# Patient Record
Sex: FEMALE | Race: WHITE | NOT HISPANIC OR LATINO | Employment: OTHER | URBAN - NONMETROPOLITAN AREA
[De-identification: names, ages, dates, MRNs, and addresses within clinical notes are randomized per-mention and may not be internally consistent; named-entity substitution may affect disease eponyms.]

---

## 2017-01-11 ENCOUNTER — OFFICE VISIT (OUTPATIENT)
Dept: FAMILY MEDICINE CLINIC | Facility: CLINIC | Age: 67
End: 2017-01-11

## 2017-01-11 VITALS
BODY MASS INDEX: 26.87 KG/M2 | WEIGHT: 133.3 LBS | DIASTOLIC BLOOD PRESSURE: 80 MMHG | HEART RATE: 94 BPM | SYSTOLIC BLOOD PRESSURE: 132 MMHG | HEIGHT: 59 IN | OXYGEN SATURATION: 98 %

## 2017-01-11 DIAGNOSIS — M25.551 RIGHT HIP PAIN: Primary | ICD-10-CM

## 2017-01-11 PROCEDURE — 99213 OFFICE O/P EST LOW 20 MIN: CPT | Performed by: FAMILY MEDICINE

## 2017-01-11 RX ORDER — ONDANSETRON 4 MG/1
4 TABLET, ORALLY DISINTEGRATING ORAL EVERY 8 HOURS PRN
Qty: 90 TABLET | Refills: 3 | Status: SHIPPED | OUTPATIENT
Start: 2017-01-11 | End: 2017-05-27

## 2017-01-11 RX ORDER — ACETAMINOPHEN 325 MG/1
650 TABLET ORAL EVERY 6 HOURS PRN
Qty: 240 TABLET | Refills: 5 | Status: SHIPPED | OUTPATIENT
Start: 2017-01-11 | End: 2017-02-07 | Stop reason: HOSPADM

## 2017-01-11 RX ORDER — CLINDAMYCIN HYDROCHLORIDE 150 MG/1
CAPSULE ORAL
Qty: 28 CAPSULE | Refills: 0 | Status: ON HOLD | OUTPATIENT
Start: 2017-01-11 | End: 2017-02-06 | Stop reason: SDUPTHER

## 2017-01-17 DIAGNOSIS — R11.2 NAUSEA AND VOMITING, INTRACTABILITY OF VOMITING NOT SPECIFIED, UNSPECIFIED VOMITING TYPE: Primary | ICD-10-CM

## 2017-01-17 RX ORDER — PROMETHAZINE HYDROCHLORIDE 25 MG/1
25 TABLET ORAL EVERY 8 HOURS PRN
Qty: 30 TABLET | Refills: 2 | Status: SHIPPED | OUTPATIENT
Start: 2017-01-17 | End: 2017-03-31 | Stop reason: HOSPADM

## 2017-01-20 ENCOUNTER — OFFICE VISIT (OUTPATIENT)
Dept: PAIN MEDICINE | Facility: CLINIC | Age: 67
End: 2017-01-20

## 2017-01-20 VITALS
SYSTOLIC BLOOD PRESSURE: 130 MMHG | WEIGHT: 132.5 LBS | DIASTOLIC BLOOD PRESSURE: 78 MMHG | HEIGHT: 61 IN | BODY MASS INDEX: 25.02 KG/M2

## 2017-01-20 DIAGNOSIS — M47.817 LUMBOSACRAL SPONDYLOSIS WITHOUT MYELOPATHY: ICD-10-CM

## 2017-01-20 DIAGNOSIS — M48.061 LUMBAR STENOSIS: Primary | ICD-10-CM

## 2017-01-20 DIAGNOSIS — M46.1 SACROILIITIS (HCC): ICD-10-CM

## 2017-01-20 DIAGNOSIS — Z79.899 HIGH RISK MEDICATIONS (NOT ANTICOAGULANTS) LONG-TERM USE: ICD-10-CM

## 2017-01-20 PROCEDURE — 99204 OFFICE O/P NEW MOD 45 MIN: CPT | Performed by: NURSE PRACTITIONER

## 2017-01-20 RX ORDER — HYDROCODONE BITARTRATE AND ACETAMINOPHEN 7.5; 325 MG/1; MG/1
1 TABLET ORAL 3 TIMES DAILY
Qty: 90 TABLET | Refills: 0 | Status: SHIPPED | OUTPATIENT
Start: 2017-01-20 | End: 2017-02-04 | Stop reason: HOSPADM

## 2017-01-20 NOTE — PROGRESS NOTES
"Nikki Felder is a 66 y.o. female.   1950    HPI:   Location: right hip  Quality: shooting, aching and sharp  Severity: 5/10  Timing: constant  Alleviating: pain medication and lying down  Aggravating: ambulating  and increased activity    Has b hip pain but right is significantly worse.  Has had this about 3 years and has been progressing.  Never had hip surgery.  States she has been told she will likely need a hip replacement in the future.  This was from a CT scan after a fall 2 weeks ago.  She is taking gabapentin 1200mg tid.  She has been on this dose for two years.  Was taking norco from Dr. Manley until she had an MI and admitted to the rehab floor.  Utilizes a walker.  She is on coumadin for hx of \"blood clots\".  Has received opioid from ER for hip pain in interim.  States that sitting improves her symptoms.. Describes occasional right leg radiation but uncommon.    CT from 1/17:  IMPRESSION-       1. Osteoporosis.  2. NO CT evidence of acute fracture.   3. Multilevel degenerative disease in the degenerative arthropathy of  the lumbar spine with severe acquired canal stenosis at L3-4.         The following portions of the patient's history were reviewed by me and updated as appropriate: allergies, current medications, past family history, past medical history, past social history, past surgical history and problem list.    Past Medical History   Diagnosis Date   • Acute bronchitis    • Allergic rhinitis    • Anxiety state    • Anxiety state      Anxiety state, unspecified      • Bacterial pneumonia    • Chronic pain    • Chronic pain      Other chronic pain      • Contact dermatitis    • Coronary arteriosclerosis    • Depressive disorder    • Depressive disorder    • Diabetes mellitus due to underlying condition with diabetic autonomic neuropathy      Diabetes mellitus due to underlying condition with diabetic autonomic (poly)neuropathy      • Drug therapy      Long-term drug therapy      • " Dysuria    • Encounter for immunization    • Encounter for screening for malignant neoplasm of colon    • Essential hypertension    • Folliculitis    • Headache    • Herpes zoster    • Herpes zoster with nervous system complication    • History of screening mammography    • Hyperlipidemia    • Insomnia    • Kidney stone    • Lumbar radiculopathy    • Migraine    • Migraine      Migraine, unspecified, without mention of intractable migraine, without mention of status migrainosus      • Nausea and vomiting    • Nausea with vomiting    • Neck pain    • Need for immunization against influenza    • Need for prophylactic vaccination and inoculation against diphtheria alone    • Need for prophylactic vaccination and inoculation against unspecified single disease      Need for prophylactic vaccination and inoculation against Streptococcus pneumoniae [pneumococcus] and influenza      • Non-alcoholic fatty liver disease    • Senile osteoporosis    • Shoulder pain    • Solitary pulmonary nodule present on computed tomography of lung    • Spasm of back muscles    • Tobacco dependence syndrome    • Type 2 diabetes mellitus    • Urinary tract infectious disease    • Viral gastroenteritis    • Vitamin D deficiency      Unspecified vitamin D deficiency      • Vitamin D deficiency        Social History     Social History   • Marital status:      Spouse name: N/A   • Number of children: N/A   • Years of education: N/A     Occupational History   • Not on file.     Social History Main Topics   • Smoking status: Former Smoker     Packs/day: 1.00     Years: 35.00   • Smokeless tobacco: Not on file      Comment: maybe 2 or 3 a day   • Alcohol use No   • Drug use: No   • Sexual activity: Defer     Other Topics Concern   • Not on file     Social History Narrative       Family History   Problem Relation Age of Onset   • Alzheimer's disease Mother    • Diabetes Brother    • Lung cancer Brother    • Alzheimer's disease Other    •  Diabetes Other    • Lung cancer Other    • Pulmonary embolism Other          Current Outpatient Prescriptions:   •  acetaminophen (TYLENOL) 325 MG tablet, Take 2 tablets by mouth Every 6 (Six) Hours As Needed for mild pain (1-3)., Disp: 240 tablet, Rfl: 5  •  albuterol (PROVENTIL HFA;VENTOLIN HFA) 108 (90 BASE) MCG/ACT inhaler, Inhale 2 puffs every 4 (four) hours as needed for wheezing., Disp: , Rfl:   •  atorvastatin (LIPITOR) 20 MG tablet, Take 20 mg by mouth every night., Disp: , Rfl:   •  carvedilol (COREG) 3.125 MG tablet, Take 3.125 mg by mouth 2 (two) times a day., Disp: , Rfl:   •  escitalopram (LEXAPRO) 10 MG tablet, Take 1 tablet by mouth Daily., Disp: 30 tablet, Rfl: 0  •  gabapentin (NEURONTIN) 400 MG capsule, Take 400 mg by mouth 3 (three) times a day., Disp: , Rfl:   •  gabapentin (NEURONTIN) 800 MG tablet, Take 800 mg by mouth 3 (three) times a day., Disp: , Rfl:   •  HYDROcodone-acetaminophen (NORCO) 7.5-325 MG per tablet, Take 1 tablet by mouth 3 (three) times a day as needed for moderate pain (4-6)., Disp: , Rfl:   •  ipratropium-albuterol (COMBIVENT RESPIMAT)  MCG/ACT inhaler, Inhale 2 puffs every 4 (four) hours as needed for wheezing., Disp: , Rfl:   •  nicotine (NICODERM CQ) 21 MG/24HR patch, Place 1 patch on the skin every day., Disp: , Rfl:   •  nitroglycerin (NITROSTAT) 0.4 MG SL tablet, Place 0.4-1.2 mg under the tongue as needed for chest pain. Take no more than 3 doses in 15 minutes., Disp: , Rfl:   •  ondansetron ODT (ZOFRAN ODT) 4 MG disintegrating tablet, Take 1 tablet by mouth Every 8 (Eight) Hours As Needed for nausea or vomiting., Disp: 90 tablet, Rfl: 3  •  warfarin (COUMADIN) 4 MG tablet, One tablet daily, Disp: 30 tablet, Rfl: 0  •  clindamycin (CLEOCIN) 150 MG capsule, 1 tab PO 4 x daily, Disp: 28 capsule, Rfl: 0  •  clonazePAM (KlonoPIN) 1 MG tablet, 1 mg po daily x 1 week then 1/2 po daily x 1 week then 1/2 po every other day, Disp: 14 tablet, Rfl: 0  •   HYDROcodone-acetaminophen (NORCO) 7.5-325 MG per tablet, Take 1 tablet by mouth 3 (Three) Times a Day for 30 days., Disp: 90 tablet, Rfl: 0  •  HYDROcodone-acetaminophen (NORCO) 7.5-325 MG per tablet, Take 1 tablet by mouth 3 (Three) Times a Day for 30 days., Disp: 90 tablet, Rfl: 0  •  promethazine (PHENERGAN) 25 MG tablet, Take 1 tablet by mouth Every 8 (Eight) Hours As Needed for nausea or vomiting., Disp: 30 tablet, Rfl: 2    Allergies   Allergen Reactions   • Corticosteroids          Review of Systems   Musculoskeletal:        Right hip pain      10 system review of systems was reviewed and negative except for above.    Physical Exam   Constitutional: She is oriented to person, place, and time. She appears well-developed and well-nourished. She does not appear ill. No distress.   HENT:   Head: Normocephalic and atraumatic.   Right Ear: Hearing normal.   Left Ear: Hearing normal.   Eyes: Conjunctivae and EOM are normal. Pupils are equal, round, and reactive to light.   Neck: Normal range of motion and full passive range of motion without pain. Neck supple. No muscular tenderness present. Normal range of motion present.   Cardiovascular: Normal rate, regular rhythm and normal heart sounds.    Pulmonary/Chest: Effort normal and breath sounds normal.   Abdominal: Soft. Bowel sounds are normal. There is no tenderness.   Musculoskeletal:        Lumbar back: She exhibits decreased range of motion (ext limited to 10 deg with right facet loading.), tenderness and pain.   Neurological: She is alert and oriented to person, place, and time. She has normal reflexes. She displays normal reflexes. No cranial nerve deficit or sensory deficit. She exhibits normal muscle tone. Coordination and gait (walker) normal.   Skin: Skin is warm and dry. No rash noted. No erythema.   Psychiatric: She has a normal mood and affect. Her behavior is normal. Judgment normal.   Vitals reviewed.      Nikki was seen today for hip  pain.    Diagnoses and all orders for this visit:    Lumbar stenosis    Sacroiliitis    Lumbosacral spondylosis without myelopathy    High risk medications (not anticoagulants) long-term use    Other orders  -     HYDROcodone-acetaminophen (NORCO) 7.5-325 MG per tablet; Take 1 tablet by mouth 3 (Three) Times a Day for 30 days.  -     HYDROcodone-acetaminophen (NORCO) 7.5-325 MG per tablet; Take 1 tablet by mouth 3 (Three) Times a Day for 30 days.        Medication: Patient reports no negative side effects, Patient reports appropriate usage and storage habits, Patient's opioid provides enough reflief to be more active and perform activities of daily living with less discomfort., Refill opioid medication as above and Opioid contract was read and discussed today and the patient chooses to sign.  Sounds like there was some issue with medications she was taking after hospital d/c.  I will request old records from [facility].    Interventional: none at this time.  Could be candidate for intervention.  However, continues in cardiac workup and is on coumadin.    Rehab: none at this time    Behavioral. [facility] Report # [id number] was reviewed and is consistent with stated history    Urine drug screen None at this time    ORT: 2    PHQ-9: 4          This document has been electronically signed by Yan Melendez MD on January 20, 2017 11:40 AM

## 2017-01-25 ENCOUNTER — TRANSCRIBE ORDERS (OUTPATIENT)
Dept: CARDIOLOGY | Facility: HOSPITAL | Age: 67
End: 2017-01-25

## 2017-01-25 DIAGNOSIS — I50.9 CHRONIC HEART FAILURE, UNSPECIFIED HEART FAILURE TYPE (HCC): Primary | ICD-10-CM

## 2017-02-02 ENCOUNTER — HOSPITAL ENCOUNTER (OUTPATIENT)
Facility: HOSPITAL | Age: 67
Setting detail: OBSERVATION
Discharge: HOME | End: 2017-02-04
Attending: EMERGENCY MEDICINE | Admitting: FAMILY MEDICINE

## 2017-02-02 ENCOUNTER — APPOINTMENT (OUTPATIENT)
Dept: GENERAL RADIOLOGY | Facility: HOSPITAL | Age: 67
End: 2017-02-02

## 2017-02-02 DIAGNOSIS — R07.9 CHEST PAIN AT REST: Primary | ICD-10-CM

## 2017-02-02 DIAGNOSIS — I10 ESSENTIAL HYPERTENSION, BENIGN: ICD-10-CM

## 2017-02-02 PROBLEM — R79.1 SUBTHERAPEUTIC INTERNATIONAL NORMALIZED RATIO (INR): Chronic | Status: ACTIVE | Noted: 2017-02-02

## 2017-02-02 PROBLEM — E11.9 DIABETES MELLITUS TYPE 2 IN NONOBESE (HCC): Chronic | Status: ACTIVE | Noted: 2017-02-02

## 2017-02-02 PROBLEM — F17.200 TOBACCO DEPENDENCE SYNDROME: Chronic | Status: ACTIVE | Noted: 2017-02-02

## 2017-02-02 LAB
ALBUMIN SERPL-MCNC: 4.4 G/DL (ref 3.4–4.8)
ALBUMIN/GLOB SERPL: 1.6 G/DL (ref 1.1–1.8)
ALP SERPL-CCNC: 99 U/L (ref 38–126)
ALT SERPL W P-5'-P-CCNC: 28 U/L (ref 9–52)
ANION GAP SERPL CALCULATED.3IONS-SCNC: 13 MMOL/L (ref 5–15)
AST SERPL-CCNC: 19 U/L (ref 14–36)
BASOPHILS # BLD AUTO: 0.03 10*3/MM3 (ref 0–0.2)
BASOPHILS NFR BLD AUTO: 0.6 % (ref 0–2)
BILIRUB SERPL-MCNC: 0.6 MG/DL (ref 0.2–1.3)
BUN BLD-MCNC: 5 MG/DL (ref 7–21)
BUN/CREAT SERPL: 8.5 (ref 7–25)
CALCIUM SPEC-SCNC: 9.4 MG/DL (ref 8.4–10.2)
CHLORIDE SERPL-SCNC: 107 MMOL/L (ref 95–110)
CK MB SERPL-CCNC: 1.63 NG/ML (ref 0–5)
CK MB SERPL-CCNC: 1.81 NG/ML (ref 0–5)
CK SERPL-CCNC: 36 U/L (ref 30–135)
CK SERPL-CCNC: 39 U/L (ref 30–135)
CO2 SERPL-SCNC: 21 MMOL/L (ref 22–31)
CREAT BLD-MCNC: 0.59 MG/DL (ref 0.5–1)
DEPRECATED RDW RBC AUTO: 43.8 FL (ref 36.4–46.3)
EOSINOPHIL # BLD AUTO: 0.07 10*3/MM3 (ref 0–0.7)
EOSINOPHIL NFR BLD AUTO: 1.3 % (ref 0–7)
ERYTHROCYTE [DISTWIDTH] IN BLOOD BY AUTOMATED COUNT: 13.3 % (ref 11.5–14.5)
GFR SERPL CREATININE-BSD FRML MDRD: 101 ML/MIN/1.73 (ref 45–104)
GLOBULIN UR ELPH-MCNC: 2.7 GM/DL (ref 2.3–3.5)
GLUCOSE BLD-MCNC: 146 MG/DL (ref 60–100)
HCT VFR BLD AUTO: 41.2 % (ref 35–45)
HGB BLD-MCNC: 14.4 G/DL (ref 12–15.5)
HOLD SPECIMEN: NORMAL
HOLD SPECIMEN: NORMAL
IMM GRANULOCYTES # BLD: 0.01 10*3/MM3 (ref 0–0.02)
IMM GRANULOCYTES NFR BLD: 0.2 % (ref 0–0.5)
INR PPP: 1.43 (ref 0.8–1.2)
LYMPHOCYTES # BLD AUTO: 2.12 10*3/MM3 (ref 0.6–4.2)
LYMPHOCYTES NFR BLD AUTO: 40.7 % (ref 10–50)
MCH RBC QN AUTO: 31.6 PG (ref 26.5–34)
MCHC RBC AUTO-ENTMCNC: 35 G/DL (ref 31.4–36)
MCV RBC AUTO: 90.5 FL (ref 80–98)
MONOCYTES # BLD AUTO: 0.26 10*3/MM3 (ref 0–0.9)
MONOCYTES NFR BLD AUTO: 5 % (ref 0–12)
NEUTROPHILS # BLD AUTO: 2.72 10*3/MM3 (ref 2–8.6)
NEUTROPHILS NFR BLD AUTO: 52.2 % (ref 37–80)
NT-PROBNP SERPL-MCNC: 577 PG/ML (ref 0–900)
PLATELET # BLD AUTO: 276 10*3/MM3 (ref 150–450)
PMV BLD AUTO: 9 FL (ref 8–12)
POTASSIUM BLD-SCNC: 3.6 MMOL/L (ref 3.5–5.1)
PROT SERPL-MCNC: 7.1 G/DL (ref 6.3–8.6)
PROTHROMBIN TIME: 17.3 SECONDS (ref 11.1–15.3)
RBC # BLD AUTO: 4.55 10*6/MM3 (ref 3.77–5.16)
SODIUM BLD-SCNC: 141 MMOL/L (ref 137–145)
TROPONIN I SERPL-MCNC: <0.012 NG/ML
WBC NRBC COR # BLD: 5.21 10*3/MM3 (ref 3.2–9.8)
WHOLE BLOOD HOLD SPECIMEN: NORMAL
WHOLE BLOOD HOLD SPECIMEN: NORMAL

## 2017-02-02 PROCEDURE — 63710000001 PROMETHAZINE PER 25 MG: Performed by: FAMILY MEDICINE

## 2017-02-02 PROCEDURE — 93005 ELECTROCARDIOGRAM TRACING: CPT | Performed by: EMERGENCY MEDICINE

## 2017-02-02 PROCEDURE — 96374 THER/PROPH/DIAG INJ IV PUSH: CPT

## 2017-02-02 PROCEDURE — 83880 ASSAY OF NATRIURETIC PEPTIDE: CPT | Performed by: EMERGENCY MEDICINE

## 2017-02-02 PROCEDURE — G0378 HOSPITAL OBSERVATION PER HR: HCPCS

## 2017-02-02 PROCEDURE — 71010 HC CHEST PA OR AP: CPT

## 2017-02-02 PROCEDURE — 25010000002 ONDANSETRON PER 1 MG: Performed by: EMERGENCY MEDICINE

## 2017-02-02 PROCEDURE — 99284 EMERGENCY DEPT VISIT MOD MDM: CPT

## 2017-02-02 PROCEDURE — 85610 PROTHROMBIN TIME: CPT | Performed by: EMERGENCY MEDICINE

## 2017-02-02 PROCEDURE — 82553 CREATINE MB FRACTION: CPT | Performed by: FAMILY MEDICINE

## 2017-02-02 PROCEDURE — 85025 COMPLETE CBC W/AUTO DIFF WBC: CPT | Performed by: EMERGENCY MEDICINE

## 2017-02-02 PROCEDURE — 25010000002 MORPHINE SULFATE (PF) 2 MG/ML SOLUTION: Performed by: EMERGENCY MEDICINE

## 2017-02-02 PROCEDURE — 84484 ASSAY OF TROPONIN QUANT: CPT | Performed by: EMERGENCY MEDICINE

## 2017-02-02 PROCEDURE — 84484 ASSAY OF TROPONIN QUANT: CPT | Performed by: FAMILY MEDICINE

## 2017-02-02 PROCEDURE — 82550 ASSAY OF CK (CPK): CPT | Performed by: FAMILY MEDICINE

## 2017-02-02 PROCEDURE — 96375 TX/PRO/DX INJ NEW DRUG ADDON: CPT

## 2017-02-02 PROCEDURE — 99220 PR INITIAL OBSERVATION CARE/DAY 70 MINUTES: CPT | Performed by: FAMILY MEDICINE

## 2017-02-02 PROCEDURE — 80053 COMPREHEN METABOLIC PANEL: CPT | Performed by: EMERGENCY MEDICINE

## 2017-02-02 PROCEDURE — 93010 ELECTROCARDIOGRAM REPORT: CPT | Performed by: INTERNAL MEDICINE

## 2017-02-02 RX ORDER — ACETAMINOPHEN, ASPIRIN AND CAFFEINE 250; 250; 65 MG/1; MG/1; MG/1
2 TABLET, FILM COATED ORAL EVERY 6 HOURS PRN
Status: DISCONTINUED | OUTPATIENT
Start: 2017-02-02 | End: 2017-02-03

## 2017-02-02 RX ORDER — GABAPENTIN 800 MG/1
800 TABLET ORAL 3 TIMES DAILY
Status: DISCONTINUED | OUTPATIENT
Start: 2017-02-02 | End: 2017-02-04 | Stop reason: HOSPADM

## 2017-02-02 RX ORDER — SODIUM CHLORIDE 0.9 % (FLUSH) 0.9 %
10 SYRINGE (ML) INJECTION AS NEEDED
Status: DISCONTINUED | OUTPATIENT
Start: 2017-02-02 | End: 2017-02-04 | Stop reason: HOSPADM

## 2017-02-02 RX ORDER — CLOPIDOGREL BISULFATE 75 MG/1
TABLET ORAL
Refills: 2 | Status: ON HOLD | COMMUNITY
Start: 2017-01-12 | End: 2017-03-31

## 2017-02-02 RX ORDER — PROMETHAZINE HYDROCHLORIDE 25 MG/1
25 TABLET ORAL EVERY 8 HOURS PRN
Status: DISCONTINUED | OUTPATIENT
Start: 2017-02-02 | End: 2017-02-04 | Stop reason: HOSPADM

## 2017-02-02 RX ORDER — ESCITALOPRAM OXALATE 10 MG/1
10 TABLET ORAL DAILY
Status: DISCONTINUED | OUTPATIENT
Start: 2017-02-02 | End: 2017-02-04 | Stop reason: HOSPADM

## 2017-02-02 RX ORDER — NITROGLYCERIN 0.4 MG/1
0.4 TABLET SUBLINGUAL
Status: DISCONTINUED | OUTPATIENT
Start: 2017-02-02 | End: 2017-02-02

## 2017-02-02 RX ORDER — WARFARIN SODIUM 5 MG/1
5 TABLET ORAL
Refills: 12 | COMMUNITY
Start: 2017-01-12 | End: 2017-02-04 | Stop reason: HOSPADM

## 2017-02-02 RX ORDER — HYDROCODONE BITARTRATE AND ACETAMINOPHEN 7.5; 325 MG/1; MG/1
1 TABLET ORAL 3 TIMES DAILY PRN
Status: DISCONTINUED | OUTPATIENT
Start: 2017-02-02 | End: 2017-02-04 | Stop reason: HOSPADM

## 2017-02-02 RX ORDER — ASPIRIN 325 MG
325 TABLET ORAL ONCE
Status: COMPLETED | OUTPATIENT
Start: 2017-02-02 | End: 2017-02-02

## 2017-02-02 RX ORDER — ONDANSETRON 2 MG/ML
4 INJECTION INTRAMUSCULAR; INTRAVENOUS ONCE
Status: COMPLETED | OUTPATIENT
Start: 2017-02-02 | End: 2017-02-02

## 2017-02-02 RX ORDER — ATORVASTATIN CALCIUM 20 MG/1
20 TABLET, FILM COATED ORAL NIGHTLY
Status: DISCONTINUED | OUTPATIENT
Start: 2017-02-02 | End: 2017-02-04 | Stop reason: HOSPADM

## 2017-02-02 RX ORDER — NICOTINE 21 MG/24HR
1 PATCH, TRANSDERMAL 24 HOURS TRANSDERMAL EVERY 24 HOURS
Status: DISCONTINUED | OUTPATIENT
Start: 2017-02-02 | End: 2017-02-04 | Stop reason: HOSPADM

## 2017-02-02 RX ORDER — CARVEDILOL 3.12 MG/1
3.12 TABLET ORAL 2 TIMES DAILY
Status: DISCONTINUED | OUTPATIENT
Start: 2017-02-02 | End: 2017-02-04 | Stop reason: HOSPADM

## 2017-02-02 RX ORDER — HYDROCODONE BITARTRATE AND ACETAMINOPHEN 5; 325 MG/1; MG/1
5 TABLET ORAL 3 TIMES DAILY PRN
Refills: 0 | COMMUNITY
Start: 2017-01-02 | End: 2017-02-04 | Stop reason: HOSPADM

## 2017-02-02 RX ORDER — CLOPIDOGREL BISULFATE 75 MG/1
75 TABLET ORAL DAILY
Status: DISCONTINUED | OUTPATIENT
Start: 2017-02-02 | End: 2017-02-04 | Stop reason: HOSPADM

## 2017-02-02 RX ORDER — ACETAMINOPHEN 325 MG/1
650 TABLET ORAL EVERY 6 HOURS PRN
Status: DISCONTINUED | OUTPATIENT
Start: 2017-02-02 | End: 2017-02-04 | Stop reason: HOSPADM

## 2017-02-02 RX ORDER — GABAPENTIN 400 MG/1
400 CAPSULE ORAL 3 TIMES DAILY
Status: DISCONTINUED | OUTPATIENT
Start: 2017-02-02 | End: 2017-02-04 | Stop reason: HOSPADM

## 2017-02-02 RX ORDER — MORPHINE SULFATE 2 MG/ML
2 INJECTION, SOLUTION INTRAMUSCULAR; INTRAVENOUS ONCE
Status: COMPLETED | OUTPATIENT
Start: 2017-02-02 | End: 2017-02-02

## 2017-02-02 RX ORDER — WARFARIN SODIUM 5 MG/1
5 TABLET ORAL NIGHTLY
Status: DISCONTINUED | OUTPATIENT
Start: 2017-02-02 | End: 2017-02-04 | Stop reason: HOSPADM

## 2017-02-02 RX ADMIN — CLOPIDOGREL BISULFATE 75 MG: 75 TABLET ORAL at 15:27

## 2017-02-02 RX ADMIN — PROMETHAZINE HYDROCHLORIDE 25 MG: 25 TABLET ORAL at 23:10

## 2017-02-02 RX ADMIN — MORPHINE SULFATE 2 MG: 2 INJECTION, SOLUTION INTRAMUSCULAR; INTRAVENOUS at 12:25

## 2017-02-02 RX ADMIN — WARFARIN SODIUM 5 MG: 5 TABLET ORAL at 20:59

## 2017-02-02 RX ADMIN — CARVEDILOL 3.12 MG: 3.12 TABLET, FILM COATED ORAL at 17:15

## 2017-02-02 RX ADMIN — ACETAMINOPHEN 650 MG: 325 TABLET ORAL at 18:34

## 2017-02-02 RX ADMIN — GABAPENTIN 800 MG: 800 TABLET, FILM COATED ORAL at 15:27

## 2017-02-02 RX ADMIN — HYDROCODONE BITARTRATE AND ACETAMINOPHEN 1 TABLET: 7.5; 325 TABLET ORAL at 23:10

## 2017-02-02 RX ADMIN — NITROGLYCERIN 1 INCH: 20 OINTMENT TOPICAL at 12:31

## 2017-02-02 RX ADMIN — ACETAMINOPHEN, ASPIRIN, CAFFEINE 2 TABLET: 250; 250; 65 TABLET ORAL at 21:35

## 2017-02-02 RX ADMIN — ONDANSETRON 4 MG: 2 INJECTION, SOLUTION INTRAMUSCULAR; INTRAVENOUS at 12:30

## 2017-02-02 RX ADMIN — HYDROCODONE BITARTRATE AND ACETAMINOPHEN 1 TABLET: 7.5; 325 TABLET ORAL at 15:27

## 2017-02-02 RX ADMIN — GABAPENTIN 800 MG: 800 TABLET, FILM COATED ORAL at 20:59

## 2017-02-02 RX ADMIN — GABAPENTIN 400 MG: 400 CAPSULE ORAL at 20:59

## 2017-02-02 RX ADMIN — NITROGLYCERIN 1 INCH: 20 OINTMENT TOPICAL at 17:14

## 2017-02-02 RX ADMIN — GABAPENTIN 400 MG: 400 CAPSULE ORAL at 15:27

## 2017-02-02 RX ADMIN — ATORVASTATIN CALCIUM 20 MG: 20 TABLET, FILM COATED ORAL at 20:59

## 2017-02-02 RX ADMIN — NICOTINE 1 PATCH: 21 PATCH TRANSDERMAL at 15:28

## 2017-02-02 RX ADMIN — ASPIRIN 325 MG: 325 TABLET, COATED ORAL at 12:00

## 2017-02-02 NOTE — ED PROVIDER NOTES
Subjective   Patient is a 66 y.o. female presenting with chest pain.   History provided by:  Patient  Chest Pain   Pain location:  Substernal area  Pain quality: dull and pressure    Pain radiates to:  R shoulder  Pain severity:  Moderate  Onset quality:  Gradual  Duration:  5 hours  Timing:  Constant  Progression:  Unchanged  Chronicity:  Recurrent  Context: at rest    Relieved by:  Nothing  Worsened by:  Nothing  Ineffective treatments:  Nitroglycerin  Associated symptoms: nausea and shortness of breath    Associated symptoms: no abdominal pain, no back pain, no cough, no diaphoresis, no dizziness, no fever, no headache, no numbness, no palpitations, no PND, no vomiting and no weakness    Risk factors: coronary artery disease, diabetes mellitus, high cholesterol, hypertension and smoking        Review of Systems   Constitutional: Negative for activity change, appetite change, chills, diaphoresis and fever.   HENT: Negative for congestion, ear pain and sore throat.    Eyes: Negative.  Negative for discharge and redness.   Respiratory: Positive for shortness of breath. Negative for cough and chest tightness.    Cardiovascular: Positive for chest pain. Negative for palpitations, leg swelling and PND.   Gastrointestinal: Positive for nausea. Negative for abdominal pain, diarrhea and vomiting.   Genitourinary: Negative for difficulty urinating, dysuria, flank pain and urgency.   Musculoskeletal: Negative.  Negative for arthralgias, back pain, joint swelling, myalgias and neck pain.   Skin: Negative.  Negative for color change and rash.   Neurological: Negative.  Negative for dizziness, seizures, speech difficulty, weakness, numbness and headaches.   Psychiatric/Behavioral: Negative for behavioral problems.   All other systems reviewed and are negative.      Past Medical History   Diagnosis Date   • Acute bronchitis    • Allergic    • Allergic rhinitis    • Anxiety state    • Anxiety state      Anxiety state,  unspecified      • Arthritis    • Bacterial pneumonia    • Chronic pain    • Chronic pain      Other chronic pain      • Contact dermatitis    • COPD (chronic obstructive pulmonary disease)    • Coronary arteriosclerosis    • Depressive disorder    • Depressive disorder    • Diabetes mellitus due to underlying condition with diabetic autonomic neuropathy      Diabetes mellitus due to underlying condition with diabetic autonomic (poly)neuropathy      • Drug therapy      Long-term drug therapy      • Dysuria    • Encounter for immunization    • Encounter for screening for malignant neoplasm of colon    • Essential hypertension    • Folliculitis    • Headache    • Heart murmur    • Herpes zoster    • Herpes zoster with nervous system complication    • History of screening mammography    • Hyperlipidemia    • Insomnia    • Insomnia    • Kidney stone    • Lumbar radiculopathy    • Lung nodule    • Migraine    • Migraine      Migraine, unspecified, without mention of intractable migraine, without mention of status migrainosus      • Migraine    • Myocardial infarction    • Nausea and vomiting    • Nausea with vomiting    • Neck pain    • Need for immunization against influenza    • Need for prophylactic vaccination and inoculation against diphtheria alone    • Need for prophylactic vaccination and inoculation against unspecified single disease      Need for prophylactic vaccination and inoculation against Streptococcus pneumoniae [pneumococcus] and influenza      • Non-alcoholic fatty liver disease    • Rectus sheath hematoma    • Senile osteoporosis    • Shoulder pain    • Solitary pulmonary nodule present on computed tomography of lung    • Spasm of back muscles    • Spinal stenosis of lumbar region    • Systolic congestive heart failure    • TIA (transient ischemic attack)    • Tobacco dependence syndrome    • Type 2 diabetes mellitus    • Urinary tract infectious disease    • Viral gastroenteritis    • Vitamin D  deficiency      Unspecified vitamin D deficiency      • Vitamin D deficiency        Allergies   Allergen Reactions   • Corticosteroids        Past Surgical History   Procedure Laterality Date   • Cardiac catheterization       plaque noted ef 55% 2010    • Cholecystectomy     • Dilatation and curettage  1975   • Injection of medication  2015      Phenergan (3)      • Injection of medication       Kenalog (1)      2011    • Injection of medication  2014     Toradol (3)   • Tubal abdominal ligation        [facility] tubal ligation 1981    • Injection of medication  2011      Vistaril (1)    • Injection of medication  2012     Zofran (1)   •  section        Low cervical  1981       • Dilatation and curettage  1975       Family History   Problem Relation Age of Onset   • Alzheimer's disease Mother    • Diabetes Brother    • Alzheimer's disease Other    • Diabetes Other    • Lung cancer Other    • Pulmonary embolism Other    • Hypertension Father    • Depression Daughter    • Hypertension Daughter    • Anxiety disorder Daughter    • Lung cancer Brother        Social History     Social History   • Marital status:      Spouse name: N/A   • Number of children: N/A   • Years of education: N/A     Social History Main Topics   • Smoking status: Former Smoker     Packs/day: 1.00     Years: 45.00   • Smokeless tobacco: Never Used      Comment: maybe 2 or 3 a day   • Alcohol use No   • Drug use: No   • Sexual activity: Defer     Other Topics Concern   • None     Social History Narrative    Previously worked in [employer] at [facility] [facility] [place].  Lives with daughter.            Objective   Physical Exam   Constitutional: She is oriented to person, place, and time. She appears well-developed and well-nourished.   HENT:   Head: Normocephalic and atraumatic.   Right Ear: External ear normal.   Left Ear: External ear normal.   Nose: Nose normal.   Mouth/Throat:  Oropharynx is clear and moist.   Eyes: Conjunctivae and EOM are normal. Pupils are equal, round, and reactive to light.   Neck: Normal range of motion. Neck supple.   Cardiovascular: Normal rate, regular rhythm, normal heart sounds and intact distal pulses.    Pulmonary/Chest: Effort normal and breath sounds normal.   Abdominal: Soft. Bowel sounds are normal.   Musculoskeletal: Normal range of motion.   Neurological: She is alert and oriented to person, place, and time. She has normal reflexes.   Skin: Skin is warm and dry.   Psychiatric: She has a normal mood and affect. Her behavior is normal.   Nursing note and vitals reviewed.      ECG 12 Lead    Date/Time: 2/2/2017 11:15 AM  Performed by: JUAN A BOTELLO  Authorized by: JUAN A BOTELLO   Interpreted by physician  Comparison: compared with previous ECG   Similar to previous ECG  Rhythm: sinus rhythm  Rate: normal  QRS axis: normal  Conduction: conduction normal  T depression: V2, V3, V4, V5 and V6  Q waves: V1 and V2  Clinical impression: abnormal ECG                 ED Course  ED Course   Comment By Time    Patient with chest pain and HEART score of 7. Patient was given nitro, asa and pain meds. She is admitted to Dr. Stewart. She is improved.  Juan A Botello MD 02/03 0606      Labs Reviewed   COMPREHENSIVE METABOLIC PANEL - Abnormal; Notable for the following:        Result Value    Glucose 146 (*)     BUN 5 (*)     CO2 21.0 (*)     All other components within normal limits   PROTIME-INR - Abnormal; Notable for the following:     Protime 17.3 (*)     INR 1.43 (*)     All other components within normal limits    Narrative:     Therapeutic range for most indications is 2.0-3.0 INR,  or 2.5-3.5 for mechanical heart valves.   TROPONIN (IN-HOUSE) - Normal   BNP (IN-HOUSE) - Normal   CBC WITH AUTO DIFFERENTIAL - Normal   TROPONIN (IN-HOUSE) - Normal   CK TOTAL AND CKMB - Normal   CK TOTAL AND CKMB - Normal   TROPONIN (IN-HOUSE) - Normal   RAINBOW DRAW     Narrative:     The following orders were created for panel order [facility] Draw.  Procedure                               Abnormality         Status                     ---------                               -----------         ------                     Light Blue Top[[id number]]                                    Final result               Green Top (Gel)[[id number]]                                   Final result               Lavender Top[[id number]]                                      Final result               Gold Top - SST[[id number]]                                    Final result                 Please view results for these tests on the individual orders.   CBC (NO DIFF)   COMPREHENSIVE METABOLIC PANEL   PROTIME-INR   CBC AND DIFFERENTIAL    Narrative:     The following orders were created for panel order CBC & Differential.  Procedure                               Abnormality         Status                     ---------                               -----------         ------                     CBC Auto Differential[[id number]]         Normal              Final result                 Please view results for these tests on the individual orders.   LIGHT BLUE TOP   GREEN TOP   LAVENDER TOP   GOLD TOP - SST     Xr Humerus Right    Result Date: 1/8/2017  Narrative: Patient Name-  GINO TAVARES Patient ID-  [id number] Ordering-  EROS CHÁVEZ MD Attending-  DR VANN  ------------------------------------------------  DATE OF EXAM- 1/8/2017 1-45 PM CST  PROCEDURE- HUMERUS MIN 2 VIEWS RIGHT  INDICATION FOR PROCEDURE- 66 years old patient presents for evaluation of right-sided arm injury after fall..  TECHNIQUE-  Two views of the right arm are submitted for interpretation.  COMPARISON-  None  FINDINGS-  There is soft tissue contusion of the mid arm is soft tissue swelling. The humerus has a normal appearance without obvious fracture. There may be an acromial spur at the shoulder. There are mild degenerative changes  at the elbow.  IMPRESSION-  Soft tissue contusion without evidence for acute fracture currently. If there is still clinical concern for acute fracture, suggest follow-up radiographs in 7-10 days to detect a healing radiographically occult fracture.  Electronically Signed By- Gael Whitlock MD On: 2017-01-08 16:14:13    Ct Pelvis Without Contrast    Result Date: 1/8/2017  Narrative: Patient Name-  GINO TAVARES Patient ID-  [id number] Ordering-  EROS CHÁVEZ MD Attending-  DR VANN ------------------------------------------------  DATE OF EXAM- 1/8/2017 2-20 PM CST  PROCEDURE- CT-PELVIS NO CONTRAST  INDICATION FOR PROCEDURE- 66 years old patient presents for evaluation of pelvic pain with possible fracture after falling..  TECHNIQUE-  Contiguous axial images were obtained of the bony pelvis. Multiplanar reformations are submitted for interpretation. Dose length product is 282. Images were acquired in accordance with the principles of ALARA (as low as reasonably allowable).  COMPARISON-  Radiographs of the pelvis dated January 8, 2017.  FINDINGS-  There is grade 1 spondylolisthesis at L3-4 and L4-5. There is multilevel degenerative arthropathy of facet joints of the lumbar spine. There is a high-grade central canal stenosis at L3-4.  The visualized sacrum does not have fracture. Superior and inferior pubic rami are within normal limits. The iliac wings have a grossly normal appearance.  Visualized proximal femurs have a normal appearance. There is mild degenerative arthropathy of both hips with degenerative changes of the pubic symphysis.  There are scattered colonic diverticula. There is no obvious organomegaly, mass or dilated. Skeletal muscles is seen of the pelvis and proximal thighs through the normal limits.  IMPRESSION-   1.  Osteoporosis. 2.  NO CT evidence of acute fracture. 3.  Multilevel degenerative disease in the degenerative arthropathy of the lumbar spine with severe acquired canal stenosis at  L3-4.  Electronically Signed By- Gale Whitlock MD On: 2017-01-08 17:09:50    Xr Chest 1 View    Result Date: 2/2/2017  Narrative: PROCEDURE: Single chest view AP REASON FOR EXAM:Chest Pain triage protocol FINDINGS: Comparison study dated January 2, 2017. Cardiac and pulmonary vasculature are normal. Left upper lung field small calcified lung parenchymal granuloma consistent with old granulomatous disease. Lungs are otherwise clear. Pleural spaces are normal. No acute osseous abnormality.     Impression: 1.  Evidence of old granulomatous disease. 2.  Otherwise unremarkable chest. Electronically signed by:  Mason Franco  2/2/2017 12:03 PM CST     Xr Pelvis 1 Or 2 View    Result Date: 1/8/2017  Narrative: Patient Name-  GINO TAVARES Patient ID-  [id number] Ordering-  EROS CHÁVEZ MD Attending-  DR VANN ------------------------------------------------  DATE OF EXAM- 1/8/2017 1-45 PM CST  PROCEDURE- PELVIS ONE OR TWO VIEWS  INDICATION FOR PROCEDURE- 66 years old patient presents for evaluation of pelvic pain after fall..  TECHNIQUE-  AP radiograph of the pelvis is submitted for interpretation.  COMPARISON-  Radiograph of pelvis dated January 2, 2017.  FINDINGS-  The bones appear osteopenic. Sacrum is obscured by bowel gas. Iliac wings have a grossly normal appearance. There are bilateral acetabular spurs. There is mild narrowing of both hip joints. The proximal right femur has a subtle lucency at the intertrochanteric area and undisplaced fracture cannot be excluded.  A transverse lucency in the region of the proximal left femur is thought to be related to overlying soft tissues.  The proximal left femur has a grossly normal appearance.  IMPRESSION-   1.  Osteopenia. 2.  Questionable undisplaced fracture of the right greater trochanter.  Electronically Signed By- Gale Whitlock MD On: 2017-01-08 16:17:07          HEART Score  History: Highly suspicious (+2)  ECG: Non specific repolarization disturbance (+1)  Age:  Greater than or equal to 65 (+2)  Risk Factors: 3 or more risk factors OR history of atherosclerotic disease (+2)  Troponin: Normal limit or lower (+0)  Total: 7         MDM    Final diagnoses:   Chest pain at rest   Essential hypertension, benign            Juan A Muhammad MD  02/03/17 [id number]

## 2017-02-03 DIAGNOSIS — F32.A DEPRESSION, UNSPECIFIED DEPRESSION TYPE: ICD-10-CM

## 2017-02-03 PROBLEM — R51.9 ACUTE INTRACTABLE HEADACHE: Status: ACTIVE | Noted: 2017-02-03

## 2017-02-03 LAB
ALBUMIN SERPL-MCNC: 3.2 G/DL (ref 3.4–4.8)
ALBUMIN/GLOB SERPL: 1.3 G/DL (ref 1.1–1.8)
ALP SERPL-CCNC: 74 U/L (ref 38–126)
ALT SERPL W P-5'-P-CCNC: 33 U/L (ref 9–52)
ANION GAP SERPL CALCULATED.3IONS-SCNC: 5 MMOL/L (ref 5–15)
AST SERPL-CCNC: 20 U/L (ref 14–36)
BILIRUB SERPL-MCNC: 0.3 MG/DL (ref 0.2–1.3)
BUN BLD-MCNC: 14 MG/DL (ref 7–21)
BUN/CREAT SERPL: 19.2 (ref 7–25)
CALCIUM SPEC-SCNC: 8.4 MG/DL (ref 8.4–10.2)
CHLORIDE SERPL-SCNC: 109 MMOL/L (ref 95–110)
CO2 SERPL-SCNC: 25 MMOL/L (ref 22–31)
CREAT BLD-MCNC: 0.73 MG/DL (ref 0.5–1)
DEPRECATED RDW RBC AUTO: 46.3 FL (ref 36.4–46.3)
ERYTHROCYTE [DISTWIDTH] IN BLOOD BY AUTOMATED COUNT: 13.8 % (ref 11.5–14.5)
GFR SERPL CREATININE-BSD FRML MDRD: 79 ML/MIN/1.73 (ref 60–104)
GLOBULIN UR ELPH-MCNC: 2.4 GM/DL (ref 2.3–3.5)
GLUCOSE BLD-MCNC: 133 MG/DL (ref 60–100)
HCT VFR BLD AUTO: 37.9 % (ref 35–45)
HGB BLD-MCNC: 12.7 G/DL (ref 12–15.5)
INR PPP: 1.42 (ref 0.8–1.2)
MCH RBC QN AUTO: 31.1 PG (ref 26.5–34)
MCHC RBC AUTO-ENTMCNC: 33.5 G/DL (ref 31.4–36)
MCV RBC AUTO: 92.9 FL (ref 80–98)
PLATELET # BLD AUTO: 229 10*3/MM3 (ref 150–450)
PMV BLD AUTO: 8.5 FL (ref 8–12)
POTASSIUM BLD-SCNC: 4.3 MMOL/L (ref 3.5–5.1)
PROT SERPL-MCNC: 5.6 G/DL (ref 6.3–8.6)
PROTHROMBIN TIME: 17.2 SECONDS (ref 11.1–15.3)
RBC # BLD AUTO: 4.08 10*6/MM3 (ref 3.77–5.16)
SODIUM BLD-SCNC: 139 MMOL/L (ref 137–145)
WBC NRBC COR # BLD: 4.22 10*3/MM3 (ref 3.2–9.8)

## 2017-02-03 PROCEDURE — 99225 PR SBSQ OBSERVATION CARE/DAY 25 MINUTES: CPT | Performed by: FAMILY MEDICINE

## 2017-02-03 PROCEDURE — 85610 PROTHROMBIN TIME: CPT | Performed by: FAMILY MEDICINE

## 2017-02-03 PROCEDURE — 25010000002 KETOROLAC TROMETHAMINE PER 15 MG: Performed by: FAMILY MEDICINE

## 2017-02-03 PROCEDURE — G0378 HOSPITAL OBSERVATION PER HR: HCPCS

## 2017-02-03 PROCEDURE — 85027 COMPLETE CBC AUTOMATED: CPT | Performed by: FAMILY MEDICINE

## 2017-02-03 PROCEDURE — 96375 TX/PRO/DX INJ NEW DRUG ADDON: CPT

## 2017-02-03 PROCEDURE — 63710000001 PROMETHAZINE PER 25 MG: Performed by: FAMILY MEDICINE

## 2017-02-03 PROCEDURE — 80053 COMPREHEN METABOLIC PANEL: CPT | Performed by: FAMILY MEDICINE

## 2017-02-03 RX ORDER — ESCITALOPRAM OXALATE 10 MG/1
10 TABLET ORAL DAILY
Qty: 30 TABLET | Refills: 5 | Status: SHIPPED | OUTPATIENT
Start: 2017-02-03 | End: 2017-03-31 | Stop reason: HOSPADM

## 2017-02-03 RX ORDER — BUTALBITAL, ACETAMINOPHEN AND CAFFEINE 50; 325; 40 MG/1; MG/1; MG/1
1 TABLET ORAL EVERY 4 HOURS PRN
Status: DISCONTINUED | OUTPATIENT
Start: 2017-02-03 | End: 2017-02-04 | Stop reason: HOSPADM

## 2017-02-03 RX ORDER — KETOROLAC TROMETHAMINE 15 MG/ML
15 INJECTION, SOLUTION INTRAMUSCULAR; INTRAVENOUS ONCE
Status: COMPLETED | OUTPATIENT
Start: 2017-02-03 | End: 2017-02-03

## 2017-02-03 RX ADMIN — GABAPENTIN 400 MG: 400 CAPSULE ORAL at 08:31

## 2017-02-03 RX ADMIN — GABAPENTIN 400 MG: 400 CAPSULE ORAL at 20:05

## 2017-02-03 RX ADMIN — CARVEDILOL 3.12 MG: 3.12 TABLET, FILM COATED ORAL at 08:31

## 2017-02-03 RX ADMIN — HYDROCODONE BITARTRATE AND ACETAMINOPHEN 1 TABLET: 7.5; 325 TABLET ORAL at 15:50

## 2017-02-03 RX ADMIN — ESCITALOPRAM OXALATE 10 MG: 10 TABLET ORAL at 17:41

## 2017-02-03 RX ADMIN — CLOPIDOGREL BISULFATE 75 MG: 75 TABLET ORAL at 08:31

## 2017-02-03 RX ADMIN — PROMETHAZINE HYDROCHLORIDE 25 MG: 25 TABLET ORAL at 15:50

## 2017-02-03 RX ADMIN — Medication 3 ML: at 12:13

## 2017-02-03 RX ADMIN — GABAPENTIN 400 MG: 400 CAPSULE ORAL at 15:50

## 2017-02-03 RX ADMIN — ATORVASTATIN CALCIUM 20 MG: 20 TABLET, FILM COATED ORAL at 20:05

## 2017-02-03 RX ADMIN — GABAPENTIN 800 MG: 800 TABLET, FILM COATED ORAL at 08:31

## 2017-02-03 RX ADMIN — NICOTINE 1 PATCH: 21 PATCH TRANSDERMAL at 15:50

## 2017-02-03 RX ADMIN — WARFARIN SODIUM 5 MG: 5 TABLET ORAL at 20:05

## 2017-02-03 RX ADMIN — HYDROCODONE BITARTRATE AND ACETAMINOPHEN 1 TABLET: 7.5; 325 TABLET ORAL at 23:44

## 2017-02-03 RX ADMIN — KETOROLAC TROMETHAMINE 15 MG: 15 INJECTION, SOLUTION INTRAMUSCULAR; INTRAVENOUS at 12:13

## 2017-02-03 RX ADMIN — GABAPENTIN 800 MG: 800 TABLET, FILM COATED ORAL at 20:05

## 2017-02-03 RX ADMIN — CARVEDILOL 3.12 MG: 3.12 TABLET, FILM COATED ORAL at 17:41

## 2017-02-03 RX ADMIN — PROMETHAZINE HYDROCHLORIDE 25 MG: 25 TABLET ORAL at 07:58

## 2017-02-03 RX ADMIN — HYDROCODONE BITARTRATE AND ACETAMINOPHEN 1 TABLET: 7.5; 325 TABLET ORAL at 07:58

## 2017-02-03 RX ADMIN — BUTALBITAL, ACETAMINOPHEN, AND CAFFEINE 1 TABLET: 50; 325; 40 TABLET ORAL at 10:21

## 2017-02-03 RX ADMIN — GABAPENTIN 800 MG: 800 TABLET, FILM COATED ORAL at 15:50

## 2017-02-03 NOTE — PLAN OF CARE
Problem: Patient Care Overview (Adult)  Goal: Plan of Care Review  Outcome: Ongoing (interventions implemented as appropriate)    02/02/17 1822   Coping/Psychosocial Response Interventions   Plan Of Care Reviewed With patient   Patient Care Overview   Progress no change   Outcome Evaluation   Outcome Summary/Follow up Plan ce(-) but still c/o chest pain 5, waiting for kodi       Goal: Adult Individualization and Mutuality  Outcome: Ongoing (interventions implemented as appropriate)  Goal: Discharge Needs Assessment  Outcome: Ongoing (interventions implemented as appropriate)    Problem: Acute Coronary Syndrome (ACS) (Adult)  Goal: Signs and Symptoms of Listed Potential Problems Will be Absent or Manageable (Acute Coronary Syndrome)  Outcome: Ongoing (interventions implemented as appropriate)

## 2017-02-03 NOTE — PLAN OF CARE
Problem: Patient Care Overview (Adult)  Goal: Plan of Care Review  Outcome: Ongoing (interventions implemented as appropriate)    02/03/17 0608   Coping/Psychosocial Response Interventions   Plan Of Care Reviewed With patient   Patient Care Overview   Progress improving       Goal: Adult Individualization and Mutuality  Outcome: Ongoing (interventions implemented as appropriate)  Goal: Discharge Needs Assessment  Outcome: Ongoing (interventions implemented as appropriate)    Problem: Acute Coronary Syndrome (ACS) (Adult)  Goal: Signs and Symptoms of Listed Potential Problems Will be Absent or Manageable (Acute Coronary Syndrome)  Outcome: Ongoing (interventions implemented as appropriate)

## 2017-02-03 NOTE — CONSULTS
Discharge Planning Assessment  [facility]     Patient Name: Nikki Felder  MRN: [id number]  Today's Date: 2/3/2017    Admit Date: 2/2/2017          Discharge Needs Assessment       02/03/17 1439    Discharge Needs Assessment    Discharge Planning Comments Pt. states that she has mc part D which covers her meds after deductibles and she is able to get her meds.       02/03/17 1434    Living Environment    Lives With child(xavi), adult    Living Arrangements mobile home    Quality Of Family Relationships supportive    Able to Return to Prior Living Arrangements yes    Living Arrangement Comments Pt. was indep. prior., still drives.She uses a SW and shower chair for assist.     Discharge Needs Assessment    Concerns To Be Addressed other (see comments)    Concerns Comments pt. would like referral to anticoag. cliniic at     Outpatient/Agency/Support Group Needs clinic(s) (specify)    Community Agency Name(S) anticoag. clinic at [facility].     Equipment Needed After Discharge walker, standard;shower chair    Transportation Available family or friend will provide            Discharge Plan       02/03/17 1439    Case Management/Social Work Plan    Plan home with family    Patient/Family In Agreement With Plan yes      02/03/17 1430    Case Management/Social Work Plan    Plan home with daughter    Patient/Family In Agreement With Plan yes    Additional Comments Pt. and daughter would like to make sure that she has arrangements made for the coumadin clinic at d/c. Spoke with Dennise LION, and she will follow up.        Discharge Placement     No information found                Demographic Summary       02/03/17 1432    Referral Information    Admission Type observation    Referral Source physician    Reason For Consult discharge planning    Record Reviewed medical record    Referral Information Comments Pt. and daughter voice no needs, except needing MD to make referral to anticoag. clinic at d/c.              Functional Status     None            Psychosocial     None            Abuse/Neglect     None            Legal     None            Substance Abuse     None            Patient Forms     None          Kajal Manrique RN

## 2017-02-03 NOTE — PROGRESS NOTES
FAMILY MEDICINE PROGRESS NOTE  NAME: Nikki Felder  : 1950  MRN: [id number]     LOS: 0 days   Full Code  PROVIDER OF SERVICE:     Chief Complaint:  Atherosclerosis of native coronary artery of native heart with angina pectoris    Subjective     Interval History:  History taken from: patient  Subjective: Patient is a 67 yo  female who has known coronary artery disease and was admitted for angina.  She is still complaining of chest pain off/on.  She is still having a headache, the tylenol and excedrin did not help.    Review of Systems:   Review of Systems   Constitutional: Positive for chills and fatigue. Negative for activity change, appetite change and fever.   HENT: Positive for congestion and hearing loss.    Respiratory: Positive for cough and shortness of breath.    Cardiovascular: Positive for chest pain. Negative for palpitations and leg swelling.   Gastrointestinal: Positive for nausea. Negative for abdominal pain, constipation and vomiting.   Musculoskeletal: Positive for arthralgias and back pain.   Neurological: Positive for headaches.   Psychiatric/Behavioral: Positive for dysphoric mood. The patient is nervous/anxious.        Objective     Vital Signs  Temp:  [96.5 °F (35.8 °C)-98.7 °F (37.1 °C)] 96.7 °F (35.9 °C)  Heart Rate:  [59-81] 71  Resp:  [14-22] 20  BP: (126-182)/(67-89) 134/82    Physical Exam  Physical Exam   Constitutional: She is oriented to person, place, and time. She appears well-developed and well-nourished.   HENT:   Head: Normocephalic and atraumatic.   Cardiovascular: Normal rate, regular rhythm and intact distal pulses.  Exam reveals no gallop and no friction rub.    Murmur heard.  Pulmonary/Chest: Effort normal and breath sounds normal. No respiratory distress. She has no wheezes. She has no rales.   Abdominal: Soft. Bowel sounds are normal. She exhibits no distension. There is no tenderness. There is no rebound and no guarding.   Neurological: She is alert and  oriented to person, place, and time.   Psychiatric: She has a normal mood and affect. Her behavior is normal. Judgment and thought content normal.   Nursing note and vitals reviewed.      Medication Review    Current Facility-Administered Medications:   •  acetaminophen (TYLENOL) tablet 650 mg, 650 mg, Oral, Q6H PRN, Yasmin Stewart MD, 650 mg at 02/02/17 1834  •  atorvastatin (LIPITOR) tablet 20 mg, 20 mg, Oral, Nightly, Yasmin Stewart MD, 20 mg at 02/02/17 2059  •  butalbital-acetaminophen-caffeine (FIORICET, ESGIC) -40 MG per tablet 1 tablet, 1 tablet, Oral, Q4H PRN, Yasmin Stewart MD, 1 tablet at 02/03/17 1021  •  carvedilol (COREG) tablet 3.125 mg, 3.125 mg, Oral, BID, Yasmin Stewart MD, 3.125 mg at 02/03/17 0831  •  clopidogrel (PLAVIX) tablet 75 mg, 75 mg, Oral, Daily, Yasmin Stewart MD, 75 mg at 02/03/17 0831  •  escitalopram (LEXAPRO) tablet 10 mg, 10 mg, Oral, Daily, Yasmin Stewart MD, 10 mg at 02/02/17 1532  •  gabapentin (NEURONTIN) capsule 400 mg, 400 mg, Oral, TID, Yasmin Stewart MD, 400 mg at 02/03/17 0831  •  gabapentin (NEURONTIN) tablet 800 mg, 800 mg, Oral, TID, Yasmin Stewart MD, 800 mg at 02/03/17 0831  •  HYDROcodone-acetaminophen (NORCO) 7.5-325 MG per tablet 1 tablet, 1 tablet, Oral, TID PRN, Yasmin Stewart MD, 1 tablet at 02/03/17 0758  •  nicotine (NICODERM CQ) 21 MG/24HR patch 1 patch, 1 patch, Transdermal, Q24H, Yasmin Stewart MD, 1 patch at 02/02/17 1528  •  nitroglycerin (NITROSTAT) ointment 1 inch, 1 inch, Topical, Q6H, Yasmin Stewart MD, 1 inch at 02/02/17 1714  •  promethazine (PHENERGAN) tablet 25 mg, 25 mg, Oral, Q8H PRN, Yasmin Stewart MD, 25 mg at 02/03/17 7491  •  sodium chloride 0.9 % flush 10 mL, 10 mL, Intravenous, PRN, Juan A Muhammad MD  •  warfarin (COUMADIN) tablet 5 mg, 5 mg, Oral, Nightly, Yasmin Stewart MD, 5 mg at 02/02/17 7972     Diagnostic Data      I reviewed the patient's new clinical results.      Assessment/Plan     Active Hospital Problems (** Indicates  Principal Problem)    Diagnosis Date Noted   • **Atherosclerosis of native coronary artery of native heart with angina pectoris [I25.119] 10/08/2016     Priority: High   • Acute intractable headache [R51] 02/03/2017   • Chest pain at rest [R07.9] 02/02/2017   • Subtherapeutic international normalized ratio (INR) [R79.1] 02/02/2017   • Tobacco dependence syndrome [F17.200] 02/02/2017   • Diabetes mellitus type 2 in nonobese [E11.9] 02/02/2017      Resolved Hospital Problems    Diagnosis Date Noted Date Resolved   No resolved problems to display.     Waiting for cardiology recommendations.  Refer outpatient for coumadin clinic, daughter now states she can take her.  Try fioricet for headache, excedrin and tylenol did not help.  Recommend smoking cessation.    DVT prophylaxis: Coumadin      Disposition:Home           This document has been electronically signed by Yasmin Stewart MD on February 3, 2017 11:06 AM

## 2017-02-03 NOTE — H&P
<principal problem not specified>  Subjective:     Nikki Felder is a 66 y.o. female who presents for chest pain.  Patient has known coronary artery disease and has prior MI's last fall.  Patient has been stented multiple times.  Patient had previously been noncompliant with her medication which caused her to stents to close.  Patient states the pain started at 7 AM this morning.  Patient took 3 nitroglycerin glycerin without relief.  Patient states the pain radiated to her right shoulder.  Patient describes the pain as sharp, steady, and constant.  Patient got nauseated but did not vomit.  Patient denies any diaphoresis.  She did get really short of breath.  Patient states when she arrived in the ER her pain was improved with morphine and nitroglycerin.  It did not take the pain away but it did dull it.  Patient states that she's not had her INR checked since January 8.  Patient states she was unable to get it checked in [place] due to the doctor refusing.  Patient continues to smoke 2 or 3 cigarettes a day.  Patient denies any hemoptysis.  Patient denies any fever or chills.  Patient states that Dr. Martinez told her that she would possibly need a pacemaker due to a low ejection fraction.  Patient is scheduled for a MUGA scan on February 9.  Patient states she has been taking her Coumadin every day.     The following portions of the patient's history were reviewed and updated as appropriate: allergies, current medications, past family history, past medical history, past social history, past surgical history and problem list.    Concurrent Medical Hx:  Past Medical History   Diagnosis Date   • Acute bronchitis    • Allergic    • Allergic rhinitis    • Anxiety state    • Anxiety state      Anxiety state, unspecified      • Arthritis    • Bacterial pneumonia    • Chronic pain    • Chronic pain      Other chronic pain      • Contact dermatitis    • COPD (chronic obstructive pulmonary disease)    • Coronary  arteriosclerosis    • Depressive disorder    • Depressive disorder    • Diabetes mellitus due to underlying condition with diabetic autonomic neuropathy      Diabetes mellitus due to underlying condition with diabetic autonomic (poly)neuropathy      • Drug therapy      Long-term drug therapy      • Dysuria    • Encounter for immunization    • Encounter for screening for malignant neoplasm of colon    • Essential hypertension    • Folliculitis    • Headache    • Heart murmur    • Herpes zoster    • Herpes zoster with nervous system complication    • History of screening mammography    • Hyperlipidemia    • Insomnia    • Insomnia    • Kidney stone    • Lumbar radiculopathy    • Lung nodule    • Migraine    • Migraine      Migraine, unspecified, without mention of intractable migraine, without mention of status migrainosus      • Migraine    • Myocardial infarction    • Nausea and vomiting    • Nausea with vomiting    • Neck pain    • Need for immunization against influenza    • Need for prophylactic vaccination and inoculation against diphtheria alone    • Need for prophylactic vaccination and inoculation against unspecified single disease      Need for prophylactic vaccination and inoculation against Streptococcus pneumoniae [pneumococcus] and influenza      • Non-alcoholic fatty liver disease    • Rectus sheath hematoma    • Senile osteoporosis    • Shoulder pain    • Solitary pulmonary nodule present on computed tomography of lung    • Spasm of back muscles    • Spinal stenosis of lumbar region    • Systolic congestive heart failure    • TIA (transient ischemic attack)    • Tobacco dependence syndrome    • Type 2 diabetes mellitus    • Urinary tract infectious disease    • Viral gastroenteritis    • Vitamin D deficiency      Unspecified vitamin D deficiency      • Vitamin D deficiency        Past Surgical Hx:  Past Surgical History   Procedure Laterality Date   • Cardiac catheterization       plaque noted ef 55%  2010    • Cholecystectomy     • Dilatation and curettage  1975   • Injection of medication  2015      Phenergan (3)      • Injection of medication       Kenalog (1)      2011    • Injection of medication  2014     Toradol (3)   • Tubal abdominal ligation        [place] tubal ligation 1981    • Injection of medication  2011      Vistaril (1)    • Injection of medication  2012     Zofran (1)   •  section        Low cervical  1981       • Dilatation and curettage  1975     Family Hx:  Family History   Problem Relation Age of Onset   • Alzheimer's disease Mother    • Diabetes Brother    • Alzheimer's disease Other    • Diabetes Other    • Lung cancer Other    • Pulmonary embolism Other    • Hypertension Father    • Depression Daughter    • Hypertension Daughter    • Anxiety disorder Daughter    • Lung cancer Brother       Social History:  Social History     Social History   • Marital status:      Spouse name: N/A   • Number of children: N/A   • Years of education: N/A     Occupational History   • Not on file.     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  Lives with daughter.        Home Medications:  No current facility-administered medications on file prior to encounter.      Current Outpatient Prescriptions on File Prior to Encounter   Medication Sig Dispense Refill   • atorvastatin (LIPITOR) 20 MG tablet Take 20 mg by mouth every night.     • carvedilol (COREG) 3.125 MG tablet Take 3.125 mg by mouth 2 (two) times a day.     • gabapentin (NEURONTIN) 400 MG capsule Take 400 mg by mouth 3 (three) times a day.     • gabapentin  (NEURONTIN) 800 MG tablet Take 800 mg by mouth 3 (three) times a day.     • HYDROcodone-acetaminophen (NORCO) 7.5-325 MG per tablet Take 1 tablet by mouth 3 (Three) Times a Day for 30 days. 90 tablet 0   • acetaminophen (TYLENOL) 325 MG tablet Take 2 tablets by mouth Every 6 (Six) Hours As Needed for mild pain (1-3). 240 tablet 5   • albuterol (PROVENTIL HFA;VENTOLIN HFA) 108 (90 BASE) MCG/ACT inhaler Inhale 2 puffs every 4 (four) hours as needed for wheezing.     • clindamycin (CLEOCIN) 150 MG capsule 1 tab PO 4 x daily 28 capsule 0   • clonazePAM (KlonoPIN) 1 MG tablet 1 mg po daily x 1 week then 1/2 po daily x 1 week then 1/2 po every other day 14 tablet 0   • escitalopram (LEXAPRO) 10 MG tablet Take 1 tablet by mouth Daily. 30 tablet 0   • HYDROcodone-acetaminophen (NORCO) 7.5-325 MG per tablet Take 1 tablet by mouth 3 (three) times a day as needed for moderate pain (4-6).     • HYDROcodone-acetaminophen (NORCO) 7.5-325 MG per tablet Take 1 tablet by mouth 3 (Three) Times a Day for 30 days. 90 tablet 0   • ipratropium-albuterol (COMBIVENT RESPIMAT)  MCG/ACT inhaler Inhale 2 puffs every 4 (four) hours as needed for wheezing.     • nicotine (NICODERM CQ) 21 MG/24HR patch Place 1 patch on the skin every day.     • nitroglycerin (NITROSTAT) 0.4 MG SL tablet Place 0.4-1.2 mg under the tongue as needed for chest pain. Take no more than 3 doses in 15 minutes.     • ondansetron ODT (ZOFRAN ODT) 4 MG disintegrating tablet Take 1 tablet by mouth Every 8 (Eight) Hours As Needed for nausea or vomiting. 90 tablet 3   • promethazine (PHENERGAN) 25 MG tablet Take 1 tablet by mouth Every 8 (Eight) Hours As Needed for nausea or vomiting. 30 tablet 2   • warfarin (COUMADIN) 4 MG tablet One tablet daily 30 tablet 0       Allergies:  Corticosteroids  I reviewed the patient's new clinical results.  Review of Systems  Review of Systems   Constitutional: Positive for activity change, chills and fatigue. Negative for appetite  "change, diaphoresis, fever and unexpected weight change.   HENT: Positive for congestion, ear pain, hearing loss and rhinorrhea. Negative for ear discharge, nosebleeds, postnasal drip, sinus pressure, sore throat and trouble swallowing.    Eyes: Negative.    Respiratory: Positive for cough, chest tightness and shortness of breath. Negative for choking and wheezing.    Gastrointestinal: Positive for nausea. Negative for abdominal pain, blood in stool, constipation, diarrhea and vomiting.   Genitourinary: Negative for decreased urine volume, difficulty urinating, dysuria, hematuria, vaginal bleeding and vaginal discharge.   Musculoskeletal: Positive for arthralgias, back pain, gait problem, joint swelling, myalgias, neck pain and neck stiffness.   Skin: Positive for wound.        Skin tear from falling   Allergic/Immunologic: Positive for environmental allergies.   Neurological: Positive for weakness, numbness and headaches. Negative for syncope.   Hematological: Bruises/bleeds easily.   Psychiatric/Behavioral: Positive for dysphoric mood. Negative for decreased concentration, hallucinations, self-injury, sleep disturbance and suicidal ideas. The patient is nervous/anxious. The patient is not hyperactive.        Objective:     Visit Vitals   • /67 (BP Location: Left arm, Patient Position: Lying)   • Pulse 81   • Temp 98.7 °F (37.1 °C) (Oral)   • Resp 18   • Ht 61\" (154.9 cm)   • Wt 133 lb (60.3 kg)   • SpO2 100%   • BMI 25.13 kg/m2     Physical Exam   Constitutional: She is oriented to person, place, and time. She appears well-developed and well-nourished. No distress.   HENT:   Head: Normocephalic and atraumatic.   Nose: Nose normal.   Mouth/Throat: Oropharynx is clear and moist. No oropharyngeal exudate.   Eyes: EOM are normal. Pupils are equal, round, and reactive to light. No scleral icterus.   Neck: Neck supple. No JVD present.   Cardiovascular: Normal rate, regular rhythm and intact distal pulses.  Exam " reveals no gallop and no friction rub.    Murmur heard.  Pulmonary/Chest: Effort normal and breath sounds normal. No respiratory distress. She has no wheezes. She has no rales.   Abdominal: Soft. Bowel sounds are normal. She exhibits no distension and no mass. There is no tenderness. There is no rebound and no guarding.   Musculoskeletal: She exhibits tenderness. She exhibits no edema.   Lymphadenopathy:     She has no cervical adenopathy.   Neurological: She is alert and oriented to person, place, and time. She displays normal reflexes. No cranial nerve deficit.   Skin: Skin is warm and dry. She is not diaphoretic.   Multiple bruises and skin tear noted on right upper arm   Psychiatric: She has a normal mood and affect. Her behavior is normal. Judgment and thought content normal.   Nursing note and vitals reviewed.    I reviewed the patient's new clinical results.  Assessment/Plan:     Active Hospital Problems (** Indicates Principal Problem)    Diagnosis Date Noted   • Chest pain at rest [R07.9] 02/02/2017   • Subtherapeutic international normalized ratio (INR) [R79.1] 02/02/2017   • Tobacco dependence syndrome [F17.200] 02/02/2017   • Diabetes mellitus type 2 in nonobese [E11.9] 02/02/2017   • Atherosclerosis of native coronary artery of native heart with angina pectoris [I25.119] 10/08/2016      Resolved Hospital Problems    Diagnosis Date Noted Date Resolved   No resolved problems to display.     Will repeat cardiac enzymes.  Consult Dr. Martinez.  Patient is advised on smoking cessation.  Patient is advised on the importance of getting her INR checked.  Patient's daughter states that she can now bring her to town to have her Coumadin checked.  Patient will be referred at time of discharge to the Coumadin clinic.  Patient's Kaspar has been reviewed [id number].  Patient is full code.          This document has been electronically signed by Yasmin Stewart MD on February 2, 2017 10:42 PM          This document has  been electronically signed by Yasmin Stewart MD on February 2, 2017 10:42 PM

## 2017-02-04 VITALS
BODY MASS INDEX: 25.11 KG/M2 | TEMPERATURE: 98.1 F | SYSTOLIC BLOOD PRESSURE: 152 MMHG | HEART RATE: 65 BPM | HEIGHT: 61 IN | RESPIRATION RATE: 18 BRPM | DIASTOLIC BLOOD PRESSURE: 89 MMHG | WEIGHT: 133 LBS | OXYGEN SATURATION: 98 %

## 2017-02-04 DIAGNOSIS — I21.29: Primary | ICD-10-CM

## 2017-02-04 PROCEDURE — 99217 PR OBSERVATION CARE DISCHARGE MANAGEMENT: CPT | Performed by: FAMILY MEDICINE

## 2017-02-04 PROCEDURE — 63710000001 PROMETHAZINE PER 25 MG: Performed by: FAMILY MEDICINE

## 2017-02-04 PROCEDURE — G0378 HOSPITAL OBSERVATION PER HR: HCPCS

## 2017-02-04 RX ADMIN — HYDROCODONE BITARTRATE AND ACETAMINOPHEN 1 TABLET: 7.5; 325 TABLET ORAL at 08:13

## 2017-02-04 RX ADMIN — PROMETHAZINE HYDROCHLORIDE 25 MG: 25 TABLET ORAL at 01:36

## 2017-02-04 RX ADMIN — GABAPENTIN 400 MG: 400 CAPSULE ORAL at 08:13

## 2017-02-04 RX ADMIN — CARVEDILOL 3.12 MG: 3.12 TABLET, FILM COATED ORAL at 08:13

## 2017-02-04 RX ADMIN — CLOPIDOGREL BISULFATE 75 MG: 75 TABLET ORAL at 08:13

## 2017-02-04 RX ADMIN — GABAPENTIN 800 MG: 800 TABLET, FILM COATED ORAL at 08:13

## 2017-02-04 NOTE — PLAN OF CARE
Problem: Patient Care Overview (Adult)  Goal: Plan of Care Review  Outcome: Ongoing (interventions implemented as appropriate)    02/04/17 0509   Coping/Psychosocial Response Interventions   Plan Of Care Reviewed With patient   Patient Care Overview   Progress improving   Outcome Evaluation   Outcome Summary/Follow up Plan headache pain gone; no chest pain during shift       Goal: Adult Individualization and Mutuality  Outcome: Ongoing (interventions implemented as appropriate)  Goal: Discharge Needs Assessment  Outcome: Ongoing (interventions implemented as appropriate)    Problem: Acute Coronary Syndrome (ACS) (Adult)  Goal: Signs and Symptoms of Listed Potential Problems Will be Absent or Manageable (Acute Coronary Syndrome)  Outcome: Ongoing (interventions implemented as appropriate)

## 2017-02-04 NOTE — CONSULTS
Cardiology Consultation Note.        Patient Name: Nikki Felder  Age/Sex: 66 y.o. female  : 1950  MRN: [id number]    Date of consultation: 2017  Consulting Physician: Raheel Martinez MD  Primary care Physician: Yasmin Stewart MD  Requesting Physician:   Yasmin Stewart MD  Reason for consultation:    1.  Chest pain.  2.  History of atherolerotic coronary artery disease with previous aborted anterior wall myocardial infarction.  3.  Ischemic cardiomyopathy with left ventricular systolic dysfunction with an ejection fraction of 25-30%.      Subjective:       Chief Complaint: Chest pain with history of atherosclerotic coronary artery disease  History of Present Illness:  Nikki Felder is a 66 y.o. female     Body mass index is 25.13 kg/(m^2). with a past medical history significant for atherosclerotic coronary artery disease with aborted anterior wall myocardial infarction on 09/15/2016 with a Pronto thrombectomy and PTCA and stenting of the 100% occluded left anterior descending artery, with deployment of a 2.5 mm x 8 mm, a 2.5 mm x 15 mm, and a 2.75 mm x 18 mm Alpine drug-eluting stent, with reduction of stenosis from 100% to less than 0% stenosis, and PTCA and stenting of the 100% occluded 1st diagonal branch with deployment of a 2 mm x 28 mm Mini Vision stent, and luminal irregularity in the circumflex artery, and no evidence of any obstructive disease noted in the right coronary artery, with anteroapical wall hypokinesis with akinesis, with an ejection fraction of 25-30%, with left ventricular apical thrombus, on chronic anticoagulation with Coumadin, mild mitral and mild tricuspid regurgitation, concentric left ventricular hypertrophy with diastolic dysfunction. History of hyperlipidemia. History of rectus sheath hematoma secondary to supratherapeutic anticoagulation in 2016 with subsequent transfer to acute rehab. History of anxiety, depression, chronic pain. History of transient  ischemic attack, arterial hypertension, diabetes, chronic obstructive lung disease. History of renal calculi.     Patient had presented to the hospital on 11/27/2016 with symptoms of chest pain radiating to the both the jaws associated with shortness of breath and some diaphoresis  lasting for about 6 hours. Patient had taken a nitroglycerin with partial resolution of the discomfort.  Patient's resting electrocardiogram had revealed sinus rhythm with anterolateral T-wave inversion. Patient's initial and subsequent cardiac enzyme were negative. Patient during the hospitalization underwent a coronary angiogram and in-stent restenosis of the diagonal branch percutaneous intervention.  Patient was subsequently discharged home.  Patient was evaluated on outpatient basis and due to the patient's left ventricular systolic dysfunction patient was to undergo a MUGA scan evaluation.    Patient last transthoracic echocardiogram had revealed left ventricular systolic dysfunction with an ejection fraction of 25-30%.    Patient now presents to the hospital with symptoms off substernal chest pain which is more localized on the right side radiating to the right shoulder.  Patient's chest pain has more or less been continuous pain not worse with exertion without any associated diaphoresis or shortness of breath.  Patient troponin was negative.  Patient EKG was unchanged.  Patient had ambulation in the hallway without any worsening of the pain.  Of note patient has continued to smoke.  Patient is currently on Coumadin.    Patient 10 point review of system except for what is stated in the history of present illness is negative.          Past Medical History:      1. Atherosclerotic coronary artery disease.   2.  PTCA of the in-stent stenosis in the diagonal branch done in November 2016 with sustained patency in the left anterior descending artery stent and no evidence of any obstructive disease in the circumflex artery..  3. Aborted  anterior wall myocardial infarction on 09/15/2016 with atherosclerotic coronary artery disease.   4. Pronto thrombectomy of the left anterior descending artery with PTCA and stenting of the proximal to mid left anterior descending artery with deployment of a 2.5 mm x 8 mm, and a 2.5 mm x 15 mm, and a 2.75 mm x 18 mm Alpine drug-eluting stent with reduction of stenosis from 100% to less than 0% stenosis.   5. PTCA and stenting of the 100% occluded 1st diagonal branch with deployment of a 2 mm x 28 mm Mini-Vision stent.  6. No evidence of any obstructive disease noted in the right coronary artery.   7. Ischemic cardiomyopathy with anterior apical wall hypokinesis with akinesis with an ejection fraction of 25-30%.   8. Left ventricular apical thrombus on chronic anticoagulation with Coumadin.   9. Mild mitral and mild tricuspid regurgitation.   10. Concentric left ventricular hypertrophy with diastolic dysfunction.   11. Hyperlipidemia.   12. Non-insulin-dependent diabetes.   13. Anxiety and panic disorder.   14. Chronic pain syndrome.   15. Chronic obstructive lung disease with tobacco abuse.   16. History of renal calculi.   17. Previous history of transient ischemic attack.   18. Rectus sheet hematoma secondary to supratherapeutic PT/INR from anticoagulation with Coumadin.   19. Acute rehab followup after the rectus sheath hematoma.   20. Ongoing tobacco abuse.              Past Surgical History:  1. Status post  times 2 occasions.   2. Status post bilateral tubal ligation.   3. Status post cholecystectomy.   4. Status post dilation and curettage procedure.         Family History:   Significant for atherosclerotic CAD. CARDIAC RISK FACTORS:       Family History   Problem Relation Age of Onset   • Alzheimer's disease Mother    • Diabetes Brother    • Alzheimer's disease Other    • Diabetes Other    • Lung cancer Other    • Pulmonary embolism Other    • Hypertension Father    • Depression Daughter    •  Hypertension Daughter    • Anxiety disorder Daughter    • Lung cancer Brother        Social History:    Significant for 1/2 to 1 pack per day tobacco abuse. Denies any alcohol intake.   Social History     Social History   • Marital status:      Spouse name: N/A   • Number of children: N/A   • Years of education: N/A     Occupational History   • Not on file.     Social History Main Topics   • Smoking status: Former Smoker     Packs/day: 1.00     Years: 45.00   • Smokeless tobacco: Never Used      Comment: maybe 2 or 3 a day   • Alcohol use No   • Drug use: No   • Sexual activity: Defer     Other Topics Concern   • Not on file     Social History Narrative    Previously worked in [employer] [place] [employer] [place]  Lives with daughter.         Cardiac Risk factor:   1. Postmenopausal.   2. Arterial hypertension.   3. Hyperlipidemia.  4. Tobacco abuse.   5. Diabetes.       Allergies:  Allergies   Allergen Reactions   • Corticosteroids        Medication::  Prescriptions Prior to Admission   Medication Sig Dispense Refill Last Dose   • atorvastatin (LIPITOR) 20 MG tablet Take 20 mg by mouth every night.   Taking   • carvedilol (COREG) 3.125 MG tablet Take 3.125 mg by mouth 2 (two) times a day.   Taking   • clopidogrel (PLAVIX) 75 MG tablet   2    • gabapentin (NEURONTIN) 400 MG capsule Take 400 mg by mouth 3 (three) times a day.   Taking   • gabapentin (NEURONTIN) 800 MG tablet Take 800 mg by mouth 3 (three) times a day.   Taking   • HYDROcodone-acetaminophen (NORCO) 5-325 MG per tablet Take 5 tablets by mouth 3 (Three) Times a Day As Needed.  0    • HYDROcodone-acetaminophen (NORCO) 7.5-325 MG per tablet Take 1 tablet by mouth 3 (Three) Times a Day for 30 days. 90 tablet 0    • warfarin (COUMADIN) 5 MG tablet Take 5 mg by mouth every night at bedtime.  12    • acetaminophen (TYLENOL) 325 MG tablet Take 2 tablets by mouth Every 6 (Six) Hours As Needed for mild pain (1-3). 240 tablet 5 Taking   • albuterol (PROVENTIL  HFA;VENTOLIN HFA) 108 (90 BASE) MCG/ACT inhaler Inhale 2 puffs every 4 (four) hours as needed for wheezing.   Taking   • clindamycin (CLEOCIN) 150 MG capsule 1 tab PO 4 x daily 28 capsule 0 Not Taking   • clonazePAM (KlonoPIN) 1 MG tablet 1 mg po daily x 1 week then 1/2 po daily x 1 week then 1/2 po every other day 14 tablet 0 Not Taking   • HYDROcodone-acetaminophen (NORCO) 7.5-325 MG per tablet Take 1 tablet by mouth 3 (three) times a day as needed for moderate pain (4-6).   Taking   • HYDROcodone-acetaminophen (NORCO) 7.5-325 MG per tablet Take 1 tablet by mouth 3 (Three) Times a Day for 30 days. 90 tablet 0    • ipratropium-albuterol (COMBIVENT RESPIMAT)  MCG/ACT inhaler Inhale 2 puffs every 4 (four) hours as needed for wheezing.   Taking   • nicotine (NICODERM CQ) 21 MG/24HR patch Place 1 patch on the skin every day.   Taking   • nitroglycerin (NITROSTAT) 0.4 MG SL tablet Place 0.4-1.2 mg under the tongue as needed for chest pain. Take no more than 3 doses in 15 minutes.   Taking   • ondansetron ODT (ZOFRAN ODT) 4 MG disintegrating tablet Take 1 tablet by mouth Every 8 (Eight) Hours As Needed for nausea or vomiting. 90 tablet 3 Taking   • promethazine (PHENERGAN) 25 MG tablet Take 1 tablet by mouth Every 8 (Eight) Hours As Needed for nausea or vomiting. 30 tablet 2 Not Taking   • warfarin (COUMADIN) 4 MG tablet One tablet daily 30 tablet 0 Taking           Review of Systems:       Constitutional:  Denies recent weight loss, weight gain, fever or chills, no change in exercise tolerance     HENT:  Denies any hearing loss, epistaxis, hoarseness, or difficulty speaking.     Eyes: Wears eyeglasses or contact lenses     Respiratory:  Denies dyspnea with exertion,no cough, wheezing, or hemoptysis.     Cardiovascular: Positive for chest pain radiating to the right shoulder, Negative for palpations, chest pain, orthopnea, PND, peripheral edema, syncope, or claudication.     Gastrointestinal:  Denies change in  bowel habits, dyspepsia, ulcer disease, hematochezia, or melena.  No nausea, no vomiting, no hematemesis, no diarrhea or constipation, no melena      Endocrine: Negative for cold intolerance, heat intolerance, polydipsia, polyphagia and polyuria. Denies any history of weight change, heat/cold intolerance, polydipsia, polyuria     Genitourinary: Negative hor hematuria.      Musculoskeletal: Denies any history of arthritic symptoms or back problems .  No joint pain, joint stiffness, joint swelling, muscle pain, muscle weakness and neck pain    Skin:  Denies any change in hair or nails, rashes, or skin lesions.     Allergic/Immunologic: Negative.  Negative for environmental allergies, food allergies and immunocompromised state.     Neurological:  Denies any history of recurrent headaches, strokes, TIA, or seizure disorder.     Hematological: Denies excessive bleeding, easy bruising, fatigue, lymphadenopathy and petechiae or any bleeding disorders, or lymphadenopathy.     Psychiatric/Behavioral: Denies any history of depression, substance abuse, or change in cognitive function. Denies any psychomotor reaction or tangential thought.  No depression, homicidal ideations and suicidal ideations    Endocrine: No frequent urination and nocturia, temperature intolerance, weight gain, unintended and weight loss, unintended            Objective:     Objective:  Vitals:    02/04/17 0803   BP: 136/79   Pulse: 66   Resp: 18   Temp: 98.6 °F (37 °C)   SpO2: 97%     .    Body mass index is 25.13 kg/(m^2).           Physical Exam:   General Appearance:    Alert, oriented, cooperative, in no acute distress   Head:    Normocephalic, atraumatic, without obvious abnormality   Eyes:           BASIL  Lids and lashes normal, conjunctivae and sclerae normal, no icterus, no pallor   Ears:    Ears appear intact with no abnormalities noted   Throat:   Mucous membranes pink and moist   Neck:   Supple, trachea midline, no carotid bruit, no  organomegaly or JVD   Lungs:     Clear to auscultation and percussion, respirations regular, even and Unlabored. No wheezes, rales, rhonchi    Heart:    Regular rhythm and normal rate, normal S1 and S2, no            murmur, no gallop, no rub, no click   Abdomen:     Soft, non-tender, non-distended, no guarding, no rebound tenderness, Normal bowel sounds in all four quadrant, no masses, liver and spleen nonpalpable,    Genitalia:    Deferred   Extremities:   Moves all extremities well, no edema, no cyanosis, no              Redness, no clubbing   Pulses:   Pulses palpable and equal bilaterally   Skin:   Moist and warm. No bleeding, bruising or rash   Neurologic/Psychiatric:   Alert and oriented to person, place, and time.  Motor, power and tone in upper and lower extremity is grossly intact.  No focal neurological deficits. Normal cognitive function. No psychomotor reaction or tangential thought. No depression, homicidal ideations and suicidal ideations           Lab Review:       Results from last 7 days  Lab Units 02/03/17  0615   SODIUM mmol/L 139   POTASSIUM mmol/L 4.3   CHLORIDE mmol/L 109   TOTAL CO2 mmol/L 25.0   BUN mg/dL 14   CREATININE mg/dL 0.73   CALCIUM mg/dL 8.4   BILIRUBIN mg/dL 0.3   ALK PHOS U/L 74   ALT (SGPT) U/L 33   AST (SGOT) U/L 20   GLUCOSE mg/dL 133*       Results from last 7 days  Lab Units 02/02/17  1730 02/02/17  1536 02/02/17  1137   CK TOTAL U/L 36 39  --    TROPONIN I ng/mL <0.012 <0.012 <0.012           Results from last 7 days  Lab Units 02/03/17  0615   WBC 10*3/mm3 4.22   HEMOGLOBIN g/dL 12.7   HEMATOCRIT % 37.9   PLATELETS 10*3/mm3 229       Results from last 7 days  Lab Units 02/03/17  0615 02/02/17  1137   INR  1.42* 1.43*                   Invalid input(s):  T4,  FREET4    EKG:   ECG/EMG Results (last 24 hours)     ** No results found for the last 24 hours. **          Imaging:  Imaging Results (last 24 hours)     ** No results found for the last 24 hours. **          I  personally viewed and interpreted the patient's EKG/Telemetry data.    Assessment:   1.  Chest pain.  2.  Atheroscler.  3.  PTCA and stenting of the left anterior descending artery and the diagonal branch.  4.  PTCA of the in-stent restenosis of the diagonal branch in November 2016.5.  Ischemic cardiomyopathy with apical thrombus with an ejection fraction of 25-30% on chronic anticoagulation with Coumadin.  5.  Chronic obstructive lung disease with tobacco abuse.        Plan:   1. Chest pain with history of documented atherosclerotic coronary artery disease with aborted anterior wall myocardial infarction in September 2016, with Pronto thrombectomy and rescue PTCA and stenting of the 100% occluded left anterior descending artery, and PTCA and stenting of the diagonal branch. Patient did not have any evidence of any obstructive disease in the circumflex or the right coronary artery. Patient now presents with symptoms of atypical chest pain radiating to the right shoulder.  Patient after the last percutaneous intervention of the in-stent restenosis of the diagonal branch was evaluated and was scheduled to undergo an outpatient MUGA scan evaluation for consideration for an AICD placement.  Patient resting electrocardiogram does not show any new changes and the troponin is negative.  Patient at the present timehas been recommended medical management for the coronary artery disease patient has been counseled extensively on risk factor modification and smoking cessation.chest pain.  2. Arterial hypertension with hypertensive heart disease. Patient's blood pressure has been labile. Patient's blood pressure is 128/70. Patient, at the present time, has been recommended to continue with the present dose of the Coreg and the losartan.   3. History of peripheral vascular disease is noted.   4. Apical wall akinesis with apical thrombus. Patient at the present time, will be continued on anticoagulation with Coumadin. Patient has  had a supratherapeutic INR with a rectus sheath hematoma requiring close followup and subsequent evaluation in the transitional care unit. Patient has been recommended to be compliant with the dosing of the anticoagulation with Coumadin to prevent any future supratherapeutic PT/INR.   5. Chronic obstructive lung disease with tobacco abuse is noted. 6. Non-insulin-dependent diabetes. Patient has been counseled on American Diabetic Association diet.   7. History of transient ischemic attack is noted.         Time: time spent in face-to-face evaluation off greater than 60  minutes and interacting and formulating examining and discussing the plan with the patient with 50% of greater time spent in face-to-face interaction.    Raheel Martinez MD  02/04/17  11:15 AM      EMR Dragon/Transcription disclaimer:   Some of this note may be an electronic transcription/translation of spoken language to printed text. The electronic translation of spoken language may permit erroneous, or at times, nonsensical words or phrases to be inadvertently transcribed; Although I have reviewed the note for such errors, some may still exist.

## 2017-02-04 NOTE — NURSING NOTE
Contacted Dr Green per telephone about duplicate Norco on discharge med rec and Nitro paste being ordered at discharge. Dr Green gave me a TORB order to d/c duplicate [place] on med rec and to d/c Nitro paste on med rec.. When changes were made and order was placed it did not prompt me to put in a Telephone order by Dr Green.      TORB Dr Green/Camilla Chaudhari RN

## 2017-02-04 NOTE — NURSING NOTE
Pt states that she wants to follow up with Dr Stewart next week about coumadin and will have Dr Stewart make referral to coumadin clinic.

## 2017-02-04 NOTE — DISCHARGE SUMMARY
[address] [address]  T - [phone]     DISCHARGE SUMMARY         PATIENT  DEMOGRAPHICS   PATIENT NAME: Nikki Felder                      PHYSICIAN: Samuel Green MD  : 1950  MRN: [id number]    ADMISSION/DISCHARGE INFO   ADMISSION DATE: 2017   DISCHARGE DATE:     ADMISSION DIAGNOSES: Essential hypertension, benign [I10]  Chest pain at rest [R07.9]  DISCHARGE DIAGNOSES: Chest pain                                                Essential Hypertension,benign    Principal Problem:    Atherosclerosis of native coronary artery of native heart with angina pectoris  Active Problems:    Chest pain at rest    Subtherapeutic international normalized ratio (INR)    Tobacco dependence syndrome    Diabetes mellitus type 2 in nonobese    Acute intractable headache      SERVICE: Family Medicine   CONSULTS   Consult Orders (all)     Start     Ordered    17 0000  Inpatient Consult to Case Management   Once     Provider:  (Not yet assigned)    17 2253    17 1421  Inpatient Consult to Cardiology  Once     Specialty:  Cardiology  Provider:  Raheel Martinez MD    17 1421    17 1243  Family Practice - Faculty (on-call MD unless specified)  Once     Specialty:  Family Medicine  Provider:  Yasmin Stewart MD    17 1245          PROCEDURES     Imaging Results (last 24 hours)     ** No results found for the last 24 hours. **          Xr Humerus Right    Result Date: 2017  Narrative: Patient Name-  NIKKI FELDER Patient ID-  [id number] Ordering-  EROS CHÁVEZ MD Attending-  DR VANN  ------------------------------------------------  DATE OF EXAM- 2017 1-45 PM CST  PROCEDURE- HUMERUS MIN 2 VIEWS RIGHT  INDICATION FOR PROCEDURE- 66 years old patient presents for evaluation of right-sided arm injury after fall..  TECHNIQUE-  Two views of the right arm are submitted for interpretation.  COMPARISON-  None   FINDINGS-  There is soft tissue contusion of the mid arm is soft tissue swelling. The humerus has a normal appearance without obvious fracture. There may be an acromial spur at the shoulder. There are mild degenerative changes at the elbow.  IMPRESSION-  Soft tissue contusion without evidence for acute fracture currently. If there is still clinical concern for acute fracture, suggest follow-up radiographs in 7-10 days to detect a healing radiographically occult fracture.  Electronically Signed By- Gale Whitlock MD On: 2017-01-08 16:14:13    Ct Pelvis Without Contrast    Result Date: 1/8/2017  Narrative: Patient Name-  GINO TAVARES Patient ID-  [id number] Ordering-  EROS CHÁVEZ MD Attending-  DR VANN ------------------------------------------------  DATE OF EXAM- 1/8/2017 2-20 PM CST  PROCEDURE- CT-PELVIS NO CONTRAST  INDICATION FOR PROCEDURE- 66 years old patient presents for evaluation of pelvic pain with possible fracture after falling..  TECHNIQUE-  Contiguous axial images were obtained of the bony pelvis. Multiplanar reformations are submitted for interpretation. Dose length product is 282. Images were acquired in accordance with the principles of ALARA (as low as reasonably allowable).  COMPARISON-  Radiographs of the pelvis dated January 8, 2017.  FINDINGS-  There is grade 1 spondylolisthesis at L3-4 and L4-5. There is multilevel degenerative arthropathy of facet joints of the lumbar spine. There is a high-grade central canal stenosis at L3-4.  The visualized sacrum does not have fracture. Superior and inferior pubic rami are within normal limits. The iliac wings have a grossly normal appearance.  Visualized proximal femurs have a normal appearance. There is mild degenerative arthropathy of both hips with degenerative changes of the pubic symphysis.  There are scattered colonic diverticula. There is no obvious organomegaly, mass or dilated. Skeletal muscles is seen of the pelvis and proximal thighs  through the normal limits.  IMPRESSION-   1.  Osteoporosis. 2.  NO CT evidence of acute fracture. 3.  Multilevel degenerative disease in the degenerative arthropathy of the lumbar spine with severe acquired canal stenosis at L3-4.  Electronically Signed By- Gale Whitlock MD On: 2017-01-08 17:09:50    Xr Chest 1 View    Result Date: 2/2/2017  Narrative: PROCEDURE: Single chest view AP REASON FOR EXAM:Chest Pain triage protocol FINDINGS: Comparison study dated January 2, 2017. Cardiac and pulmonary vasculature are normal. Left upper lung field small calcified lung parenchymal granuloma consistent with old granulomatous disease. Lungs are otherwise clear. Pleural spaces are normal. No acute osseous abnormality.     Impression: 1.  Evidence of old granulomatous disease. 2.  Otherwise unremarkable chest. Electronically signed by:  Mason Franco  2/2/2017 12:03 PM CST     Xr Pelvis 1 Or 2 View    Result Date: 1/8/2017  Narrative: Patient Name-  GINO TAVARES Patient ID-  [id number] Ordering-  EROS CHÁVEZ MD Attending-  DR VANN ------------------------------------------------  DATE OF EXAM- 1/8/2017 1-45 PM CST  PROCEDURE- PELVIS ONE OR TWO VIEWS  INDICATION FOR PROCEDURE- 66 years old patient presents for evaluation of pelvic pain after fall..  TECHNIQUE-  AP radiograph of the pelvis is submitted for interpretation.  COMPARISON-  Radiograph of pelvis dated January 2, 2017.  FINDINGS-  The bones appear osteopenic. Sacrum is obscured by bowel gas. Iliac wings have a grossly normal appearance. There are bilateral acetabular spurs. There is mild narrowing of both hip joints. The proximal right femur has a subtle lucency at the intertrochanteric area and undisplaced fracture cannot be excluded.  A transverse lucency in the region of the proximal left femur is thought to be related to overlying soft tissues.  The proximal left femur has a grossly normal appearance.  IMPRESSION-   1.  Osteopenia. 2.  Questionable  undisplaced fracture of the right greater trochanter.  Electronically Signed By- Gale Whitlock MD On: 2017-01-08 16:17:07      HISTORY OF PRESENT ILLNESS   Nikki Felder is a 66 y.o. female who presented with chest pain.  Her cardiac enzymes remained normal.  She was seen in consultation by Dr. Martinez.  She is doing better and felt to be ready for discharge home         HOSPITAL COURSE   Patient was admitted to the observation unit.  Cardiac enzymes were tracked and stayed within normal range.  Her glucose was in the 140 to 1:30 range.  Her INR was 1.42.  She will be discharged and follow up with her family physician, Dr. Stewart     DISCHARGE CONDITION   Stable    DISPOSITION   Disposition is home     DISCHARGE MEDICATIONS        Your medication list      ASK your doctor about these medications       Instructions Last Dose Given Next Dose Due    acetaminophen 325 MG tablet   Commonly known as:  TYLENOL        Take 2 tablets by mouth Every 6 (Six) Hours As Needed for mild pain (1-3).         albuterol 108 (90 BASE) MCG/ACT inhaler   Commonly known as:  PROVENTIL HFA;VENTOLIN HFA              atorvastatin 20 MG tablet   Commonly known as:  LIPITOR              carvedilol 3.125 MG tablet   Commonly known as:  COREG              clindamycin 150 MG capsule   Commonly known as:  CLEOCIN        1 tab PO 4 x daily         clonazePAM 1 MG tablet   Commonly known as:  KlonoPIN        1 mg po daily x 1 week then 1/2 po daily x 1 week then 1/2 po every other day         clopidogrel 75 MG tablet   Commonly known as:  PLAVIX              escitalopram 10 MG tablet   Commonly known as:  LEXAPRO        Take 1 tablet by mouth Daily.         gabapentin 400 MG capsule   Commonly known as:  NEURONTIN              gabapentin 800 MG tablet   Commonly known as:  NEURONTIN              HYDROcodone-acetaminophen 5-325 MG per tablet   Commonly known as:  NORCO              HYDROcodone-acetaminophen 7.5-325 MG per tablet   Commonly known  as:  NORCO        Take 1 tablet by mouth 3 (Three) Times a Day for 30 days.         HYDROcodone-acetaminophen 7.5-325 MG per tablet   Commonly known as:  NORCO        Take 1 tablet by mouth 3 (Three) Times a Day for 30 days.         HYDROcodone-acetaminophen 7.5-325 MG per tablet   Commonly known as:  NORCO              ipratropium-albuterol  MCG/ACT inhaler   Commonly known as:  COMBIVENT RESPIMAT              nicotine 21 MG/24HR patch   Commonly known as:  NICODERM CQ              nitroglycerin 0.4 MG SL tablet   Commonly known as:  NITROSTAT              ondansetron ODT 4 MG disintegrating tablet   Commonly known as:  ZOFRAN ODT        Take 1 tablet by mouth Every 8 (Eight) Hours As Needed for nausea or vomiting.         promethazine 25 MG tablet   Commonly known as:  PHENERGAN        Take 1 tablet by mouth Every 8 (Eight) Hours As Needed for nausea or vomiting.         warfarin 4 MG tablet   Commonly known as:  COUMADIN        One tablet daily         warfarin 5 MG tablet   Commonly known as:  COUMADIN                   Where to Get Your Medications      These medications were sent to [facility] [address] [facility] [phone] [place] [phone] [address] [address]     Phone:  [phone]    • escitalopram 10 MG tablet             INSTRUCTIONS   Activity: As tolerated    Diet: Cardiac select    Special Instructions: Follow-up with Dr. Stewart in 4 days.    FOLLOW UP      PENDING TEST RESULTS AT DISCHARGE                    Samuel Green MD  02/04/17  10:51 AM

## 2017-02-04 NOTE — PLAN OF CARE
Problem: Patient Care Overview (Adult)  Goal: Plan of Care Review  Outcome: Outcome(s) achieved Date Met:  02/04/17  Goal: Adult Individualization and Mutuality  Outcome: Outcome(s) achieved Date Met:  02/04/17  Goal: Discharge Needs Assessment  Outcome: Outcome(s) achieved Date Met:  02/04/17    Problem: Acute Coronary Syndrome (ACS) (Adult)  Goal: Signs and Symptoms of Listed Potential Problems Will be Absent or Manageable (Acute Coronary Syndrome)  Outcome: Outcome(s) achieved Date Met:  02/04/17

## 2017-02-06 ENCOUNTER — HOSPITAL ENCOUNTER (OUTPATIENT)
Facility: HOSPITAL | Age: 67
Setting detail: OBSERVATION
Discharge: HOME | End: 2017-02-07
Attending: EMERGENCY MEDICINE | Admitting: FAMILY MEDICINE

## 2017-02-06 ENCOUNTER — APPOINTMENT (OUTPATIENT)
Dept: GENERAL RADIOLOGY | Facility: HOSPITAL | Age: 67
End: 2017-02-06

## 2017-02-06 DIAGNOSIS — R07.9 CHEST PAIN, UNSPECIFIED TYPE: Primary | ICD-10-CM

## 2017-02-06 LAB
ALBUMIN SERPL-MCNC: 4.4 G/DL (ref 3.4–4.8)
ALBUMIN/GLOB SERPL: 1.5 G/DL (ref 1.1–1.8)
ALP SERPL-CCNC: 100 U/L (ref 38–126)
ALT SERPL W P-5'-P-CCNC: 24 U/L (ref 9–52)
ANION GAP SERPL CALCULATED.3IONS-SCNC: 12 MMOL/L (ref 5–15)
APTT PPP: 31.6 SECONDS (ref 20–40.3)
AST SERPL-CCNC: 31 U/L (ref 14–36)
BACTERIA UR QL AUTO: ABNORMAL /HPF
BASOPHILS # BLD AUTO: 0.03 10*3/MM3 (ref 0–0.2)
BASOPHILS NFR BLD AUTO: 0.5 % (ref 0–2)
BILIRUB SERPL-MCNC: 0.5 MG/DL (ref 0.2–1.3)
BILIRUB UR QL STRIP: NEGATIVE
BUN BLD-MCNC: 10 MG/DL (ref 7–21)
BUN/CREAT SERPL: 16.1 (ref 7–25)
CALCIUM SPEC-SCNC: 9.2 MG/DL (ref 8.4–10.2)
CHLORIDE SERPL-SCNC: 106 MMOL/L (ref 95–110)
CK MB SERPL-CCNC: 2.44 NG/ML (ref 0–5)
CK SERPL-CCNC: 51 U/L (ref 30–135)
CLARITY UR: ABNORMAL
CO2 SERPL-SCNC: 23 MMOL/L (ref 22–31)
COLOR UR: YELLOW
CREAT BLD-MCNC: 0.62 MG/DL (ref 0.5–1)
D-DIMER, QUANTITATIVE (MAD,POW, STR): 379 NG/ML (FEU) (ref 0–470)
DEPRECATED RDW RBC AUTO: 44 FL (ref 36.4–46.3)
EOSINOPHIL # BLD AUTO: 0.11 10*3/MM3 (ref 0–0.7)
EOSINOPHIL NFR BLD AUTO: 2 % (ref 0–7)
ERYTHROCYTE [DISTWIDTH] IN BLOOD BY AUTOMATED COUNT: 13.3 % (ref 11.5–14.5)
GFR SERPL CREATININE-BSD FRML MDRD: 96 ML/MIN/1.73 (ref 45–104)
GLOBULIN UR ELPH-MCNC: 2.9 GM/DL (ref 2.3–3.5)
GLUCOSE BLD-MCNC: 141 MG/DL (ref 60–100)
GLUCOSE UR STRIP-MCNC: ABNORMAL MG/DL
HCT VFR BLD AUTO: 41 % (ref 35–45)
HGB BLD-MCNC: 14.1 G/DL (ref 12–15.5)
HGB UR QL STRIP.AUTO: NEGATIVE
HOLD SPECIMEN: NORMAL
HOLD SPECIMEN: NORMAL
HYALINE CASTS UR QL AUTO: ABNORMAL /LPF
IMM GRANULOCYTES # BLD: 0.01 10*3/MM3 (ref 0–0.02)
IMM GRANULOCYTES NFR BLD: 0.2 % (ref 0–0.5)
INR PPP: 1.53 (ref 0.8–1.2)
KETONES UR QL STRIP: NEGATIVE
LEUKOCYTE ESTERASE UR QL STRIP.AUTO: ABNORMAL
LYMPHOCYTES # BLD AUTO: 1.93 10*3/MM3 (ref 0.6–4.2)
LYMPHOCYTES NFR BLD AUTO: 35.2 % (ref 10–50)
MCH RBC QN AUTO: 31.1 PG (ref 26.5–34)
MCHC RBC AUTO-ENTMCNC: 34.4 G/DL (ref 31.4–36)
MCV RBC AUTO: 90.3 FL (ref 80–98)
MONOCYTES # BLD AUTO: 0.37 10*3/MM3 (ref 0–0.9)
MONOCYTES NFR BLD AUTO: 6.8 % (ref 0–12)
NEUTROPHILS # BLD AUTO: 3.03 10*3/MM3 (ref 2–8.6)
NEUTROPHILS NFR BLD AUTO: 55.3 % (ref 37–80)
NITRITE UR QL STRIP: NEGATIVE
NT-PROBNP SERPL-MCNC: 458 PG/ML (ref 0–900)
NT-PROBNP SERPL-MCNC: 469 PG/ML (ref 0–900)
PH UR STRIP.AUTO: 8.5 [PH] (ref 5–9)
PLATELET # BLD AUTO: 259 10*3/MM3 (ref 150–450)
PMV BLD AUTO: 9.1 FL (ref 8–12)
POTASSIUM BLD-SCNC: 3.9 MMOL/L (ref 3.5–5.1)
PROT SERPL-MCNC: 7.3 G/DL (ref 6.3–8.6)
PROT UR QL STRIP: NEGATIVE
PROTHROMBIN TIME: 18.2 SECONDS (ref 11.1–15.3)
RBC # BLD AUTO: 4.54 10*6/MM3 (ref 3.77–5.16)
RBC # UR: ABNORMAL /HPF
REF LAB TEST METHOD: ABNORMAL
SODIUM BLD-SCNC: 141 MMOL/L (ref 137–145)
SP GR UR STRIP: 1.01 (ref 1–1.03)
SQUAMOUS #/AREA URNS HPF: ABNORMAL /HPF
TROPONIN I SERPL-MCNC: <0.012 NG/ML
TROPONIN I SERPL-MCNC: <0.012 NG/ML
UROBILINOGEN UR QL STRIP: ABNORMAL
WBC NRBC COR # BLD: 5.48 10*3/MM3 (ref 3.2–9.8)
WBC UR QL AUTO: ABNORMAL /HPF
WHOLE BLOOD HOLD SPECIMEN: NORMAL
WHOLE BLOOD HOLD SPECIMEN: NORMAL

## 2017-02-06 PROCEDURE — 85730 THROMBOPLASTIN TIME PARTIAL: CPT | Performed by: EMERGENCY MEDICINE

## 2017-02-06 PROCEDURE — 93005 ELECTROCARDIOGRAM TRACING: CPT

## 2017-02-06 PROCEDURE — 96361 HYDRATE IV INFUSION ADD-ON: CPT

## 2017-02-06 PROCEDURE — 84484 ASSAY OF TROPONIN QUANT: CPT | Performed by: EMERGENCY MEDICINE

## 2017-02-06 PROCEDURE — 82553 CREATINE MB FRACTION: CPT | Performed by: EMERGENCY MEDICINE

## 2017-02-06 PROCEDURE — 25010000002 MORPHINE PER 10 MG: Performed by: EMERGENCY MEDICINE

## 2017-02-06 PROCEDURE — G0378 HOSPITAL OBSERVATION PER HR: HCPCS

## 2017-02-06 PROCEDURE — 81001 URINALYSIS AUTO W/SCOPE: CPT | Performed by: EMERGENCY MEDICINE

## 2017-02-06 PROCEDURE — 85610 PROTHROMBIN TIME: CPT | Performed by: EMERGENCY MEDICINE

## 2017-02-06 PROCEDURE — 83880 ASSAY OF NATRIURETIC PEPTIDE: CPT | Performed by: EMERGENCY MEDICINE

## 2017-02-06 PROCEDURE — 99220 PR INITIAL OBSERVATION CARE/DAY 70 MINUTES: CPT | Performed by: FAMILY MEDICINE

## 2017-02-06 PROCEDURE — 99285 EMERGENCY DEPT VISIT HI MDM: CPT

## 2017-02-06 PROCEDURE — 71010 HC CHEST PA OR AP: CPT

## 2017-02-06 PROCEDURE — 85025 COMPLETE CBC W/AUTO DIFF WBC: CPT | Performed by: EMERGENCY MEDICINE

## 2017-02-06 PROCEDURE — 25010000002 ENOXAPARIN PER 10 MG: Performed by: EMERGENCY MEDICINE

## 2017-02-06 PROCEDURE — 96374 THER/PROPH/DIAG INJ IV PUSH: CPT

## 2017-02-06 PROCEDURE — 96372 THER/PROPH/DIAG INJ SC/IM: CPT

## 2017-02-06 PROCEDURE — 25010000002 ONDANSETRON PER 1 MG: Performed by: EMERGENCY MEDICINE

## 2017-02-06 PROCEDURE — 96375 TX/PRO/DX INJ NEW DRUG ADDON: CPT

## 2017-02-06 PROCEDURE — 80053 COMPREHEN METABOLIC PANEL: CPT | Performed by: EMERGENCY MEDICINE

## 2017-02-06 PROCEDURE — 82550 ASSAY OF CK (CPK): CPT | Performed by: EMERGENCY MEDICINE

## 2017-02-06 PROCEDURE — 85379 FIBRIN DEGRADATION QUANT: CPT | Performed by: EMERGENCY MEDICINE

## 2017-02-06 PROCEDURE — 87086 URINE CULTURE/COLONY COUNT: CPT | Performed by: EMERGENCY MEDICINE

## 2017-02-06 RX ORDER — WARFARIN SODIUM 5 MG/1
5 TABLET ORAL
Status: DISCONTINUED | OUTPATIENT
Start: 2017-02-06 | End: 2017-02-07 | Stop reason: HOSPADM

## 2017-02-06 RX ORDER — ESCITALOPRAM OXALATE 10 MG/1
10 TABLET ORAL DAILY
Status: DISCONTINUED | OUTPATIENT
Start: 2017-02-06 | End: 2017-02-07 | Stop reason: HOSPADM

## 2017-02-06 RX ORDER — NITROGLYCERIN 0.4 MG/1
0.4 TABLET SUBLINGUAL
Status: DISCONTINUED | OUTPATIENT
Start: 2017-02-06 | End: 2017-02-07 | Stop reason: HOSPADM

## 2017-02-06 RX ORDER — SODIUM CHLORIDE 0.9 % (FLUSH) 0.9 %
1-10 SYRINGE (ML) INJECTION AS NEEDED
Status: DISCONTINUED | OUTPATIENT
Start: 2017-02-06 | End: 2017-02-07 | Stop reason: HOSPADM

## 2017-02-06 RX ORDER — ATORVASTATIN CALCIUM 20 MG/1
20 TABLET, FILM COATED ORAL NIGHTLY
Status: DISCONTINUED | OUTPATIENT
Start: 2017-02-06 | End: 2017-02-07 | Stop reason: HOSPADM

## 2017-02-06 RX ORDER — ACETAMINOPHEN 325 MG/1
650 TABLET ORAL EVERY 6 HOURS PRN
Status: DISCONTINUED | OUTPATIENT
Start: 2017-02-06 | End: 2017-02-07 | Stop reason: HOSPADM

## 2017-02-06 RX ORDER — CLOPIDOGREL BISULFATE 75 MG/1
300 TABLET ORAL ONCE
Status: COMPLETED | OUTPATIENT
Start: 2017-02-06 | End: 2017-02-06

## 2017-02-06 RX ORDER — GABAPENTIN 400 MG/1
400 CAPSULE ORAL 3 TIMES DAILY
Status: DISCONTINUED | OUTPATIENT
Start: 2017-02-06 | End: 2017-02-06 | Stop reason: SDUPTHER

## 2017-02-06 RX ORDER — SODIUM CHLORIDE 0.9 % (FLUSH) 0.9 %
10 SYRINGE (ML) INJECTION AS NEEDED
Status: DISCONTINUED | OUTPATIENT
Start: 2017-02-06 | End: 2017-02-07 | Stop reason: HOSPADM

## 2017-02-06 RX ORDER — SODIUM CHLORIDE 9 MG/ML
75 INJECTION, SOLUTION INTRAVENOUS CONTINUOUS
Status: DISCONTINUED | OUTPATIENT
Start: 2017-02-06 | End: 2017-02-07 | Stop reason: HOSPADM

## 2017-02-06 RX ORDER — ASPIRIN 325 MG
325 TABLET ORAL ONCE
Status: DISCONTINUED | OUTPATIENT
Start: 2017-02-06 | End: 2017-02-07 | Stop reason: HOSPADM

## 2017-02-06 RX ORDER — CARVEDILOL 3.12 MG/1
3.12 TABLET ORAL 2 TIMES DAILY
Status: DISCONTINUED | OUTPATIENT
Start: 2017-02-06 | End: 2017-02-07 | Stop reason: HOSPADM

## 2017-02-06 RX ORDER — ONDANSETRON 2 MG/ML
4 INJECTION INTRAMUSCULAR; INTRAVENOUS ONCE
Status: COMPLETED | OUTPATIENT
Start: 2017-02-06 | End: 2017-02-06

## 2017-02-06 RX ORDER — GABAPENTIN 800 MG/1
800 TABLET ORAL 3 TIMES DAILY
Status: DISCONTINUED | OUTPATIENT
Start: 2017-02-06 | End: 2017-02-07 | Stop reason: HOSPADM

## 2017-02-06 RX ORDER — HYDROCODONE BITARTRATE AND ACETAMINOPHEN 7.5; 325 MG/1; MG/1
1 TABLET ORAL 3 TIMES DAILY PRN
Status: DISCONTINUED | OUTPATIENT
Start: 2017-02-06 | End: 2017-02-07

## 2017-02-06 RX ORDER — ONDANSETRON 4 MG/1
4 TABLET, ORALLY DISINTEGRATING ORAL EVERY 8 HOURS PRN
Status: DISCONTINUED | OUTPATIENT
Start: 2017-02-06 | End: 2017-02-07 | Stop reason: HOSPADM

## 2017-02-06 RX ORDER — NICOTINE 21 MG/24HR
1 PATCH, TRANSDERMAL 24 HOURS TRANSDERMAL EVERY 24 HOURS
Status: DISCONTINUED | OUTPATIENT
Start: 2017-02-06 | End: 2017-02-07 | Stop reason: HOSPADM

## 2017-02-06 RX ORDER — MORPHINE SULFATE 4 MG/ML
4 INJECTION, SOLUTION INTRAMUSCULAR; INTRAVENOUS ONCE
Status: COMPLETED | OUTPATIENT
Start: 2017-02-06 | End: 2017-02-06

## 2017-02-06 RX ORDER — ALBUTEROL SULFATE 2.5 MG/3ML
2.5 SOLUTION RESPIRATORY (INHALATION) EVERY 4 HOURS PRN
Status: DISCONTINUED | OUTPATIENT
Start: 2017-02-06 | End: 2017-02-07 | Stop reason: HOSPADM

## 2017-02-06 RX ADMIN — WARFARIN SODIUM 5 MG: 5 TABLET ORAL at 20:28

## 2017-02-06 RX ADMIN — CARVEDILOL 3.12 MG: 3.12 TABLET, FILM COATED ORAL at 17:51

## 2017-02-06 RX ADMIN — ACETAMINOPHEN 650 MG: 325 TABLET ORAL at 20:28

## 2017-02-06 RX ADMIN — ONDANSETRON 4 MG: 2 INJECTION INTRAMUSCULAR; INTRAVENOUS at 14:13

## 2017-02-06 RX ADMIN — MORPHINE SULFATE 4 MG: 4 INJECTION, SOLUTION INTRAMUSCULAR; INTRAVENOUS at 14:12

## 2017-02-06 RX ADMIN — ENOXAPARIN SODIUM 60 MG: 60 INJECTION SUBCUTANEOUS at 15:41

## 2017-02-06 RX ADMIN — ONDANSETRON 4 MG: 4 TABLET, ORALLY DISINTEGRATING ORAL at 21:28

## 2017-02-06 RX ADMIN — SODIUM CHLORIDE 75 ML/HR: 9 INJECTION, SOLUTION INTRAVENOUS at 14:14

## 2017-02-06 RX ADMIN — NITROGLYCERIN 1 INCH: 20 OINTMENT TOPICAL at 17:51

## 2017-02-06 RX ADMIN — ATORVASTATIN CALCIUM 20 MG: 20 TABLET, FILM COATED ORAL at 20:29

## 2017-02-06 RX ADMIN — ESCITALOPRAM OXALATE 10 MG: 10 TABLET ORAL at 17:51

## 2017-02-06 RX ADMIN — NICOTINE 1 PATCH: 21 PATCH TRANSDERMAL at 18:19

## 2017-02-06 RX ADMIN — CLOPIDOGREL BISULFATE 300 MG: 75 TABLET ORAL at 15:37

## 2017-02-06 RX ADMIN — HYDROCODONE BITARTRATE AND ACETAMINOPHEN 1 TABLET: 7.5; 325 TABLET ORAL at 17:51

## 2017-02-06 RX ADMIN — GABAPENTIN 800 MG: 800 TABLET, FILM COATED ORAL at 17:51

## 2017-02-06 RX ADMIN — Medication 10 ML: at 14:14

## 2017-02-06 NOTE — ED PROVIDER NOTES
Subjective   HPI Comments: 65yo female pmh significantcad/ischemic cardiomyopathy LVEF 25-30%/hyperlipidemia/dm2/anxiety/chronic pain/copd/tia, recently discharged [facility] 02.04.2017 secondary to chest pain evaluation, presents ED c/o 1d hx stuttering substernal chest pain 'ache'/radiating right mandible/transient response sl ntg/neg exac factors/assoc nausea, soa.    Patient is a 66 y.o. female presenting with chest pain.   Chest Pain   Pain location:  Substernal area  Pain quality: pressure    Pain radiates to:  R jaw  Pain severity:  Moderate  Onset quality:  Gradual  Duration:  1 day  Timing:  Constant  Progression:  Waxing and waning  Chronicity:  Recurrent  Relieved by:  Nitroglycerin  Associated symptoms: nausea and shortness of breath    Associated symptoms: no abdominal pain, no cough and no vomiting        Review of Systems   Respiratory: Positive for shortness of breath. Negative for cough.    Cardiovascular: Positive for chest pain.   Gastrointestinal: Positive for nausea. Negative for abdominal pain and vomiting.   All other systems reviewed and are negative.      Past Medical History   Diagnosis Date   • Acute bronchitis    • Allergic    • Allergic rhinitis    • Anxiety state    • Anxiety state      Anxiety state, unspecified      • Arthritis    • Bacterial pneumonia    • Chronic pain    • Chronic pain      Other chronic pain      • Contact dermatitis    • COPD (chronic obstructive pulmonary disease)    • Coronary arteriosclerosis    • Depressive disorder    • Depressive disorder    • Diabetes mellitus due to underlying condition with diabetic autonomic neuropathy      Diabetes mellitus due to underlying condition with diabetic autonomic (poly)neuropathy      • Drug therapy      Long-term drug therapy      • Dysuria    • Encounter for immunization    • Encounter for screening for malignant neoplasm of colon    • Essential hypertension    • Folliculitis    • Headache    • Heart murmur    • Herpes zoster     • Herpes zoster with nervous system complication    • History of screening mammography    • Hyperlipidemia    • Insomnia    • Insomnia    • Kidney stone    • Lumbar radiculopathy    • Lung nodule    • Migraine    • Migraine      Migraine, unspecified, without mention of intractable migraine, without mention of status migrainosus      • Migraine    • Myocardial infarction    • Nausea and vomiting    • Nausea with vomiting    • Neck pain    • Need for immunization against influenza    • Need for prophylactic vaccination and inoculation against diphtheria alone    • Need for prophylactic vaccination and inoculation against unspecified single disease      Need for prophylactic vaccination and inoculation against Streptococcus pneumoniae [pneumococcus] and influenza      • Non-alcoholic fatty liver disease    • Rectus sheath hematoma    • Senile osteoporosis    • Shoulder pain    • Solitary pulmonary nodule present on computed tomography of lung    • Spasm of back muscles    • Spinal stenosis of lumbar region    • Systolic congestive heart failure    • TIA (transient ischemic attack)    • Tobacco dependence syndrome    • Type 2 diabetes mellitus    • Urinary tract infectious disease    • Viral gastroenteritis    • Vitamin D deficiency      Unspecified vitamin D deficiency      • Vitamin D deficiency        Allergies   Allergen Reactions   • Corticosteroids        Past Surgical History   Procedure Laterality Date   • Cardiac catheterization       plaque noted ef 55% 2010    • Cholecystectomy     • Dilatation and curettage  1975   • Injection of medication  2015      Phenergan (3)      • Injection of medication       Kenalog (1)      2011    • Injection of medication  2014     Toradol (3)   • Tubal abdominal ligation        [place] tubal ligation 1981    • Injection of medication  2011      Vistaril (1)    • Injection of medication  2012     Zofran (1)   •  section         Low cervical  1981       • Dilatation and curettage  1975       Family History   Problem Relation Age of Onset   • Alzheimer's disease Mother    • Diabetes Brother    • Alzheimer's disease Other    • Diabetes Other    • Lung cancer Other    • Pulmonary embolism Other    • Hypertension Father    • Depression Daughter    • Hypertension Daughter    • Anxiety disorder Daughter    • Lung cancer Brother        Social History     Social History   • Marital status:      Spouse name: N/A   • Number of children: N/A   • Years of education: N/A     Social History Main Topics   • Smoking status: Former Smoker     Packs/day: 1.00     Years: 45.00   • Smokeless tobacco: Never Used      Comment: maybe 2 or 3 a day   • Alcohol use No   • Drug use: No   • Sexual activity: Defer     Other Topics Concern   • None     Social History Narrative    Previously worked in [employer] at [employer].  Lives with daughter.            Objective   Physical Exam   Constitutional: She is oriented to person, place, and time. She appears well-developed and well-nourished.   HENT:   Head: Normocephalic and atraumatic.   Mouth/Throat: Oropharynx is clear and moist.   Eyes: Pupils are equal, round, and reactive to light.   Neck: Neck supple. No JVD present.   Cardiovascular: Normal rate, regular rhythm, normal heart sounds and intact distal pulses.  Exam reveals no gallop and no friction rub.    No murmur heard.  Pulmonary/Chest: Effort normal and breath sounds normal. She has no wheezes. She has no rales. She exhibits no tenderness.   Abdominal: Soft. Bowel sounds are normal. There is no tenderness. There is no rebound and no guarding.   Lymphadenopathy:     She has no cervical adenopathy.   Neurological: She is alert and oriented to person, place, and time.   Nursing note and vitals reviewed.      ECG 12 Lead    Date/Time: 2017 3:12 PM  Performed by: EVY CAMPBELL  Authorized by: EVY CAMPBELL   Interpreted  by physician  Comparison: compared with previous ECG from 2/2/2017  Similar to previous ECG  Rhythm: sinus rhythm  Rate: normal  BPM: 71  QRS axis: normal  ST Segments: ST segments normal  Other findings: PRWP  Other findings comments: twi 1, avl, v1-v6  Clinical impression: abnormal ECG               ED Course  ED Course        Labs Reviewed   COMPREHENSIVE METABOLIC PANEL - Abnormal; Notable for the following:        Result Value    Glucose 141 (*)     All other components within normal limits   URINALYSIS W/ CULTURE IF INDICATED - Abnormal; Notable for the following:     Appearance, UA Slightly Cloudy (*)     Glucose,  mg/dL (Trace) (*)     Leuk Esterase, UA Trace (*)     All other components within normal limits   PROTIME-INR - Abnormal; Notable for the following:     Protime 18.2 (*)     INR 1.53 (*)     All other components within normal limits    Narrative:     Therapeutic range for most indications is 2.0-3.0 INR,  or 2.5-3.5 for mechanical heart valves.   URINALYSIS, MICROSCOPIC ONLY - Abnormal; Notable for the following:     WBC, UA 6-12 (*)     Bacteria, UA 1+ (*)     Squamous Epithelial Cells, UA 13-20 (*)     All other components within normal limits   TROPONIN (IN-HOUSE) - Normal   BNP (IN-HOUSE) - Normal   CBC WITH AUTO DIFFERENTIAL - Normal    Narrative:     Appended report.  These results have been appended to a previously verified report.   APTT - Normal    Narrative:     The recommended Heparin therapeutic range is 68-97 seconds.   BNP (IN-HOUSE) - Normal   D-DIMER, QUANTITATIVE - Normal    Narrative:     Dimer values <500 ng/ml FEU are FDA approved as aid in diagnosis of deep venous thrombosis and pulmonary embolism.  This test should not be used in an exclusion strategy with pretest probability alone.    A recent guideline regarding diagnosis for pulmonary thomboembolism recommends an adjusted exclusion criterion of age x 10 ng/ml FEU for patients >50 years of age ([place] Intern Med 2015;  163: 701-711).   CK - Normal   CK MB - Normal   URINE CULTURE   RAINBOW DRAW    Narrative:     The following orders were created for panel order [place] Draw.  Procedure                               Abnormality         Status                     ---------                               -----------         ------                     Light Blue Top[[id number]]                                    In process                 Green Top (Gel)[[id number]]                                   In process                 Lavender Top[[id number]]                                      In process                 Gold Top - SST[[id number]]                                    In process                   Please view results for these tests on the individual orders.   TROPONIN (IN-HOUSE)   POCT PROTIME - INR   CBC AND DIFFERENTIAL    Narrative:     The following orders were created for panel order CBC & Differential.  Procedure                               Abnormality         Status                     ---------                               -----------         ------                     CBC Auto Differential[[id number]]         Normal              Final result                 Please view results for these tests on the individual orders.   LIGHT BLUE TOP   GREEN TOP   LAVENDER TOP   GOLD TOP - SST   Xr Humerus Right    Result Date: 1/8/2017  Narrative: Patient Name-  GINO TAVARES Patient ID-  [id number] Ordering-  EROS CHÁVEZ MD Attending-  DR VANN  ------------------------------------------------  DATE OF EXAM- 1/8/2017 1-45 PM CST  PROCEDURE- HUMERUS MIN 2 VIEWS RIGHT  INDICATION FOR PROCEDURE- 66 years old patient presents for evaluation of right-sided arm injury after fall..  TECHNIQUE-  Two views of the right arm are submitted for interpretation.  COMPARISON-  None  FINDINGS-  There is soft tissue contusion of the mid arm is soft tissue swelling. The humerus has a normal appearance without obvious fracture. There may be an acromial  spur at the shoulder. There are mild degenerative changes at the elbow.  IMPRESSION-  Soft tissue contusion without evidence for acute fracture currently. If there is still clinical concern for acute fracture, suggest follow-up radiographs in 7-10 days to detect a healing radiographically occult fracture.  Electronically Signed By- Gale Whitlock MD On: 2017-01-08 16:14:13    Ct Pelvis Without Contrast    Result Date: 1/8/2017  Narrative: Patient Name-  GINO TAVARES Patient ID-  [id number] Ordering-  EROS CHÁVEZ MD Attending-  DR VANN ------------------------------------------------  DATE OF EXAM- 1/8/2017 2-20 PM CST  PROCEDURE- CT-PELVIS NO CONTRAST  INDICATION FOR PROCEDURE- 66 years old patient presents for evaluation of pelvic pain with possible fracture after falling..  TECHNIQUE-  Contiguous axial images were obtained of the bony pelvis. Multiplanar reformations are submitted for interpretation. Dose length product is 282. Images were acquired in accordance with the principles of ALARA (as low as reasonably allowable).  COMPARISON-  Radiographs of the pelvis dated January 8, 2017.  FINDINGS-  There is grade 1 spondylolisthesis at L3-4 and L4-5. There is multilevel degenerative arthropathy of facet joints of the lumbar spine. There is a high-grade central canal stenosis at L3-4.  The visualized sacrum does not have fracture. Superior and inferior pubic rami are within normal limits. The iliac wings have a grossly normal appearance.  Visualized proximal femurs have a normal appearance. There is mild degenerative arthropathy of both hips with degenerative changes of the pubic symphysis.  There are scattered colonic diverticula. There is no obvious organomegaly, mass or dilated. Skeletal muscles is seen of the pelvis and proximal thighs through the normal limits.  IMPRESSION-   1.  Osteoporosis. 2.  NO CT evidence of acute fracture. 3.  Multilevel degenerative disease in the degenerative arthropathy of  the lumbar spine with severe acquired canal stenosis at L3-4.  Electronically Signed By- Gale Whitlock MD On: 2017-01-08 17:09:50    Xr Chest 1 View    Result Date: 2/6/2017  Narrative: PROCEDURE: Single chest view AP REASON FOR EXAM:Chest Pain triage protocol FINDINGS: Comparison exam dated February 2, 2017. Cardiac and pulmonary vasculature are normal. Left upper lung field small calcified lung parenchymal granuloma consistent with old granulomatous disease. Left lung base small linear opacity. Right lung base very small linear opacity. Lungs otherwise clear. Pleural spaces are normal. No acute osseous abnormality.     Impression: 1.  Evidence of old granulomatous disease. 2.  Bibasilar small foci of discoid atelectasis. Electronically signed by:  Mason Franco  2/6/2017 12:59 PM CST     Xr Chest 1 View    Result Date: 2/2/2017  Narrative: PROCEDURE: Single chest view AP REASON FOR EXAM:Chest Pain triage protocol FINDINGS: Comparison study dated January 2, 2017. Cardiac and pulmonary vasculature are normal. Left upper lung field small calcified lung parenchymal granuloma consistent with old granulomatous disease. Lungs are otherwise clear. Pleural spaces are normal. No acute osseous abnormality.     Impression: 1.  Evidence of old granulomatous disease. 2.  Otherwise unremarkable chest. Electronically signed by:  Mason Franco  2/2/2017 12:03 PM CST     Xr Pelvis 1 Or 2 View    Result Date: 1/8/2017  Narrative: Patient Name-  GINO TAVARES Patient ID-  [id number] Ordering-  EROS CHÁVEZ MD Attending-  DR VANN ------------------------------------------------  DATE OF EXAM- 1/8/2017 1-45 PM CST  PROCEDURE- PELVIS ONE OR TWO VIEWS  INDICATION FOR PROCEDURE- 66 years old patient presents for evaluation of pelvic pain after fall..  TECHNIQUE-  AP radiograph of the pelvis is submitted for interpretation.  COMPARISON-  Radiograph of pelvis dated January 2, 2017.  FINDINGS-  The bones appear osteopenic. Sacrum is  obscured by bowel gas. Iliac wings have a grossly normal appearance. There are bilateral acetabular spurs. There is mild narrowing of both hip joints. The proximal right femur has a subtle lucency at the intertrochanteric area and undisplaced fracture cannot be excluded.  A transverse lucency in the region of the proximal left femur is thought to be related to overlying soft tissues.  The proximal left femur has a grossly normal appearance.  IMPRESSION-   1.  Osteopenia. 2.  Questionable undisplaced fracture of the right greater trochanter.  Electronically Signed By- Gale Whitlock MD On: 2017-01-08 16:17:07            [facility]    Final diagnoses:   Chest pain, unspecified type            John Gunn MD  02/06/17 4417

## 2017-02-06 NOTE — H&P
<principal problem not specified>  Subjective:     Nikki Felder is a 66 y.o. female who presents for evaluation of chest pain.  It is retrosternal and goes into the jaw.  She has recently been in the hospital in an observation status and her enzymes ruled out.       Concurrent Medical Hx:  Past Medical History   Diagnosis Date   • Acute bronchitis    • Allergic    • Allergic rhinitis    • Anxiety state    • Anxiety state      Anxiety state, unspecified      • Arthritis    • Bacterial pneumonia    • Chronic pain    • Chronic pain      Other chronic pain      • Contact dermatitis    • COPD (chronic obstructive pulmonary disease)    • Coronary arteriosclerosis    • Depressive disorder    • Depressive disorder    • Diabetes mellitus due to underlying condition with diabetic autonomic neuropathy      Diabetes mellitus due to underlying condition with diabetic autonomic (poly)neuropathy      • Drug therapy      Long-term drug therapy      • Dysuria    • Encounter for immunization    • Encounter for screening for malignant neoplasm of colon    • Essential hypertension    • Folliculitis    • Headache    • Heart murmur    • Herpes zoster    • Herpes zoster with nervous system complication    • History of screening mammography    • Hyperlipidemia    • Insomnia    • Insomnia    • Kidney stone    • Lumbar radiculopathy    • Lung nodule    • Migraine    • Migraine      Migraine, unspecified, without mention of intractable migraine, without mention of status migrainosus      • Migraine    • Myocardial infarction    • Nausea and vomiting    • Nausea with vomiting    • Neck pain    • Need for immunization against influenza    • Need for prophylactic vaccination and inoculation against diphtheria alone    • Need for prophylactic vaccination and inoculation against unspecified single disease      Need for prophylactic vaccination and inoculation against Streptococcus pneumoniae [pneumococcus] and influenza      • Non-alcoholic  fatty liver disease    • Rectus sheath hematoma    • Senile osteoporosis    • Shoulder pain    • Solitary pulmonary nodule present on computed tomography of lung    • Spasm of back muscles    • Spinal stenosis of lumbar region    • Systolic congestive heart failure    • TIA (transient ischemic attack)    • Tobacco dependence syndrome    • Type 2 diabetes mellitus    • Urinary tract infectious disease    • Viral gastroenteritis    • Vitamin D deficiency      Unspecified vitamin D deficiency      • Vitamin D deficiency        Past Surgical Hx:  Past Surgical History   Procedure Laterality Date   • Cardiac catheterization       plaque noted ef 55% 2010    • Cholecystectomy     • Dilatation and curettage  1975   • Injection of medication  2015      Phenergan (3)      • Injection of medication       Kenalog (1)      2011    • Injection of medication  2014     Toradol (3)   • Tubal abdominal ligation        [place] tubal ligation 1981    • Injection of medication  2011      Vistaril (1)    • Injection of medication  2012     Zofran (1)   •  section        Low cervical  1981       • Dilatation and curettage  1975     Family Hx:  Family History   Problem Relation Age of Onset   • Alzheimer's disease Mother    • Diabetes Brother    • Alzheimer's disease Other    • Diabetes Other    • Lung cancer Other    • Pulmonary embolism Other    • Hypertension Father    • Depression Daughter    • Hypertension Daughter    • Anxiety disorder Daughter    • Lung cancer Brother       Social History:  Social History     Social History   • Marital status:      Spouse name: N/A   • Number of children: N/A   • Years of education: N/A     Occupational History   • Not on file.     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  Lives with daughter.        Home Medications:  Prior to Admission medications    Medication Sig Start Date End Date Taking? Authorizing Provider   acetaminophen (TYLENOL) 325 MG tablet Take 2 tablets by mouth Every 6 (Six) Hours As Needed for mild pain (1-3). 1/11/17  Yes Micheal Whitman MD   albuterol (PROVENTIL HFA;VENTOLIN HFA) 108 (90 BASE) MCG/ACT inhaler Inhale 2 puffs every 4 (four) hours as needed for wheezing.   Yes Historical Provider, MD   atorvastatin (LIPITOR) 20 MG tablet Take 20 mg by mouth every night.   Yes Historical Provider, MD   carvedilol (COREG) 3.125 MG tablet Take 3.125 mg by mouth 2 (two) times a day.   Yes Historical Provider, MD   clindamycin (CLEOCIN) 150 MG capsule 1 tab PO 4 x daily 1/11/17  Yes Micheal Whitman MD   clonazePAM (KlonoPIN) 1 MG tablet 1 mg po daily x 1 week then 1/2 po daily x 1 week then 1/2 po every other day 11/18/16  Yes Yasmin Stewart MD   clopidogrel (PLAVIX) 75 MG tablet  1/12/17  Yes Historical Provider, MD   escitalopram (LEXAPRO) 10 MG tablet Take 1 tablet by mouth Daily. 2/3/17  Yes Yasmin Stewart MD   gabapentin (NEURONTIN) 400 MG capsule Take 400 mg by mouth 3 (three) times a day.   Yes Historical Provider, MD   gabapentin (NEURONTIN) 800 MG tablet Take 800 mg by mouth 3 (three) times a day.   Yes Historical Provider, MD   HYDROcodone-acetaminophen (NORCO) 7.5-325 MG per tablet Take 1 tablet by mouth 3 (three) times a day as needed for moderate pain (4-6).   Yes Historical Provider, MD   ipratropium-albuterol (COMBIVENT RESPIMAT)  MCG/ACT inhaler Inhale 2 puffs every 4 (four) hours as needed for wheezing.   Yes Historical Provider, MD   nicotine (NICODERM CQ) 21 MG/24HR patch Place 1 patch on the skin every day.   Yes Historical Provider, MD   nitroglycerin (NITROSTAT) 0.4 MG SL  "tablet Place 0.4-1.2 mg under the tongue as needed for chest pain. Take no more than 3 doses in 15 minutes.   Yes Historical Provider, MD   ondansetron ODT (ZOFRAN ODT) 4 MG disintegrating tablet Take 1 tablet by mouth Every 8 (Eight) Hours As Needed for nausea or vomiting. 1/11/17  Yes Micheal Whitman MD   promethazine (PHENERGAN) 25 MG tablet Take 1 tablet by mouth Every 8 (Eight) Hours As Needed for nausea or vomiting. 1/17/17  Yes Yasmin Stewart MD   warfarin (COUMADIN) 4 MG tablet One tablet daily 11/9/16  Yes Grey Lieberman MD        Allergies:  Corticosteroids  I reviewed the patient's new clinical results.  Review of Systems  Review of Systems   Constitutional: Negative for chills and fatigue.   Respiratory: Negative for cough, chest tightness and stridor.    Cardiovascular: Positive for chest pain. Negative for palpitations and leg swelling.   Gastrointestinal: Negative for abdominal distention and vomiting.    All other systems reviewed and are negative.    Objective:     Visit Vitals   • /64 (BP Location: Right arm, Patient Position: Sitting)   • Pulse 61   • Temp 97.4 °F (36.3 °C) (Oral)   • Resp 18   • Ht 61\" (154.9 cm)   • Wt 133 lb (60.3 kg)   • SpO2 98%   • BMI 25.13 kg/m2     Physical Exam   Constitutional: She appears well-developed and well-nourished.   HENT:   Head: Normocephalic and atraumatic.   Right Ear: External ear normal.   Left Ear: External ear normal.   Mouth/Throat: Oropharynx is clear and moist.   Eyes: EOM are normal. Pupils are equal, round, and reactive to light.   Cardiovascular: Normal rate, regular rhythm and normal heart sounds.  Exam reveals no gallop and no friction rub.    No murmur heard.  Pulmonary/Chest: Effort normal and breath sounds normal. No respiratory distress. She has no wheezes. She has no rales.   Abdominal: Soft. Bowel sounds are normal. She exhibits no distension and no mass. There is no tenderness.   Musculoskeletal: Normal range of motion. "   Neurological: She is alert.   Skin: Skin is warm and dry.   Psychiatric: She has a normal mood and affect. Her behavior is normal. Thought content normal.   Nursing note and vitals reviewed.    I reviewed the patient's new clinical results.  Assessment/Plan:     Active Hospital Problems (** Indicates Principal Problem)    Diagnosis Date Noted   • Chest pain [R07.9] 02/06/2017   • Essential hypertension, benign [I10] 11/09/2016   • Anxiety [F41.9] 11/09/2016   • Diabetes mellitus [E11.9] 11/09/2016   • Atherosclerosis of native coronary artery of native heart with angina pectoris [I25.119] 10/08/2016      Resolved Hospital Problems    Diagnosis Date Noted Date Resolved   No resolved problems to display.         I will admit to observation.  We'll consult Dr. Martinez,he  is her cardiologist.  Check serial cardiac enzymes.        This document has been electronically signed by Samuel Green MD on February 6, 2017 3:45 PM

## 2017-02-06 NOTE — PLAN OF CARE
Problem: Patient Care Overview (Adult)  Goal: Plan of Care Review  Outcome: Ongoing (interventions implemented as appropriate)    02/06/17 8450   Coping/Psychosocial Response Interventions   Plan Of Care Reviewed With patient;daughter   Patient Care Overview   Progress no change   Outcome Evaluation   Outcome Summary/Follow up Plan pt still having R sided chest pain        Goal: Adult Individualization and Mutuality  Outcome: Ongoing (interventions implemented as appropriate)  Goal: Discharge Needs Assessment  Outcome: Ongoing (interventions implemented as appropriate)    Problem: Acute Coronary Syndrome (ACS) (Adult)  Goal: Signs and Symptoms of Listed Potential Problems Will be Absent or Manageable (Acute Coronary Syndrome)  Outcome: Ongoing (interventions implemented as appropriate)

## 2017-02-07 ENCOUNTER — APPOINTMENT (OUTPATIENT)
Dept: CT IMAGING | Facility: HOSPITAL | Age: 67
End: 2017-02-07

## 2017-02-07 VITALS
OXYGEN SATURATION: 98 % | WEIGHT: 133.5 LBS | HEIGHT: 61 IN | BODY MASS INDEX: 25.2 KG/M2 | TEMPERATURE: 98.3 F | HEART RATE: 62 BPM | RESPIRATION RATE: 18 BRPM | SYSTOLIC BLOOD PRESSURE: 118 MMHG | DIASTOLIC BLOOD PRESSURE: 76 MMHG

## 2017-02-07 LAB
INR PPP: 1.73 (ref 0.8–1.2)
PROTHROMBIN TIME: 19.9 SECONDS (ref 11.1–15.3)
TROPONIN I SERPL-MCNC: <0.012 NG/ML

## 2017-02-07 PROCEDURE — G0378 HOSPITAL OBSERVATION PER HR: HCPCS

## 2017-02-07 PROCEDURE — 0 IOPAMIDOL PER 1 ML: Performed by: FAMILY MEDICINE

## 2017-02-07 PROCEDURE — 99224 PR SBSQ OBSERVATION CARE/DAY 15 MINUTES: CPT | Performed by: FAMILY MEDICINE

## 2017-02-07 PROCEDURE — 84484 ASSAY OF TROPONIN QUANT: CPT | Performed by: EMERGENCY MEDICINE

## 2017-02-07 PROCEDURE — 85610 PROTHROMBIN TIME: CPT | Performed by: FAMILY MEDICINE

## 2017-02-07 PROCEDURE — 96361 HYDRATE IV INFUSION ADD-ON: CPT

## 2017-02-07 PROCEDURE — 75574 CT ANGIO HRT W/3D IMAGE: CPT

## 2017-02-07 RX ORDER — HYDROCODONE BITARTRATE AND ACETAMINOPHEN 7.5; 325 MG/1; MG/1
1 TABLET ORAL EVERY 6 HOURS PRN
Status: DISCONTINUED | OUTPATIENT
Start: 2017-02-07 | End: 2017-02-07 | Stop reason: HOSPADM

## 2017-02-07 RX ORDER — ALPRAZOLAM 0.25 MG/1
0.25 TABLET ORAL ONCE
Status: COMPLETED | OUTPATIENT
Start: 2017-02-07 | End: 2017-02-07

## 2017-02-07 RX ORDER — RANOLAZINE 500 MG/1
500 TABLET, EXTENDED RELEASE ORAL EVERY 12 HOURS SCHEDULED
Status: DISCONTINUED | OUTPATIENT
Start: 2017-02-07 | End: 2017-02-07 | Stop reason: HOSPADM

## 2017-02-07 RX ORDER — NITROGLYCERIN 40 MG/1
1 PATCH TRANSDERMAL DAILY
Qty: 90 PATCH | Refills: 12 | Status: SHIPPED | OUTPATIENT
Start: 2017-02-07 | End: 2017-07-21 | Stop reason: HOSPADM

## 2017-02-07 RX ORDER — RANOLAZINE 500 MG/1
500 TABLET, EXTENDED RELEASE ORAL EVERY 12 HOURS SCHEDULED
Qty: 180 TABLET | Refills: 12 | Status: SHIPPED | OUTPATIENT
Start: 2017-02-07 | End: 2017-04-20 | Stop reason: SDUPTHER

## 2017-02-07 RX ORDER — NITROGLYCERIN 40 MG/1
1 PATCH TRANSDERMAL DAILY
Status: DISCONTINUED | OUTPATIENT
Start: 2017-02-07 | End: 2017-02-07 | Stop reason: HOSPADM

## 2017-02-07 RX ADMIN — ONDANSETRON 4 MG: 4 TABLET, ORALLY DISINTEGRATING ORAL at 06:46

## 2017-02-07 RX ADMIN — ESCITALOPRAM OXALATE 10 MG: 10 TABLET ORAL at 08:32

## 2017-02-07 RX ADMIN — Medication 10 ML: at 08:34

## 2017-02-07 RX ADMIN — SODIUM CHLORIDE 75 ML/HR: 9 INJECTION, SOLUTION INTRAVENOUS at 02:07

## 2017-02-07 RX ADMIN — HYDROCODONE BITARTRATE AND ACETAMINOPHEN 1 TABLET: 7.5; 325 TABLET ORAL at 06:46

## 2017-02-07 RX ADMIN — HYDROCODONE BITARTRATE AND ACETAMINOPHEN 1 TABLET: 7.5; 325 TABLET ORAL at 00:27

## 2017-02-07 RX ADMIN — IOPAMIDOL 75 ML: 755 INJECTION, SOLUTION INTRAVENOUS at 11:30

## 2017-02-07 RX ADMIN — NITROGLYCERIN 1 PATCH: 0.2 PATCH TRANSDERMAL at 08:33

## 2017-02-07 RX ADMIN — RANOLAZINE 500 MG: 500 TABLET, FILM COATED, EXTENDED RELEASE ORAL at 00:59

## 2017-02-07 RX ADMIN — ACETAMINOPHEN 650 MG: 325 TABLET ORAL at 09:08

## 2017-02-07 RX ADMIN — HYDROCODONE BITARTRATE AND ACETAMINOPHEN 1 TABLET: 7.5; 325 TABLET ORAL at 12:31

## 2017-02-07 RX ADMIN — CARVEDILOL 3.12 MG: 3.12 TABLET, FILM COATED ORAL at 08:32

## 2017-02-07 RX ADMIN — GABAPENTIN 800 MG: 800 TABLET, FILM COATED ORAL at 08:32

## 2017-02-07 RX ADMIN — ALPRAZOLAM 0.25 MG: 0.25 TABLET ORAL at 02:04

## 2017-02-07 RX ADMIN — RANOLAZINE 500 MG: 500 TABLET, FILM COATED, EXTENDED RELEASE ORAL at 08:32

## 2017-02-07 NOTE — CONSULTS
Cardiology Consultation Note.        Patient Name: Nikki Felder  Age/Sex: 66 y.o. female  : 1950  MRN: [id number]    Date of consultation: 2017  Consulting Physician: Raheel Martinez MD  Primary care Physician: Yasmin Stewart MD  Requesting Physician:   Samuel Green M.D.  Reason for consultation: Chest pain with history of atherosclerotic coronary artery disease with previous PTCA and stenting with abnormal resting electrocardiogram.      Subjective:       Chief Complaint: Chest pain requiring nitroglycerin with radiation to the neck with abnormal resting electrocardiogram with history of atherosclerotic coronary artery disease.    History of Present Illness:  Nikki Felder is a 66 y.o. female     Body mass index is 25.22 kg/(m^2).  with a past medical history significant for atherosclerotic coronary artery disease with aborted anterior wall myocardial infarction on 09/15/2016 with a Pronto thrombectomy and PTCA and stenting of the 100% occluded left anterior descending artery, with deployment of a 2.5 mm x 8 mm, a 2.5 mm x 15 mm, and a 2.75 mm x 18 mm Alpine drug-eluting stent, with reduction of stenosis from 100% to less than 0% stenosis, and PTCA and stenting of the 100% occluded 1st diagonal branch with deployment of a 2 mm x 28 mm Mini Vision stent, and luminal irregularity in the circumflex artery, and no evidence of any obstructive disease noted in the right coronary artery, with anteroapical wall hypokinesis with akinesis, with an ejection fraction of 25-30%, with left ventricular apical thrombus, on chronic anticoagulation with Coumadin, mild mitral and mild tricuspid regurgitation, concentric left ventricular hypertrophy with diastolic dysfunction. History of hyperlipidemia. History of rectus sheath hematoma secondary to supratherapeutic anticoagulation in 2016 with subsequent transfer to acute rehab. History of anxiety, depression, chronic pain. History of transient  ischemic attack, arterial hypertension, diabetes, chronic obstructive lung disease. History of renal calculi.    Patient had presented to the hospital on 11/27/2016 with symptoms of chest pain radiating to the both the jaws associated with shortness of breath and some diaphoresis lasting for about 6 hours. Patient had taken a nitroglycerin with partial resolution of the discomfort. Patient's resting electrocardiogram had revealed sinus rhythm with anterolateral T-wave inversion. Patient's initial and subsequent cardiac enzyme were negative. Patient during the hospitalization underwent a coronary angiogram and in-stent restenosis of the diagonal branch percutaneous intervention. Patient was subsequently discharged home. Patient was evaluated on outpatient basis and due to the patient's left ventricular systolic dysfunction patient was to undergo a MUGA scan evaluation.     Patient last transthoracic echocardiogram had revealed left ventricular systolic dysfunction with an ejection fraction of 25-30%.  Patient was hospitalized over the last weekend and was discharged on Saturday after she had negative troponin and the patient chest pain was pleuritic in nature.    Patient after being discharged home on Saturday on Sunday experienced symptoms of substernal chest pain requiring her to take to someone nitroglycerin.  Patient describes the pain on Sunday of a different quality radiating to the jaw.  Patient did not have any associated diaphoresis but did have symptoms of nausea and dry heaves.  Patient had recurrent symptoms of chest pain on Monday and subsequently presented to the emergency room.  Patient initial resting electrocardiogram in the emergency room revealed sinus rhythm with anterior T-wave inversion which was unchanged from the previous electrocardiogram.  Patient initial and subsequent cardiac enzyme was negative.    Patient has continued to smoke.  Patient is awaiting to undergo a MUGA scan evaluation  which was scheduled on February 9.  Patient now has symptoms of chest pain with negative troponin and abnormal resting electrocardiogram which is unchanged.  Patient is anticoagulated with Coumadin with a PT INR of 1.42.  Patient does complain of having symptoms of shortness of breath and on and off chest pain.  Patient denies any paroxysmal nocturnal dyspnea or orthopnea.    Patient 10 point review of system except for what is stated in the history of present illness is negative.      Past Medical History:  1. Atherosclerotic coronary artery disease.   2. PTCA of the in-stent stenosis in the diagonal branch done in November 2016 with sustained patency in the left anterior descending artery stent and no evidence of any obstructive disease in the circumflex artery..  3. Aborted anterior wall myocardial infarction on 09/15/2016 with atherosclerotic coronary artery disease.   4. Pronto thrombectomy of the left anterior descending artery with PTCA and stenting of the proximal to mid left anterior descending artery with deployment of a 2.5 mm x 8 mm, and a 2.5 mm x 15 mm, and a 2.75 mm x 18 mm Alpine drug-eluting stent with reduction of stenosis from 100% to less than 0% stenosis.   5. PTCA and stenting of the 100% occluded 1st diagonal branch with deployment of a 2 mm x 28 mm Mini-Vision stent.  6. No evidence of any obstructive disease noted in the right coronary artery.   7. Ischemic cardiomyopathy with anterior apical wall hypokinesis with akinesis with an ejection fraction of 25-30%.   8. Left ventricular apical thrombus on chronic anticoagulation with Coumadin.   9. Mild mitral and mild tricuspid regurgitation.   10. Concentric left ventricular hypertrophy with diastolic dysfunction.   11. Hyperlipidemia.   12. Non-insulin-dependent diabetes.   13. Anxiety and panic disorder.   14. Chronic pain syndrome.   15. Chronic obstructive lung disease with tobacco abuse.   16. History of renal calculi.   17. Previous  history of transient ischemic attack.   18. Rectus sheet hematoma secondary to supratherapeutic PT/INR from anticoagulation with Coumadin.   19. Acute rehab followup after the rectus sheath hematoma.   20. Ongoing tobacco abuse.    Past Surgical History:      1. Status post  times 2 occasions.   2. Status post bilateral tubal ligation.   3. Status post cholecystectomy.   4. Status post dilation and curettage procedure.     Family History:  Significant for atherosclerotic CAD.  Family History   Problem Relation Age of Onset   • Alzheimer's disease Mother    • Diabetes Brother    • Alzheimer's disease Other    • Diabetes Other    • Lung cancer Other    • Pulmonary embolism Other    • Hypertension Father    • Depression Daughter    • Hypertension Daughter    • Anxiety disorder Daughter    • Lung cancer Brother        Social History:      Significant for 1/2 to 1 pack per day tobacco abuse. Denies any alcohol intake.       Social History     Social History   • Marital status:      Spouse name: N/A   • Number of children: N/A   • Years of education: N/A     Occupational History   • Not on file.     Social History Main Topics   • Smoking status: Former Smoker     Packs/day: 1.00     Years: 45.00   • Smokeless tobacco: Never Used      Comment: maybe 2 or 3 a day   • Alcohol use No   • Drug use: No   • Sexual activity: Defer     Other Topics Concern   • Not on file     Social History Narrative    Previously worked in [employer] at [employer] [facility] [place], [place].  Lives with daughter.         Cardiac Risk factor:   1. Postmenopausal.   2. Arterial hypertension.   3. Hyperlipidemia.  4. Tobacco abuse.   5. Diabetes.     Allergies:  Allergies   Allergen Reactions   • Corticosteroids        Medication::  Prescriptions Prior to Admission   Medication Sig Dispense Refill Last Dose   • atorvastatin (LIPITOR) 20 MG tablet Take 20 mg by mouth every night.   2017 at Unknown time   • carvedilol (COREG) 3.125 MG tablet Take 3.125  mg by mouth 2 (two) times a day.   2/5/2017 at Unknown time   • clopidogrel (PLAVIX) 75 MG tablet   2 2/5/2017 at Unknown time   • escitalopram (LEXAPRO) 10 MG tablet Take 1 tablet by mouth Daily. 30 tablet 5 2/5/2017 at Unknown time   • gabapentin (NEURONTIN) 400 MG capsule Take 400 mg by mouth 3 (three) times a day.   2/5/2017 at Unknown time   • gabapentin (NEURONTIN) 800 MG tablet Take 800 mg by mouth 3 (three) times a day.   2/5/2017 at Unknown time   • HYDROcodone-acetaminophen (NORCO) 7.5-325 MG per tablet Take 1 tablet by mouth 3 (three) times a day as needed for moderate pain (4-6).   2/5/2017 at Unknown time   • nicotine (NICODERM CQ) 21 MG/24HR patch Place 1 patch on the skin every day.   2/5/2017 at Unknown time   • nitroglycerin (NITROSTAT) 0.4 MG SL tablet Place 0.4-1.2 mg under the tongue as needed for chest pain. Take no more than 3 doses in 15 minutes.   2/6/2017 at Unknown time   • ondansetron ODT (ZOFRAN ODT) 4 MG disintegrating tablet Take 1 tablet by mouth Every 8 (Eight) Hours As Needed for nausea or vomiting. 90 tablet 3 2/5/2017 at Unknown time   • promethazine (PHENERGAN) 25 MG tablet Take 1 tablet by mouth Every 8 (Eight) Hours As Needed for nausea or vomiting. 30 tablet 2 2/5/2017 at Unknown time   • warfarin (COUMADIN) 4 MG tablet One tablet daily 30 tablet 0 2/5/2017 at Unknown time   • acetaminophen (TYLENOL) 325 MG tablet Take 2 tablets by mouth Every 6 (Six) Hours As Needed for mild pain (1-3). 240 tablet 5 Unknown at Unknown time   • albuterol (PROVENTIL HFA;VENTOLIN HFA) 108 (90 BASE) MCG/ACT inhaler Inhale 2 puffs every 4 (four) hours as needed for wheezing.   Unknown at Unknown time   • ipratropium-albuterol (COMBIVENT RESPIMAT)  MCG/ACT inhaler Inhale 2 puffs every 4 (four) hours as needed for wheezing.   Unknown at Unknown time           Review of Systems:       Constitutional:  Denies recent weight loss, weight gain, fever or chills, no change in exercise tolerance      HENT:  Denies any hearing loss, epistaxis, hoarseness, or difficulty speaking.     Eyes: Wears eyeglasses or contact lenses     Respiratory:  Denies dyspnea with exertion,no cough, wheezing, or hemoptysis.     Cardiovascular: Positive for chest pain and shortness of breath.  Negative for palpations,orthopnea, PND, peripheral edema, syncope, or claudication.     Gastrointestinal:  Denies change in bowel habits, dyspepsia, ulcer disease, hematochezia, or melena.  No nausea, no vomiting, no hematemesis, no diarrhea or constipation, no melena      Endocrine: Negative for cold intolerance, heat intolerance, polydipsia, polyphagia and polyuria. Denies any history of weight change, heat/cold intolerance, polydipsia, polyuria     Genitourinary: Negative hor hematuria.      Musculoskeletal: Denies any history of arthritic symptoms or back problems .  No joint pain, joint stiffness, joint swelling, muscle pain, muscle weakness and neck pain    Skin:  Denies any change in hair or nails, rashes, or skin lesions.     Allergic/Immunologic: Negative.  Negative for environmental allergies, food allergies and immunocompromised state.     Neurological:  Denies any history of recurrent headaches, strokes, TIA, or seizure disorder.     Hematological: Denies excessive bleeding, easy bruising, fatigue, lymphadenopathy and petechiae or any bleeding disorders, or lymphadenopathy.     Psychiatric/Behavioral: Denies any history of depression, substance abuse, or change in cognitive function. Denies any psychomotor reaction or tangential thought.  No depression, homicidal ideations and suicidal ideations    Endocrine: No frequent urination and nocturia, temperature intolerance, weight gain, unintended and weight loss, unintended            Objective:     Objective:  Vitals:    02/06/17 2024   BP: 124/65   Pulse: 74   Resp: 18   Temp: 98.1 °F (36.7 °C)   SpO2: 97%     .    Body mass index is 25.22 kg/(m^2).           Physical Exam:   General  Appearance:    Alert, oriented, cooperative, in no acute distress   Head:    Normocephalic, atraumatic, without obvious abnormality   Eyes:           BASIL  Lids and lashes normal, conjunctivae and sclerae normal, no icterus, no pallor   Ears:    Ears appear intact with no abnormalities noted   Throat:   Mucous membranes pink and moist   Neck:   Supple, trachea midline, no carotid bruit, no organomegaly or JVD   Lungs:     Clear to auscultation and percussion, respirations regular, even and Unlabored. No wheezes, rales, rhonchi    Heart:    Regular rhythm and normal rate, normal S1 and S2, soft systolic murmur best heard at the apex , no gallop, no rub, no click   Abdomen:     Soft, non-tender, non-distended, no guarding, no rebound tenderness, Normal bowel sounds in all four quadrant, no masses, liver and spleen nonpalpable,    Genitalia:    Deferred   Extremities:   Moves all extremities well, no edema, no cyanosis, no              Redness, no clubbing   Pulses:   Pulses palpable and equal bilaterally   Skin:   Moist and warm. No bleeding, bruising or rash   Neurologic/Psychiatric:   Alert and oriented to person, place, and time.  Motor, power and tone in upper and lower extremity is grossly intact.  No focal neurological deficits. Normal cognitive function. No psychomotor reaction or tangential thought. No depression, homicidal ideations and suicidal ideations           Lab Review:       Results from last 7 days  Lab Units 02/06/17  1318   SODIUM mmol/L 141   POTASSIUM mmol/L 3.9   CHLORIDE mmol/L 106   TOTAL CO2 mmol/L 23.0   BUN mg/dL 10   CREATININE mg/dL 0.62   CALCIUM mg/dL 9.2   BILIRUBIN mg/dL 0.5   ALK PHOS U/L 100   ALT (SGPT) U/L 24   AST (SGOT) U/L 31   GLUCOSE mg/dL 141*       Results from last 7 days  Lab Units 02/06/17  1906 02/06/17  1318 02/02/17  1730 02/02/17  1536   CK TOTAL U/L  --  51 36 39   TROPONIN I ng/mL <0.012 <0.012 <0.012 <0.012           Results from last 7 days  Lab Units  02/06/17  1318   WBC 10*3/mm3 5.48   HEMOGLOBIN g/dL 14.1   HEMATOCRIT % 41.0   PLATELETS 10*3/mm3 259       Results from last 7 days  Lab Units 02/06/17  1318 02/03/17  0615 02/02/17  1137   INR  1.53* 1.42* 1.43*   APTT seconds 31.6  --   --                    Invalid input(s):  T4,  FREET4    EKG:   ECG/EMG Results (last 24 hours)     Procedure Component Value Units Date/Time    ECG 12 Lead [[id number]] Collected:  02/06/17 1232     Updated:  02/06/17 1516          Imaging:  Imaging Results (last 24 hours)     Procedure Component Value Units Date/Time    XR Chest 1 View [[id number]] Collected:  02/06/17 1257     Updated:  02/06/17 1300    Narrative:           PROCEDURE: Single chest view AP    REASON FOR EXAM:Chest Pain triage protocol    FINDINGS: Comparison exam dated February 2, 2017. Cardiac and pulmonary vasculature are normal. Left upper lung field small calcified lung parenchymal granuloma consistent with old granulomatous disease. Left lung base small linear opacity. Right lung   base very small linear opacity. Lungs otherwise clear. Pleural spaces are normal. No acute osseous abnormality.      Impression:       1.  Evidence of old granulomatous disease.  2.  Bibasilar small foci of discoid atelectasis.    Electronically signed by:  Mason Franco  2/6/2017 12:59 PM CST            I personally viewed and interpreted the patient's EKG/Telemetry data.    Assessment:   1. Chest pain with recurrent hospitalization with previous history of aborted anterior wall myocardial infarction in September 2016..  2.  Atherosclerotic coronary artery disease .   3. PTCA and stenting of the left anterior descending artery and the diagonal branch September 2016.  4. PTCA of the in-stent restenosis of the diagonal branch in November 2016.  5. Ischemic cardiomyopathy with apical thrombus with an ejection fraction of 25-30% on chronic anticoagulation with Coumadin.  5. Chronic obstructive lung disease with tobacco  abuse.          Plan:   1. Chest pain with history of documented atherosclerotic coronary artery disease with aborted anterior wall myocardial infarction in September 2016, with Pronto thrombectomy and rescue PTCA and stenting of the 100% occluded left anterior descending artery, and PTCA and stenting of the diagonal branch. Patient did not have any evidence of any obstructive disease in the circumflex or the right coronary artery. Patient now presents with symptoms of atypical chest pain radiating to the right shoulder. Patient after the last percutaneous intervention of the in-stent restenosis of the diagonal branch was evaluated and was scheduled to undergo an outpatient MUGA scan evaluation for consideration for an AICD placement. Patient resting electrocardiogram does not show any new changes and the troponin is negative.  Patient has had recurrent hospitalization with symptoms of chest pain requiring her to take nitroglycerin with relief.  Due to the patient recurrent symptoms of chest pain with patient being on anticoagulation with Coumadin with subtherapeutic PT INR have discussed with the patient and the treatment option of coronary angiogram versus a CTA of the coronaries to rule out progression and restenosis of the previously angioplastied stent.  Patient at the present time has been recommended medical management for the coronary artery disease patient has been counseled extensively on risk factor modification and smoking cessation.chest pain.  Patient would be started on Ranexa 500 mg twice a day and a Nitro-Dur patch 0.2 mg to the anterior chest wall from 7 AM to 7 PM pending further evaluation.  2. Arterial hypertension with hypertensive heart disease. Patient's blood pressure has been labile. Patient's blood pressure is 128/70. Patient, at the present time, has been recommended to continue with the present dose of the Coreg and the losartan.   3. History of peripheral vascular disease is noted.   4.  Apical wall akinesis with apical thrombus. Patient at the present time, will be continued on anticoagulation with Coumadin. Patient has had a supratherapeutic INR with a rectus sheath hematoma requiring close followup and subsequent evaluation in the transitional care unit. Patient has been recommended to be compliant with the dosing of the anticoagulation with Coumadin to prevent any future supratherapeutic PT/INR.  Patient 's  PT/INR is 1.42 at the present time.   5. Chronic obstructive lung disease with tobacco abuse is noted.   6. Non-insulin-dependent diabetes. Patient has been counseled on American Diabetic Association diet.   7. History of transient ischemic attack is noted.   8.  Ischemic cardiomyopathy: Patient last ejection fraction on a transthoracic echo was 25-30%.  Patient was scheduled for outpatient MUGA scan evaluation.  Patient would undergo the MUGA scan evaluation in the morning after the CT angiogram of the coronaries.      Time: time spent in face-to-face evaluation off greater than 60 minutes and interacting and formulating examining and discussing the plan with the patient with 50% of greater time spent in face-to-face interaction.    Raheel Martinez MD  02/06/17  11:40 PM        EMR Dragon/Transcription disclaimer:   Some of this note may be an electronic transcription/translation of spoken language to printed text. The electronic translation of spoken language may permit erroneous, or at times, nonsensical words or phrases to be inadvertently transcribed; Although I have reviewed the note for such errors, some may still exist.

## 2017-02-07 NOTE — PLAN OF CARE
Problem: Pain, Chronic (Adult)  Goal: Identify Related Risk Factors and Signs and Symptoms  Outcome: Outcome(s) achieved Date Met:  02/06/17 02/06/17 1914   Pain, Chronic   Related Risk Factors (Chronic Pain) disease process   Signs and Symptoms (Chronic Pain) abnormal posturing/positioning       Goal: Acceptable Pain Control/Comfort Level  Outcome: Ongoing (interventions implemented as appropriate)

## 2017-02-07 NOTE — PLAN OF CARE
Problem: Patient Care Overview (Adult)  Goal: Plan of Care Review  Outcome: Ongoing (interventions implemented as appropriate)    02/07/17 0511   Coping/Psychosocial Response Interventions   Plan Of Care Reviewed With patient   Patient Care Overview   Progress improving   Outcome Evaluation   Outcome Summary/Follow up Plan new med ordered for treatment of angina       Goal: Adult Individualization and Mutuality  Outcome: Ongoing (interventions implemented as appropriate)    Problem: Acute Coronary Syndrome (ACS) (Adult)  Goal: Signs and Symptoms of Listed Potential Problems Will be Absent or Manageable (Acute Coronary Syndrome)  Outcome: Ongoing (interventions implemented as appropriate)    Problem: Pain, Chronic (Adult)  Goal: Acceptable Pain Control/Comfort Level  Outcome: Outcome(s) achieved Date Met:  02/07/17

## 2017-02-07 NOTE — DISCHARGE SUMMARY
Cardiology Discharge Summary          Date of Discharge:  2/7/2017    Discharge Diagnosis:   1.  Chest pain.  2.Atherosclerotic coronary artery disease with previous  PTCA of the in-stent stenosis in the diagonal branch done in November 2016 with sustained patency in the left anterior descending artery stent and no evidence of any obstructive disease in the circumflex artery..  3. Aborted anterior wall myocardial infarction on 09/15/2016 with atherosclerotic coronary artery disease.   4. Pronto thrombectomy of the left anterior descending artery with PTCA and stenting of the proximal to mid left anterior descending artery with deployment of a 2.5 mm x 8 mm, and a 2.5 mm x 15 mm, and a 2.75 mm x 18 mm Alpine drug-eluting stent with reduction of stenosis from 100% to less than 0% stenosis.   5. PTCA and stenting of the 100% occluded 1st diagonal branch with deployment of a 2 mm x 28 mm Mini-Vision stent.  6. No evidence of any obstructive disease noted in the right coronary artery.   7. Ischemic cardiomyopathy with anterior apical wall hypokinesis with akinesis with an ejection fraction of 25-30%.   8. Left ventricular apical thrombus on chronic anticoagulation with Coumadin.   9. Mild mitral and mild tricuspid regurgitation.   10. Concentric left ventricular hypertrophy with diastolic dysfunction.   11. Hyperlipidemia.   12. Non-insulin-dependent diabetes.   13. Anxiety and panic disorder.   14. Chronic pain syndrome.   15. Chronic obstructive lung disease with tobacco abuse.   16. History of renal calculi.   17. Previous history of transient ischemic attack.   18. Rectus sheet hematoma secondary to supratherapeutic PT/INR from anticoagulation with Coumadin.   19. Acute rehab followup after the rectus sheath hematoma.   20. Ongoing tobacco abuse.      Presenting Problem/History of Present Illness    Nikki Felder is a 66 y.o. female     Body mass index is 25.22 kg/(m^2).  with a past medical history significant  for atherosclerotic coronary artery disease with aborted anterior wall myocardial infarction on 09/15/2016 with a Pronto thrombectomy and PTCA and stenting of the 100% occluded left anterior descending artery, with deployment of a 2.5 mm x 8 mm, a 2.5 mm x 15 mm, and a 2.75 mm x 18 mm Alpine drug-eluting stent, with reduction of stenosis from 100% to less than 0% stenosis, and PTCA and stenting of the 100% occluded 1st diagonal branch with deployment of a 2 mm x 28 mm Mini Vision stent, and luminal irregularity in the circumflex artery, and no evidence of any obstructive disease noted in the right coronary artery, with anteroapical wall hypokinesis with akinesis, with an ejection fraction of 25-30%, with left ventricular apical thrombus, on chronic anticoagulation with Coumadin, mild mitral and mild tricuspid regurgitation, concentric left ventricular hypertrophy with diastolic dysfunction. History of hyperlipidemia. History of rectus sheath hematoma secondary to supratherapeutic anticoagulation in October 2016 with subsequent transfer to acute rehab. History of anxiety, depression, chronic pain. History of transient ischemic attack, arterial hypertension, diabetes, chronic obstructive lung disease. History of renal calculi.     Patient had presented to the hospital on 11/27/2016 with symptoms of chest pain radiating to the both the jaws associated with shortness of breath and some diaphoresis lasting for about 6 hours. Patient had taken a nitroglycerin with partial resolution of the discomfort. Patient's resting electrocardiogram had revealed sinus rhythm with anterolateral T-wave inversion. Patient's initial and subsequent cardiac enzyme were negative. Patient during the hospitalization underwent a coronary angiogram and in-stent restenosis of the diagonal branch percutaneous intervention. Patient was subsequently discharged home. Patient was evaluated on outpatient basis and due to the patient's left ventricular  systolic dysfunction patient was to undergo a MUGA scan evaluation.      Patient last transthoracic echocardiogram had revealed left ventricular systolic dysfunction with an ejection fraction of 25-30%.  Patient was hospitalized over the last weekend and was discharged on Saturday after she had negative troponin and the patient chest pain was pleuritic in nature.     Patient after being discharged home on Saturday on Sunday experienced symptoms of substernal chest pain requiring her to take to someone nitroglycerin. Patient describes the pain on Sunday of a different quality radiating to the jaw. Patient did not have any associated diaphoresis but did have symptoms of nausea and dry heaves. Patient had recurrent symptoms of chest pain on Monday and subsequently presented to the emergency room. Patient initial resting electrocardiogram in the emergency room revealed sinus rhythm with anterior T-wave inversion which was unchanged from the previous electrocardiogram. Patient initial and subsequent cardiac enzyme was negative.      Hospital Course  Patient after being hospitalized was ruled out for myocardial infarction with serial negative troponin.  Patient did not have any new electrocardiographic changes suggestive of ischemia.  Patient was recommended a CT angiogram for the coronary disease to rule out stenosis in the left anterior descending artery and the diagonal branch stenting.  Patient CT angiogram of the coronaries revealed patency in the left anterior descending artery and the diagonal branch stenting.  Patient was recommended medical management and was started on Nitro-Dur patch 0.2 mg to the anterior chest wall from 7 AM to 7 PM and Ranexa 500 mg twice a day.  Patient had ambulation in the hallway and was recommended to be discharged for outpatient follow-up as scheduled.    Procedures Performed  CT angiogram of the coronaries reveal patency in the left anterior descending artery and the diagonal branch  stenting with an ejection fraction of 55%.         Consults:   Consults     Date and Time Order Name Status Description    2/6/2017 1650 Inpatient Consult to Cardiology Completed     2/2/2017 1421 Inpatient Consult to Cardiology Completed           Pertinent Test Results:     Lab Results (last 24 hours)     Procedure Component Value Units Date/Time    Urinalysis With / Culture If Indicated [[id number]]  (Abnormal) Collected:  02/06/17 1318    Specimen:  Urine from Urine, Clean Catch Updated:  02/06/17 1335     Color, UA Yellow      Appearance, UA Slightly Cloudy (A)      pH, UA 8.5      Specific Gravity, UA 1.015      Glucose,  mg/dL (Trace) (A)      Ketones, UA Negative      Bilirubin, UA Negative      Blood, UA Negative      Protein, UA Negative      Leuk Esterase, UA Trace (A)      Nitrite, UA Negative      Urobilinogen, UA 0.2 E.U./dL     CBC & Differential [[id number]] Collected:  02/06/17 1318    Specimen:  Blood Updated:  02/06/17 1346    Narrative:       The following orders were created for panel order CBC & Differential.  Procedure                               Abnormality         Status                     ---------                               -----------         ------                     CBC Auto Differential[[id number]]         Normal              Final result                 Please view results for these tests on the individual orders.    CBC Auto Differential [[id number]]  (Normal) Collected:  02/06/17 1318    Specimen:  Blood Updated:  02/06/17 1346     WBC 5.48 10*3/mm3      RBC 4.54 10*6/mm3      Hemoglobin 14.1 g/dL      Hematocrit 41.0 %      MCV 90.3 fL      MCH 31.1 pg      MCHC 34.4 g/dL      RDW 13.3 %      RDW-SD 44.0 fl      MPV 9.1 fL      Platelets 259 10*3/mm3      Neutrophil % 55.3 %      Lymphocyte % 35.2 %      Monocyte % 6.8 %      Eosinophil % 2.0 %      Basophil % 0.5 %      Immature Grans % 0.2 %      Neutrophils, Absolute 3.03 10*3/mm3      Lymphocytes, Absolute 1.93 10*3/mm3       Monocytes, Absolute 0.37 10*3/mm3      Eosinophils, Absolute 0.11 10*3/mm3      Basophils, Absolute 0.03 10*3/mm3      Immature Grans, Absolute 0.01 10*3/mm3     Narrative:       Appended report.  These results have been appended to a previously verified report.    Comprehensive Metabolic Panel [[id number]]  (Abnormal) Collected:  02/06/17 1318    Specimen:  Blood Updated:  02/06/17 1358     Glucose 141 (H) mg/dL      BUN 10 mg/dL      Creatinine 0.62 mg/dL      Sodium 141 mmol/L      Potassium 3.9 mmol/L      Chloride 106 mmol/L      CO2 23.0 mmol/L      Calcium 9.2 mg/dL      Total Protein 7.3 g/dL      Albumin 4.40 g/dL      ALT (SGPT) 24 U/L      AST (SGOT) 31 U/L      Alkaline Phosphatase 100 U/L      Total Bilirubin 0.5 mg/dL      eGFR Non African Amer 96 mL/min/1.73      Globulin 2.9 gm/dL      A/G Ratio 1.5 g/dL      BUN/Creatinine Ratio 16.1      Anion Gap 12.0 mmol/L     Protime-INR [[id number]]  (Abnormal) Collected:  02/06/17 1318    Specimen:  Blood Updated:  02/06/17 1404     Protime 18.2 (H) Seconds      INR 1.53 (H)     Narrative:       Therapeutic range for most indications is 2.0-3.0 INR,  or 2.5-3.5 for mechanical heart valves.    Troponin [[id number]]  (Normal) Collected:  02/06/17 1318    Specimen:  Blood Updated:  02/06/17 1405     Troponin I <0.012 ng/mL     BNP [[id number]]  (Normal) Collected:  02/06/17 1318    Specimen:  Blood Updated:  02/06/17 1407     proBNP 469.0 pg/mL     Urine Culture [[id number]] Collected:  02/06/17 1318    Specimen:  Urine from Urine, Clean Catch Updated:  02/06/17 1410    Urinalysis, Microscopic Only [[id number]]  (Abnormal) Collected:  02/06/17 1318    Specimen:  Urine from Urine, Clean Catch Updated:  02/06/17 1410     RBC, UA None Seen /HPF      WBC, UA 6-12 (A) /HPF      Bacteria, UA 1+ (A) /HPF      Squamous Epithelial Cells, UA 13-20 (A) /HPF      Hyaline Casts, UA None Seen /LPF      Methodology Automated Microscopy     CK [[id number]]  (Normal) Collected:  02/06/17  1318    Specimen:  Blood Updated:  02/06/17 1422     Creatine Kinase 51 U/L     BNP [[id number]]  (Normal) Collected:  02/06/17 1318    Specimen:  Blood Updated:  02/06/17 1433     proBNP 458.0 pg/mL     CK-MB [[id number]]  (Normal) Collected:  02/06/17 1318    Specimen:  Blood Updated:  02/06/17 1433     CKMB 2.44 ng/mL     aPTT [[id number]]  (Normal) Collected:  02/06/17 1318    Specimen:  Blood Updated:  02/06/17 1438     PTT 31.6 seconds     Narrative:       The recommended Heparin therapeutic range is 68-97 seconds.    D-dimer, Quantitative [[id number]]  (Normal) Collected:  02/06/17 1318    Specimen:  Blood Updated:  02/06/17 1438     D-Dimer, Quantitative 379 ng/mL (FEU)     Narrative:       Dimer values <500 ng/ml FEU are FDA approved as aid in diagnosis of deep venous thrombosis and pulmonary embolism.  This test should not be used in an exclusion strategy with pretest probability alone.    A recent guideline regarding diagnosis for pulmonary thomboembolism recommends an adjusted exclusion criterion of age x 10 ng/ml FEU for patients >50 years of age ([place] Intern Med 2015; 163: 701-711).    [place] Draw [[id number]] Collected:  02/06/17 1318    Specimen:  Blood Updated:  02/06/17 1801    Narrative:       The following orders were created for panel order [place] Draw.  Procedure                               Abnormality         Status                     ---------                               -----------         ------                     Light Blue Top[[id number]]                                    Final result               Green Top (Gel)[[id number]]                                   Final result               Lavender Top[[id number]]                                      Final result               Gold Top - SST[[id number]]                                    Final result                 Please view results for these tests on the individual orders.    Light Blue Top [[id number]] Collected:  02/06/17 1318    Specimen:  Blood  Updated:  02/06/17 1801     Extra Tube hold for add-on       Auto resulted       Green Top (Gel) [[id number]] Collected:  02/06/17 1318    Specimen:  Blood Updated:  02/06/17 1801     Extra Tube Hold for add-ons.       Auto resulted.       Lavender Top [[id number]] Collected:  02/06/17 1318    Specimen:  Blood Updated:  02/06/17 1801     Extra Tube hold for add-on       Auto resulted       Gold Top - SST [[id number]] Collected:  02/06/17 1318    Specimen:  Blood Updated:  02/06/17 1801     Extra Tube Hold for add-ons.       Auto resulted.       Troponin [[id number]]  (Normal) Collected:  02/06/17 1906    Specimen:  Blood Updated:  02/06/17 1942     Troponin I <0.012 ng/mL     Protime-INR [[id number]]  (Abnormal) Collected:  02/07/17 0157    Specimen:  Blood Updated:  02/07/17 0223     Protime 19.9 (H) Seconds      INR 1.73 (H)     Narrative:       Therapeutic range for most indications is 2.0-3.0 INR,  or 2.5-3.5 for mechanical heart valves.    Troponin [[id number]]  (Normal) Collected:  02/07/17 0157    Specimen:  Blood Updated:  02/07/17 0237     Troponin I <0.012 ng/mL           Imaging Results (last 24 hours)     Procedure Component Value Units Date/Time    CT Angiogram Coronary [[id number]] Collected:  02/07/17 1156     Updated:  02/07/17 1215    Narrative:       CT coronary arteriography. Calcium score, functional analysis.  CLINICAL HISTORY: 66-year-old female with prior history of multiple stent placements now presenting with chest pain.    COMPARISON: None.    Post processing was performed by the radiologist at the [employer] [facility] workstation. Serial axial CT images were obtained through the heart at 3 mm thickness without contrast for calcium scoring. 3D images including vessel probing technique were also obtained.   Subsequently, following the intravenous administration of 75 ml of Isovue-370, serial axial CT images were obtained through the heart at 0.6 mm thickness utilizing retrospective gating. Images were obtained without  beta blocker premedication . Heart rate   55-60.  administered. Full field of view reconstructed images were used for evaluation of the extracardiac tissues.    CALCIUM PLAQUE BURDEN:    REGION                                         CALCIUM SCORE ([place])  Left Main                                                      0   Right Coronary Artery                                 76   Left Anterior Descending                            (multiple stents)   Circumflex                                                    0   Posterior Descending Artery                       0          Your Calcium Score is 76.      This places the patent in the moderate risk for coronary artery disease. (Known coronary disease with multiple stents.)    CTA OF THE CORONARY ARTERIES: There is good visualization of the left main, LAD, diagonals, circumflex, and RCA. There is a type C LAD. The patient is right dominant.    Left Main: No calcified or soft itssue density plaque and no stenosis.  LAD: Multiple stents proximal and mid LAD. Multiple stents proximal D1 branch. Stents are all patent. Vessels distal to the LAD and D1 stents are unremarkable.    Circumflex: No calcified or soft tissue density plaque and no stenosis. Normal OM1.  RCA: Calcified plaque proximal right coronary artery without significant stenosis.    The aortic valve is tricuspid. There is no myocardial bridging. On short axis views, the myocardium is homogeneous in thickness.    EXTRACARDIAC SOFT TISSUES:  Mediastinum: On the imaging, the ascending and descending aorta are normal in caliber. There is no mediastinal or hilar lymphadenopathy. The pericardium is normal.  Lungs: No consolidation or focal nodules are present. Linear fibrotic changes and bronchiectasis in both lower lobes. There is no pneumothorax or effusion. The pulmonary arteries are normal in appearance.  Abdomen: No evidence of hiatal hernia. The imaged liver and spleen are unremarkable. Bones: There are  no lytic or blastic lesions within the osseous structures.    CT FUNCTIONAL ANALYSIS:  Ejection Fraction      57 %  Diastolic Volume      98 ml  Systolic Volume       42 ml  Stroke Volume         56 ml  Cardiac Output        3.7 L/minute      Impression:       CONCLUSION: Normal left main coronary artery. Multiple stents proximal and mid  LAD. Stents proximal D1 branch. Stents are all patent. LAD and D1 branch distal to the stents are patent and normal.    Small amount of calcified plaque proximal right coronary artery without significant stenosis. Dominant right coronary artery. Small but otherwise unremarkable left circumflex.    Calcium score 76 (known history of coronary artery disease and stent placement.) Normal functional analysis. Computer-assisted calculation of left ventricular ejection fraction 57%.    Linear fibrotic changes and bronchiectasis at both lung bases.    Electronically signed by:  Ivan Stephens  2/7/2017 12:14 PM CST            ECG/EMG Results (last 24 hours)     Procedure Component Value Units Date/Time    ECG 12 Lead [[id number]] Collected:  02/06/17 1232     Updated:  02/06/17 1516    SCANNED EKG [[id number]] Resulted:  02/06/17      Updated:  02/07/17 1258              Vital Signs  Temp:  [97 °F (36.1 °C)-98.6 °F (37 °C)] 98.3 °F (36.8 °C)  Heart Rate:  [60-87] 62  Resp:  [15-20] 18  BP: (109-149)/(62-80) 118/76  Physical Exam:   General Appearance:    Alert, oriented, cooperative, in no acute distress   Head:    Normocephalic, atraumatic, without obvious abnormality   Eyes:           BASIL  Lids and lashes normal, conjunctivae and sclerae normal, no icterus, no pallor   Ears:    Ears appear intact with no abnormalities noted   Throat:   Mucous membranes pink and moist   Neck:   Supple, trachea midline, no carotid bruit, no organomegaly or JVD   Lungs:     Clear to auscultation and percussion, respirations regular, even and Unlabored. No wheezes, rales, rhonchi    Heart:    Regular rhythm and  normal rate, normal S1 and S2, no            murmur, no gallop, no rub, no click   Abdomen:     Soft, non-tender, non-distended, no guarding, no rebound tenderness, Normal bowel sounds in all four quadrant, no masses, liver and spleen nonpalpable,    Genitalia:    Deferred   Extremities:   Moves all extremities well, no edema, no cyanosis, no              Redness, no clubbing   Pulses:   Pulses palpable and equal bilaterally   Skin:   Moist and warm. No bleeding, bruising or rash   Neurologic/Psychiatric:   Alert and oriented to person, place, and time.  Motor, power and tone in upper and lower extremity is grossly intact.  No focal neurological deficits. Normal cognitive function. No psychomotor reaction or tangential thought. No depression, homicidal ideations and suicidal ideations           Discharge Disposition  Home or Self Care    Condition on Discharge:  Stable    Discharge Medications   Nikki Felder   Home Medication Instructions ARUN:[id number]    Printed on:02/07/17 1311   Medication Information                      albuterol (PROVENTIL HFA;VENTOLIN HFA) 108 (90 BASE) MCG/ACT inhaler  Inhale 2 puffs every 4 (four) hours as needed for wheezing.             atorvastatin (LIPITOR) 20 MG tablet  Take 20 mg by mouth every night.             carvedilol (COREG) 3.125 MG tablet  Take 3.125 mg by mouth 2 (two) times a day.             clopidogrel (PLAVIX) 75 MG tablet               escitalopram (LEXAPRO) 10 MG tablet  Take 1 tablet by mouth Daily.             gabapentin (NEURONTIN) 400 MG capsule  Take 400 mg by mouth 3 (three) times a day.             gabapentin (NEURONTIN) 800 MG tablet  Take 800 mg by mouth 3 (three) times a day.             HYDROcodone-acetaminophen (NORCO) 7.5-325 MG per tablet  Take 1 tablet by mouth 3 (three) times a day as needed for moderate pain (4-6).             ipratropium-albuterol (COMBIVENT RESPIMAT)  MCG/ACT inhaler  Inhale 2 puffs every 4 (four) hours as needed for  wheezing.             nicotine (NICODERM CQ) 21 MG/24HR patch  Place 1 patch on the skin every day.             nitroglycerin (NITRODUR) 0.2 MG/HR patch  Place 1 patch on the skin Daily.             nitroglycerin (NITROSTAT) 0.4 MG SL tablet  Place 0.4-1.2 mg under the tongue as needed for chest pain. Take no more than 3 doses in 15 minutes.             ondansetron ODT (ZOFRAN ODT) 4 MG disintegrating tablet  Take 1 tablet by mouth Every 8 (Eight) Hours As Needed for nausea or vomiting.             promethazine (PHENERGAN) 25 MG tablet  Take 1 tablet by mouth Every 8 (Eight) Hours As Needed for nausea or vomiting.             ranolazine (RANEXA) 500 MG 12 hr tablet  Take 1 tablet by mouth Every 12 (Twelve) Hours.             warfarin (COUMADIN) 4 MG tablet  One tablet daily                 Discharge Diet:   Diet Instructions     Advance Diet As Tolerated                     Activity at Discharge: Activity Instructions     Activity as Tolerated                     Follow-up Appointments  Future Appointments  Date Time Provider Department Center   2/9/2017 11:00 AM [facility] 14 [facility]   3/20/2017 9:00 AM [facility] None         Test Results Pending at Discharge   Order Current Status    Urine Culture In process           Raheel Martinez MD  02/07/17  1:12 PM    Time: Discharge 45 min

## 2017-02-07 NOTE — PROGRESS NOTES
"Anticoagulation by Pharmacy - Warfarin    Nikki Felder is a 66 y.o.female  [Ht: 61\" (154.9 cm); Wt: 133 lb 8 oz (60.6 kg)] on Warfarin 5 mg PO  for indication of Left ventricular apical thrombus on chronic anticoagulation with Coumadin. Pt has history of CAD and PE.    Goal INR: 2-3  Today's INR:   Lab Results   Component Value Date    INR 1.73 (H) 02/07/2017         Lab Results   Component Value Date    INR 1.73 (H) 02/07/2017    INR 1.53 (H) 02/06/2017    INR 1.42 (H) 02/03/2017    PROTIME 19.9 (H) 02/07/2017    PROTIME 18.2 (H) 02/06/2017    PROTIME 17.2 (H) 02/03/2017     Lab Results   Component Value Date    HGB 14.1 02/06/2017    HGB 12.7 02/03/2017    HGB 14.4 02/02/2017     Lab Results   Component Value Date    HCT 41.0 02/06/2017    HCT 37.9 02/03/2017    HCT 41.2 02/02/2017     Assessment/Plan:  Reviewed above labs and notes. Pt is on aspirin and clopidogrel. Will continue 5 mg warfarin. Pharmacy will monitor and adjust dose accordingly.     Peggy Mcfarland RPH  02/07/17 12:20 PM     "

## 2017-02-07 NOTE — PROGRESS NOTES
[address] [address]  T - [id number]         PROGRESS NOTE         SUBJECTIVE:   Patient Care Team:  Yasmin Stewart MD as PCP - General    Chief Complaint:     Chief Complaint   Patient presents with   • Chest Pain       Subjective     Patient is 66 y.o. female presents with with chest pain. She has had no chest pain this AM. She has had a CTA and that will determine our course of action.     ROS/HISTORY/ CURRENT MEDICATIONS/OBJECTIVE/VS/PE:   Review of Systems:   Review of Systems   Constitutional: Negative for diaphoresis, fatigue and fever.   Respiratory: Negative for cough, chest tightness and wheezing.    Cardiovascular: Negative for chest pain, palpitations and leg swelling.       History:     Past Medical History   Diagnosis Date   • Acute bronchitis    • Allergic    • Allergic rhinitis    • Anxiety state    • Anxiety state      Anxiety state, unspecified      • Arthritis    • Bacterial pneumonia    • Chronic pain    • Chronic pain      Other chronic pain      • Contact dermatitis    • COPD (chronic obstructive pulmonary disease)    • Coronary arteriosclerosis    • Depressive disorder    • Depressive disorder    • Diabetes mellitus due to underlying condition with diabetic autonomic neuropathy      Diabetes mellitus due to underlying condition with diabetic autonomic (poly)neuropathy      • Drug therapy      Long-term drug therapy      • Dysuria    • Encounter for immunization    • Encounter for screening for malignant neoplasm of colon    • Essential hypertension    • Folliculitis    • Headache    • Heart murmur    • Herpes zoster    • Herpes zoster with nervous system complication    • History of screening mammography    • Hyperlipidemia    • Insomnia    • Insomnia    • Kidney stone    • Lumbar radiculopathy    • Lung nodule    • Migraine    • Migraine      Migraine, unspecified, without mention of intractable migraine, without mention of status  migrainosus      • Migraine    • Myocardial infarction    • Nausea and vomiting    • Nausea with vomiting    • Neck pain    • Need for immunization against influenza    • Need for prophylactic vaccination and inoculation against diphtheria alone    • Need for prophylactic vaccination and inoculation against unspecified single disease      Need for prophylactic vaccination and inoculation against Streptococcus pneumoniae [pneumococcus] and influenza      • Non-alcoholic fatty liver disease    • Rectus sheath hematoma    • Senile osteoporosis    • Shoulder pain    • Solitary pulmonary nodule present on computed tomography of lung    • Spasm of back muscles    • Spinal stenosis of lumbar region    • Systolic congestive heart failure    • TIA (transient ischemic attack)    • Tobacco dependence syndrome    • Type 2 diabetes mellitus    • Urinary tract infectious disease    • Viral gastroenteritis    • Vitamin D deficiency      Unspecified vitamin D deficiency      • Vitamin D deficiency      Past Surgical History   Procedure Laterality Date   • Cardiac catheterization       plaque noted ef 55% 2010    • Cholecystectomy     • Dilatation and curettage  1975   • Injection of medication  2015      Phenergan (3)      • Injection of medication       Kenalog (1)      2011    • Injection of medication  2014     Toradol (3)   • Tubal abdominal ligation        [place] tubal ligation 1981    • Injection of medication  2011      Vistaril (1)    • Injection of medication  2012     Zofran (1)   •  section        Low cervical  1981       • Dilatation and curettage  1975     Family History   Problem Relation Age of Onset   • Alzheimer's disease Mother    • Diabetes Brother    • Alzheimer's disease Other    • Diabetes Other    • Lung cancer Other    • Pulmonary embolism Other    • Hypertension Father    • Depression Daughter    • Hypertension Daughter    •  Anxiety disorder Daughter    • Lung cancer Brother      Social History   Substance Use Topics   • Smoking status: Former Smoker     Packs/day: 1.00     Years: 45.00   • Smokeless tobacco: Never Used      Comment: maybe 2 or 3 a day   • Alcohol use No     Prescriptions Prior to Admission   Medication Sig Dispense Refill Last Dose   • atorvastatin (LIPITOR) 20 MG tablet Take 20 mg by mouth every night.   2/5/2017 at Unknown time   • carvedilol (COREG) 3.125 MG tablet Take 3.125 mg by mouth 2 (two) times a day.   2/5/2017 at Unknown time   • clopidogrel (PLAVIX) 75 MG tablet   2 2/5/2017 at Unknown time   • escitalopram (LEXAPRO) 10 MG tablet Take 1 tablet by mouth Daily. 30 tablet 5 2/5/2017 at Unknown time   • gabapentin (NEURONTIN) 400 MG capsule Take 400 mg by mouth 3 (three) times a day.   2/5/2017 at Unknown time   • gabapentin (NEURONTIN) 800 MG tablet Take 800 mg by mouth 3 (three) times a day.   2/5/2017 at Unknown time   • HYDROcodone-acetaminophen (NORCO) 7.5-325 MG per tablet Take 1 tablet by mouth 3 (three) times a day as needed for moderate pain (4-6).   2/5/2017 at Unknown time   • nicotine (NICODERM CQ) 21 MG/24HR patch Place 1 patch on the skin every day.   2/5/2017 at Unknown time   • nitroglycerin (NITROSTAT) 0.4 MG SL tablet Place 0.4-1.2 mg under the tongue as needed for chest pain. Take no more than 3 doses in 15 minutes.   2/6/2017 at Unknown time   • ondansetron ODT (ZOFRAN ODT) 4 MG disintegrating tablet Take 1 tablet by mouth Every 8 (Eight) Hours As Needed for nausea or vomiting. 90 tablet 3 2/5/2017 at Unknown time   • promethazine (PHENERGAN) 25 MG tablet Take 1 tablet by mouth Every 8 (Eight) Hours As Needed for nausea or vomiting. 30 tablet 2 2/5/2017 at Unknown time   • warfarin (COUMADIN) 4 MG tablet One tablet daily 30 tablet 0 2/5/2017 at Unknown time   • acetaminophen (TYLENOL) 325 MG tablet Take 2 tablets by mouth Every 6 (Six) Hours As Needed for mild pain (1-3). 240 tablet 5  Unknown at Unknown time   • albuterol (PROVENTIL HFA;VENTOLIN HFA) 108 (90 BASE) MCG/ACT inhaler Inhale 2 puffs every 4 (four) hours as needed for wheezing.   Unknown at Unknown time   • ipratropium-albuterol (COMBIVENT RESPIMAT)  MCG/ACT inhaler Inhale 2 puffs every 4 (four) hours as needed for wheezing.   Unknown at Unknown time     Allergies:  Corticosteroids    Current Medications:     Current Facility-Administered Medications   Medication Dose Route Frequency Provider Last Rate Last Dose   • acetaminophen (TYLENOL) tablet 650 mg  650 mg Oral Q6H PRN Samuel Green MD   650 mg at 02/07/17 0908   • albuterol (PROVENTIL) nebulizer solution 0.083% 2.5 mg/3mL  2.5 mg Nebulization Q4H PRN Samuel Green MD       • aspirin tablet 325 mg  325 mg Oral Once John Gunn MD   325 mg at 02/06/17 1742   • atorvastatin (LIPITOR) tablet 20 mg  20 mg Oral Nightly Samuel Green MD   20 mg at 02/06/17 2029   • carvedilol (COREG) tablet 3.125 mg  3.125 mg Oral BID Samuel Green MD   3.125 mg at 02/07/17 0832   • escitalopram (LEXAPRO) tablet 10 mg  10 mg Oral Daily Samuel Green MD   10 mg at 02/07/17 0832   • gabapentin (NEURONTIN) tablet 800 mg  800 mg Oral TID Samuel Green MD   800 mg at 02/07/17 0832   • HYDROcodone-acetaminophen (NORCO) 7.5-325 MG per tablet 1 tablet  1 tablet Oral Q6H PRN Raheel Martinez MD   1 tablet at 02/07/17 0646   • [COMPLETED] iopamidol (ISOVUE-370) 76 % injection 100 mL  100 mL Intravenous Once in imaging Samuel Green MD   75 mL at 02/07/17 1130   • nicotine (NICODERM CQ) 21 MG/24HR patch 1 patch  1 patch Transdermal Q24H Jordon Islas DO   1 patch at 02/06/17 1819   • nitroglycerin (NITRODUR) 0.2 MG/HR patch 1 patch  1 patch Transdermal Daily Raheel Martinez MD   1 patch at 02/07/17 0833   • nitroglycerin (NITROSTAT) SL tablet 0.4 mg  0.4 mg Sublingual Q5 Min PRN John Gunn MD       • ondansetron ODT (ZOFRAN-ODT) disintegrating tablet 4 mg  4 mg Oral Q8H PRN Samuel Green,  MD   4 mg at 02/07/17 0646   • Pharmacy to dose warfarin   Does not apply Continuous PRN Samuel Green MD       • ranolazine (RANEXA) 12 hr tablet 500 mg  500 mg Oral Q12H Raheel Martinez MD   500 mg at 02/07/17 0832   • sodium chloride 0.9 % flush 1-10 mL  1-10 mL Intravenous PRN Samuel Green MD   10 mL at 02/07/17 0834   • sodium chloride 0.9 % flush 10 mL  10 mL Intravenous PRN John Gunn MD   10 mL at 02/06/17 1414   • sodium chloride 0.9 % infusion  75 mL/hr Intravenous Continuous John Gunn MD 75 mL/hr at 02/07/17 0207 75 mL/hr at 02/07/17 0207   • warfarin (COUMADIN) tablet 5 mg  5 mg Oral Daily Samuel Green MD   5 mg at 02/06/17 2028       Objective     Physical Exam:   Temp:  [97 °F (36.1 °C)-98.6 °F (37 °C)] 98.3 °F (36.8 °C)  Heart Rate:  [60-87] 62  Resp:  [14-20] 18  BP: (109-149)/(62-83) 118/76    Physical Exam   Constitutional: She appears well-developed and well-nourished.   HENT:   Head: Normocephalic and atraumatic.   Eyes: Pupils are equal, round, and reactive to light.   Neck: Normal range of motion.   Cardiovascular: Normal rate and regular rhythm.  Exam reveals no gallop and no friction rub.    No murmur heard.  Pulmonary/Chest: Effort normal and breath sounds normal. No respiratory distress. She has no wheezes. She has no rales.   Abdominal: Soft. Bowel sounds are normal. She exhibits no distension. There is no tenderness.   Musculoskeletal: Normal range of motion.   Neurological: She is alert.   Skin: Skin is warm and dry.   Nursing note and vitals reviewed.           Results Review:     WBC WBC   Date Value Ref Range Status   02/06/2017 5.48 3.20 - 9.80 10*3/mm3 Final      HGB HEMOGLOBIN   Date Value Ref Range Status   02/06/2017 14.1 12.0 - 15.5 g/dL Final      HCT HEMATOCRIT   Date Value Ref Range Status   02/06/2017 41.0 35.0 - 45.0 % Final      Platlets PLATELETS   Date Value Ref Range Status   02/06/2017 259 150 - 450 10*3/mm3 Final          Imaging Results (last 24 hours)      Procedure Component Value Units Date/Time    XR Chest 1 View [[id number]] Collected:  02/06/17 1257     Updated:  02/06/17 1300    Narrative:           PROCEDURE: Single chest view AP    REASON FOR EXAM:Chest Pain triage protocol    FINDINGS: Comparison exam dated February 2, 2017. Cardiac and pulmonary vasculature are normal. Left upper lung field small calcified lung parenchymal granuloma consistent with old granulomatous disease. Left lung base small linear opacity. Right lung   base very small linear opacity. Lungs otherwise clear. Pleural spaces are normal. No acute osseous abnormality.      Impression:       1.  Evidence of old granulomatous disease.  2.  Bibasilar small foci of discoid atelectasis.    Electronically signed by:  Mason Franco  2/6/2017 12:59 PM CST      CT Angiogram Coronary [[id number]]      Updated:  02/07/17 1051           I reviewed the patient's new clinical results.  I reviewed the patient's new imaging results and agree with the interpretation.     ASSESSMENT/PLAN:   Assessment/Plan   Active Problems:    Atherosclerosis of native coronary artery of native heart with angina pectoris    Essential hypertension, benign    Anxiety    Diabetes mellitus    Chest pain      Will await results of CTA. Dr. Martinez will discharge as appropriate.  I discussed the patients findings and my recommendations with patient and family.      Samuel Green MD  02/07/17  11:24 AM

## 2017-02-08 LAB — BACTERIA SPEC AEROBE CULT: NORMAL

## 2017-02-09 ENCOUNTER — APPOINTMENT (OUTPATIENT)
Dept: NUCLEAR MEDICINE | Facility: HOSPITAL | Age: 67
End: 2017-02-09
Attending: INTERNAL MEDICINE

## 2017-02-21 NOTE — PROGRESS NOTES
I have reviewed the notes, assessments, and/or procedures performed. I concur with her/his documentation of Nikki Felder.     Jordon Islas, DO

## 2017-03-14 DIAGNOSIS — Z79.01 CHRONIC ANTICOAGULATION: Primary | ICD-10-CM

## 2017-03-24 ENCOUNTER — APPOINTMENT (OUTPATIENT)
Dept: GENERAL RADIOLOGY | Facility: HOSPITAL | Age: 67
End: 2017-03-24

## 2017-03-24 ENCOUNTER — HOSPITAL ENCOUNTER (OUTPATIENT)
Facility: HOSPITAL | Age: 67
Setting detail: OBSERVATION
Discharge: HOME HEALTH | End: 2017-03-31
Attending: EMERGENCY MEDICINE | Admitting: FAMILY MEDICINE

## 2017-03-24 DIAGNOSIS — R07.9 CHEST PAIN AT REST: ICD-10-CM

## 2017-03-24 DIAGNOSIS — I25.119 ATHEROSCLEROSIS OF NATIVE CORONARY ARTERY OF NATIVE HEART WITH ANGINA PECTORIS (HCC): ICD-10-CM

## 2017-03-24 DIAGNOSIS — R07.9 CHEST PAIN, UNSPECIFIED TYPE: Primary | ICD-10-CM

## 2017-03-24 DIAGNOSIS — E11.65 TYPE 2 DIABETES MELLITUS WITH HYPERGLYCEMIA, WITHOUT LONG-TERM CURRENT USE OF INSULIN (HCC): ICD-10-CM

## 2017-03-24 DIAGNOSIS — Z78.9 IMPAIRED MOBILITY AND ACTIVITIES OF DAILY LIVING: ICD-10-CM

## 2017-03-24 DIAGNOSIS — Z74.09 IMPAIRED MOBILITY AND ACTIVITIES OF DAILY LIVING: ICD-10-CM

## 2017-03-24 DIAGNOSIS — Z74.09 IMPAIRED FUNCTIONAL MOBILITY, BALANCE, GAIT, AND ENDURANCE: ICD-10-CM

## 2017-03-24 DIAGNOSIS — Z91.14 NONCOMPLIANCE WITH MEDICATION REGIMEN: ICD-10-CM

## 2017-03-24 LAB
ALBUMIN SERPL-MCNC: 3.6 G/DL (ref 3.4–4.8)
ALBUMIN/GLOB SERPL: 1.4 G/DL (ref 1.1–1.8)
ALP SERPL-CCNC: 100 U/L (ref 38–126)
ALT SERPL W P-5'-P-CCNC: 22 U/L (ref 9–52)
ANION GAP SERPL CALCULATED.3IONS-SCNC: 12 MMOL/L (ref 5–15)
AST SERPL-CCNC: 20 U/L (ref 14–36)
BASOPHILS # BLD AUTO: 0.01 10*3/MM3 (ref 0–0.2)
BASOPHILS NFR BLD AUTO: 0.2 % (ref 0–2)
BILIRUB SERPL-MCNC: 0.4 MG/DL (ref 0.2–1.3)
BUN BLD-MCNC: 9 MG/DL (ref 7–21)
BUN/CREAT SERPL: 15.5 (ref 7–25)
CALCIUM SPEC-SCNC: 8.9 MG/DL (ref 8.4–10.2)
CHLORIDE SERPL-SCNC: 104 MMOL/L (ref 95–110)
CO2 SERPL-SCNC: 17 MMOL/L (ref 22–31)
CREAT BLD-MCNC: 0.58 MG/DL (ref 0.5–1)
DEPRECATED RDW RBC AUTO: 41.1 FL (ref 36.4–46.3)
EOSINOPHIL # BLD AUTO: 0.06 10*3/MM3 (ref 0–0.7)
EOSINOPHIL NFR BLD AUTO: 0.9 % (ref 0–7)
ERYTHROCYTE [DISTWIDTH] IN BLOOD BY AUTOMATED COUNT: 12.8 % (ref 11.5–14.5)
GFR SERPL CREATININE-BSD FRML MDRD: 103 ML/MIN/1.73 (ref 45–104)
GLOBULIN UR ELPH-MCNC: 2.5 GM/DL (ref 2.3–3.5)
GLUCOSE BLD-MCNC: 307 MG/DL (ref 60–100)
GLUCOSE BLDC GLUCOMTR-MCNC: 269 MG/DL (ref 70–130)
HCT VFR BLD AUTO: 35.6 % (ref 35–45)
HGB BLD-MCNC: 12.8 G/DL (ref 12–15.5)
HOLD SPECIMEN: NORMAL
HOLD SPECIMEN: NORMAL
IMM GRANULOCYTES # BLD: 0.03 10*3/MM3 (ref 0–0.02)
IMM GRANULOCYTES NFR BLD: 0.5 % (ref 0–0.5)
INR PPP: 1.23 (ref 0.8–1.2)
INR PPP: 1.24 (ref 0.8–1.2)
LYMPHOCYTES # BLD AUTO: 2.28 10*3/MM3 (ref 0.6–4.2)
LYMPHOCYTES NFR BLD AUTO: 35 % (ref 10–50)
MCH RBC QN AUTO: 31.8 PG (ref 26.5–34)
MCHC RBC AUTO-ENTMCNC: 36 G/DL (ref 31.4–36)
MCV RBC AUTO: 88.6 FL (ref 80–98)
MONOCYTES # BLD AUTO: 0.4 10*3/MM3 (ref 0–0.9)
MONOCYTES NFR BLD AUTO: 6.1 % (ref 0–12)
NEUTROPHILS # BLD AUTO: 3.74 10*3/MM3 (ref 2–8.6)
NEUTROPHILS NFR BLD AUTO: 57.3 % (ref 37–80)
NT-PROBNP SERPL-MCNC: 350 PG/ML (ref 0–900)
PLATELET # BLD AUTO: 288 10*3/MM3 (ref 150–450)
PMV BLD AUTO: 8.9 FL (ref 8–12)
POTASSIUM BLD-SCNC: 3.5 MMOL/L (ref 3.5–5.1)
PROT SERPL-MCNC: 6.1 G/DL (ref 6.3–8.6)
PROTHROMBIN TIME: 15.5 SECONDS (ref 11.1–15.3)
PROTHROMBIN TIME: 15.6 SECONDS (ref 11.1–15.3)
RBC # BLD AUTO: 4.02 10*6/MM3 (ref 3.77–5.16)
SODIUM BLD-SCNC: 133 MMOL/L (ref 137–145)
TROPONIN I SERPL-MCNC: <0.012 NG/ML
TROPONIN I SERPL-MCNC: <0.012 NG/ML
WBC NRBC COR # BLD: 6.52 10*3/MM3 (ref 3.2–9.8)
WHOLE BLOOD HOLD SPECIMEN: NORMAL
WHOLE BLOOD HOLD SPECIMEN: NORMAL

## 2017-03-24 PROCEDURE — 80053 COMPREHEN METABOLIC PANEL: CPT | Performed by: EMERGENCY MEDICINE

## 2017-03-24 PROCEDURE — 93010 ELECTROCARDIOGRAM REPORT: CPT | Performed by: INTERNAL MEDICINE

## 2017-03-24 PROCEDURE — 25010000002 ONDANSETRON PER 1 MG: Performed by: EMERGENCY MEDICINE

## 2017-03-24 PROCEDURE — G0378 HOSPITAL OBSERVATION PER HR: HCPCS

## 2017-03-24 PROCEDURE — 85610 PROTHROMBIN TIME: CPT | Performed by: EMERGENCY MEDICINE

## 2017-03-24 PROCEDURE — 85025 COMPLETE CBC W/AUTO DIFF WBC: CPT | Performed by: EMERGENCY MEDICINE

## 2017-03-24 PROCEDURE — 25010000002 MORPHINE PER 10 MG: Performed by: EMERGENCY MEDICINE

## 2017-03-24 PROCEDURE — 96374 THER/PROPH/DIAG INJ IV PUSH: CPT

## 2017-03-24 PROCEDURE — 82962 GLUCOSE BLOOD TEST: CPT

## 2017-03-24 PROCEDURE — 85610 PROTHROMBIN TIME: CPT | Performed by: FAMILY MEDICINE

## 2017-03-24 PROCEDURE — 93005 ELECTROCARDIOGRAM TRACING: CPT

## 2017-03-24 PROCEDURE — 96375 TX/PRO/DX INJ NEW DRUG ADDON: CPT

## 2017-03-24 PROCEDURE — 71010 HC CHEST PA OR AP: CPT

## 2017-03-24 PROCEDURE — 36415 COLL VENOUS BLD VENIPUNCTURE: CPT

## 2017-03-24 PROCEDURE — 84484 ASSAY OF TROPONIN QUANT: CPT | Performed by: EMERGENCY MEDICINE

## 2017-03-24 PROCEDURE — 84484 ASSAY OF TROPONIN QUANT: CPT | Performed by: FAMILY MEDICINE

## 2017-03-24 PROCEDURE — 83880 ASSAY OF NATRIURETIC PEPTIDE: CPT | Performed by: EMERGENCY MEDICINE

## 2017-03-24 PROCEDURE — 99285 EMERGENCY DEPT VISIT HI MDM: CPT

## 2017-03-24 RX ORDER — ONDANSETRON 2 MG/ML
4 INJECTION INTRAMUSCULAR; INTRAVENOUS ONCE
Status: COMPLETED | OUTPATIENT
Start: 2017-03-24 | End: 2017-03-24

## 2017-03-24 RX ORDER — MORPHINE SULFATE 2 MG/ML
2 INJECTION, SOLUTION INTRAMUSCULAR; INTRAVENOUS EVERY 4 HOURS PRN
Status: DISCONTINUED | OUTPATIENT
Start: 2017-03-24 | End: 2017-03-27

## 2017-03-24 RX ORDER — ATORVASTATIN CALCIUM 20 MG/1
20 TABLET, FILM COATED ORAL NIGHTLY
Status: DISCONTINUED | OUTPATIENT
Start: 2017-03-24 | End: 2017-03-31 | Stop reason: HOSPADM

## 2017-03-24 RX ORDER — NALOXONE HCL 0.4 MG/ML
0.4 VIAL (ML) INJECTION
Status: DISCONTINUED | OUTPATIENT
Start: 2017-03-24 | End: 2017-03-27

## 2017-03-24 RX ORDER — MAGNESIUM HYDROXIDE/ALUMINUM HYDROXICE/SIMETHICONE 120; 1200; 1200 MG/30ML; MG/30ML; MG/30ML
30 SUSPENSION ORAL EVERY 6 HOURS PRN
Status: DISCONTINUED | OUTPATIENT
Start: 2017-03-24 | End: 2017-03-31 | Stop reason: HOSPADM

## 2017-03-24 RX ORDER — ESCITALOPRAM OXALATE 10 MG/1
10 TABLET ORAL DAILY
Status: DISCONTINUED | OUTPATIENT
Start: 2017-03-25 | End: 2017-03-31

## 2017-03-24 RX ORDER — ALBUTEROL SULFATE 2.5 MG/3ML
2.5 SOLUTION RESPIRATORY (INHALATION) EVERY 4 HOURS PRN
Status: DISCONTINUED | OUTPATIENT
Start: 2017-03-24 | End: 2017-03-31 | Stop reason: HOSPADM

## 2017-03-24 RX ORDER — GABAPENTIN 400 MG/1
400 CAPSULE ORAL EVERY 8 HOURS SCHEDULED
Status: DISCONTINUED | OUTPATIENT
Start: 2017-03-24 | End: 2017-03-28

## 2017-03-24 RX ORDER — NITROGLYCERIN 40 MG/1
1 PATCH TRANSDERMAL DAILY
Status: DISCONTINUED | OUTPATIENT
Start: 2017-03-25 | End: 2017-03-25

## 2017-03-24 RX ORDER — MORPHINE SULFATE 4 MG/ML
4 INJECTION, SOLUTION INTRAMUSCULAR; INTRAVENOUS ONCE
Status: COMPLETED | OUTPATIENT
Start: 2017-03-24 | End: 2017-03-24

## 2017-03-24 RX ORDER — HYDROCODONE BITARTRATE AND ACETAMINOPHEN 7.5; 325 MG/1; MG/1
1 TABLET ORAL 3 TIMES DAILY PRN
Status: DISCONTINUED | OUTPATIENT
Start: 2017-03-24 | End: 2017-03-25 | Stop reason: SDUPTHER

## 2017-03-24 RX ORDER — WARFARIN SODIUM 5 MG/1
5 TABLET ORAL
Status: ON HOLD | COMMUNITY
Start: 2017-03-23 | End: 2017-03-29

## 2017-03-24 RX ORDER — RANOLAZINE 500 MG/1
500 TABLET, EXTENDED RELEASE ORAL EVERY 12 HOURS SCHEDULED
Status: DISCONTINUED | OUTPATIENT
Start: 2017-03-24 | End: 2017-03-31 | Stop reason: HOSPADM

## 2017-03-24 RX ORDER — PROMETHAZINE HYDROCHLORIDE 25 MG/1
25 TABLET ORAL EVERY 8 HOURS PRN
Status: DISCONTINUED | OUTPATIENT
Start: 2017-03-24 | End: 2017-03-31 | Stop reason: HOSPADM

## 2017-03-24 RX ORDER — WARFARIN SODIUM 5 MG/1
5 TABLET ORAL
Status: DISCONTINUED | OUTPATIENT
Start: 2017-03-24 | End: 2017-03-27

## 2017-03-24 RX ORDER — NITROGLYCERIN 0.4 MG/1
0.4 TABLET SUBLINGUAL AS NEEDED
Status: DISCONTINUED | OUTPATIENT
Start: 2017-03-24 | End: 2017-03-24 | Stop reason: SDUPTHER

## 2017-03-24 RX ORDER — NICOTINE 21 MG/24HR
1 PATCH, TRANSDERMAL 24 HOURS TRANSDERMAL EVERY 24 HOURS
Status: DISCONTINUED | OUTPATIENT
Start: 2017-03-24 | End: 2017-03-31 | Stop reason: HOSPADM

## 2017-03-24 RX ORDER — NICOTINE 21 MG/24HR
1 PATCH, TRANSDERMAL 24 HOURS TRANSDERMAL EVERY 24 HOURS
Status: DISCONTINUED | OUTPATIENT
Start: 2017-03-24 | End: 2017-03-24 | Stop reason: SDUPTHER

## 2017-03-24 RX ORDER — IPRATROPIUM BROMIDE AND ALBUTEROL SULFATE 2.5; .5 MG/3ML; MG/3ML
3 SOLUTION RESPIRATORY (INHALATION)
Status: DISCONTINUED | OUTPATIENT
Start: 2017-03-25 | End: 2017-03-31 | Stop reason: HOSPADM

## 2017-03-24 RX ORDER — SODIUM CHLORIDE 0.9 % (FLUSH) 0.9 %
10 SYRINGE (ML) INJECTION AS NEEDED
Status: DISCONTINUED | OUTPATIENT
Start: 2017-03-24 | End: 2017-03-31 | Stop reason: HOSPADM

## 2017-03-24 RX ORDER — NITROGLYCERIN 0.4 MG/1
0.4 TABLET SUBLINGUAL
Status: DISCONTINUED | OUTPATIENT
Start: 2017-03-24 | End: 2017-03-31 | Stop reason: HOSPADM

## 2017-03-24 RX ORDER — CARVEDILOL 3.12 MG/1
3.12 TABLET ORAL 2 TIMES DAILY
Status: DISCONTINUED | OUTPATIENT
Start: 2017-03-24 | End: 2017-03-31 | Stop reason: HOSPADM

## 2017-03-24 RX ORDER — CLOPIDOGREL BISULFATE 75 MG/1
75 TABLET ORAL ONCE
Status: COMPLETED | OUTPATIENT
Start: 2017-03-24 | End: 2017-03-24

## 2017-03-24 RX ORDER — SODIUM CHLORIDE 0.9 % (FLUSH) 0.9 %
1-10 SYRINGE (ML) INJECTION AS NEEDED
Status: DISCONTINUED | OUTPATIENT
Start: 2017-03-24 | End: 2017-03-31 | Stop reason: HOSPADM

## 2017-03-24 RX ORDER — PROMETHAZINE HYDROCHLORIDE 25 MG/ML
12.5 INJECTION, SOLUTION INTRAMUSCULAR; INTRAVENOUS EVERY 6 HOURS PRN
Status: DISCONTINUED | OUTPATIENT
Start: 2017-03-24 | End: 2017-03-31 | Stop reason: HOSPADM

## 2017-03-24 RX ADMIN — HYDROCODONE BITARTRATE AND ACETAMINOPHEN 1 TABLET: 7.5; 325 TABLET ORAL at 22:06

## 2017-03-24 RX ADMIN — ONDANSETRON 4 MG: 2 INJECTION INTRAMUSCULAR; INTRAVENOUS at 20:34

## 2017-03-24 RX ADMIN — CARVEDILOL 3.12 MG: 3.12 TABLET, FILM COATED ORAL at 22:04

## 2017-03-24 RX ADMIN — RANOLAZINE 500 MG: 500 TABLET, FILM COATED, EXTENDED RELEASE ORAL at 22:04

## 2017-03-24 RX ADMIN — CLOPIDOGREL BISULFATE 75 MG: 75 TABLET ORAL at 22:04

## 2017-03-24 RX ADMIN — NITROGLYCERIN 1 INCH: 20 OINTMENT TOPICAL at 20:34

## 2017-03-24 RX ADMIN — NICOTINE 1 PATCH: 21 PATCH TRANSDERMAL at 22:04

## 2017-03-24 RX ADMIN — MORPHINE SULFATE 4 MG: 4 INJECTION, SOLUTION INTRAMUSCULAR; INTRAVENOUS at 20:34

## 2017-03-24 RX ADMIN — WARFARIN SODIUM 5 MG: 5 TABLET ORAL at 22:04

## 2017-03-24 RX ADMIN — ATORVASTATIN CALCIUM 20 MG: 20 TABLET, FILM COATED ORAL at 22:04

## 2017-03-24 RX ADMIN — GABAPENTIN 400 MG: 400 CAPSULE ORAL at 22:04

## 2017-03-25 ENCOUNTER — APPOINTMENT (OUTPATIENT)
Dept: CARDIOLOGY | Facility: HOSPITAL | Age: 67
End: 2017-03-25
Attending: FAMILY MEDICINE

## 2017-03-25 LAB
BH CV ECHO MEAS - ACS: 1.6 CM
BH CV ECHO MEAS - AO MAX PG (FULL): 3.1 MMHG
BH CV ECHO MEAS - AO MAX PG: 5.6 MMHG
BH CV ECHO MEAS - AO MEAN PG (FULL): 2.1 MMHG
BH CV ECHO MEAS - AO MEAN PG: 3.4 MMHG
BH CV ECHO MEAS - AO ROOT AREA: 8.2 CM^2
BH CV ECHO MEAS - AO ROOT DIAM: 3.2 CM
BH CV ECHO MEAS - AO V2 MAX: 118.3 CM/SEC
BH CV ECHO MEAS - AO V2 MEAN: 88 CM/SEC
BH CV ECHO MEAS - AO V2 VTI: 21.4 CM
BH CV ECHO MEAS - AVA(I,A): 1.8 CM^2
BH CV ECHO MEAS - AVA(I,D): 1.8 CM^2
BH CV ECHO MEAS - AVA(V,A): 1.9 CM^2
BH CV ECHO MEAS - AVA(V,D): 1.9 CM^2
BH CV ECHO MEAS - CONTRAST EF 4CH: 47.3 ML/M^2
BH CV ECHO MEAS - EDV(CUBED): 66.8 ML
BH CV ECHO MEAS - EDV(MOD-SP4): 68.1 ML
BH CV ECHO MEAS - EDV(TEICH): 72.4 ML
BH CV ECHO MEAS - EF(CUBED): 53.3 %
BH CV ECHO MEAS - EF(MOD-SP4): 60 %
BH CV ECHO MEAS - EF(TEICH): 45.6 %
BH CV ECHO MEAS - ESV(CUBED): 31.2 ML
BH CV ECHO MEAS - ESV(MOD-SP4): 35.9 ML
BH CV ECHO MEAS - ESV(TEICH): 39.4 ML
BH CV ECHO MEAS - FS: 22.4 %
BH CV ECHO MEAS - IVS/LVPW: 1.1
BH CV ECHO MEAS - IVSD: 1.2 CM
BH CV ECHO MEAS - LA DIMENSION: 3.6 CM
BH CV ECHO MEAS - LA/AO: 1.1
BH CV ECHO MEAS - LV MASS(C)D: 149.8 GRAMS
BH CV ECHO MEAS - LV MAX PG: 2.5 MMHG
BH CV ECHO MEAS - LV MEAN PG: 1.3 MMHG
BH CV ECHO MEAS - LV V1 MAX: 78.3 CM/SEC
BH CV ECHO MEAS - LV V1 MEAN: 50.1 CM/SEC
BH CV ECHO MEAS - LV V1 VTI: 13.7 CM
BH CV ECHO MEAS - LVIDD: 4.1 CM
BH CV ECHO MEAS - LVIDS: 3.1 CM
BH CV ECHO MEAS - LVLD AP4: 7.8 CM
BH CV ECHO MEAS - LVLS AP4: 7.1 CM
BH CV ECHO MEAS - LVOT AREA (M): 2.8 CM^2
BH CV ECHO MEAS - LVOT AREA: 2.8 CM^2
BH CV ECHO MEAS - LVOT DIAM: 1.9 CM
BH CV ECHO MEAS - LVPWD: 1 CM
BH CV ECHO MEAS - MV A MAX VEL: 100.9 CM/SEC
BH CV ECHO MEAS - MV DEC SLOPE: 378.4 CM/SEC^2
BH CV ECHO MEAS - MV E MAX VEL: 72.1 CM/SEC
BH CV ECHO MEAS - MV E/A: 0.71
BH CV ECHO MEAS - MV MAX PG: 3.9 MMHG
BH CV ECHO MEAS - MV MEAN PG: 1.1 MMHG
BH CV ECHO MEAS - MV P1/2T MAX VEL: 65.2 CM/SEC
BH CV ECHO MEAS - MV P1/2T: 50.4 MSEC
BH CV ECHO MEAS - MV V2 MAX: 98.3 CM/SEC
BH CV ECHO MEAS - MV V2 MEAN: 45.8 CM/SEC
BH CV ECHO MEAS - MV V2 VTI: 22.3 CM
BH CV ECHO MEAS - MVA P1/2T LCG: 3.4 CM^2
BH CV ECHO MEAS - MVA(P1/2T): 4.4 CM^2
BH CV ECHO MEAS - MVA(VTI): 1.7 CM^2
BH CV ECHO MEAS - PA MAX PG: 2.8 MMHG
BH CV ECHO MEAS - PA V2 MAX: 83.6 CM/SEC
BH CV ECHO MEAS - RAP SYSTOLE: 10 MMHG
BH CV ECHO MEAS - RVDD: 2.1 CM
BH CV ECHO MEAS - SV(AO): 174.9 ML
BH CV ECHO MEAS - SV(CUBED): 35.6 ML
BH CV ECHO MEAS - SV(LVOT): 38.6 ML
BH CV ECHO MEAS - SV(MOD-SP4): 32.2 ML
BH CV ECHO MEAS - SV(TEICH): 33 ML
D-DIMER, QUANTITATIVE (MAD,POW, STR): 286 NG/ML (FEU) (ref 0–470)
INR PPP: 1.26 (ref 0.8–1.2)
LV EF 2D ECHO EST: 55 %
PROTHROMBIN TIME: 15.8 SECONDS (ref 11.1–15.3)
TROPONIN I SERPL-MCNC: <0.012 NG/ML
TROPONIN I SERPL-MCNC: <0.012 NG/ML

## 2017-03-25 PROCEDURE — 94799 UNLISTED PULMONARY SVC/PX: CPT

## 2017-03-25 PROCEDURE — 84484 ASSAY OF TROPONIN QUANT: CPT | Performed by: FAMILY MEDICINE

## 2017-03-25 PROCEDURE — 94640 AIRWAY INHALATION TREATMENT: CPT

## 2017-03-25 PROCEDURE — 93306 TTE W/DOPPLER COMPLETE: CPT

## 2017-03-25 PROCEDURE — 85379 FIBRIN DEGRADATION QUANT: CPT | Performed by: FAMILY MEDICINE

## 2017-03-25 PROCEDURE — 93306 TTE W/DOPPLER COMPLETE: CPT | Performed by: INTERNAL MEDICINE

## 2017-03-25 PROCEDURE — 25010000002 MORPHINE SULFATE (PF) 2 MG/ML SOLUTION: Performed by: FAMILY MEDICINE

## 2017-03-25 PROCEDURE — 94760 N-INVAS EAR/PLS OXIMETRY 1: CPT

## 2017-03-25 PROCEDURE — G0378 HOSPITAL OBSERVATION PER HR: HCPCS

## 2017-03-25 PROCEDURE — 63710000001 PROMETHAZINE PER 25 MG: Performed by: FAMILY MEDICINE

## 2017-03-25 PROCEDURE — 96376 TX/PRO/DX INJ SAME DRUG ADON: CPT

## 2017-03-25 PROCEDURE — 85610 PROTHROMBIN TIME: CPT | Performed by: FAMILY MEDICINE

## 2017-03-25 RX ORDER — HYDROCODONE BITARTRATE AND ACETAMINOPHEN 7.5; 325 MG/1; MG/1
1 TABLET ORAL EVERY 6 HOURS PRN
Status: DISCONTINUED | OUTPATIENT
Start: 2017-03-25 | End: 2017-03-28

## 2017-03-25 RX ORDER — NITROGLYCERIN 80 MG/1
1 PATCH TRANSDERMAL DAILY
Status: DISCONTINUED | OUTPATIENT
Start: 2017-03-25 | End: 2017-03-26

## 2017-03-25 RX ADMIN — HYDROCODONE BITARTRATE AND ACETAMINOPHEN 1 TABLET: 7.5; 325 TABLET ORAL at 22:55

## 2017-03-25 RX ADMIN — NITROGLYCERIN 1 PATCH: 0.2 PATCH TRANSDERMAL at 08:14

## 2017-03-25 RX ADMIN — MORPHINE SULFATE 2 MG: 2 INJECTION, SOLUTION INTRAMUSCULAR; INTRAVENOUS at 08:15

## 2017-03-25 RX ADMIN — PROMETHAZINE HYDROCHLORIDE 25 MG: 25 TABLET ORAL at 02:43

## 2017-03-25 RX ADMIN — HYDROCODONE BITARTRATE AND ACETAMINOPHEN 1 TABLET: 7.5; 325 TABLET ORAL at 06:40

## 2017-03-25 RX ADMIN — PROMETHAZINE HYDROCHLORIDE 25 MG: 25 TABLET ORAL at 18:20

## 2017-03-25 RX ADMIN — MORPHINE SULFATE 2 MG: 2 INJECTION, SOLUTION INTRAMUSCULAR; INTRAVENOUS at 02:43

## 2017-03-25 RX ADMIN — CARVEDILOL 3.12 MG: 3.12 TABLET, FILM COATED ORAL at 18:20

## 2017-03-25 RX ADMIN — IPRATROPIUM BROMIDE AND ALBUTEROL SULFATE 3 ML: 2.5; .5 SOLUTION RESPIRATORY (INHALATION) at 18:39

## 2017-03-25 RX ADMIN — HYDROCODONE BITARTRATE AND ACETAMINOPHEN 1 TABLET: 7.5; 325 TABLET ORAL at 16:44

## 2017-03-25 RX ADMIN — MORPHINE SULFATE 2 MG: 2 INJECTION, SOLUTION INTRAMUSCULAR; INTRAVENOUS at 20:46

## 2017-03-25 RX ADMIN — GABAPENTIN 400 MG: 400 CAPSULE ORAL at 14:17

## 2017-03-25 RX ADMIN — Medication 10 ML: at 02:44

## 2017-03-25 RX ADMIN — HYDROCODONE BITARTRATE AND ACETAMINOPHEN 1 TABLET: 7.5; 325 TABLET ORAL at 14:17

## 2017-03-25 RX ADMIN — PROMETHAZINE HYDROCHLORIDE 25 MG: 25 TABLET ORAL at 10:53

## 2017-03-25 RX ADMIN — CARVEDILOL 3.12 MG: 3.12 TABLET, FILM COATED ORAL at 08:15

## 2017-03-25 RX ADMIN — RANOLAZINE 500 MG: 500 TABLET, FILM COATED, EXTENDED RELEASE ORAL at 20:38

## 2017-03-25 RX ADMIN — IPRATROPIUM BROMIDE AND ALBUTEROL SULFATE 3 ML: 2.5; .5 SOLUTION RESPIRATORY (INHALATION) at 04:43

## 2017-03-25 RX ADMIN — GABAPENTIN 400 MG: 400 CAPSULE ORAL at 06:29

## 2017-03-25 RX ADMIN — WARFARIN SODIUM 5 MG: 5 TABLET ORAL at 18:20

## 2017-03-25 RX ADMIN — ESCITALOPRAM OXALATE 10 MG: 10 TABLET ORAL at 08:13

## 2017-03-25 RX ADMIN — ATORVASTATIN CALCIUM 20 MG: 20 TABLET, FILM COATED ORAL at 20:39

## 2017-03-25 RX ADMIN — RANOLAZINE 500 MG: 500 TABLET, FILM COATED, EXTENDED RELEASE ORAL at 08:13

## 2017-03-25 RX ADMIN — GABAPENTIN 400 MG: 400 CAPSULE ORAL at 20:39

## 2017-03-26 LAB
ALBUMIN SERPL-MCNC: 3.3 G/DL (ref 3.4–4.8)
ALBUMIN/GLOB SERPL: 1.4 G/DL (ref 1.1–1.8)
ALP SERPL-CCNC: 89 U/L (ref 38–126)
ALT SERPL W P-5'-P-CCNC: 18 U/L (ref 9–52)
ANION GAP SERPL CALCULATED.3IONS-SCNC: 8 MMOL/L (ref 5–15)
AST SERPL-CCNC: 17 U/L (ref 14–36)
BILIRUB SERPL-MCNC: 0.3 MG/DL (ref 0.2–1.3)
BUN BLD-MCNC: 10 MG/DL (ref 7–21)
BUN/CREAT SERPL: 14.9 (ref 7–25)
CALCIUM SPEC-SCNC: 8.4 MG/DL (ref 8.4–10.2)
CHLORIDE SERPL-SCNC: 103 MMOL/L (ref 95–110)
CO2 SERPL-SCNC: 24 MMOL/L (ref 22–31)
CREAT BLD-MCNC: 0.67 MG/DL (ref 0.5–1)
DEPRECATED RDW RBC AUTO: 42.2 FL (ref 36.4–46.3)
ERYTHROCYTE [DISTWIDTH] IN BLOOD BY AUTOMATED COUNT: 12.7 % (ref 11.5–14.5)
GFR SERPL CREATININE-BSD FRML MDRD: 88 ML/MIN/1.73 (ref 60–104)
GLOBULIN UR ELPH-MCNC: 2.4 GM/DL (ref 2.3–3.5)
GLUCOSE BLD-MCNC: 295 MG/DL (ref 60–100)
GLUCOSE BLDC GLUCOMTR-MCNC: 222 MG/DL (ref 70–130)
GLUCOSE BLDC GLUCOMTR-MCNC: 232 MG/DL (ref 70–130)
GLUCOSE BLDC GLUCOMTR-MCNC: 293 MG/DL (ref 70–130)
GLUCOSE BLDC GLUCOMTR-MCNC: 317 MG/DL (ref 70–130)
HBA1C MFR BLD: 10.35 % (ref 4–5.6)
HCT VFR BLD AUTO: 36 % (ref 35–45)
HGB BLD-MCNC: 12.4 G/DL (ref 12–15.5)
INR PPP: 1.5 (ref 0.8–1.2)
MCH RBC QN AUTO: 31.1 PG (ref 26.5–34)
MCHC RBC AUTO-ENTMCNC: 34.4 G/DL (ref 31.4–36)
MCV RBC AUTO: 90.2 FL (ref 80–98)
PLATELET # BLD AUTO: 252 10*3/MM3 (ref 150–450)
PMV BLD AUTO: 8.9 FL (ref 8–12)
POTASSIUM BLD-SCNC: 4.1 MMOL/L (ref 3.5–5.1)
PROT SERPL-MCNC: 5.7 G/DL (ref 6.3–8.6)
PROTHROMBIN TIME: 18.1 SECONDS (ref 11.1–15.3)
RBC # BLD AUTO: 3.99 10*6/MM3 (ref 3.77–5.16)
SODIUM BLD-SCNC: 135 MMOL/L (ref 137–145)
WBC NRBC COR # BLD: 4.38 10*3/MM3 (ref 3.2–9.8)

## 2017-03-26 PROCEDURE — 94760 N-INVAS EAR/PLS OXIMETRY 1: CPT

## 2017-03-26 PROCEDURE — 80053 COMPREHEN METABOLIC PANEL: CPT | Performed by: FAMILY MEDICINE

## 2017-03-26 PROCEDURE — 94799 UNLISTED PULMONARY SVC/PX: CPT

## 2017-03-26 PROCEDURE — 83036 HEMOGLOBIN GLYCOSYLATED A1C: CPT | Performed by: FAMILY MEDICINE

## 2017-03-26 PROCEDURE — 85027 COMPLETE CBC AUTOMATED: CPT | Performed by: FAMILY MEDICINE

## 2017-03-26 PROCEDURE — 96376 TX/PRO/DX INJ SAME DRUG ADON: CPT

## 2017-03-26 PROCEDURE — 99225 PR SBSQ OBSERVATION CARE/DAY 25 MINUTES: CPT | Performed by: INTERNAL MEDICINE

## 2017-03-26 PROCEDURE — G0378 HOSPITAL OBSERVATION PER HR: HCPCS

## 2017-03-26 PROCEDURE — 82962 GLUCOSE BLOOD TEST: CPT

## 2017-03-26 PROCEDURE — 25010000002 MORPHINE SULFATE (PF) 2 MG/ML SOLUTION: Performed by: FAMILY MEDICINE

## 2017-03-26 PROCEDURE — 99220 PR INITIAL OBSERVATION CARE/DAY 70 MINUTES: CPT | Performed by: FAMILY MEDICINE

## 2017-03-26 PROCEDURE — 85610 PROTHROMBIN TIME: CPT | Performed by: FAMILY MEDICINE

## 2017-03-26 PROCEDURE — 63710000001 INSULIN ASPART PER 5 UNITS: Performed by: FAMILY MEDICINE

## 2017-03-26 RX ORDER — ISOSORBIDE MONONITRATE 30 MG/1
30 TABLET, EXTENDED RELEASE ORAL
Status: DISCONTINUED | OUTPATIENT
Start: 2017-03-26 | End: 2017-03-31 | Stop reason: HOSPADM

## 2017-03-26 RX ADMIN — IPRATROPIUM BROMIDE AND ALBUTEROL SULFATE 3 ML: 2.5; .5 SOLUTION RESPIRATORY (INHALATION) at 07:27

## 2017-03-26 RX ADMIN — ISOSORBIDE MONONITRATE 30 MG: 30 TABLET, EXTENDED RELEASE ORAL at 14:14

## 2017-03-26 RX ADMIN — ESCITALOPRAM OXALATE 10 MG: 10 TABLET ORAL at 08:33

## 2017-03-26 RX ADMIN — INSULIN ASPART 6 UNITS: 100 INJECTION, SOLUTION INTRAVENOUS; SUBCUTANEOUS at 20:34

## 2017-03-26 RX ADMIN — INSULIN ASPART 4 UNITS: 100 INJECTION, SOLUTION INTRAVENOUS; SUBCUTANEOUS at 11:08

## 2017-03-26 RX ADMIN — HYDROCODONE BITARTRATE AND ACETAMINOPHEN 1 TABLET: 7.5; 325 TABLET ORAL at 04:53

## 2017-03-26 RX ADMIN — MORPHINE SULFATE 2 MG: 2 INJECTION, SOLUTION INTRAMUSCULAR; INTRAVENOUS at 18:29

## 2017-03-26 RX ADMIN — HYDROCODONE BITARTRATE AND ACETAMINOPHEN 1 TABLET: 7.5; 325 TABLET ORAL at 11:05

## 2017-03-26 RX ADMIN — WARFARIN SODIUM 5 MG: 5 TABLET ORAL at 17:06

## 2017-03-26 RX ADMIN — MORPHINE SULFATE 2 MG: 2 INJECTION, SOLUTION INTRAMUSCULAR; INTRAVENOUS at 22:26

## 2017-03-26 RX ADMIN — NITROGLYCERIN 1 PATCH: 0.4 PATCH TRANSDERMAL at 08:32

## 2017-03-26 RX ADMIN — IPRATROPIUM BROMIDE AND ALBUTEROL SULFATE 3 ML: 2.5; .5 SOLUTION RESPIRATORY (INHALATION) at 18:55

## 2017-03-26 RX ADMIN — Medication 10 ML: at 09:40

## 2017-03-26 RX ADMIN — Medication 10 ML: at 22:26

## 2017-03-26 RX ADMIN — INSULIN ASPART 4 UNITS: 100 INJECTION, SOLUTION INTRAVENOUS; SUBCUTANEOUS at 16:58

## 2017-03-26 RX ADMIN — GABAPENTIN 400 MG: 400 CAPSULE ORAL at 21:05

## 2017-03-26 RX ADMIN — Medication 10 ML: at 18:29

## 2017-03-26 RX ADMIN — HYDROCODONE BITARTRATE AND ACETAMINOPHEN 1 TABLET: 7.5; 325 TABLET ORAL at 17:06

## 2017-03-26 RX ADMIN — RANOLAZINE 500 MG: 500 TABLET, FILM COATED, EXTENDED RELEASE ORAL at 20:07

## 2017-03-26 RX ADMIN — MORPHINE SULFATE 2 MG: 2 INJECTION, SOLUTION INTRAMUSCULAR; INTRAVENOUS at 09:40

## 2017-03-26 RX ADMIN — GABAPENTIN 400 MG: 400 CAPSULE ORAL at 13:00

## 2017-03-26 RX ADMIN — ATORVASTATIN CALCIUM 20 MG: 20 TABLET, FILM COATED ORAL at 20:07

## 2017-03-26 RX ADMIN — NICOTINE 1 PATCH: 21 PATCH TRANSDERMAL at 21:25

## 2017-03-26 RX ADMIN — MORPHINE SULFATE 2 MG: 2 INJECTION, SOLUTION INTRAMUSCULAR; INTRAVENOUS at 14:17

## 2017-03-26 RX ADMIN — GABAPENTIN 400 MG: 400 CAPSULE ORAL at 05:54

## 2017-03-26 RX ADMIN — INSULIN ASPART 7 UNITS: 100 INJECTION, SOLUTION INTRAVENOUS; SUBCUTANEOUS at 06:01

## 2017-03-26 RX ADMIN — RANOLAZINE 500 MG: 500 TABLET, FILM COATED, EXTENDED RELEASE ORAL at 08:33

## 2017-03-26 RX ADMIN — Medication 10 ML: at 14:17

## 2017-03-26 RX ADMIN — CARVEDILOL 3.12 MG: 3.12 TABLET, FILM COATED ORAL at 08:33

## 2017-03-27 LAB
GLUCOSE BLDC GLUCOMTR-MCNC: 178 MG/DL (ref 70–130)
GLUCOSE BLDC GLUCOMTR-MCNC: 191 MG/DL (ref 70–130)
GLUCOSE BLDC GLUCOMTR-MCNC: 271 MG/DL (ref 70–130)
GLUCOSE BLDC GLUCOMTR-MCNC: 326 MG/DL (ref 70–130)
INR PPP: 1.57 (ref 0.8–1.2)
PROTHROMBIN TIME: 18.8 SECONDS (ref 11.1–15.3)

## 2017-03-27 PROCEDURE — 25010000002 MORPHINE SULFATE (PF) 2 MG/ML SOLUTION: Performed by: FAMILY MEDICINE

## 2017-03-27 PROCEDURE — 94799 UNLISTED PULMONARY SVC/PX: CPT

## 2017-03-27 PROCEDURE — 94760 N-INVAS EAR/PLS OXIMETRY 1: CPT

## 2017-03-27 PROCEDURE — 99225 PR SBSQ OBSERVATION CARE/DAY 25 MINUTES: CPT | Performed by: FAMILY MEDICINE

## 2017-03-27 PROCEDURE — 63710000001 INSULIN DETEMIR PER 5 UNITS: Performed by: FAMILY MEDICINE

## 2017-03-27 PROCEDURE — 82962 GLUCOSE BLOOD TEST: CPT

## 2017-03-27 PROCEDURE — 63710000001 INSULIN ASPART PER 5 UNITS: Performed by: FAMILY MEDICINE

## 2017-03-27 PROCEDURE — 96376 TX/PRO/DX INJ SAME DRUG ADON: CPT

## 2017-03-27 PROCEDURE — 85610 PROTHROMBIN TIME: CPT | Performed by: FAMILY MEDICINE

## 2017-03-27 PROCEDURE — G0378 HOSPITAL OBSERVATION PER HR: HCPCS

## 2017-03-27 RX ORDER — NALOXONE HYDROCHLORIDE 0.4 MG/ML
0.4 INJECTION, SOLUTION INTRAMUSCULAR; INTRAVENOUS; SUBCUTANEOUS
Status: DISCONTINUED | OUTPATIENT
Start: 2017-03-27 | End: 2017-03-31 | Stop reason: HOSPADM

## 2017-03-27 RX ORDER — MORPHINE SULFATE 2 MG/ML
2 INJECTION, SOLUTION INTRAMUSCULAR; INTRAVENOUS EVERY 4 HOURS PRN
Status: DISCONTINUED | OUTPATIENT
Start: 2017-03-27 | End: 2017-03-28

## 2017-03-27 RX ORDER — HYDROXYZINE PAMOATE 25 MG/1
25 CAPSULE ORAL 4 TIMES DAILY PRN
Status: DISCONTINUED | OUTPATIENT
Start: 2017-03-27 | End: 2017-03-31 | Stop reason: HOSPADM

## 2017-03-27 RX ORDER — NALOXONE HCL 0.4 MG/ML
0.4 VIAL (ML) INJECTION
Status: DISCONTINUED | OUTPATIENT
Start: 2017-03-27 | End: 2017-03-27

## 2017-03-27 RX ADMIN — RANOLAZINE 500 MG: 500 TABLET, FILM COATED, EXTENDED RELEASE ORAL at 08:39

## 2017-03-27 RX ADMIN — Medication 10 ML: at 13:32

## 2017-03-27 RX ADMIN — CARVEDILOL 3.12 MG: 3.12 TABLET, FILM COATED ORAL at 08:39

## 2017-03-27 RX ADMIN — INSULIN DETEMIR 10 UNITS: 100 INJECTION, SOLUTION SUBCUTANEOUS at 21:10

## 2017-03-27 RX ADMIN — GABAPENTIN 400 MG: 400 CAPSULE ORAL at 13:32

## 2017-03-27 RX ADMIN — MORPHINE SULFATE 2 MG: 2 INJECTION, SOLUTION INTRAMUSCULAR; INTRAVENOUS at 13:32

## 2017-03-27 RX ADMIN — Medication 10 ML: at 04:26

## 2017-03-27 RX ADMIN — NICOTINE 1 PATCH: 21 PATCH TRANSDERMAL at 21:18

## 2017-03-27 RX ADMIN — INSULIN ASPART 2 UNITS: 100 INJECTION, SOLUTION INTRAVENOUS; SUBCUTANEOUS at 06:19

## 2017-03-27 RX ADMIN — INSULIN ASPART 4 UNITS: 100 INJECTION, SOLUTION INTRAVENOUS; SUBCUTANEOUS at 11:26

## 2017-03-27 RX ADMIN — Medication 10 ML: at 17:47

## 2017-03-27 RX ADMIN — Medication 10 ML: at 09:28

## 2017-03-27 RX ADMIN — IPRATROPIUM BROMIDE AND ALBUTEROL SULFATE 3 ML: 2.5; .5 SOLUTION RESPIRATORY (INHALATION) at 19:31

## 2017-03-27 RX ADMIN — ESCITALOPRAM OXALATE 10 MG: 10 TABLET ORAL at 08:39

## 2017-03-27 RX ADMIN — HYDROCODONE BITARTRATE AND ACETAMINOPHEN 1 TABLET: 7.5; 325 TABLET ORAL at 21:24

## 2017-03-27 RX ADMIN — Medication 5 ML: at 23:40

## 2017-03-27 RX ADMIN — IPRATROPIUM BROMIDE AND ALBUTEROL SULFATE 3 ML: 2.5; .5 SOLUTION RESPIRATORY (INHALATION) at 08:37

## 2017-03-27 RX ADMIN — HYDROCODONE BITARTRATE AND ACETAMINOPHEN 1 TABLET: 7.5; 325 TABLET ORAL at 01:11

## 2017-03-27 RX ADMIN — MORPHINE SULFATE 2 MG: 2 INJECTION, SOLUTION INTRAMUSCULAR; INTRAVENOUS at 09:27

## 2017-03-27 RX ADMIN — MORPHINE SULFATE 2 MG: 2 INJECTION, SOLUTION INTRAMUSCULAR; INTRAVENOUS at 04:26

## 2017-03-27 RX ADMIN — HYDROCODONE BITARTRATE AND ACETAMINOPHEN 1 TABLET: 7.5; 325 TABLET ORAL at 07:49

## 2017-03-27 RX ADMIN — GABAPENTIN 400 MG: 400 CAPSULE ORAL at 06:19

## 2017-03-27 RX ADMIN — ISOSORBIDE MONONITRATE 30 MG: 30 TABLET, EXTENDED RELEASE ORAL at 09:27

## 2017-03-27 RX ADMIN — INSULIN ASPART 7 UNITS: 100 INJECTION, SOLUTION INTRAVENOUS; SUBCUTANEOUS at 17:38

## 2017-03-27 RX ADMIN — GABAPENTIN 400 MG: 400 CAPSULE ORAL at 21:11

## 2017-03-27 RX ADMIN — MORPHINE SULFATE 2 MG: 2 INJECTION, SOLUTION INTRAMUSCULAR; INTRAVENOUS at 17:47

## 2017-03-27 RX ADMIN — MORPHINE SULFATE 2 MG: 2 INJECTION, SOLUTION INTRAMUSCULAR; INTRAVENOUS at 23:34

## 2017-03-27 RX ADMIN — IPRATROPIUM BROMIDE AND ALBUTEROL SULFATE 3 ML: 2.5; .5 SOLUTION RESPIRATORY (INHALATION) at 14:09

## 2017-03-27 RX ADMIN — ATORVASTATIN CALCIUM 20 MG: 20 TABLET, FILM COATED ORAL at 21:11

## 2017-03-27 RX ADMIN — RANOLAZINE 500 MG: 500 TABLET, FILM COATED, EXTENDED RELEASE ORAL at 21:10

## 2017-03-27 RX ADMIN — HYDROXYZINE PAMOATE 25 MG: 25 CAPSULE ORAL at 21:11

## 2017-03-27 RX ADMIN — INSULIN ASPART 2 UNITS: 100 INJECTION, SOLUTION INTRAVENOUS; SUBCUTANEOUS at 21:10

## 2017-03-27 RX ADMIN — HYDROCODONE BITARTRATE AND ACETAMINOPHEN 1 TABLET: 7.5; 325 TABLET ORAL at 15:26

## 2017-03-27 RX ADMIN — CARVEDILOL 3.12 MG: 3.12 TABLET, FILM COATED ORAL at 17:38

## 2017-03-28 LAB
ALBUMIN SERPL-MCNC: 3 G/DL (ref 3.4–4.8)
ALBUMIN SERPL-MCNC: 3.1 G/DL (ref 3.4–4.8)
ALBUMIN/GLOB SERPL: 1.4 G/DL (ref 1.1–1.8)
ALBUMIN/GLOB SERPL: 1.4 G/DL (ref 1.1–1.8)
ALP SERPL-CCNC: 68 U/L (ref 38–126)
ALP SERPL-CCNC: 79 U/L (ref 38–126)
ALT SERPL W P-5'-P-CCNC: 20 U/L (ref 9–52)
ALT SERPL W P-5'-P-CCNC: 23 U/L (ref 9–52)
ANION GAP SERPL CALCULATED.3IONS-SCNC: 8 MMOL/L (ref 5–15)
ANION GAP SERPL CALCULATED.3IONS-SCNC: 9 MMOL/L (ref 5–15)
AST SERPL-CCNC: 14 U/L (ref 14–36)
AST SERPL-CCNC: 19 U/L (ref 14–36)
BILIRUB SERPL-MCNC: 0.2 MG/DL (ref 0.2–1.3)
BILIRUB SERPL-MCNC: 0.3 MG/DL (ref 0.2–1.3)
BUN BLD-MCNC: 14 MG/DL (ref 7–21)
BUN BLD-MCNC: 17 MG/DL (ref 7–21)
BUN/CREAT SERPL: 16.3 (ref 7–25)
BUN/CREAT SERPL: 17.2 (ref 7–25)
CALCIUM SPEC-SCNC: 8 MG/DL (ref 8.4–10.2)
CALCIUM SPEC-SCNC: 8.2 MG/DL (ref 8.4–10.2)
CHLORIDE SERPL-SCNC: 104 MMOL/L (ref 95–110)
CHLORIDE SERPL-SCNC: 105 MMOL/L (ref 95–110)
CO2 SERPL-SCNC: 19 MMOL/L (ref 22–31)
CO2 SERPL-SCNC: 25 MMOL/L (ref 22–31)
CREAT BLD-MCNC: 0.86 MG/DL (ref 0.5–1)
CREAT BLD-MCNC: 0.99 MG/DL (ref 0.5–1)
DEPRECATED RDW RBC AUTO: 43.6 FL (ref 36.4–46.3)
ERYTHROCYTE [DISTWIDTH] IN BLOOD BY AUTOMATED COUNT: 13 % (ref 11.5–14.5)
GFR SERPL CREATININE-BSD FRML MDRD: 56 ML/MIN/1.73 (ref 45–104)
GFR SERPL CREATININE-BSD FRML MDRD: 66 ML/MIN/1.73 (ref 60–104)
GLOBULIN UR ELPH-MCNC: 2.2 GM/DL (ref 2.3–3.5)
GLOBULIN UR ELPH-MCNC: 2.2 GM/DL (ref 2.3–3.5)
GLUCOSE BLD-MCNC: 134 MG/DL (ref 60–100)
GLUCOSE BLD-MCNC: 229 MG/DL (ref 60–100)
GLUCOSE BLDC GLUCOMTR-MCNC: 124 MG/DL (ref 70–130)
GLUCOSE BLDC GLUCOMTR-MCNC: 161 MG/DL (ref 70–130)
GLUCOSE BLDC GLUCOMTR-MCNC: 206 MG/DL (ref 70–130)
GLUCOSE BLDC GLUCOMTR-MCNC: 346 MG/DL (ref 70–130)
HCT VFR BLD AUTO: 34 % (ref 35–45)
HGB BLD-MCNC: 11.8 G/DL (ref 12–15.5)
INR PPP: 1.6 (ref 0.8–1.2)
MCH RBC QN AUTO: 31.8 PG (ref 26.5–34)
MCHC RBC AUTO-ENTMCNC: 34.7 G/DL (ref 31.4–36)
MCV RBC AUTO: 91.6 FL (ref 80–98)
PLATELET # BLD AUTO: 258 10*3/MM3 (ref 150–450)
PMV BLD AUTO: 9.1 FL (ref 8–12)
POTASSIUM BLD-SCNC: 4.3 MMOL/L (ref 3.5–5.1)
POTASSIUM BLD-SCNC: 4.5 MMOL/L (ref 3.5–5.1)
PROT SERPL-MCNC: 5.2 G/DL (ref 6.3–8.6)
PROT SERPL-MCNC: 5.3 G/DL (ref 6.3–8.6)
PROTHROMBIN TIME: ABNORMAL SECONDS (ref 11.1–15.3)
RBC # BLD AUTO: 3.71 10*6/MM3 (ref 3.77–5.16)
SODIUM BLD-SCNC: 132 MMOL/L (ref 137–145)
SODIUM BLD-SCNC: 138 MMOL/L (ref 137–145)
WBC NRBC COR # BLD: 4.37 10*3/MM3 (ref 3.2–9.8)

## 2017-03-28 PROCEDURE — 99225 PR SBSQ OBSERVATION CARE/DAY 25 MINUTES: CPT | Performed by: FAMILY MEDICINE

## 2017-03-28 PROCEDURE — 96372 THER/PROPH/DIAG INJ SC/IM: CPT

## 2017-03-28 PROCEDURE — 25010000002 VITAMIN K1 1 MG/1 ML SOLUTION: Performed by: FAMILY MEDICINE

## 2017-03-28 PROCEDURE — 63710000001 INSULIN DETEMIR PER 5 UNITS: Performed by: FAMILY MEDICINE

## 2017-03-28 PROCEDURE — 80053 COMPREHEN METABOLIC PANEL: CPT | Performed by: FAMILY MEDICINE

## 2017-03-28 PROCEDURE — 63710000001 INSULIN ASPART PER 5 UNITS: Performed by: FAMILY MEDICINE

## 2017-03-28 PROCEDURE — 85610 PROTHROMBIN TIME: CPT | Performed by: INTERNAL MEDICINE

## 2017-03-28 PROCEDURE — 94760 N-INVAS EAR/PLS OXIMETRY 1: CPT

## 2017-03-28 PROCEDURE — G0378 HOSPITAL OBSERVATION PER HR: HCPCS

## 2017-03-28 PROCEDURE — 25010000002 PROMETHAZINE PER 50 MG: Performed by: FAMILY MEDICINE

## 2017-03-28 PROCEDURE — 96376 TX/PRO/DX INJ SAME DRUG ADON: CPT

## 2017-03-28 PROCEDURE — 25010000002 MORPHINE SULFATE (PF) 2 MG/ML SOLUTION: Performed by: FAMILY MEDICINE

## 2017-03-28 PROCEDURE — 82962 GLUCOSE BLOOD TEST: CPT

## 2017-03-28 PROCEDURE — 85027 COMPLETE CBC AUTOMATED: CPT | Performed by: FAMILY MEDICINE

## 2017-03-28 PROCEDURE — 94799 UNLISTED PULMONARY SVC/PX: CPT

## 2017-03-28 RX ORDER — HYDROCODONE BITARTRATE AND ACETAMINOPHEN 7.5; 325 MG/1; MG/1
1 TABLET ORAL EVERY 4 HOURS PRN
Status: DISCONTINUED | OUTPATIENT
Start: 2017-03-28 | End: 2017-03-31

## 2017-03-28 RX ORDER — GABAPENTIN 400 MG/1
1200 CAPSULE ORAL EVERY 8 HOURS SCHEDULED
Status: DISCONTINUED | OUTPATIENT
Start: 2017-03-28 | End: 2017-03-31 | Stop reason: HOSPADM

## 2017-03-28 RX ORDER — MORPHINE SULFATE 2 MG/ML
2 INJECTION, SOLUTION INTRAMUSCULAR; INTRAVENOUS EVERY 6 HOURS PRN
Status: DISCONTINUED | OUTPATIENT
Start: 2017-03-28 | End: 2017-03-31 | Stop reason: HOSPADM

## 2017-03-28 RX ORDER — PHYTONADIONE 2 MG/ML
5 INJECTION, EMULSION INTRAMUSCULAR; INTRAVENOUS; SUBCUTANEOUS ONCE
Status: COMPLETED | OUTPATIENT
Start: 2017-03-28 | End: 2017-03-28

## 2017-03-28 RX ADMIN — CARVEDILOL 3.12 MG: 3.12 TABLET, FILM COATED ORAL at 09:21

## 2017-03-28 RX ADMIN — INSULIN DETEMIR 10 UNITS: 100 INJECTION, SOLUTION SUBCUTANEOUS at 21:07

## 2017-03-28 RX ADMIN — Medication 10 ML: at 15:39

## 2017-03-28 RX ADMIN — GABAPENTIN 400 MG: 400 CAPSULE ORAL at 13:42

## 2017-03-28 RX ADMIN — NICOTINE 1 PATCH: 21 PATCH TRANSDERMAL at 21:06

## 2017-03-28 RX ADMIN — ATORVASTATIN CALCIUM 20 MG: 20 TABLET, FILM COATED ORAL at 21:06

## 2017-03-28 RX ADMIN — PROMETHAZINE HYDROCHLORIDE 12.5 MG: 25 INJECTION, SOLUTION INTRAMUSCULAR; INTRAVENOUS at 13:39

## 2017-03-28 RX ADMIN — ESCITALOPRAM OXALATE 10 MG: 10 TABLET ORAL at 09:20

## 2017-03-28 RX ADMIN — Medication 10 ML: at 05:46

## 2017-03-28 RX ADMIN — HYDROCODONE BITARTRATE AND ACETAMINOPHEN 1 TABLET: 7.5; 325 TABLET ORAL at 16:36

## 2017-03-28 RX ADMIN — MORPHINE SULFATE 2 MG: 2 INJECTION, SOLUTION INTRAMUSCULAR; INTRAVENOUS at 11:30

## 2017-03-28 RX ADMIN — RANOLAZINE 500 MG: 500 TABLET, FILM COATED, EXTENDED RELEASE ORAL at 09:21

## 2017-03-28 RX ADMIN — IPRATROPIUM BROMIDE AND ALBUTEROL SULFATE 3 ML: 2.5; .5 SOLUTION RESPIRATORY (INHALATION) at 18:35

## 2017-03-28 RX ADMIN — IPRATROPIUM BROMIDE AND ALBUTEROL SULFATE 3 ML: 2.5; .5 SOLUTION RESPIRATORY (INHALATION) at 06:32

## 2017-03-28 RX ADMIN — HYDROCODONE BITARTRATE AND ACETAMINOPHEN 1 TABLET: 7.5; 325 TABLET ORAL at 20:45

## 2017-03-28 RX ADMIN — IPRATROPIUM BROMIDE AND ALBUTEROL SULFATE 3 ML: 2.5; .5 SOLUTION RESPIRATORY (INHALATION) at 13:55

## 2017-03-28 RX ADMIN — GABAPENTIN 1200 MG: 400 CAPSULE ORAL at 21:06

## 2017-03-28 RX ADMIN — INSULIN ASPART 4 UNITS: 100 INJECTION, SOLUTION INTRAVENOUS; SUBCUTANEOUS at 21:07

## 2017-03-28 RX ADMIN — RANOLAZINE 500 MG: 500 TABLET, FILM COATED, EXTENDED RELEASE ORAL at 21:06

## 2017-03-28 RX ADMIN — MORPHINE SULFATE 2 MG: 2 INJECTION, SOLUTION INTRAMUSCULAR; INTRAVENOUS at 05:45

## 2017-03-28 RX ADMIN — Medication 3 ML: at 11:30

## 2017-03-28 RX ADMIN — CARVEDILOL 3.12 MG: 3.12 TABLET, FILM COATED ORAL at 18:09

## 2017-03-28 RX ADMIN — GABAPENTIN 400 MG: 400 CAPSULE ORAL at 06:23

## 2017-03-28 RX ADMIN — HYDROCODONE BITARTRATE AND ACETAMINOPHEN 1 TABLET: 7.5; 325 TABLET ORAL at 10:35

## 2017-03-28 RX ADMIN — MORPHINE SULFATE 2 MG: 2 INJECTION, SOLUTION INTRAMUSCULAR; INTRAVENOUS at 22:30

## 2017-03-28 RX ADMIN — PHYTONADIONE 5 MG: 1 INJECTION, EMULSION INTRAMUSCULAR; INTRAVENOUS; SUBCUTANEOUS at 18:09

## 2017-03-28 RX ADMIN — INSULIN ASPART 7 UNITS: 100 INJECTION, SOLUTION INTRAVENOUS; SUBCUTANEOUS at 12:36

## 2017-03-28 RX ADMIN — HYDROCODONE BITARTRATE AND ACETAMINOPHEN 1 TABLET: 7.5; 325 TABLET ORAL at 04:31

## 2017-03-28 RX ADMIN — INSULIN ASPART 2 UNITS: 100 INJECTION, SOLUTION INTRAVENOUS; SUBCUTANEOUS at 18:09

## 2017-03-28 RX ADMIN — ISOSORBIDE MONONITRATE 30 MG: 30 TABLET, EXTENDED RELEASE ORAL at 09:21

## 2017-03-28 RX ADMIN — MORPHINE SULFATE 2 MG: 2 INJECTION, SOLUTION INTRAMUSCULAR; INTRAVENOUS at 15:39

## 2017-03-28 RX ADMIN — Medication 3 ML: at 13:45

## 2017-03-29 DIAGNOSIS — IMO0002 UNCONTROLLED TYPE 2 DIABETES MELLITUS WITH COMPLICATION, WITHOUT LONG-TERM CURRENT USE OF INSULIN: Primary | ICD-10-CM

## 2017-03-29 DIAGNOSIS — I25.118 CORONARY ARTERY DISEASE OF NATIVE ARTERY OF NATIVE HEART WITH STABLE ANGINA PECTORIS (HCC): ICD-10-CM

## 2017-03-29 LAB
ALBUMIN SERPL-MCNC: 3.3 G/DL (ref 3.4–4.8)
ALBUMIN/GLOB SERPL: 1.4 G/DL (ref 1.1–1.8)
ALP SERPL-CCNC: 75 U/L (ref 38–126)
ALT SERPL W P-5'-P-CCNC: 25 U/L (ref 9–52)
ANION GAP SERPL CALCULATED.3IONS-SCNC: 11 MMOL/L (ref 5–15)
AST SERPL-CCNC: 17 U/L (ref 14–36)
BILIRUB SERPL-MCNC: 0.4 MG/DL (ref 0.2–1.3)
BUN BLD-MCNC: 16 MG/DL (ref 7–21)
BUN/CREAT SERPL: 18.8 (ref 7–25)
CALCIUM SPEC-SCNC: 8.4 MG/DL (ref 8.4–10.2)
CHLORIDE SERPL-SCNC: 106 MMOL/L (ref 95–110)
CO2 SERPL-SCNC: 20 MMOL/L (ref 22–31)
CREAT BLD-MCNC: 0.85 MG/DL (ref 0.5–1)
DEPRECATED RDW RBC AUTO: 43.5 FL (ref 36.4–46.3)
ERYTHROCYTE [DISTWIDTH] IN BLOOD BY AUTOMATED COUNT: 13 % (ref 11.5–14.5)
GFR SERPL CREATININE-BSD FRML MDRD: 67 ML/MIN/1.73 (ref 45–104)
GLOBULIN UR ELPH-MCNC: 2.4 GM/DL (ref 2.3–3.5)
GLUCOSE BLD-MCNC: 153 MG/DL (ref 60–100)
GLUCOSE BLDC GLUCOMTR-MCNC: 143 MG/DL (ref 70–130)
GLUCOSE BLDC GLUCOMTR-MCNC: 218 MG/DL (ref 70–130)
GLUCOSE BLDC GLUCOMTR-MCNC: 244 MG/DL (ref 70–130)
GLUCOSE BLDC GLUCOMTR-MCNC: 273 MG/DL (ref 70–130)
HCT VFR BLD AUTO: 33.9 % (ref 35–45)
HGB BLD-MCNC: 11.7 G/DL (ref 12–15.5)
INR PPP: 1.18 (ref 0.8–1.2)
MCH RBC QN AUTO: 31.5 PG (ref 26.5–34)
MCHC RBC AUTO-ENTMCNC: 34.5 G/DL (ref 31.4–36)
MCV RBC AUTO: 91.4 FL (ref 80–98)
PLATELET # BLD AUTO: 251 10*3/MM3 (ref 150–450)
PMV BLD AUTO: 9.3 FL (ref 8–12)
POTASSIUM BLD-SCNC: 4.1 MMOL/L (ref 3.5–5.1)
PROT SERPL-MCNC: 5.7 G/DL (ref 6.3–8.6)
PROTHROMBIN TIME: 15 SECONDS (ref 11.1–15.3)
RBC # BLD AUTO: 3.71 10*6/MM3 (ref 3.77–5.16)
SODIUM BLD-SCNC: 137 MMOL/L (ref 137–145)
WBC NRBC COR # BLD: 5.48 10*3/MM3 (ref 3.2–9.8)

## 2017-03-29 PROCEDURE — 94799 UNLISTED PULMONARY SVC/PX: CPT

## 2017-03-29 PROCEDURE — 80053 COMPREHEN METABOLIC PANEL: CPT | Performed by: FAMILY MEDICINE

## 2017-03-29 PROCEDURE — 25010000002 BIVALIRUDIN PER 1 MG: Performed by: INTERNAL MEDICINE

## 2017-03-29 PROCEDURE — 25010000002 MORPHINE SULFATE (PF) 2 MG/ML SOLUTION: Performed by: FAMILY MEDICINE

## 2017-03-29 PROCEDURE — G0378 HOSPITAL OBSERVATION PER HR: HCPCS

## 2017-03-29 PROCEDURE — 82962 GLUCOSE BLOOD TEST: CPT

## 2017-03-29 PROCEDURE — C1760 CLOSURE DEV, VASC: HCPCS | Performed by: INTERNAL MEDICINE

## 2017-03-29 PROCEDURE — 85027 COMPLETE CBC AUTOMATED: CPT | Performed by: FAMILY MEDICINE

## 2017-03-29 PROCEDURE — C9600 PERC DRUG-EL COR STENT SING: HCPCS | Performed by: INTERNAL MEDICINE

## 2017-03-29 PROCEDURE — C1874 STENT, COATED/COV W/DEL SYS: HCPCS | Performed by: INTERNAL MEDICINE

## 2017-03-29 PROCEDURE — C1887 CATHETER, GUIDING: HCPCS | Performed by: INTERNAL MEDICINE

## 2017-03-29 PROCEDURE — 93454 CORONARY ARTERY ANGIO S&I: CPT | Performed by: INTERNAL MEDICINE

## 2017-03-29 PROCEDURE — 63710000001 INSULIN DETEMIR PER 5 UNITS: Performed by: FAMILY MEDICINE

## 2017-03-29 PROCEDURE — C1769 GUIDE WIRE: HCPCS | Performed by: INTERNAL MEDICINE

## 2017-03-29 PROCEDURE — 96372 THER/PROPH/DIAG INJ SC/IM: CPT

## 2017-03-29 PROCEDURE — 63710000001 INSULIN ASPART PER 5 UNITS: Performed by: FAMILY MEDICINE

## 2017-03-29 PROCEDURE — 94760 N-INVAS EAR/PLS OXIMETRY 1: CPT

## 2017-03-29 PROCEDURE — 96376 TX/PRO/DX INJ SAME DRUG ADON: CPT

## 2017-03-29 PROCEDURE — 25010000002 MIDAZOLAM PER 1 MG: Performed by: INTERNAL MEDICINE

## 2017-03-29 PROCEDURE — C1894 INTRO/SHEATH, NON-LASER: HCPCS | Performed by: INTERNAL MEDICINE

## 2017-03-29 PROCEDURE — 99225 PR SBSQ OBSERVATION CARE/DAY 25 MINUTES: CPT | Performed by: FAMILY MEDICINE

## 2017-03-29 PROCEDURE — 0 IOPAMIDOL PER 1 ML: Performed by: INTERNAL MEDICINE

## 2017-03-29 PROCEDURE — C1725 CATH, TRANSLUMIN NON-LASER: HCPCS | Performed by: INTERNAL MEDICINE

## 2017-03-29 PROCEDURE — 85610 PROTHROMBIN TIME: CPT | Performed by: FAMILY MEDICINE

## 2017-03-29 DEVICE — XIENCE ALPINE EVEROLIMUS ELUTING CORONARY STENT SYSTEM 2.25 MM X 23 MM / OVER-THE-WIRE
Type: IMPLANTABLE DEVICE | Status: FUNCTIONAL
Brand: XIENCE ALPINE

## 2017-03-29 DEVICE — XIENCE ALPINE EVEROLIMUS ELUTING CORONARY STENT SYSTEM 2.50 MM X 15 MM / OVER-THE-WIRE
Type: IMPLANTABLE DEVICE | Status: FUNCTIONAL
Brand: XIENCE ALPINE

## 2017-03-29 RX ORDER — SODIUM CHLORIDE 450 MG/100ML
75 INJECTION, SOLUTION INTRAVENOUS CONTINUOUS
Status: DISCONTINUED | OUTPATIENT
Start: 2017-03-29 | End: 2017-03-29 | Stop reason: SDUPTHER

## 2017-03-29 RX ORDER — SODIUM CHLORIDE 450 MG/100ML
75 INJECTION, SOLUTION INTRAVENOUS CONTINUOUS
Status: DISCONTINUED | OUTPATIENT
Start: 2017-03-29 | End: 2017-03-30

## 2017-03-29 RX ORDER — LIDOCAINE HYDROCHLORIDE 20 MG/ML
INJECTION, SOLUTION INFILTRATION; PERINEURAL AS NEEDED
Status: DISCONTINUED | OUTPATIENT
Start: 2017-03-29 | End: 2017-03-29 | Stop reason: HOSPADM

## 2017-03-29 RX ORDER — GLIPIZIDE 5 MG/1
5 TABLET ORAL
Qty: 60 TABLET | Refills: 5 | Status: ON HOLD | OUTPATIENT
Start: 2017-03-29 | End: 2017-04-05 | Stop reason: SDUPTHER

## 2017-03-29 RX ORDER — MIDAZOLAM HYDROCHLORIDE 1 MG/ML
INJECTION INTRAMUSCULAR; INTRAVENOUS AS NEEDED
Status: DISCONTINUED | OUTPATIENT
Start: 2017-03-29 | End: 2017-03-29 | Stop reason: HOSPADM

## 2017-03-29 RX ORDER — GLIPIZIDE 5 MG/1
5 TABLET ORAL
Status: DISCONTINUED | OUTPATIENT
Start: 2017-03-29 | End: 2017-03-31 | Stop reason: HOSPADM

## 2017-03-29 RX ADMIN — MORPHINE SULFATE 2 MG: 2 INJECTION, SOLUTION INTRAMUSCULAR; INTRAVENOUS at 04:41

## 2017-03-29 RX ADMIN — ATORVASTATIN CALCIUM 20 MG: 20 TABLET, FILM COATED ORAL at 20:16

## 2017-03-29 RX ADMIN — CARVEDILOL 3.12 MG: 3.12 TABLET, FILM COATED ORAL at 18:22

## 2017-03-29 RX ADMIN — Medication 10 ML: at 10:18

## 2017-03-29 RX ADMIN — ESCITALOPRAM OXALATE 10 MG: 10 TABLET ORAL at 09:17

## 2017-03-29 RX ADMIN — INSULIN ASPART 4 UNITS: 100 INJECTION, SOLUTION INTRAVENOUS; SUBCUTANEOUS at 18:13

## 2017-03-29 RX ADMIN — MORPHINE SULFATE 2 MG: 2 INJECTION, SOLUTION INTRAMUSCULAR; INTRAVENOUS at 10:18

## 2017-03-29 RX ADMIN — HYDROCODONE BITARTRATE AND ACETAMINOPHEN 1 TABLET: 7.5; 325 TABLET ORAL at 01:48

## 2017-03-29 RX ADMIN — CARVEDILOL 3.12 MG: 3.12 TABLET, FILM COATED ORAL at 09:17

## 2017-03-29 RX ADMIN — HYDROCODONE BITARTRATE AND ACETAMINOPHEN 1 TABLET: 7.5; 325 TABLET ORAL at 11:43

## 2017-03-29 RX ADMIN — IPRATROPIUM BROMIDE AND ALBUTEROL SULFATE 3 ML: 2.5; .5 SOLUTION RESPIRATORY (INHALATION) at 06:45

## 2017-03-29 RX ADMIN — RANOLAZINE 500 MG: 500 TABLET, FILM COATED, EXTENDED RELEASE ORAL at 20:16

## 2017-03-29 RX ADMIN — TICAGRELOR 180 MG: 90 TABLET ORAL at 15:59

## 2017-03-29 RX ADMIN — HYDROCODONE BITARTRATE AND ACETAMINOPHEN 1 TABLET: 7.5; 325 TABLET ORAL at 20:16

## 2017-03-29 RX ADMIN — HYDROCODONE BITARTRATE AND ACETAMINOPHEN 1 TABLET: 7.5; 325 TABLET ORAL at 15:59

## 2017-03-29 RX ADMIN — GABAPENTIN 1200 MG: 400 CAPSULE ORAL at 06:59

## 2017-03-29 RX ADMIN — SODIUM CHLORIDE 75 ML/HR: 4.5 INJECTION, SOLUTION INTRAVENOUS at 18:10

## 2017-03-29 RX ADMIN — INSULIN ASPART 6 UNITS: 100 INJECTION, SOLUTION INTRAVENOUS; SUBCUTANEOUS at 21:39

## 2017-03-29 RX ADMIN — HYDROCODONE BITARTRATE AND ACETAMINOPHEN 1 TABLET: 7.5; 325 TABLET ORAL at 07:03

## 2017-03-29 RX ADMIN — GLIPIZIDE 5 MG: 5 TABLET ORAL at 17:42

## 2017-03-29 RX ADMIN — RANOLAZINE 500 MG: 500 TABLET, FILM COATED, EXTENDED RELEASE ORAL at 09:17

## 2017-03-29 RX ADMIN — MORPHINE SULFATE 2 MG: 2 INJECTION, SOLUTION INTRAMUSCULAR; INTRAVENOUS at 17:42

## 2017-03-29 RX ADMIN — ISOSORBIDE MONONITRATE 30 MG: 30 TABLET, EXTENDED RELEASE ORAL at 09:17

## 2017-03-29 RX ADMIN — IPRATROPIUM BROMIDE AND ALBUTEROL SULFATE 3 ML: 2.5; .5 SOLUTION RESPIRATORY (INHALATION) at 19:05

## 2017-03-29 RX ADMIN — NICOTINE 1 PATCH: 21 PATCH TRANSDERMAL at 21:39

## 2017-03-29 RX ADMIN — SODIUM CHLORIDE 75 ML/HR: 4.5 INJECTION, SOLUTION INTRAVENOUS at 11:43

## 2017-03-29 RX ADMIN — TICAGRELOR 90 MG: 90 TABLET ORAL at 17:43

## 2017-03-29 RX ADMIN — INSULIN DETEMIR 14 UNITS: 100 INJECTION, SOLUTION SUBCUTANEOUS at 20:16

## 2017-03-29 RX ADMIN — GABAPENTIN 1200 MG: 400 CAPSULE ORAL at 18:22

## 2017-03-29 RX ADMIN — GABAPENTIN 1200 MG: 400 CAPSULE ORAL at 21:40

## 2017-03-30 LAB
ALBUMIN SERPL-MCNC: 3.6 G/DL (ref 3.4–4.8)
ALBUMIN/GLOB SERPL: 1.3 G/DL (ref 1.1–1.8)
ALP SERPL-CCNC: 86 U/L (ref 38–126)
ALT SERPL W P-5'-P-CCNC: 30 U/L (ref 9–52)
ANION GAP SERPL CALCULATED.3IONS-SCNC: 9 MMOL/L (ref 5–15)
AST SERPL-CCNC: 34 U/L (ref 14–36)
BILIRUB SERPL-MCNC: 0.5 MG/DL (ref 0.2–1.3)
BUN BLD-MCNC: 10 MG/DL (ref 7–21)
BUN/CREAT SERPL: 14.1 (ref 7–25)
CALCIUM SPEC-SCNC: 8.7 MG/DL (ref 8.4–10.2)
CHLORIDE SERPL-SCNC: 107 MMOL/L (ref 95–110)
CO2 SERPL-SCNC: 24 MMOL/L (ref 22–31)
CREAT BLD-MCNC: 0.71 MG/DL (ref 0.5–1)
DEPRECATED RDW RBC AUTO: 42.9 FL (ref 36.4–46.3)
ERYTHROCYTE [DISTWIDTH] IN BLOOD BY AUTOMATED COUNT: 12.9 % (ref 11.5–14.5)
GFR SERPL CREATININE-BSD FRML MDRD: 82 ML/MIN/1.73 (ref 45–104)
GLOBULIN UR ELPH-MCNC: 2.7 GM/DL (ref 2.3–3.5)
GLUCOSE BLD-MCNC: 125 MG/DL (ref 60–100)
GLUCOSE BLDC GLUCOMTR-MCNC: 117 MG/DL (ref 70–130)
GLUCOSE BLDC GLUCOMTR-MCNC: 183 MG/DL (ref 70–130)
GLUCOSE BLDC GLUCOMTR-MCNC: 192 MG/DL (ref 70–130)
GLUCOSE BLDC GLUCOMTR-MCNC: 380 MG/DL (ref 70–130)
HCT VFR BLD AUTO: 37.4 % (ref 35–45)
HGB BLD-MCNC: 13 G/DL (ref 12–15.5)
MCH RBC QN AUTO: 31.6 PG (ref 26.5–34)
MCHC RBC AUTO-ENTMCNC: 34.8 G/DL (ref 31.4–36)
MCV RBC AUTO: 91 FL (ref 80–98)
PLATELET # BLD AUTO: 258 10*3/MM3 (ref 150–450)
PMV BLD AUTO: 9.1 FL (ref 8–12)
POTASSIUM BLD-SCNC: 4.6 MMOL/L (ref 3.5–5.1)
PROT SERPL-MCNC: 6.3 G/DL (ref 6.3–8.6)
RBC # BLD AUTO: 4.11 10*6/MM3 (ref 3.77–5.16)
SODIUM BLD-SCNC: 140 MMOL/L (ref 137–145)
WBC NRBC COR # BLD: 7.09 10*3/MM3 (ref 3.2–9.8)

## 2017-03-30 PROCEDURE — 94799 UNLISTED PULMONARY SVC/PX: CPT

## 2017-03-30 PROCEDURE — 97750 PHYSICAL PERFORMANCE TEST: CPT

## 2017-03-30 PROCEDURE — 80053 COMPREHEN METABOLIC PANEL: CPT | Performed by: FAMILY MEDICINE

## 2017-03-30 PROCEDURE — 85027 COMPLETE CBC AUTOMATED: CPT | Performed by: INTERNAL MEDICINE

## 2017-03-30 PROCEDURE — 25010000002 PROMETHAZINE PER 50 MG: Performed by: FAMILY MEDICINE

## 2017-03-30 PROCEDURE — 94760 N-INVAS EAR/PLS OXIMETRY 1: CPT

## 2017-03-30 PROCEDURE — 63710000001 INSULIN ASPART PER 5 UNITS: Performed by: FAMILY MEDICINE

## 2017-03-30 PROCEDURE — G0378 HOSPITAL OBSERVATION PER HR: HCPCS

## 2017-03-30 PROCEDURE — 25010000002 MORPHINE SULFATE (PF) 2 MG/ML SOLUTION: Performed by: FAMILY MEDICINE

## 2017-03-30 PROCEDURE — G8978 MOBILITY CURRENT STATUS: HCPCS

## 2017-03-30 PROCEDURE — 97162 PT EVAL MOD COMPLEX 30 MIN: CPT

## 2017-03-30 PROCEDURE — 99225 PR SBSQ OBSERVATION CARE/DAY 25 MINUTES: CPT | Performed by: FAMILY MEDICINE

## 2017-03-30 PROCEDURE — 63710000001 INSULIN DETEMIR PER 5 UNITS: Performed by: FAMILY MEDICINE

## 2017-03-30 PROCEDURE — 82962 GLUCOSE BLOOD TEST: CPT

## 2017-03-30 PROCEDURE — G8979 MOBILITY GOAL STATUS: HCPCS

## 2017-03-30 RX ORDER — HYDRALAZINE HYDROCHLORIDE 20 MG/ML
20 INJECTION INTRAMUSCULAR; INTRAVENOUS EVERY 6 HOURS PRN
Status: DISCONTINUED | OUTPATIENT
Start: 2017-03-30 | End: 2017-03-31 | Stop reason: HOSPADM

## 2017-03-30 RX ADMIN — PROMETHAZINE HYDROCHLORIDE 12.5 MG: 25 INJECTION, SOLUTION INTRAMUSCULAR; INTRAVENOUS at 12:10

## 2017-03-30 RX ADMIN — HYDROCODONE BITARTRATE AND ACETAMINOPHEN 1 TABLET: 7.5; 325 TABLET ORAL at 12:04

## 2017-03-30 RX ADMIN — HYDROCODONE BITARTRATE AND ACETAMINOPHEN 1 TABLET: 7.5; 325 TABLET ORAL at 16:02

## 2017-03-30 RX ADMIN — ISOSORBIDE MONONITRATE 30 MG: 30 TABLET, EXTENDED RELEASE ORAL at 08:20

## 2017-03-30 RX ADMIN — ATORVASTATIN CALCIUM 20 MG: 20 TABLET, FILM COATED ORAL at 20:10

## 2017-03-30 RX ADMIN — GLIPIZIDE 5 MG: 5 TABLET ORAL at 07:18

## 2017-03-30 RX ADMIN — IPRATROPIUM BROMIDE AND ALBUTEROL SULFATE 3 ML: 2.5; .5 SOLUTION RESPIRATORY (INHALATION) at 12:31

## 2017-03-30 RX ADMIN — ESCITALOPRAM OXALATE 10 MG: 10 TABLET ORAL at 08:20

## 2017-03-30 RX ADMIN — CARVEDILOL 3.12 MG: 3.12 TABLET, FILM COATED ORAL at 08:20

## 2017-03-30 RX ADMIN — HYDROCODONE BITARTRATE AND ACETAMINOPHEN 1 TABLET: 7.5; 325 TABLET ORAL at 20:09

## 2017-03-30 RX ADMIN — GLIPIZIDE 5 MG: 5 TABLET ORAL at 17:40

## 2017-03-30 RX ADMIN — HYDROCODONE BITARTRATE AND ACETAMINOPHEN 1 TABLET: 7.5; 325 TABLET ORAL at 00:16

## 2017-03-30 RX ADMIN — INSULIN ASPART 8 UNITS: 100 INJECTION, SOLUTION INTRAVENOUS; SUBCUTANEOUS at 11:21

## 2017-03-30 RX ADMIN — INSULIN ASPART 2 UNITS: 100 INJECTION, SOLUTION INTRAVENOUS; SUBCUTANEOUS at 21:18

## 2017-03-30 RX ADMIN — MORPHINE SULFATE 2 MG: 2 INJECTION, SOLUTION INTRAMUSCULAR; INTRAVENOUS at 07:24

## 2017-03-30 RX ADMIN — RANOLAZINE 500 MG: 500 TABLET, FILM COATED, EXTENDED RELEASE ORAL at 20:10

## 2017-03-30 RX ADMIN — SODIUM CHLORIDE 75 ML/HR: 4.5 INJECTION, SOLUTION INTRAVENOUS at 06:12

## 2017-03-30 RX ADMIN — HYDROCODONE BITARTRATE AND ACETAMINOPHEN 1 TABLET: 7.5; 325 TABLET ORAL at 08:19

## 2017-03-30 RX ADMIN — IPRATROPIUM BROMIDE AND ALBUTEROL SULFATE 3 ML: 2.5; .5 SOLUTION RESPIRATORY (INHALATION) at 07:37

## 2017-03-30 RX ADMIN — GABAPENTIN 1200 MG: 400 CAPSULE ORAL at 21:18

## 2017-03-30 RX ADMIN — TICAGRELOR 90 MG: 90 TABLET ORAL at 08:20

## 2017-03-30 RX ADMIN — CARVEDILOL 3.12 MG: 3.12 TABLET, FILM COATED ORAL at 17:40

## 2017-03-30 RX ADMIN — INSULIN ASPART 2 UNITS: 100 INJECTION, SOLUTION INTRAVENOUS; SUBCUTANEOUS at 17:40

## 2017-03-30 RX ADMIN — RANOLAZINE 500 MG: 500 TABLET, FILM COATED, EXTENDED RELEASE ORAL at 08:20

## 2017-03-30 RX ADMIN — GABAPENTIN 1200 MG: 400 CAPSULE ORAL at 14:49

## 2017-03-30 RX ADMIN — TICAGRELOR 90 MG: 90 TABLET ORAL at 17:40

## 2017-03-30 RX ADMIN — INSULIN DETEMIR 14 UNITS: 100 INJECTION, SOLUTION SUBCUTANEOUS at 20:10

## 2017-03-30 RX ADMIN — NICOTINE 1 PATCH: 21 PATCH TRANSDERMAL at 21:21

## 2017-03-30 RX ADMIN — HYDROCODONE BITARTRATE AND ACETAMINOPHEN 1 TABLET: 7.5; 325 TABLET ORAL at 04:28

## 2017-03-30 RX ADMIN — GABAPENTIN 1200 MG: 400 CAPSULE ORAL at 06:12

## 2017-03-30 RX ADMIN — MORPHINE SULFATE 2 MG: 2 INJECTION, SOLUTION INTRAMUSCULAR; INTRAVENOUS at 14:49

## 2017-03-30 RX ADMIN — IPRATROPIUM BROMIDE AND ALBUTEROL SULFATE 3 ML: 2.5; .5 SOLUTION RESPIRATORY (INHALATION) at 19:15

## 2017-03-31 VITALS
DIASTOLIC BLOOD PRESSURE: 60 MMHG | HEART RATE: 82 BPM | WEIGHT: 139.99 LBS | SYSTOLIC BLOOD PRESSURE: 104 MMHG | RESPIRATION RATE: 16 BRPM | BODY MASS INDEX: 26.43 KG/M2 | OXYGEN SATURATION: 94 % | TEMPERATURE: 97.3 F | HEIGHT: 61 IN

## 2017-03-31 PROBLEM — Z91.14 NONCOMPLIANCE WITH MEDICATION REGIMEN: Status: ACTIVE | Noted: 2017-03-31

## 2017-03-31 PROBLEM — R07.9 CHEST PAIN: Chronic | Status: RESOLVED | Noted: 2017-02-06 | Resolved: 2017-03-31

## 2017-03-31 LAB
ALBUMIN SERPL-MCNC: 3.1 G/DL (ref 3.4–4.8)
ALBUMIN/GLOB SERPL: 1.3 G/DL (ref 1.1–1.8)
ALP SERPL-CCNC: 74 U/L (ref 38–126)
ALT SERPL W P-5'-P-CCNC: 22 U/L (ref 9–52)
ANION GAP SERPL CALCULATED.3IONS-SCNC: 10 MMOL/L (ref 5–15)
AST SERPL-CCNC: 19 U/L (ref 14–36)
BILIRUB SERPL-MCNC: 0.4 MG/DL (ref 0.2–1.3)
BUN BLD-MCNC: 13 MG/DL (ref 7–21)
BUN/CREAT SERPL: 15.7 (ref 7–25)
CALCIUM SPEC-SCNC: 8.3 MG/DL (ref 8.4–10.2)
CHLORIDE SERPL-SCNC: 108 MMOL/L (ref 95–110)
CO2 SERPL-SCNC: 21 MMOL/L (ref 22–31)
CREAT BLD-MCNC: 0.83 MG/DL (ref 0.5–1)
GFR SERPL CREATININE-BSD FRML MDRD: 68 ML/MIN/1.73 (ref 60–104)
GLOBULIN UR ELPH-MCNC: 2.4 GM/DL (ref 2.3–3.5)
GLUCOSE BLD-MCNC: 84 MG/DL (ref 60–100)
GLUCOSE BLDC GLUCOMTR-MCNC: 178 MG/DL (ref 70–130)
GLUCOSE BLDC GLUCOMTR-MCNC: 85 MG/DL (ref 70–130)
POTASSIUM BLD-SCNC: 4.4 MMOL/L (ref 3.5–5.1)
PROT SERPL-MCNC: 5.5 G/DL (ref 6.3–8.6)
SODIUM BLD-SCNC: 139 MMOL/L (ref 137–145)
TROPONIN I SERPL-MCNC: <0.012 NG/ML
WHOLE BLOOD HOLD SPECIMEN: NORMAL

## 2017-03-31 PROCEDURE — G8988 SELF CARE GOAL STATUS: HCPCS

## 2017-03-31 PROCEDURE — 93010 ELECTROCARDIOGRAM REPORT: CPT | Performed by: INTERNAL MEDICINE

## 2017-03-31 PROCEDURE — 94760 N-INVAS EAR/PLS OXIMETRY 1: CPT

## 2017-03-31 PROCEDURE — G8987 SELF CARE CURRENT STATUS: HCPCS

## 2017-03-31 PROCEDURE — 94799 UNLISTED PULMONARY SVC/PX: CPT

## 2017-03-31 PROCEDURE — G0378 HOSPITAL OBSERVATION PER HR: HCPCS

## 2017-03-31 PROCEDURE — 97165 OT EVAL LOW COMPLEX 30 MIN: CPT

## 2017-03-31 PROCEDURE — G8989 SELF CARE D/C STATUS: HCPCS

## 2017-03-31 PROCEDURE — 93005 ELECTROCARDIOGRAM TRACING: CPT | Performed by: FAMILY MEDICINE

## 2017-03-31 PROCEDURE — 84484 ASSAY OF TROPONIN QUANT: CPT | Performed by: FAMILY MEDICINE

## 2017-03-31 PROCEDURE — 82962 GLUCOSE BLOOD TEST: CPT

## 2017-03-31 PROCEDURE — 80053 COMPREHEN METABOLIC PANEL: CPT | Performed by: FAMILY MEDICINE

## 2017-03-31 PROCEDURE — 63710000001 INSULIN ASPART PER 5 UNITS: Performed by: FAMILY MEDICINE

## 2017-03-31 RX ORDER — BUSPIRONE HYDROCHLORIDE 5 MG/1
10 TABLET ORAL EVERY 12 HOURS SCHEDULED
Status: DISCONTINUED | OUTPATIENT
Start: 2017-03-31 | End: 2017-03-31 | Stop reason: HOSPADM

## 2017-03-31 RX ORDER — HYDROCODONE BITARTRATE AND ACETAMINOPHEN 7.5; 325 MG/1; MG/1
1 TABLET ORAL EVERY 8 HOURS PRN
Status: DISCONTINUED | OUTPATIENT
Start: 2017-03-31 | End: 2017-03-31 | Stop reason: HOSPADM

## 2017-03-31 RX ORDER — ESCITALOPRAM OXALATE 10 MG/1
20 TABLET ORAL DAILY
Status: DISCONTINUED | OUTPATIENT
Start: 2017-03-31 | End: 2017-03-31 | Stop reason: HOSPADM

## 2017-03-31 RX ORDER — BUSPIRONE HYDROCHLORIDE 10 MG/1
10 TABLET ORAL EVERY 12 HOURS SCHEDULED
Qty: 60 TABLET | Refills: 5 | Status: SHIPPED | OUTPATIENT
Start: 2017-03-31 | End: 2017-05-27 | Stop reason: DRUGHIGH

## 2017-03-31 RX ORDER — ESCITALOPRAM OXALATE 20 MG/1
20 TABLET ORAL DAILY
Qty: 30 TABLET | Refills: 5 | Status: ON HOLD | OUTPATIENT
Start: 2017-03-31 | End: 2017-04-05 | Stop reason: SDUPTHER

## 2017-03-31 RX ORDER — CLOPIDOGREL BISULFATE 75 MG/1
75 TABLET ORAL DAILY
Qty: 30 TABLET | Refills: 2 | Status: ON HOLD | OUTPATIENT
Start: 2017-03-31 | End: 2017-04-05 | Stop reason: SDUPTHER

## 2017-03-31 RX ADMIN — HYDROCODONE BITARTRATE AND ACETAMINOPHEN 1 TABLET: 7.5; 325 TABLET ORAL at 13:07

## 2017-03-31 RX ADMIN — INSULIN ASPART 2 UNITS: 100 INJECTION, SOLUTION INTRAVENOUS; SUBCUTANEOUS at 11:08

## 2017-03-31 RX ADMIN — TICAGRELOR 90 MG: 90 TABLET ORAL at 08:29

## 2017-03-31 RX ADMIN — IPRATROPIUM BROMIDE AND ALBUTEROL SULFATE 3 ML: 2.5; .5 SOLUTION RESPIRATORY (INHALATION) at 11:20

## 2017-03-31 RX ADMIN — BUSPIRONE HYDROCHLORIDE 10 MG: 5 TABLET ORAL at 09:48

## 2017-03-31 RX ADMIN — CARVEDILOL 3.12 MG: 3.12 TABLET, FILM COATED ORAL at 08:29

## 2017-03-31 RX ADMIN — HYDROCODONE BITARTRATE AND ACETAMINOPHEN 1 TABLET: 7.5; 325 TABLET ORAL at 08:36

## 2017-03-31 RX ADMIN — ISOSORBIDE MONONITRATE 30 MG: 30 TABLET, EXTENDED RELEASE ORAL at 08:29

## 2017-03-31 RX ADMIN — GLIPIZIDE 5 MG: 5 TABLET ORAL at 08:29

## 2017-03-31 RX ADMIN — HYDROCODONE BITARTRATE AND ACETAMINOPHEN 1 TABLET: 7.5; 325 TABLET ORAL at 04:45

## 2017-03-31 RX ADMIN — GABAPENTIN 1200 MG: 400 CAPSULE ORAL at 13:08

## 2017-03-31 RX ADMIN — HYDROCODONE BITARTRATE AND ACETAMINOPHEN 1 TABLET: 7.5; 325 TABLET ORAL at 00:23

## 2017-03-31 RX ADMIN — ESCITALOPRAM OXALATE 20 MG: 10 TABLET ORAL at 09:48

## 2017-03-31 RX ADMIN — RANOLAZINE 500 MG: 500 TABLET, FILM COATED, EXTENDED RELEASE ORAL at 08:27

## 2017-03-31 RX ADMIN — GABAPENTIN 1200 MG: 400 CAPSULE ORAL at 05:53

## 2017-04-01 NOTE — PLAN OF CARE
Problem: Inpatient Physical Therapy  Goal: Gait Training Goal LTG- PT  Outcome: Unable to achieve outcome(s) by discharge Date Met:  04/01/17 03/30/17 1615 04/01/17 0730   Gait Training PT LTG   Gait Training Goal PT LTG, Date Established 03/30/17 --    Gait Training Goal PT LTG, Time to Achieve by discharge --    Gait Training Goal PT LTG, [place] Level conditional independence --    Gait Training Goal PT LTG, Assist Device walker, rolling --    Gait Training Goal PT LTG, Distance to Achieve 300 --    Gait Training Goal PT LTG, Date Goal Reviewed --  04/01/17   Gait Training Goal PT LTG, Outcome --  goal not met   Gait Training Goal PT LTG, Reason Goal Not Met --  discharged from facility       Goal: Stair Training Goal LTG- PT  Outcome: Unable to achieve outcome(s) by discharge Date Met:  04/01/17 03/30/17 1615 04/01/17 0730   Stair Training PT LTG   Stair Training Goal PT LTG, Date Established 03/30/17 --    Stair Training Goal PT LTG, Time to Achieve by discharge --    Stair Training Goal PT LTG, Number of Steps 3 --    Stair Training Goal PT LTG, [place] Level supervision required --    Stair Training Goal PT LTG, Assist Device 1 handrail --    Stair Training Goal PT LTG, Date Goal Reviewed --  04/01/17   Stair Training Goal PT LTG, Outcome --  goal not met   Stair Training Goal PT LTG, Reason Goal Not Met --  discharged from facility       Goal: Physical Therapy Goal LTG- PT  Outcome: Unable to achieve outcome(s) by discharge Date Met:  04/01/17 03/30/17 1615 04/01/17 0730   Physical Therapy PT LTG   Physical Therapy PT LTG, Date Established 03/30/17 --    Physical Therapy PT LTG, Time to Achieve by discharge --    Physical Therapy PT LTG, Activity Type Pt will improve Ricci Balance Scale score to >/= 47/56 points --    Physical Therapy PT LTG, Date Goal Reviewed --  04/01/17   Physical Therapy PT LTG, Outcome --  goal not met   Physical Therapy PT LTG, Reason Goal Not Met --  discharged from  facility

## 2017-04-01 NOTE — THERAPY DISCHARGE NOTE
Acute Care - Physical Therapy Discharge Summary  [facility]       Patient Name: Nikki Felder  : 1950  MRN: [id number]    Today's Date: 2017  Onset of Illness/Injury or Date of Surgery Date: 17    Date of Referral to PT: 17  Referring Physician: Dr Stewart.      Admit Date: 3/24/2017      PT Recommendation and Plan    Visit Dx:    ICD-10-CM ICD-9-CM   1. Chest pain, unspecified type R07.9 786.50   2. Chest pain at rest R07.9 786.50   3. Impaired functional mobility, balance, gait, and endurance Z74.09 V49.89   4. Atherosclerosis of native coronary artery of native heart with angina pectoris I25.119 414.01     413.9   5. Type 2 diabetes mellitus with hyperglycemia, without long-term current use of insulin E11.65 250.00     790.29   6. Noncompliance with medication regimen Z91.14 V15.81   7. Impaired mobility and activities of daily living Z74.09 799.89             Outcome Measures       17 1033 17 1600 17 1340    How much help from another person do you currently need...    Turning from your back to your side while in flat bed without using bedrails?   4  -MN    Moving from lying on back to sitting on the side of a flat bed without bedrails?   4  -MN    Moving to and from a bed to a chair (including a wheelchair)?   4  -MN    Standing up from a chair using your arms (e.g., wheelchair, bedside chair)?   4  -MN    Climbing 3-5 steps with a railing?   3  -MN    To walk in hospital room?   4  -MN    AM-PAC 6 Clicks Score   23  -MN    How much help from another is currently needed...    Putting on and taking off regular lower body clothing? 4  -RB      Bathing (including washing, rinsing, and drying) 4  -RB      Toileting (which includes using toilet bed pan or urinal) 4  -RB      Putting on and taking off regular upper body clothing 4  -RB      Taking care of personal grooming (such as brushing teeth) 4  -RB      Eating meals 4  -RB      Score 24  -RB      Ricci Balance  Scale    Sitting to Standing   3  -MN    Standing Unsupported   4  -MN    Sitting with Back Unsupported but Feet Supported on Floor or on Stool   4  -MN    Standing to Sitting   4  -MN    Transfers   4  -MN    Standing Unsupported with Eyes Closed   3  -MN    Standing Unsupported with Feet Together   3  -MN    Reaching Forward with Outstretched Arm While Standing   4  -MN     Object From the Floor From a Standing Position   4  -MN    Turning to Look Behind Over Left and Right Shoulders While Standing   3  -MN    Turn 360 Degrees   2  -MN    Place Alternate Foot on Step or Stool While Standing Unsupported   1  -MN    Standing Unsupported with One Foot in Front   3  -MN    Standing on One Leg   1  -MN    Ricci Total Score   43  -MN    Functional Assessment    Outcome Measure Options AM-PAC 6 Clicks Daily Activity (OT)  -RB --  -MN AM-PAC 6 Clicks Basic Mobility (PT);Ricci Balance  -MN      User Key  (r) = Recorded By, (t) = Taken By, (c) = Cosigned By    Initials Name Provider Type    RB Dionte Love, OT Occupational Therapist    ALEXANDER Salas, PT Physical Therapist                      IP PT Goals       04/01/17 0730 03/30/17 1615       Gait Training PT LTG    Gait Training Goal PT LTG, Date Established  03/30/17  -MN     Gait Training Goal PT LTG, Time to Achieve  by discharge  -MN     Gait Training Goal PT LTG, [place] Level  conditional independence  -MN     Gait Training Goal PT LTG, Assist Device  walker, rolling  -MN     Gait Training Goal PT LTG, Distance to Achieve  300  -MN     Gait Training Goal PT LTG, Date Goal Reviewed 04/01/17  -AM      Gait Training Goal PT LTG, Outcome goal not met  -AM      Gait Training Goal PT LTG, Reason Goal Not Met discharged from facility  -AM      Stair Training PT LTG    Stair Training Goal PT LTG, Date Established  03/30/17  -MN     Stair Training Goal PT LTG, Time to Achieve  by discharge  -MN     Stair Training Goal PT LTG, Number of Steps  3  -MN     Stair  Training Goal PT LTG, [place] Level  supervision required  -MN     Stair Training Goal PT LTG, Assist Device  1 handrail  -MN     Stair Training Goal PT LTG, Date Goal Reviewed 04/01/17  -AM      Stair Training Goal PT LTG, Outcome goal not met  -AM      Stair Training Goal PT LTG, Reason Goal Not Met discharged from facility  -AM      Physical Therapy PT LTG    Physical Therapy PT LTG, Date Established  03/30/17  -MN     Physical Therapy PT LTG, Time to Achieve  by discharge  -MN     Physical Therapy PT LTG, Activity Type  Pt will improve Ricci Balance Scale score to >/= 47/56 points  -MN     Physical Therapy PT LTG, Date Goal Reviewed 04/01/17  -AM      Physical Therapy PT LTG, Outcome goal not met  -AM      Physical Therapy PT LTG, Reason Goal Not Met discharged from facility  -AM        User Key  (r) = Recorded By, (t) = Taken By, (c) = Cosigned By    Initials Name Provider Type    ALEXANDER Salas, PT Physical Therapist    AM Stef Elias, PTA Physical Therapy Assistant              PT Discharge Summary  Anticipated Discharge Disposition: home with home health, skilled nursing facility  Reason for Discharge: Discharge from facility  Outcomes Achieved: Unable to make functional progress toward goals at this time  Discharge Destination: Home with home health      Stef Elias, PTA   4/1/2017

## 2017-04-04 ENCOUNTER — HOSPITAL ENCOUNTER (INPATIENT)
Facility: HOSPITAL | Age: 67
LOS: 4 days | Discharge: HOME | End: 2017-04-08
Attending: EMERGENCY MEDICINE | Admitting: FAMILY MEDICINE

## 2017-04-04 ENCOUNTER — APPOINTMENT (OUTPATIENT)
Dept: GENERAL RADIOLOGY | Facility: HOSPITAL | Age: 67
End: 2017-04-04

## 2017-04-04 DIAGNOSIS — I25.119 ATHEROSCLEROSIS OF NATIVE CORONARY ARTERY OF NATIVE HEART WITH ANGINA PECTORIS (HCC): ICD-10-CM

## 2017-04-04 DIAGNOSIS — R07.9 CHEST PAIN AT REST: Primary | ICD-10-CM

## 2017-04-04 DIAGNOSIS — R73.9 HYPERGLYCEMIA: ICD-10-CM

## 2017-04-04 LAB
ALBUMIN SERPL-MCNC: 3.7 G/DL (ref 3.4–4.8)
ALBUMIN/GLOB SERPL: 1.5 G/DL (ref 1.1–1.8)
ALP SERPL-CCNC: 90 U/L (ref 38–126)
ALT SERPL W P-5'-P-CCNC: 22 U/L (ref 9–52)
AMPHET+METHAMPHET UR QL: NEGATIVE
ANION GAP SERPL CALCULATED.3IONS-SCNC: 12 MMOL/L (ref 5–15)
AST SERPL-CCNC: 23 U/L (ref 14–36)
BACTERIA UR QL AUTO: ABNORMAL /HPF
BARBITURATES UR QL SCN: NEGATIVE
BASOPHILS # BLD AUTO: 0.04 10*3/MM3 (ref 0–0.2)
BASOPHILS NFR BLD AUTO: 0.7 % (ref 0–2)
BENZODIAZ UR QL SCN: NEGATIVE
BILIRUB SERPL-MCNC: 0.4 MG/DL (ref 0.2–1.3)
BILIRUB UR QL STRIP: NEGATIVE
BUN BLD-MCNC: 7 MG/DL (ref 7–21)
BUN/CREAT SERPL: 11.1 (ref 7–25)
CALCIUM SPEC-SCNC: 9.2 MG/DL (ref 8.4–10.2)
CANNABINOIDS SERPL QL: NEGATIVE
CHLORIDE SERPL-SCNC: 105 MMOL/L (ref 95–110)
CK MB SERPL-CCNC: 1.67 NG/ML (ref 0–5)
CK MB SERPL-CCNC: 2.91 NG/ML (ref 0–5)
CK SERPL-CCNC: 48 U/L (ref 30–135)
CK SERPL-CCNC: 54 U/L (ref 30–135)
CLARITY UR: ABNORMAL
CO2 SERPL-SCNC: 17 MMOL/L (ref 22–31)
COCAINE UR QL: NEGATIVE
COLOR UR: YELLOW
CREAT BLD-MCNC: 0.63 MG/DL (ref 0.5–1)
DEPRECATED RDW RBC AUTO: 41.8 FL (ref 36.4–46.3)
EOSINOPHIL # BLD AUTO: 0.22 10*3/MM3 (ref 0–0.7)
EOSINOPHIL NFR BLD AUTO: 3.7 % (ref 0–7)
ERYTHROCYTE [DISTWIDTH] IN BLOOD BY AUTOMATED COUNT: 12.8 % (ref 11.5–14.5)
GFR SERPL CREATININE-BSD FRML MDRD: 94 ML/MIN/1.73 (ref 45–104)
GLOBULIN UR ELPH-MCNC: 2.5 GM/DL (ref 2.3–3.5)
GLUCOSE BLD-MCNC: 298 MG/DL (ref 60–100)
GLUCOSE UR STRIP-MCNC: ABNORMAL MG/DL
HCT VFR BLD AUTO: 35.7 % (ref 35–45)
HGB BLD-MCNC: 12.6 G/DL (ref 12–15.5)
HGB UR QL STRIP.AUTO: ABNORMAL
HYALINE CASTS UR QL AUTO: ABNORMAL /LPF
IMM GRANULOCYTES # BLD: 0.03 10*3/MM3 (ref 0–0.02)
IMM GRANULOCYTES NFR BLD: 0.5 % (ref 0–0.5)
INR PPP: 0.94 (ref 0.8–1.2)
KETONES UR QL STRIP: NEGATIVE
LEUKOCYTE ESTERASE UR QL STRIP.AUTO: ABNORMAL
LYMPHOCYTES # BLD AUTO: 2.13 10*3/MM3 (ref 0.6–4.2)
LYMPHOCYTES NFR BLD AUTO: 35.7 % (ref 10–50)
MAGNESIUM SERPL-MCNC: 2 MG/DL (ref 1.6–2.3)
MCH RBC QN AUTO: 31.4 PG (ref 26.5–34)
MCHC RBC AUTO-ENTMCNC: 35.3 G/DL (ref 31.4–36)
MCV RBC AUTO: 89 FL (ref 80–98)
METHADONE UR QL SCN: NEGATIVE
MONOCYTES # BLD AUTO: 0.46 10*3/MM3 (ref 0–0.9)
MONOCYTES NFR BLD AUTO: 7.7 % (ref 0–12)
NEUTROPHILS # BLD AUTO: 3.09 10*3/MM3 (ref 2–8.6)
NEUTROPHILS NFR BLD AUTO: 51.7 % (ref 37–80)
NITRITE UR QL STRIP: NEGATIVE
NT-PROBNP SERPL-MCNC: 161 PG/ML (ref 0–900)
OPIATES UR QL: NEGATIVE
OXYCODONE UR QL SCN: NEGATIVE
PH UR STRIP.AUTO: 5.5 [PH] (ref 5–9)
PLATELET # BLD AUTO: 327 10*3/MM3 (ref 150–450)
PMV BLD AUTO: 8.7 FL (ref 8–12)
POTASSIUM BLD-SCNC: 3.1 MMOL/L (ref 3.5–5.1)
PROT SERPL-MCNC: 6.2 G/DL (ref 6.3–8.6)
PROT UR QL STRIP: NEGATIVE
PROTHROMBIN TIME: 12.5 SECONDS (ref 11.1–15.3)
RBC # BLD AUTO: 4.01 10*6/MM3 (ref 3.77–5.16)
RBC # UR: ABNORMAL /HPF
REF LAB TEST METHOD: ABNORMAL
SODIUM BLD-SCNC: 134 MMOL/L (ref 137–145)
SP GR UR STRIP: 1.02 (ref 1–1.03)
SQUAMOUS #/AREA URNS HPF: ABNORMAL /HPF
TROPONIN I SERPL-MCNC: 0.01 NG/ML
TROPONIN I SERPL-MCNC: 0.1 NG/ML
TSH SERPL DL<=0.05 MIU/L-ACNC: 0.98 MIU/ML (ref 0.46–4.68)
UROBILINOGEN UR QL STRIP: ABNORMAL
WBC NRBC COR # BLD: 5.97 10*3/MM3 (ref 3.2–9.8)
WBC UR QL AUTO: ABNORMAL /HPF

## 2017-04-04 PROCEDURE — 80307 DRUG TEST PRSMV CHEM ANLYZR: CPT | Performed by: EMERGENCY MEDICINE

## 2017-04-04 PROCEDURE — 71010 HC CHEST PA OR AP: CPT

## 2017-04-04 PROCEDURE — 93005 ELECTROCARDIOGRAM TRACING: CPT | Performed by: EMERGENCY MEDICINE

## 2017-04-04 PROCEDURE — 81001 URINALYSIS AUTO W/SCOPE: CPT | Performed by: EMERGENCY MEDICINE

## 2017-04-04 PROCEDURE — 82553 CREATINE MB FRACTION: CPT | Performed by: EMERGENCY MEDICINE

## 2017-04-04 PROCEDURE — 80053 COMPREHEN METABOLIC PANEL: CPT | Performed by: EMERGENCY MEDICINE

## 2017-04-04 PROCEDURE — 93010 ELECTROCARDIOGRAM REPORT: CPT | Performed by: INTERNAL MEDICINE

## 2017-04-04 PROCEDURE — 99285 EMERGENCY DEPT VISIT HI MDM: CPT

## 2017-04-04 PROCEDURE — 85025 COMPLETE CBC W/AUTO DIFF WBC: CPT | Performed by: EMERGENCY MEDICINE

## 2017-04-04 PROCEDURE — 83735 ASSAY OF MAGNESIUM: CPT | Performed by: EMERGENCY MEDICINE

## 2017-04-04 PROCEDURE — 84443 ASSAY THYROID STIM HORMONE: CPT | Performed by: EMERGENCY MEDICINE

## 2017-04-04 PROCEDURE — 84484 ASSAY OF TROPONIN QUANT: CPT | Performed by: EMERGENCY MEDICINE

## 2017-04-04 PROCEDURE — 85610 PROTHROMBIN TIME: CPT | Performed by: EMERGENCY MEDICINE

## 2017-04-04 PROCEDURE — 83880 ASSAY OF NATRIURETIC PEPTIDE: CPT | Performed by: EMERGENCY MEDICINE

## 2017-04-04 PROCEDURE — 82550 ASSAY OF CK (CPK): CPT | Performed by: EMERGENCY MEDICINE

## 2017-04-04 PROCEDURE — 87086 URINE CULTURE/COLONY COUNT: CPT | Performed by: EMERGENCY MEDICINE

## 2017-04-04 RX ORDER — ACETAMINOPHEN 500 MG
1000 TABLET ORAL ONCE
Status: COMPLETED | OUTPATIENT
Start: 2017-04-04 | End: 2017-04-04

## 2017-04-04 RX ORDER — NITROGLYCERIN 0.4 MG/1
0.4 TABLET SUBLINGUAL
Status: DISCONTINUED | OUTPATIENT
Start: 2017-04-04 | End: 2017-04-05

## 2017-04-04 RX ORDER — ASPIRIN 325 MG
325 TABLET ORAL ONCE
Status: DISCONTINUED | OUTPATIENT
Start: 2017-04-04 | End: 2017-04-04

## 2017-04-04 RX ORDER — SODIUM CHLORIDE 0.9 % (FLUSH) 0.9 %
10 SYRINGE (ML) INJECTION AS NEEDED
Status: DISCONTINUED | OUTPATIENT
Start: 2017-04-04 | End: 2017-04-08 | Stop reason: HOSPADM

## 2017-04-04 RX ADMIN — ACETAMINOPHEN 1000 MG: 500 TABLET ORAL at 22:19

## 2017-04-04 RX ADMIN — NITROGLYCERIN 0.4 MG: 0.4 TABLET SUBLINGUAL at 20:57

## 2017-04-04 RX ADMIN — Medication 10 ML: at 20:57

## 2017-04-04 RX ADMIN — NITROGLYCERIN 1 INCH: 20 OINTMENT TOPICAL at 20:57

## 2017-04-04 RX ADMIN — NITROGLYCERIN 0.4 MG: 0.4 TABLET SUBLINGUAL at 22:13

## 2017-04-05 DIAGNOSIS — IMO0002 UNCONTROLLED TYPE 2 DIABETES MELLITUS WITH COMPLICATION, WITHOUT LONG-TERM CURRENT USE OF INSULIN: ICD-10-CM

## 2017-04-05 PROBLEM — R79.1 SUBTHERAPEUTIC INTERNATIONAL NORMALIZED RATIO (INR): Chronic | Status: RESOLVED | Noted: 2017-02-02 | Resolved: 2017-04-05

## 2017-04-05 PROBLEM — R51.9 ACUTE INTRACTABLE HEADACHE: Status: RESOLVED | Noted: 2017-02-03 | Resolved: 2017-04-05

## 2017-04-05 PROBLEM — E87.6 HYPOKALEMIA: Status: ACTIVE | Noted: 2017-04-05

## 2017-04-05 PROBLEM — I21.4 NSTEMI (NON-ST ELEVATED MYOCARDIAL INFARCTION) (HCC): Status: ACTIVE | Noted: 2017-04-05

## 2017-04-05 LAB
ANION GAP SERPL CALCULATED.3IONS-SCNC: 12 MMOL/L (ref 5–15)
BUN BLD-MCNC: 9 MG/DL (ref 7–21)
BUN/CREAT SERPL: 11.7 (ref 7–25)
CALCIUM SPEC-SCNC: 9 MG/DL (ref 8.4–10.2)
CHLORIDE SERPL-SCNC: 107 MMOL/L (ref 95–110)
CK MB SERPL-CCNC: 18.1 NG/ML (ref 0–5)
CK MB SERPL-CCNC: 23.6 NG/ML (ref 0–5)
CK SERPL-CCNC: 148 U/L (ref 30–135)
CK SERPL-CCNC: 165 U/L (ref 30–135)
CO2 SERPL-SCNC: 24 MMOL/L (ref 22–31)
CREAT BLD-MCNC: 0.77 MG/DL (ref 0.5–1)
DEPRECATED RDW RBC AUTO: 42.2 FL (ref 36.4–46.3)
ERYTHROCYTE [DISTWIDTH] IN BLOOD BY AUTOMATED COUNT: 12.8 % (ref 11.5–14.5)
GFR SERPL CREATININE-BSD FRML MDRD: 75 ML/MIN/1.73 (ref 60–104)
GLUCOSE BLD-MCNC: 77 MG/DL (ref 60–100)
GLUCOSE BLDC GLUCOMTR-MCNC: 104 MG/DL (ref 70–130)
GLUCOSE BLDC GLUCOMTR-MCNC: 128 MG/DL (ref 70–130)
GLUCOSE BLDC GLUCOMTR-MCNC: 131 MG/DL (ref 70–130)
GLUCOSE BLDC GLUCOMTR-MCNC: 131 MG/DL (ref 70–130)
GLUCOSE BLDC GLUCOMTR-MCNC: 174 MG/DL (ref 70–130)
GLUCOSE BLDC GLUCOMTR-MCNC: 203 MG/DL (ref 70–130)
HCT VFR BLD AUTO: 36 % (ref 35–45)
HGB BLD-MCNC: 12.4 G/DL (ref 12–15.5)
HOLD SPECIMEN: NORMAL
HOLD SPECIMEN: NORMAL
MCH RBC QN AUTO: 31.1 PG (ref 26.5–34)
MCHC RBC AUTO-ENTMCNC: 34.4 G/DL (ref 31.4–36)
MCV RBC AUTO: 90.2 FL (ref 80–98)
PLATELET # BLD AUTO: 280 10*3/MM3 (ref 150–450)
PMV BLD AUTO: 8.8 FL (ref 8–12)
POTASSIUM BLD-SCNC: 3.1 MMOL/L (ref 3.5–5.1)
RBC # BLD AUTO: 3.99 10*6/MM3 (ref 3.77–5.16)
SODIUM BLD-SCNC: 143 MMOL/L (ref 137–145)
TROPONIN I SERPL-MCNC: 1.87 NG/ML
TROPONIN I SERPL-MCNC: 3.83 NG/ML
WBC NRBC COR # BLD: 5.61 10*3/MM3 (ref 3.2–9.8)
WHOLE BLOOD HOLD SPECIMEN: NORMAL
WHOLE BLOOD HOLD SPECIMEN: NORMAL

## 2017-04-05 PROCEDURE — C1894 INTRO/SHEATH, NON-LASER: HCPCS | Performed by: INTERNAL MEDICINE

## 2017-04-05 PROCEDURE — C1887 CATHETER, GUIDING: HCPCS | Performed by: INTERNAL MEDICINE

## 2017-04-05 PROCEDURE — C9606 PERC D-E COR REVASC W AMI S: HCPCS | Performed by: INTERNAL MEDICINE

## 2017-04-05 PROCEDURE — B41J1ZZ FLUOROSCOPY OF OTHER LOWER ARTERIES USING LOW OSMOLAR CONTRAST: ICD-10-PCS | Performed by: INTERNAL MEDICINE

## 2017-04-05 PROCEDURE — 93010 ELECTROCARDIOGRAM REPORT: CPT | Performed by: INTERNAL MEDICINE

## 2017-04-05 PROCEDURE — B41B1ZZ FLUOROSCOPY OF OTHER INTRA-ABDOMINAL ARTERIES USING LOW OSMOLAR CONTRAST: ICD-10-PCS | Performed by: INTERNAL MEDICINE

## 2017-04-05 PROCEDURE — 3E073PZ INTRODUCTION OF PLATELET INHIBITOR INTO CORONARY ARTERY, PERCUTANEOUS APPROACH: ICD-10-PCS | Performed by: INTERNAL MEDICINE

## 2017-04-05 PROCEDURE — 82962 GLUCOSE BLOOD TEST: CPT

## 2017-04-05 PROCEDURE — 93005 ELECTROCARDIOGRAM TRACING: CPT | Performed by: FAMILY MEDICINE

## 2017-04-05 PROCEDURE — 4A023N7 MEASUREMENT OF CARDIAC SAMPLING AND PRESSURE, LEFT HEART, PERCUTANEOUS APPROACH: ICD-10-PCS | Performed by: INTERNAL MEDICINE

## 2017-04-05 PROCEDURE — 25010000002 BIVALIRUDIN PER 1 MG: Performed by: INTERNAL MEDICINE

## 2017-04-05 PROCEDURE — 25810000003 SODIUM CHLORIDE 0.9 % WITH KCL 40 MEQ/L 40-0.9 MEQ/L-% SOLUTION: Performed by: FAMILY MEDICINE

## 2017-04-05 PROCEDURE — 85027 COMPLETE CBC AUTOMATED: CPT | Performed by: FAMILY MEDICINE

## 2017-04-05 PROCEDURE — 82550 ASSAY OF CK (CPK): CPT | Performed by: FAMILY MEDICINE

## 2017-04-05 PROCEDURE — 80048 BASIC METABOLIC PNL TOTAL CA: CPT | Performed by: FAMILY MEDICINE

## 2017-04-05 PROCEDURE — 63710000001 INSULIN ASPART PER 5 UNITS: Performed by: FAMILY MEDICINE

## 2017-04-05 PROCEDURE — B2111ZZ FLUOROSCOPY OF MULTIPLE CORONARY ARTERIES USING LOW OSMOLAR CONTRAST: ICD-10-PCS | Performed by: INTERNAL MEDICINE

## 2017-04-05 PROCEDURE — C1769 GUIDE WIRE: HCPCS | Performed by: INTERNAL MEDICINE

## 2017-04-05 PROCEDURE — 84484 ASSAY OF TROPONIN QUANT: CPT | Performed by: FAMILY MEDICINE

## 2017-04-05 PROCEDURE — C1725 CATH, TRANSLUMIN NON-LASER: HCPCS | Performed by: INTERNAL MEDICINE

## 2017-04-05 PROCEDURE — 63710000001 INSULIN DETEMIR PER 5 UNITS: Performed by: FAMILY MEDICINE

## 2017-04-05 PROCEDURE — C1876 STENT, NON-COA/NON-COV W/DEL: HCPCS | Performed by: INTERNAL MEDICINE

## 2017-04-05 PROCEDURE — 25010000002 MORPHINE SULFATE (PF) 2 MG/ML SOLUTION: Performed by: FAMILY MEDICINE

## 2017-04-05 PROCEDURE — 99223 1ST HOSP IP/OBS HIGH 75: CPT | Performed by: FAMILY MEDICINE

## 2017-04-05 PROCEDURE — 0 IOPAMIDOL PER 1 ML: Performed by: INTERNAL MEDICINE

## 2017-04-05 PROCEDURE — 82550 ASSAY OF CK (CPK): CPT | Performed by: EMERGENCY MEDICINE

## 2017-04-05 PROCEDURE — 25010000002 ONDANSETRON PER 1 MG: Performed by: FAMILY MEDICINE

## 2017-04-05 PROCEDURE — 25010000002 EPTIFIBATIDE PER 5 MG

## 2017-04-05 PROCEDURE — 02703DZ DILATION OF CORONARY ARTERY, ONE ARTERY WITH INTRALUMINAL DEVICE, PERCUTANEOUS APPROACH: ICD-10-PCS | Performed by: INTERNAL MEDICINE

## 2017-04-05 PROCEDURE — 82553 CREATINE MB FRACTION: CPT | Performed by: FAMILY MEDICINE

## 2017-04-05 PROCEDURE — 25010000002 MIDAZOLAM PER 1 MG: Performed by: INTERNAL MEDICINE

## 2017-04-05 PROCEDURE — 93454 CORONARY ARTERY ANGIO S&I: CPT | Performed by: INTERNAL MEDICINE

## 2017-04-05 PROCEDURE — 84484 ASSAY OF TROPONIN QUANT: CPT | Performed by: EMERGENCY MEDICINE

## 2017-04-05 PROCEDURE — 25010000002 ENOXAPARIN PER 10 MG: Performed by: FAMILY MEDICINE

## 2017-04-05 PROCEDURE — 82553 CREATINE MB FRACTION: CPT | Performed by: EMERGENCY MEDICINE

## 2017-04-05 PROCEDURE — 93005 ELECTROCARDIOGRAM TRACING: CPT | Performed by: INTERNAL MEDICINE

## 2017-04-05 DEVICE — MULTI-LINK MINI VISION CORONARY STENT AND STENT DELIVERY SYSTEM 2.00 MM X 15 MM / RAPID-EXCHANGE
Type: IMPLANTABLE DEVICE | Status: FUNCTIONAL
Brand: MULTI-LINK MINI VISION

## 2017-04-05 RX ORDER — NICOTINE 21 MG/24HR
1 PATCH, TRANSDERMAL 24 HOURS TRANSDERMAL EVERY 24 HOURS
Status: DISCONTINUED | OUTPATIENT
Start: 2017-04-05 | End: 2017-04-08 | Stop reason: HOSPADM

## 2017-04-05 RX ORDER — HYDROCODONE BITARTRATE AND ACETAMINOPHEN 7.5; 325 MG/1; MG/1
1 TABLET ORAL 3 TIMES DAILY PRN
Status: DISCONTINUED | OUTPATIENT
Start: 2017-04-04 | End: 2017-04-05

## 2017-04-05 RX ORDER — ONDANSETRON 4 MG/1
4 TABLET, FILM COATED ORAL EVERY 6 HOURS PRN
Status: DISCONTINUED | OUTPATIENT
Start: 2017-04-05 | End: 2017-04-06

## 2017-04-05 RX ORDER — ESCITALOPRAM OXALATE 20 MG/1
20 TABLET ORAL DAILY
Qty: 90 TABLET | Refills: 3 | Status: SHIPPED | OUTPATIENT
Start: 2017-04-05 | End: 2017-09-23

## 2017-04-05 RX ORDER — ALBUTEROL SULFATE 2.5 MG/3ML
2.5 SOLUTION RESPIRATORY (INHALATION) EVERY 4 HOURS PRN
Status: DISCONTINUED | OUTPATIENT
Start: 2017-04-04 | End: 2017-04-08 | Stop reason: HOSPADM

## 2017-04-05 RX ORDER — BUSPIRONE HYDROCHLORIDE 5 MG/1
10 TABLET ORAL EVERY 12 HOURS SCHEDULED
Status: DISCONTINUED | OUTPATIENT
Start: 2017-04-05 | End: 2017-04-08 | Stop reason: HOSPADM

## 2017-04-05 RX ORDER — ONDANSETRON 2 MG/ML
4 INJECTION INTRAMUSCULAR; INTRAVENOUS EVERY 6 HOURS PRN
Status: DISCONTINUED | OUTPATIENT
Start: 2017-04-05 | End: 2017-04-06

## 2017-04-05 RX ORDER — LIDOCAINE HYDROCHLORIDE 20 MG/ML
INJECTION, SOLUTION INFILTRATION; PERINEURAL AS NEEDED
Status: DISCONTINUED | OUTPATIENT
Start: 2017-04-05 | End: 2017-04-05 | Stop reason: HOSPADM

## 2017-04-05 RX ORDER — MORPHINE SULFATE 2 MG/ML
2 INJECTION, SOLUTION INTRAMUSCULAR; INTRAVENOUS EVERY 4 HOURS PRN
Status: DISCONTINUED | OUTPATIENT
Start: 2017-04-05 | End: 2017-04-08 | Stop reason: HOSPADM

## 2017-04-05 RX ORDER — NICOTINE POLACRILEX 4 MG
15 LOZENGE BUCCAL
Status: DISCONTINUED | OUTPATIENT
Start: 2017-04-05 | End: 2017-04-08 | Stop reason: HOSPADM

## 2017-04-05 RX ORDER — GABAPENTIN 400 MG/1
1200 CAPSULE ORAL EVERY 8 HOURS SCHEDULED
Status: DISCONTINUED | OUTPATIENT
Start: 2017-04-05 | End: 2017-04-08 | Stop reason: HOSPADM

## 2017-04-05 RX ORDER — GLIPIZIDE 5 MG/1
5 TABLET ORAL
Status: DISCONTINUED | OUTPATIENT
Start: 2017-04-05 | End: 2017-04-08 | Stop reason: HOSPADM

## 2017-04-05 RX ORDER — CLOPIDOGREL BISULFATE 75 MG/1
75 TABLET ORAL DAILY
Qty: 90 TABLET | Refills: 3 | Status: SHIPPED | OUTPATIENT
Start: 2017-04-05 | End: 2017-04-20 | Stop reason: CLARIF

## 2017-04-05 RX ORDER — CARVEDILOL 3.12 MG/1
3.12 TABLET ORAL 2 TIMES DAILY
Status: DISCONTINUED | OUTPATIENT
Start: 2017-04-05 | End: 2017-04-08 | Stop reason: HOSPADM

## 2017-04-05 RX ORDER — SODIUM CHLORIDE 9 MG/ML
INJECTION, SOLUTION INTRAVENOUS
Status: DISPENSED
Start: 2017-04-05 | End: 2017-04-06

## 2017-04-05 RX ORDER — ONDANSETRON 4 MG/1
4 TABLET, ORALLY DISINTEGRATING ORAL EVERY 6 HOURS PRN
Status: DISCONTINUED | OUTPATIENT
Start: 2017-04-05 | End: 2017-04-06

## 2017-04-05 RX ORDER — ESCITALOPRAM OXALATE 10 MG/1
20 TABLET ORAL DAILY
Status: DISCONTINUED | OUTPATIENT
Start: 2017-04-05 | End: 2017-04-08 | Stop reason: HOSPADM

## 2017-04-05 RX ORDER — ATORVASTATIN CALCIUM 20 MG/1
20 TABLET, FILM COATED ORAL NIGHTLY
Status: DISCONTINUED | OUTPATIENT
Start: 2017-04-05 | End: 2017-04-08 | Stop reason: HOSPADM

## 2017-04-05 RX ORDER — GLIPIZIDE 5 MG/1
5 TABLET ORAL
Qty: 180 TABLET | Refills: 3 | Status: SHIPPED | OUTPATIENT
Start: 2017-04-05 | End: 2017-09-23

## 2017-04-05 RX ORDER — ASPIRIN 81 MG/1
81 TABLET, CHEWABLE ORAL DAILY
Status: DISCONTINUED | OUTPATIENT
Start: 2017-04-05 | End: 2017-04-08 | Stop reason: HOSPADM

## 2017-04-05 RX ORDER — RANOLAZINE 500 MG/1
500 TABLET, EXTENDED RELEASE ORAL EVERY 12 HOURS SCHEDULED
Status: DISCONTINUED | OUTPATIENT
Start: 2017-04-05 | End: 2017-04-08 | Stop reason: HOSPADM

## 2017-04-05 RX ORDER — SODIUM CHLORIDE AND POTASSIUM CHLORIDE 300; 900 MG/100ML; MG/100ML
50 INJECTION, SOLUTION INTRAVENOUS CONTINUOUS
Status: DISCONTINUED | OUTPATIENT
Start: 2017-04-05 | End: 2017-04-06

## 2017-04-05 RX ORDER — POTASSIUM CHLORIDE 750 MG/1
40 CAPSULE, EXTENDED RELEASE ORAL DAILY
Status: DISCONTINUED | OUTPATIENT
Start: 2017-04-05 | End: 2017-04-08 | Stop reason: HOSPADM

## 2017-04-05 RX ORDER — SODIUM CHLORIDE 450 MG/100ML
75 INJECTION, SOLUTION INTRAVENOUS CONTINUOUS
Status: DISCONTINUED | OUTPATIENT
Start: 2017-04-05 | End: 2017-04-05

## 2017-04-05 RX ORDER — CLOPIDOGREL BISULFATE 75 MG/1
75 TABLET ORAL DAILY
Status: DISCONTINUED | OUTPATIENT
Start: 2017-04-05 | End: 2017-04-05 | Stop reason: SDUPTHER

## 2017-04-05 RX ORDER — DEXTROSE MONOHYDRATE 25 G/50ML
25 INJECTION, SOLUTION INTRAVENOUS
Status: DISCONTINUED | OUTPATIENT
Start: 2017-04-05 | End: 2017-04-08 | Stop reason: HOSPADM

## 2017-04-05 RX ORDER — SODIUM CHLORIDE 0.9 % (FLUSH) 0.9 %
1-10 SYRINGE (ML) INJECTION AS NEEDED
Status: DISCONTINUED | OUTPATIENT
Start: 2017-04-05 | End: 2017-04-08 | Stop reason: HOSPADM

## 2017-04-05 RX ORDER — CARVEDILOL 3.12 MG/1
3.12 TABLET ORAL 2 TIMES DAILY
Qty: 180 TABLET | Refills: 3 | Status: SHIPPED | OUTPATIENT
Start: 2017-04-05 | End: 2017-11-03 | Stop reason: HOSPADM

## 2017-04-05 RX ORDER — HYDROCODONE BITARTRATE AND ACETAMINOPHEN 7.5; 325 MG/1; MG/1
1 TABLET ORAL EVERY 6 HOURS PRN
Status: DISCONTINUED | OUTPATIENT
Start: 2017-04-05 | End: 2017-04-08 | Stop reason: HOSPADM

## 2017-04-05 RX ORDER — MIDAZOLAM HYDROCHLORIDE 1 MG/ML
INJECTION INTRAMUSCULAR; INTRAVENOUS AS NEEDED
Status: DISCONTINUED | OUTPATIENT
Start: 2017-04-05 | End: 2017-04-05 | Stop reason: HOSPADM

## 2017-04-05 RX ADMIN — CLOPIDOGREL BISULFATE 75 MG: 75 TABLET ORAL at 08:41

## 2017-04-05 RX ADMIN — INSULIN DETEMIR 14 UNITS: 100 INJECTION, SOLUTION SUBCUTANEOUS at 21:32

## 2017-04-05 RX ADMIN — POTASSIUM CHLORIDE AND SODIUM CHLORIDE 50 ML/HR: 900; 300 INJECTION, SOLUTION INTRAVENOUS at 11:08

## 2017-04-05 RX ADMIN — GABAPENTIN 1200 MG: 400 CAPSULE ORAL at 21:30

## 2017-04-05 RX ADMIN — ATORVASTATIN CALCIUM 20 MG: 20 TABLET, FILM COATED ORAL at 21:31

## 2017-04-05 RX ADMIN — ONDANSETRON 4 MG: 2 INJECTION INTRAMUSCULAR; INTRAVENOUS at 20:36

## 2017-04-05 RX ADMIN — MORPHINE SULFATE 2 MG: 2 INJECTION, SOLUTION INTRAMUSCULAR; INTRAVENOUS at 20:04

## 2017-04-05 RX ADMIN — BUSPIRONE HYDROCHLORIDE 10 MG: 5 TABLET ORAL at 00:35

## 2017-04-05 RX ADMIN — GLIPIZIDE 5 MG: 5 TABLET ORAL at 17:47

## 2017-04-05 RX ADMIN — CARVEDILOL 3.12 MG: 3.12 TABLET, FILM COATED ORAL at 08:41

## 2017-04-05 RX ADMIN — HYDROCODONE BITARTRATE AND ACETAMINOPHEN 1 TABLET: 7.5; 325 TABLET ORAL at 08:41

## 2017-04-05 RX ADMIN — CARVEDILOL 3.12 MG: 3.12 TABLET, FILM COATED ORAL at 17:47

## 2017-04-05 RX ADMIN — RANOLAZINE 500 MG: 500 TABLET, FILM COATED, EXTENDED RELEASE ORAL at 21:31

## 2017-04-05 RX ADMIN — INSULIN ASPART 3 UNITS: 100 INJECTION, SOLUTION INTRAVENOUS; SUBCUTANEOUS at 00:43

## 2017-04-05 RX ADMIN — GABAPENTIN 1200 MG: 400 CAPSULE ORAL at 11:52

## 2017-04-05 RX ADMIN — ENOXAPARIN SODIUM 40 MG: 40 INJECTION SUBCUTANEOUS at 08:41

## 2017-04-05 RX ADMIN — ESCITALOPRAM OXALATE 20 MG: 10 TABLET ORAL at 08:41

## 2017-04-05 RX ADMIN — RANOLAZINE 500 MG: 500 TABLET, FILM COATED, EXTENDED RELEASE ORAL at 00:35

## 2017-04-05 RX ADMIN — HYDROCODONE BITARTRATE AND ACETAMINOPHEN 1 TABLET: 7.5; 325 TABLET ORAL at 23:05

## 2017-04-05 RX ADMIN — BUSPIRONE HYDROCHLORIDE 10 MG: 5 TABLET ORAL at 08:41

## 2017-04-05 RX ADMIN — POTASSIUM CHLORIDE 40 MEQ: 750 CAPSULE, EXTENDED RELEASE ORAL at 11:52

## 2017-04-05 RX ADMIN — NICOTINE 1 PATCH: 21 PATCH TRANSDERMAL at 00:35

## 2017-04-05 RX ADMIN — HYDROCODONE BITARTRATE AND ACETAMINOPHEN 1 TABLET: 7.5; 325 TABLET ORAL at 17:12

## 2017-04-05 RX ADMIN — ASPIRIN 81 MG 81 MG: 81 TABLET ORAL at 08:41

## 2017-04-05 RX ADMIN — BUSPIRONE HYDROCHLORIDE 10 MG: 5 TABLET ORAL at 21:31

## 2017-04-05 RX ADMIN — ONDANSETRON 4 MG: 4 TABLET, FILM COATED ORAL at 11:52

## 2017-04-05 RX ADMIN — ATORVASTATIN CALCIUM 20 MG: 20 TABLET, FILM COATED ORAL at 00:35

## 2017-04-05 RX ADMIN — RANOLAZINE 500 MG: 500 TABLET, FILM COATED, EXTENDED RELEASE ORAL at 08:41

## 2017-04-05 RX ADMIN — MORPHINE SULFATE 2 MG: 2 INJECTION, SOLUTION INTRAMUSCULAR; INTRAVENOUS at 11:33

## 2017-04-05 RX ADMIN — NICOTINE 1 PATCH: 21 PATCH TRANSDERMAL at 23:52

## 2017-04-05 RX ADMIN — HYDROCODONE BITARTRATE AND ACETAMINOPHEN 1 TABLET: 7.5; 325 TABLET ORAL at 00:48

## 2017-04-05 RX ADMIN — POTASSIUM CHLORIDE AND SODIUM CHLORIDE 50 ML/HR: 900; 300 INJECTION, SOLUTION INTRAVENOUS at 11:39

## 2017-04-05 RX ADMIN — INSULIN ASPART 2 UNITS: 100 INJECTION, SOLUTION INTRAVENOUS; SUBCUTANEOUS at 17:11

## 2017-04-05 NOTE — ED NOTES
Contacted [facility] in Telemetry to request a telemetry box     Mary Alice Alonso  04/04/17 4829

## 2017-04-05 NOTE — H&P
NSTEMI (non-ST elevated myocardial infarction)  Subjective:     Nikki Felder is a 66 y.o. female who presents for chest pain.  Patient states she was sitting in her chair when the pain started.  It was left-sided pain that did not radiate anywhere.  She describes the pain yesterday as if elevated will she had a previous heart attack.  She got diaphoretic and nauseated but no vomiting.  No shortness of breath.  She is still having some chest pain today.  No hemoptysis.  She is still smoking 5 cigarettes.  She has not taken any of her medicines other than Neurontin since Friday.  She was discharged on Friday after receiving cardiac stents on last Wednesday.  Home health has been to her to try and help with her medications.  She has been out of her pain medications at home as well.  She doesn't have an appointment with him until the end of the month.  She is complaining of chest and hip pain.  He is also complaining of anxiety.  No suicidal or homicidal ideation.      The following portions of the patient's history were reviewed and updated as appropriate: allergies, current medications, past family history, past medical history, past social history, past surgical history and problem list.    Concurrent Medical Hx:  Past Medical History:   Diagnosis Date   • Acute bronchitis    • Allergic    • Allergic rhinitis    • Anxiety state    • Anxiety state     Anxiety state, unspecified      • Arthritis    • Bacterial pneumonia    • Chronic pain    • Chronic pain     Other chronic pain      • Contact dermatitis    • COPD (chronic obstructive pulmonary disease)    • Coronary arteriosclerosis    • Depressive disorder    • Depressive disorder    • Diabetes mellitus due to underlying condition with diabetic autonomic neuropathy     Diabetes mellitus due to underlying condition with diabetic autonomic (poly)neuropathy      • Drug therapy     Long-term drug therapy      • Dysuria    • Encounter for immunization    • Encounter  for screening for malignant neoplasm of colon    • Essential hypertension    • Folliculitis    • Headache    • Heart murmur    • Herpes zoster    • Herpes zoster with nervous system complication    • History of screening mammography    • Hyperlipidemia    • Insomnia    • Insomnia    • Kidney stone    • Lumbar radiculopathy    • Lung nodule    • Migraine    • Migraine     Migraine, unspecified, without mention of intractable migraine, without mention of status migrainosus      • Migraine    • Myocardial infarction    • Nausea and vomiting    • Nausea with vomiting    • Neck pain    • Need for immunization against influenza    • Need for prophylactic vaccination and inoculation against diphtheria alone    • Need for prophylactic vaccination and inoculation against unspecified single disease     Need for prophylactic vaccination and inoculation against Streptococcus pneumoniae [pneumococcus] and influenza      • Non-alcoholic fatty liver disease    • Rectus sheath hematoma    • Senile osteoporosis    • Shoulder pain    • Solitary pulmonary nodule present on computed tomography of lung    • Spasm of back muscles    • Spinal stenosis of lumbar region    • Systolic congestive heart failure    • TIA (transient ischemic attack)    • Tobacco dependence syndrome    • Type 2 diabetes mellitus    • Urinary tract infectious disease    • Viral gastroenteritis    • Vitamin D deficiency     Unspecified vitamin D deficiency      • Vitamin D deficiency        Past Surgical Hx:  Past Surgical History:   Procedure Laterality Date   • CARDIAC CATHETERIZATION      plaque noted ef 55% 2010    • CARDIAC CATHETERIZATION N/A 3/29/2017    Procedure: Left Heart Cath;  Surgeon: Raheel Martinez MD;  Location: [facility] INVASIVE LOCATION;  Service:    •  SECTION       Low cervical  1981       • CHOLECYSTECTOMY     • DILATATION AND CURETTAGE  1975   • DILATATION AND CURETTAGE  1975   • INJECTION OF  MEDICATION  09/26/2015     Phenergan (3)      • INJECTION OF MEDICATION      Kenalog (1)      07/13/2011    • INJECTION OF MEDICATION  07/19/2014    Toradol (3)   • INJECTION OF MEDICATION  11/17/2011     Vistaril (1)    • INJECTION OF MEDICATION  05/22/2012    Zofran (1)   • TUBAL ABDOMINAL LIGATION       [place] tubal ligation 08/20/1981      Family Hx:  Family History   Problem Relation Age of Onset   • Alzheimer's disease Mother    • Diabetes Brother    • Alzheimer's disease Other    • Diabetes Other    • Lung cancer Other    • Pulmonary embolism Other    • Hypertension Father    • Depression Daughter    • Hypertension Daughter    • Anxiety disorder Daughter    • Lung cancer Brother       Social History:  Social History     Social History   • Marital status:      Spouse name: N/A   • Number of children: N/A   • Years of education: N/A     Occupational History   • Not on file.     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  Lives with daughter.        Home Medications:  No current facility-administered medications on file prior to encounter.      Current Outpatient Prescriptions on File Prior to Encounter   Medication Sig Dispense Refill   • aspirin 81 MG tablet Take 1 tablet by mouth Daily. 30 tablet 5   • atorvastatin (LIPITOR) 20 MG tablet Take 20 mg by mouth every night.     • busPIRone (BUSPAR) 10 MG tablet Take 1 tablet by mouth Every 12 (Twelve) Hours. 60 tablet 5   • carvedilol (COREG) 3.125 MG tablet Take 3.125 mg by mouth 2 (two) times a day.     • clopidogrel (PLAVIX) 75 MG tablet Take 1 tablet by mouth Daily. 30 tablet 2   • escitalopram (LEXAPRO) 20 MG tablet Take 1 tablet by  mouth Daily. 30 tablet 5   • gabapentin (NEURONTIN) 400 MG capsule Take 400 mg by mouth 3 (three) times a day.     • gabapentin (NEURONTIN) 800 MG tablet Take 800 mg by mouth 3 (three) times a day.     • glipiZIDE (GLUCOTROL) 5 MG tablet Take 1 tablet by mouth 2 (Two) Times a Day Before Meals. 60 tablet 5   • HYDROcodone-acetaminophen (NORCO) 7.5-325 MG per tablet Take 1 tablet by mouth 3 (three) times a day as needed for moderate pain (4-6).     • ipratropium-albuterol (COMBIVENT RESPIMAT)  MCG/ACT inhaler Inhale 2 puffs every 4 (four) hours as needed for wheezing.     • metFORMIN (GLUCOPHAGE) 1000 MG tablet Take 1,000 mg by mouth 2 (Two) Times a Day With Meals.     • nicotine (NICODERM CQ) 21 MG/24HR patch Place 1 patch on the skin every day.     • nitroglycerin (NITRODUR) 0.2 MG/HR patch Place 1 patch on the skin Daily. 90 patch 12   • nitroglycerin (NITROSTAT) 0.4 MG SL tablet Place 0.4-1.2 mg under the tongue as needed for chest pain. Take no more than 3 doses in 15 minutes.     • ondansetron ODT (ZOFRAN ODT) 4 MG disintegrating tablet Take 1 tablet by mouth Every 8 (Eight) Hours As Needed for nausea or vomiting. 90 tablet 3   • ranolazine (RANEXA) 500 MG 12 hr tablet Take 1 tablet by mouth Every 12 (Twelve) Hours. 180 tablet 12   • albuterol (PROVENTIL HFA;VENTOLIN HFA) 108 (90 BASE) MCG/ACT inhaler Inhale 2 puffs every 4 (four) hours as needed for wheezing.     • insulin detemir (LEVEMIR) 100 UNIT/ML injection Inject 14 Units under the skin Every Night. 2 pen 5   • Insulin Pen Needle 33G X 8 MM misc 1 Units Every Night. 100 each 5       Allergies:  Corticosteroids  I reviewed the patient's new clinical results.  Review of Systems  Review of Systems   Constitutional: Positive for chills, diaphoresis and fatigue. Negative for activity change, appetite change, fever and unexpected weight change.   HENT: Positive for dental problem and ear pain. Negative for congestion, nosebleeds, sore throat and trouble  "swallowing.    Eyes: Positive for visual disturbance.   Respiratory: Positive for cough. Negative for shortness of breath and wheezing.    Cardiovascular: Positive for chest pain. Negative for palpitations and leg swelling.   Gastrointestinal: Positive for nausea. Negative for abdominal pain, blood in stool, constipation, diarrhea, rectal pain and vomiting.   Genitourinary: Negative for dysuria, hematuria, vaginal bleeding and vaginal discharge.   Musculoskeletal: Positive for arthralgias, back pain, gait problem, joint swelling, myalgias, neck pain and neck stiffness.   Neurological: Positive for weakness, numbness and headaches.   Hematological: Bruises/bleeds easily.   Psychiatric/Behavioral: Positive for sleep disturbance. Negative for behavioral problems, confusion, decreased concentration, dysphoric mood, hallucinations, self-injury and suicidal ideas. The patient is nervous/anxious. The patient is not hyperactive.        Objective:     /71 (BP Location: Left arm, Patient Position: Lying)  Pulse 65  Temp 97.8 °F (36.6 °C) (Temporal Artery )   Resp 14  Ht 61\" (154.9 cm)  Wt 129 lb 6.4 oz (58.7 kg)  LMP  (LMP Unknown)  SpO2 98%  BMI 24.45 kg/m2  Physical Exam   Constitutional: She is oriented to person, place, and time. She appears well-developed and well-nourished. No distress.   HENT:   Head: Normocephalic and atraumatic.   Right Ear: External ear normal.   Left Ear: External ear normal.   Nose: Nose normal.   Mouth/Throat: Oropharynx is clear and moist. No oropharyngeal exudate.   Eyes: Conjunctivae and EOM are normal. Pupils are equal, round, and reactive to light. No scleral icterus.   Neck: Normal range of motion. Neck supple. No tracheal deviation present.   Cardiovascular: Normal rate, regular rhythm, normal heart sounds and intact distal pulses.  Exam reveals no gallop and no friction rub.    No murmur heard.  Pulmonary/Chest: Effort normal and breath sounds normal. No respiratory " distress. She has no wheezes. She has no rales.   Abdominal: Soft. Bowel sounds are normal. She exhibits no distension. There is no tenderness. There is no rebound and no guarding.   Musculoskeletal: She exhibits tenderness. She exhibits no edema.   Lymphadenopathy:     She has no cervical adenopathy.   Neurological: She is alert and oriented to person, place, and time. She has normal reflexes. She displays normal reflexes. No cranial nerve deficit. Coordination normal.   Skin: Skin is warm and dry. She is not diaphoretic.   Multiple ecchymosis on extremities   Psychiatric: She has a normal mood and affect. Her behavior is normal. Judgment and thought content normal.   Nursing note and vitals reviewed.    I reviewed the patient's new clinical results.  Assessment/Plan:     Active Hospital Problems (** Indicates Principal Problem)    Diagnosis Date Noted   • **NSTEMI (non-ST elevated myocardial infarction) [I21.4] 04/05/2017   • Hypokalemia [E87.6] 04/05/2017   • Noncompliance with medication regimen [Z91.14] 03/31/2017   • Chest pain at rest [R07.9] 02/02/2017   • Diabetes mellitus type 2 in nonobese [E11.9] 02/02/2017   • Tobacco dependence syndrome [F17.200] 02/02/2017   • Essential hypertension, benign [I10] 11/09/2016   • Anxiety [F41.9] 11/09/2016      Resolved Hospital Problems    Diagnosis Date Noted Date Resolved   No resolved problems to display.     Jose Alfredo [id number] reviewed and scanned.  Change to inpatient admit due to nstemi.  Dr. Martinez aware of her troponin and is wanting a repeat before intervention.  Advised on smoking cessation.  Patient hasn't had meds since discharge no pain meds either at home.  She missed her appt with pain management.  Check with home health about the mail in order, her home health nurse is on vacation and unable to get answers at this time.            This document has been electronically signed by Yasmin Stewart MD on April 5, 2017 9:26 AM          This document has been  electronically signed by Yasmin Stewart MD on April 5, 2017 9:26 AM

## 2017-04-05 NOTE — PLAN OF CARE
Problem: Patient Care Overview (Adult)  Goal: Plan of Care Review  Outcome: Ongoing (interventions implemented as appropriate)    04/05/17 0331   Coping/Psychosocial Response Interventions   Plan Of Care Reviewed With patient   Patient Care Overview   Progress improving   Outcome Evaluation   Outcome Summary/Follow up Plan Pt says chest pain has resolved but complains of headache. VSS.        Goal: Adult Individualization and Mutuality  Outcome: Ongoing (interventions implemented as appropriate)  Goal: Discharge Needs Assessment  Outcome: Ongoing (interventions implemented as appropriate)    Problem: Acute Coronary Syndrome (ACS) (Adult)  Goal: Signs and Symptoms of Listed Potential Problems Will be Absent or Manageable (Acute Coronary Syndrome)  Outcome: Ongoing (interventions implemented as appropriate)

## 2017-04-05 NOTE — CONSULTS
Cardiology Consultation Note.        Patient Name: Nikki Felder  Age/Sex: 66 y.o. female  : 1950  MRN: [id number]    Date of consultation: 2017  Consulting Physician: Raheel Martinez MD  Primary care Physician: Yasmin Stewart MD  Requesting Physician:   Ramirez Sanches MD  Reason for consultation:  Chest pain with history of atherosclerotic coronary artery disease with previous PTCA and stenting with abnormal resting electrocardiogram.       Subjective:       Chief Complaint: Chest pain with elevated troponin    History of Present Illness:  Nikki Felder is a 66 y.o. female     Body mass index is 24.45 kg/(m^2). with a past medical history significant for atherosclerotic coronary artery disease with aborted anterior wall myocardial infarction on 09/15/2016 with a Pronto thrombectomy and PTCA and stenting of the 100% occluded left anterior descending artery, with deployment of a 2.5 mm x 8 mm, a 2.5 mm x 15 mm, and a 2.75 mm x 18 mm Alpine drug-eluting stent, with reduction of stenosis from 100% to less than 0% stenosis, and PTCA and stenting of the 100% occluded 1st diagonal branch with deployment of a 2 mm x 28 mm Mini Vision stent, and luminal irregularity in the circumflex artery, and no evidence of any obstructive disease noted in the right coronary artery, with with the repeat percutaneous intervention to the diagonal branch done a week ago with deployment of a 2.5 mm x 15 mm and a 2.5 mm x 18 mm Alpine drug-eluting stent..  The patient was discharged home but had not taken her Plavix as recommended.  Patient has continued to smoke.  Patient presented to the emergency room with symptoms of substernal chest pain.  Patient had mild elevation in the troponin.  Due to the patient's symptoms of chest pain with elevated troponin patient was subsequently hospitalized and cardiology was consulted.  On further questioning patient denies any fever or chill patient denies any hemoptysis hematuria  bright red blood per rectum.  Patient's 10 point review of system except for what is stated in the history of present illness is negative.      Past Medical History:  1. Atherosclerotic coronary artery disease.   2. PTCA and stenting of the first diagonal branch with deployment of a 2.5 mm x 15 mm and a 2.5 mm x 18 mm Alpine drug-eluting stent. With PTCA of the in-stent stenosis in the diagonal branch done in November 2016 with sustained patency in the left anterior descending artery stent and no evidence of any obstructive disease in the circumflex artery..  3. Aborted anterior wall myocardial infarction on 09/15/2016 with atherosclerotic coronary artery disease.   4. Pronto thrombectomy of the left anterior descending artery with PTCA and stenting of the proximal to mid left anterior descending artery with deployment of a 2.5 mm x 8 mm, and a 2.5 mm x 15 mm, and a 2.75 mm x 18 mm Alpine drug-eluting stent with reduction of stenosis from 100% to less than 0% stenosis.   5. PTCA and stenting of the 100% occluded 1st diagonal branch with deployment of a 2 mm x 28 mm Mini-Vision stent.  6. No evidence of any obstructive disease noted in the right coronary artery.   7. Ischemic cardiomyopathy with anterior apical wall hypokinesis with akinesis with an ejection fraction of 25-30%.   8. Left ventricular apical thrombus on chronic anticoagulation with Coumadin.   9. Mild mitral and mild tricuspid regurgitation.   10. Concentric left ventricular hypertrophy with diastolic dysfunction.   11. Hyperlipidemia.   12. Non-insulin-dependent diabetes.   13. Anxiety and panic disorder.   14. Chronic pain syndrome.   15. Chronic obstructive lung disease with tobacco abuse.   16. History of renal calculi.   17. Previous history of transient ischemic attack.   18. Rectus sheet hematoma secondary to supratherapeutic PT/INR from anticoagulation with Coumadin.   19. Acute rehab followup after the rectus sheath hematoma.   20. Ongoing  tobacco abuse.     Past Surgical History:        1. Status post  times 2 occasions.   2. Status post bilateral tubal ligation.   3. Status post cholecystectomy.   4. Status post dilation and curettage procedure.      Family History   Problem Relation Age of Onset   • Alzheimer's disease Mother    • Diabetes Brother    • Alzheimer's disease Other    • Diabetes Other    • Lung cancer Other    • Pulmonary embolism Other    • Hypertension Father    • Depression Daughter    • Hypertension Daughter    • Anxiety disorder Daughter    • Lung cancer Brother        Social History:  Social History     Social History   • Marital status:      Spouse name: N/A   • Number of children: N/A   • Years of education: N/A     Occupational History   • Not on file.     Social History Main Topics   • Smoking status: Current Every Day Smoker     Packs/day: 0.25     Years: 45.00   • Smokeless tobacco: Never Used      Comment: maybe 2 or 3 a day   • Alcohol use No   • Drug use: No   • Sexual activity: Defer     Other Topics Concern   • Not on file     Social History Narrative    Previously worked in [employer] [place] [employer] [place]  Lives with daughter.         Cardiac Risk factor:   1. Postmenopausal.   2. Arterial hypertension.   3. Hyperlipidemia.  4. Tobacco abuse.   5. Diabetes.    Allergies:  Allergies   Allergen Reactions   • Corticosteroids        Medication::  Prescriptions Prior to Admission   Medication Sig Dispense Refill Last Dose   • aspirin 81 MG tablet Take 1 tablet by mouth Daily. 30 tablet 5 2017 at 0900   • atorvastatin (LIPITOR) 20 MG tablet Take 20 mg by mouth every night.   3/30/2017   • busPIRone (BUSPAR) 10 MG tablet Take 1 tablet by mouth Every 12 (Twelve) Hours. 60 tablet 5 3/31/2017   • gabapentin (NEURONTIN) 400 MG capsule Take 400 mg by mouth 3 (three) times a day.   2017 at 0900   • gabapentin (NEURONTIN) 800 MG tablet Take 800 mg by mouth 3 (three) times a day.   2017 at 0900   •  HYDROcodone-acetaminophen (NORCO) 7.5-325 MG per tablet Take 1 tablet by mouth 3 (three) times a day as needed for moderate pain (4-6).   4/3/2017 at Unknown time   • ipratropium-albuterol (COMBIVENT RESPIMAT)  MCG/ACT inhaler Inhale 2 puffs every 4 (four) hours as needed for wheezing.   Past Month at Unknown time   • nicotine (NICODERM CQ) 21 MG/24HR patch Place 1 patch on the skin every day.   3/31/2017 at 0900   • nitroglycerin (NITRODUR) 0.2 MG/HR patch Place 1 patch on the skin Daily. 90 patch 12 4/4/2017 at 0900   • nitroglycerin (NITROSTAT) 0.4 MG SL tablet Place 0.4-1.2 mg under the tongue as needed for chest pain. Take no more than 3 doses in 15 minutes.   4/4/2017 at Unknown time   • ondansetron ODT (ZOFRAN ODT) 4 MG disintegrating tablet Take 1 tablet by mouth Every 8 (Eight) Hours As Needed for nausea or vomiting. 90 tablet 3 3/31/2017   • ranolazine (RANEXA) 500 MG 12 hr tablet Take 1 tablet by mouth Every 12 (Twelve) Hours. 180 tablet 12 3/31/2017   • albuterol (PROVENTIL HFA;VENTOLIN HFA) 108 (90 BASE) MCG/ACT inhaler Inhale 2 puffs every 4 (four) hours as needed for wheezing.   Unknown at Unknown time   • insulin detemir (LEVEMIR) 100 UNIT/ML injection Inject 14 Units under the skin Every Night. 2 pen 5 3/31/2017 at Unknown time           Review of Systems:       Constitutional:  Denies recent weight loss, weight gain, fever or chills, no change in exercise tolerance     HENT:  Denies any hearing loss, epistaxis, hoarseness, or difficulty speaking.     Eyes: Wears eyeglasses or contact lenses     Respiratory:  Denies dyspnea with exertion,no cough, wheezing, or hemoptysis.     Cardiovascular: Positive for chest pain.  Negative for palpations, orthopnea, PND, peripheral edema, syncope, or claudication.     Gastrointestinal:  Denies change in bowel habits, dyspepsia, ulcer disease, hematochezia, or melena.  No nausea, no vomiting, no hematemesis, no diarrhea or constipation, no  melena      Endocrine: Negative for cold intolerance, heat intolerance, polydipsia, polyphagia and polyuria. Denies any history of weight change, heat/cold intolerance, polydipsia, polyuria     Genitourinary: Negative hor hematuria.      Musculoskeletal: Denies any history of arthritic symptoms or back problems .  No joint pain, joint stiffness, joint swelling, muscle pain, muscle weakness and neck pain    Skin:  Denies any change in hair or nails, rashes, or skin lesions.     Allergic/Immunologic: Negative.  Negative for environmental allergies, food allergies and immunocompromised state.     Neurological:  Denies any history of recurrent headaches, strokes, TIA, or seizure disorder.     Hematological: Denies excessive bleeding, easy bruising, fatigue, lymphadenopathy and petechiae or any bleeding disorders, or lymphadenopathy.     Psychiatric/Behavioral: Denies any history of depression, substance abuse, or change in cognitive function. Denies any psychomotor reaction or tangential thought.  No depression, homicidal ideations and suicidal ideations    Endocrine: No frequent urination and nocturia, temperature intolerance, weight gain, unintended and weight loss, unintended            Objective:     Objective:  Vitals:    04/05/17 1116   BP: 142/83   Pulse: 68   Resp: 14   Temp: 96.4 °F (35.8 °C)   SpO2: 100%     .    Body mass index is 24.45 kg/(m^2).           Physical Exam:   General Appearance:    Alert, oriented, cooperative, in no acute distress   Head:    Normocephalic, atraumatic, without obvious abnormality   Eyes:           BASIL  Lids and lashes normal, conjunctivae and sclerae normal, no icterus, no pallor   Ears:    Ears appear intact with no abnormalities noted   Throat:   Mucous membranes pink and moist   Neck:   Supple, trachea midline, no carotid bruit, no organomegaly or JVD   Lungs:     Clear to auscultation and percussion, respirations regular, even and Unlabored. No wheezes, rales, rhonchi     Heart:    Regular rhythm and normal rate, normal S1 and S2, no            murmur, no gallop, no rub, no click   Abdomen:     Soft, non-tender, non-distended, no guarding, no rebound tenderness, Normal bowel sounds in all four quadrant, no masses, liver and spleen nonpalpable,    Genitalia:    Deferred   Extremities:   Moves all extremities well, no edema, no cyanosis, no              Redness, no clubbing   Pulses:   Pulses palpable and equal bilaterally   Skin:   Moist and warm. No bleeding, bruising or rash   Neurologic/Psychiatric:   Alert and oriented to person, place, and time.  Motor, power and tone in upper and lower extremity is grossly intact.  No focal neurological deficits. Normal cognitive function. No psychomotor reaction or tangential thought. No depression, homicidal ideations and suicidal ideations           Lab Review:       Results from last 7 days  Lab Units 04/05/17 0321 04/04/17 2049   SODIUM mmol/L 143 134*   POTASSIUM mmol/L 3.1* 3.1*   CHLORIDE mmol/L 107 105   TOTAL CO2 mmol/L 24.0 17.0*   BUN mg/dL 9 7   CREATININE mg/dL 0.77 0.63   CALCIUM mg/dL 9.0 9.2   BILIRUBIN mg/dL  --  0.4   ALK PHOS U/L  --  90   ALT (SGPT) U/L  --  22   AST (SGOT) U/L  --  23   GLUCOSE mg/dL 77 298*       Results from last 7 days  Lab Units 04/05/17  0912 04/05/17 0321 04/04/17  2259   CK TOTAL U/L 165* 148* 54   TROPONIN I ng/mL 3.830* 1.870* 0.097*           Results from last 7 days  Lab Units 04/05/17  0321   WBC 10*3/mm3 5.61   HEMOGLOBIN g/dL 12.4   HEMATOCRIT % 36.0   PLATELETS 10*3/mm3 280       Results from last 7 days  Lab Units 04/04/17 2050   INR  0.94       Results from last 7 days  Lab Units 04/04/17  2049   MAGNESIUM mg/dL 2.0           Results from last 7 days  Lab Units 04/04/17 2050   TSH mIU/mL 0.980       EKG:   ECG/EMG Results (last 24 hours)     Procedure Component Value Units Date/Time    ECG 12 Lead [[id number]] Collected:  04/04/17 2028     Updated:  04/05/17 0711    Narrative:       Test  Reason : Chest Pain  Blood Pressure : **/** mmHG  Vent. Rate : 082 BPM     Atrial Rate : 082 BPM     P-R Int : 182 ms          QRS Dur : 086 ms      QT Int : 390 ms       P-R-T Axes : 060 -17 089 degrees     QTc Int : 455 ms    Normal sinus rhythm  Nonspecific T wave abnormality  Abnormal ECG  No previous ECGs available  Confirmed by SHRADDHA ANDRES MD ([id number]) on 4/5/2017 7:11:24 AM    Referred By:             Confirmed By:SHRADDHA ANDRES MD    ECG 12 Lead [[id number]] Collected:  04/05/17 0907     Updated:  04/05/17 0924    Narrative:       Test Reason : nstemi  Blood Pressure : **/** mmHG  Vent. Rate : 065 BPM     Atrial Rate : 065 BPM     P-R Int : 172 ms          QRS Dur : 082 ms      QT Int : 424 ms       P-R-T Axes : 022 -16 082 degrees     QTc Int : 440 ms    Normal sinus rhythm  Possible Anterior infarct , age undetermined  Abnormal ECG  When compared with ECG of 04-APR-2017 20:28,  No significant change was found    Referred By:             Confirmed By:     SCANNED EKG [[id number]] Resulted:  04/04/17      Updated:  04/05/17 1101    SCANNED EKG [[id number]] Resulted:  04/04/17      Updated:  04/05/17 1240          Imaging:  Imaging Results (last 24 hours)     Procedure Component Value Units Date/Time    XR Chest 1 View [[id number]] Collected:  04/04/17 2055     Updated:  04/04/17 2058    Narrative:       Patient Name:  GINO TAVARES  Patient ID:  [id number]   Ordering:  ELANA LOPEZ  Attending:  ELANA LOPEZ  Referring:  ELANA LOPEZ  ------------------------------------------------    PORTABLE CHEST    HISTORY: Chest pain. Central chest pain.    Portable AP upright film of the chest was obtained at 8:22 PM.  COMPARISON: March 24, 2017    EKG leads in place.  Linear atelectasis or scar lung bases.  Old granulomatous disease  Coronary artery stents.  The heart is not enlarged.  The pulmonary vasculature is not increased.  No pleural effusion..  No pneumothorax.  No acute osseous  abnormality.  Degenerative changes are present in the thoracic spine.      Impression:       CONCLUSION:  Linear atelectasis or scar lung bases.  Coronary artery stents.    [id number]    Electronically signed by:  Viral Whitlock MD  4/4/2017 8:57 PM CDT  Workstation: [id number]          I personally viewed and interpreted the patient's EKG/Telemetry data.    Assessment:   1. Chest pain with recurrent hospitalization with previous history of aborted anterior wall myocardial infarction in September 2016..  2.  Atherosclerotic coronary artery disease .   3. PTCA and stenting of the left anterior descending artery and the diagonal branch September 2016.  4. PTCA of the in-stent restenosis of the diagonal branch in November 2016.  5. Ischemic cardiomyopathy   5. Chronic obstructive lung disease with tobacco abuse.             Plan:   1. Chest pain with history of documented atherosclerotic coronary artery disease with aborted anterior wall myocardial infarction in September 2016, with Pronto thrombectomy and rescue PTCA and stenting of the 100% occluded left anterior descending artery, and PTCA and stenting of the diagonal branch a week ago with deployment of a 2.5 mm x 18 mm Alpine drug-eluting stent to had not taken her Plavix and it continued to smoke.  Patient now presents with symptoms of chest pain with rising troponin.  Due to the patient's ongoing symptoms of chest pain with rising troponin patient was recommended coronary angiogram.  Risk-benefit treatment option for the coronary angiogram were discussed with the patient and an informed consent was obtained from the patient for the coronary angiogram.  Patient Mallampati score #2 patient ASA classification was #2.  Rest for minimal sedation was also discussed with the patient..   2. Arterial hypertension with hypertensive heart disease. Patient's blood pressure has been labile. Patient's blood pressure is 128/70. Patient, at the present time, has been recommended  to continue with the present dose of the Coreg and the losartan.   3. History of peripheral vascular disease is noted.    Chronic obstructive lung disease with tobacco abuse is noted.   4. Non-insulin-dependent diabetes. Patient has been counseled on American Diabetic Association diet.   5. History of transient ischemic attack is noted.   6. Ischemic cardiomyopathy: Clinically at the present time patient is not in congestive heart failure.  Patient on previous evaluation had an apical thrombus and was on anticoagulation with Coumadin which was stopped during the last hospitalization after the repeat echocardiogram had not revealed off any evidence of any apical thrombus.  It was also unclear whether the patient was truly compliant with anticoagulation with Coumadin as the patient had not filled the prescription for the Coumadin when checked by Dr. Stewart.  Patient is currently not on anticoagulation as the patient last echocardiogram had not revealed off any evidence of any apical thrombus.  Clinically at the present time patient is not in congestive heart failure.      Time: time spent in face-to-face evaluation off greater than  55 minutes and interacting and formulating examining and discussing the plan with the patient with 50% of greater time spent in face-to-face interaction.    Raheel Martinez MD  04/05/17  2:40 PM     EMR Dragon/Transcription disclaimer:   Some of this note may be an electronic transcription/translation of spoken language to printed text. The electronic translation of spoken language may permit erroneous, or at times, nonsensical words or phrases to be inadvertently transcribed; Although I have reviewed the note for such errors, some may still exist.

## 2017-04-05 NOTE — ED PROVIDER NOTES
"Subjective   HPI Comments: From Dr Martinez's notes  \"1. Chest pain. Atherosclerotic coronary artery disease. Patient has history of aborted myocardial infarction with a rescue PTCA and stenting of the left anterior descending artery and the diagonal branch. Patient had restenosis of the first diagonal branch with repeat percutaneous intervention. Patient now presents with recurrent symptoms of chest discomfort suggestive of angina. Patient underwent successful PTCA and stenting of the first diagonal branch and has been doing well. Patient has been started on antiplatelet therapy Brilianta. Patient would also be started on Imdur 30 mg once a day due to the patient's symptoms of chest pain which is atypical.  2. Arterial hypertension. Patient blood pressure is currently well controlled and patient would be continued on the carvedilol.  3. Hypertensive heart disease. Patient clinically is not in congestive heart failure.  4. Risk factor modification. Patient has been counseled to quit smoking.\"    Patient is a 66 y.o. female presenting with chest pain.   History provided by:  Patient  Chest Pain   Pain location:  L chest and R chest  Pain quality: pressure    Pain radiates to:  Does not radiate  Pain severity:  Mild  Onset quality:  Gradual  Duration:  2 hours  Timing:  Constant  Chronicity:  Recurrent  Context: at rest    Relieved by:  Nothing  Worsened by:  Nothing  Ineffective treatments:  Aspirin, nitroglycerin, oxygen and rest  Associated symptoms: anxiety    Associated symptoms: no cough, no dizziness, no dysphagia, no fatigue, no fever, no headache, no nausea, no numbness and no palpitations    Risk factors: coronary artery disease        Review of Systems   Constitutional: Negative for activity change, appetite change, fatigue and fever.   HENT: Negative for congestion, facial swelling, mouth sores, nosebleeds, sore throat and trouble swallowing.    Eyes: Negative for discharge, redness and itching. "   Respiratory: Negative for apnea, cough and wheezing.    Cardiovascular: Positive for chest pain. Negative for palpitations.   Gastrointestinal: Negative for blood in stool and nausea.   Endocrine: Negative for cold intolerance, heat intolerance, polydipsia, polyphagia and polyuria.   Genitourinary: Negative for difficulty urinating, dysuria, flank pain, frequency and hematuria.   Musculoskeletal: Negative for gait problem, joint swelling and neck pain.   Skin: Negative.  Negative for color change, pallor and rash.   Allergic/Immunologic: Negative for environmental allergies.   Neurological: Negative for dizziness, seizures, syncope, speech difficulty, light-headedness, numbness and headaches.   Hematological: Negative for adenopathy.   Psychiatric/Behavioral: Negative for agitation, behavioral problems, confusion and sleep disturbance. The patient is not nervous/anxious.        Past Medical History:   Diagnosis Date   • Acute bronchitis    • Allergic    • Allergic rhinitis    • Anxiety state    • Anxiety state     Anxiety state, unspecified      • Arthritis    • Bacterial pneumonia    • Chronic pain    • Chronic pain     Other chronic pain      • Contact dermatitis    • COPD (chronic obstructive pulmonary disease)    • Coronary arteriosclerosis    • Depressive disorder    • Depressive disorder    • Diabetes mellitus due to underlying condition with diabetic autonomic neuropathy     Diabetes mellitus due to underlying condition with diabetic autonomic (poly)neuropathy      • Drug therapy     Long-term drug therapy      • Dysuria    • Encounter for immunization    • Encounter for screening for malignant neoplasm of colon    • Essential hypertension    • Folliculitis    • Headache    • Heart murmur    • Herpes zoster    • Herpes zoster with nervous system complication    • History of screening mammography    • Hyperlipidemia    • Insomnia    • Insomnia    • Kidney stone    • Lumbar radiculopathy    • Lung nodule    •  Migraine    • Migraine     Migraine, unspecified, without mention of intractable migraine, without mention of status migrainosus      • Migraine    • Myocardial infarction    • Nausea and vomiting    • Nausea with vomiting    • Neck pain    • Need for immunization against influenza    • Need for prophylactic vaccination and inoculation against diphtheria alone    • Need for prophylactic vaccination and inoculation against unspecified single disease     Need for prophylactic vaccination and inoculation against Streptococcus pneumoniae [pneumococcus] and influenza      • Non-alcoholic fatty liver disease    • Rectus sheath hematoma    • Senile osteoporosis    • Shoulder pain    • Solitary pulmonary nodule present on computed tomography of lung    • Spasm of back muscles    • Spinal stenosis of lumbar region    • Systolic congestive heart failure    • TIA (transient ischemic attack)    • Tobacco dependence syndrome    • Type 2 diabetes mellitus    • Urinary tract infectious disease    • Viral gastroenteritis    • Vitamin D deficiency     Unspecified vitamin D deficiency      • Vitamin D deficiency        Allergies   Allergen Reactions   • Corticosteroids        Past Surgical History:   Procedure Laterality Date   • CARDIAC CATHETERIZATION      plaque noted ef 55% 2010    • CARDIAC CATHETERIZATION N/A 3/29/2017    Procedure: Left Heart Cath;  Surgeon: Raheel Martinez MD;  Location: [facility] INVASIVE LOCATION;  Service:    •  SECTION       Low cervical  1981       • CHOLECYSTECTOMY     • DILATATION AND CURETTAGE  1975   • DILATATION AND CURETTAGE  1975   • INJECTION OF MEDICATION  2015     Phenergan (3)      • INJECTION OF MEDICATION      Kenalog (1)      2011    • INJECTION OF MEDICATION  2014    Toradol (3)   • INJECTION OF MEDICATION  2011     Vistaril (1)    • INJECTION OF MEDICATION  2012    Zofran (1)   • TUBAL ABDOMINAL LIGATION       Brenden  tubal ligation 08/20/1981        Family History   Problem Relation Age of Onset   • Alzheimer's disease Mother    • Diabetes Brother    • Alzheimer's disease Other    • Diabetes Other    • Lung cancer Other    • Pulmonary embolism Other    • Hypertension Father    • Depression Daughter    • Hypertension Daughter    • Anxiety disorder Daughter    • Lung cancer Brother        Social History     Social History   • Marital status:      Spouse name: N/A   • Number of children: N/A   • Years of education: N/A     Social History Main Topics   • Smoking status: Current Every Day Smoker     Packs/day: 0.25     Years: 45.00   • Smokeless tobacco: Never Used      Comment: maybe 2 or 3 a day   • Alcohol use No   • Drug use: No   • Sexual activity: Defer     Other Topics Concern   • None     Social History Narrative    Previously worked in [employer] at [employer] [facility] [place].  Lives with daughter.            Objective   Physical Exam   Constitutional: She is oriented to person, place, and time. She appears well-developed and well-nourished.   HENT:   Head: Normocephalic and atraumatic.   Nose: Nose normal.   Mouth/Throat: Oropharynx is clear and moist.   Eyes: Conjunctivae and EOM are normal. Pupils are equal, round, and reactive to light.   Neck: Normal range of motion. Neck supple.   Cardiovascular: Normal rate, regular rhythm, normal heart sounds and intact distal pulses.    Pulmonary/Chest: Effort normal and breath sounds normal.   Abdominal: Soft. Bowel sounds are normal.   Musculoskeletal: Normal range of motion.   Neurological: She is alert and oriented to person, place, and time.   Skin: Skin is warm and dry.   Psychiatric: She has a normal mood and affect. Her behavior is normal. Judgment and thought content normal.   Nursing note and vitals reviewed.      ECG 12 Lead    Date/Time: 4/4/2017 8:36 PM  Performed by: ELANA LOPEZ  Authorized by: ELANA LOPEZ   Interpreted by physician  Comparison: not compared with  previous ECG   Rhythm: sinus rhythm  Rate: normal  QRS axis: normal  Clinical impression: abnormal ECG  Comments: Nonspecific T-wave abnormality                 ED Course  ED Course   Comment By Time   Discussed with Dr. Martinez  who suggested admitting the patient to Dr. Pat aSnches MD 04/04 2220   Discussed with Dr. Stewart and she will admit the patient Ramirez Sanches MD 04/04 2223      Labs Reviewed   COMPREHENSIVE METABOLIC PANEL - Abnormal; Notable for the following:        Result Value    Glucose 298 (*)     Sodium 134 (*)     Potassium 3.1 (*)     CO2 17.0 (*)     Total Protein 6.2 (*)     All other components within normal limits   CBC WITH AUTO DIFFERENTIAL - Abnormal; Notable for the following:     Immature Grans, Absolute 0.03 (*)     All other components within normal limits   TROPONIN (IN-HOUSE) - Abnormal; Notable for the following:     Troponin I 0.097 (*)     All other components within normal limits   URINALYSIS W/ CULTURE IF INDICATED - Abnormal; Notable for the following:     Appearance, UA Cloudy (*)     Glucose,  mg/dL (2+) (*)     Blood, UA Trace (*)     Leuk Esterase, UA Small (1+) (*)     All other components within normal limits   URINALYSIS, MICROSCOPIC ONLY - Abnormal; Notable for the following:     RBC, UA 0-2 (*)     WBC, UA 21-30 (*)     Bacteria, UA 1+ (*)     Squamous Epithelial Cells, UA 31-50 (*)     All other components within normal limits   TROPONIN (IN-HOUSE) - Normal   CK - Normal   CK - Normal   CK MB - Normal   CK MB - Normal   BNP (IN-HOUSE) - Normal   PROTIME-INR - Normal    Narrative:     Therapeutic range for most indications is 2.0-3.0 INR,  or 2.5-3.5 for mechanical heart valves.   URINE DRUG SCREEN - Normal    Narrative:     Negative Thresholds For Drugs Screened in Urine:     Amphetamines          500 ng/ml  Barbiturates          200 ng/ml  Benzodiazepines       200 ng/ml  Cocaine               150 ng/ml  Methadone             300 ng/mL  Opiates                300 ng/mL  Oxycodone             100 ng/mL  THC                   20 ng/mL    The normal value for all drugs tested is negative. This report includes final unconfirmed screening results.  A positive result by this assay can be, at your request, sent to the Reference Lab for confirmation by gas chromatography. Unconfirmed results must not be used for non-medical purposes, such as employment or legal testing. Clinical consideration should be applied to any drug of abuse test result, particularly when unconfirmed results are used.   TSH - Normal   MAGNESIUM - Normal   URINE CULTURE   RAINBOW DRAW    Narrative:     The following orders were created for panel order [place] Draw.  Procedure                               Abnormality         Status                     ---------                               -----------         ------                     Light Blue Top[[id number]]                                    In process                 Green Top (Gel)[[id number]]                                   In process                 Lavender Top[[id number]]                                      In process                 Gold Top - SST[[id number]]                                    In process                   Please view results for these tests on the individual orders.   TROPONIN (IN-HOUSE)   CK   CK MB   BASIC METABOLIC PANEL   CBC (NO DIFF)   POCT GLUCOSE FINGERSTICK   POCT GLUCOSE FINGERSTICK   POCT GLUCOSE FINGERSTICK   POCT GLUCOSE FINGERSTICK   CBC AND DIFFERENTIAL    Narrative:     The following orders were created for panel order CBC & Differential.  Procedure                               Abnormality         Status                     ---------                               -----------         ------                     CBC Auto Differential[[id number]]         Abnormal            Final result                 Please view results for these tests on the individual orders.   LIGHT BLUE TOP   GREEN TOP   LAVENDER TOP    GOLD TOP - [facility]        XR Chest 1 View   Final Result   CONCLUSION:   Linear atelectasis or scar lung bases.   Coronary artery stents.      [id number]      Electronically signed by:  Viral Whitlock MD  4/4/2017 8:57 PM CDT   Workstation: [id number]    Final diagnoses:   Chest pain at rest   Hyperglycemia            Ramirez Sanches MD  04/05/17 0018

## 2017-04-05 NOTE — PLAN OF CARE
Problem: Patient Care Overview (Adult)  Goal: Plan of Care Review  Outcome: Ongoing (interventions implemented as appropriate)    04/05/17 3830   Coping/Psychosocial Response Interventions   Plan Of Care Reviewed With patient;daughter   Patient Care Overview   Progress improving   Outcome Evaluation   Outcome Summary/Follow up Plan A line still present, will recheck act at 1800       Goal: Discharge Needs Assessment  Outcome: Ongoing (interventions implemented as appropriate)    Problem: Acute Coronary Syndrome (ACS) (Adult)  Goal: Signs and Symptoms of Listed Potential Problems Will be Absent or Manageable (Acute Coronary Syndrome)  Outcome: Ongoing (interventions implemented as appropriate)    Problem: Cardiac Catheterization with/without PCI (Adult)  Goal: Signs and Symptoms of Listed Potential Problems Will be Absent or Manageable (Cardiac Catheterization with/without PCI)  Outcome: Ongoing (interventions implemented as appropriate)

## 2017-04-05 NOTE — ED NOTES
Patient presents by ambulance to the ED from home with c/o chest pain that radiates down left arm, onset around 5:30pm tonight. Post cardiac stent placement; discharged home this past Friday 3/31/17.      Sharona Grewal RN  04/04/17 2043

## 2017-04-05 NOTE — PROGRESS NOTES
Discharge Planning Assessment  [facility]     Patient Name: Nikki Felder  MRN: [id number]  Today's Date: 4/5/2017    Admit Date: 4/4/2017          Discharge Needs Assessment       04/05/17 1539    Living Environment    Lives With child(xavi), adult    Living Arrangements mobile home    Provides Primary Care For no one    Quality Of Family Relationships supportive    Living Arrangement Comments Lives with adult daughter.    Discharge Needs Assessment    Concerns To Be Addressed no discharge needs identified    Readmission Within The Last 30 Days no previous admission in last 30 days    Equipment Currently Used at Home shower chair;walker, rolling    Equipment Needed After Discharge none            Discharge Plan       04/05/17 1541    Case Management/Social Work Plan    Plan Home with [facility]    Additional Comments Pt currently utilizes [facility] and wishes to utilize them at discharge.       04/05/17 1525    Case Management/Social Work Plan    Additional Comments Per Consult Case Management to notify pt of samples at Dr. Martinez's office. Per MD, pt may benefit from [facility] follow up visit at discharge.         Discharge Placement     No information found                Demographic Summary     None            Functional Status       04/05/17 1538    Functional Status Prior    Ambulation 1-->assistive equipment    Transferring 1-->assistive equipment    Toileting 1-->assistive equipment    Bathing 0-->independent    Dressing 0-->independent    Eating 0-->independent    Communication 0-->understands/communicates without difficulty    Swallowing 0-->swallows foods/liquids without difficulty    IADL    Medications assistive person    Meal Preparation other (see comments)    Housekeeping other (see comments)    Laundry other (see comments)    Shopping other (see comments)    Oral Care independent    IADL Comments Lives with adult daughter    Cognitive/Perceptual/Developmental    Current  Mental Status/Cognitive Functioning no deficits noted    Recent Changes in Mental Status/Cognitive Functioning no changes            Psychosocial     None            Abuse/Neglect     None            Legal     None            Substance Abuse     None            Patient Forms     None          Gia Larios

## 2017-04-06 LAB
ANION GAP SERPL CALCULATED.3IONS-SCNC: 10 MMOL/L (ref 5–15)
BACTERIA SPEC AEROBE CULT: NORMAL
BUN BLD-MCNC: 9 MG/DL (ref 7–21)
BUN/CREAT SERPL: 11.8 (ref 7–25)
CALCIUM SPEC-SCNC: 8.6 MG/DL (ref 8.4–10.2)
CHLORIDE SERPL-SCNC: 112 MMOL/L (ref 95–110)
CO2 SERPL-SCNC: 16 MMOL/L (ref 22–31)
CREAT BLD-MCNC: 0.76 MG/DL (ref 0.5–1)
DEPRECATED RDW RBC AUTO: 41.8 FL (ref 36.4–46.3)
ERYTHROCYTE [DISTWIDTH] IN BLOOD BY AUTOMATED COUNT: 12.9 % (ref 11.5–14.5)
GFR SERPL CREATININE-BSD FRML MDRD: 76 ML/MIN/1.73 (ref 45–104)
GLUCOSE BLD-MCNC: 174 MG/DL (ref 60–100)
GLUCOSE BLDC GLUCOMTR-MCNC: 144 MG/DL (ref 70–130)
GLUCOSE BLDC GLUCOMTR-MCNC: 204 MG/DL (ref 70–130)
GLUCOSE BLDC GLUCOMTR-MCNC: 204 MG/DL (ref 70–130)
GLUCOSE BLDC GLUCOMTR-MCNC: 212 MG/DL (ref 70–130)
GLUCOSE BLDC GLUCOMTR-MCNC: 60 MG/DL (ref 70–130)
HCT VFR BLD AUTO: 35.1 % (ref 35–45)
HGB BLD-MCNC: 12.6 G/DL (ref 12–15.5)
MCH RBC QN AUTO: 32 PG (ref 26.5–34)
MCHC RBC AUTO-ENTMCNC: 35.9 G/DL (ref 31.4–36)
MCV RBC AUTO: 89.1 FL (ref 80–98)
PLATELET # BLD AUTO: 284 10*3/MM3 (ref 150–450)
PMV BLD AUTO: 8.9 FL (ref 8–12)
POTASSIUM BLD-SCNC: 4.2 MMOL/L (ref 3.5–5.1)
RBC # BLD AUTO: 3.94 10*6/MM3 (ref 3.77–5.16)
SODIUM BLD-SCNC: 138 MMOL/L (ref 137–145)
WBC NRBC COR # BLD: 6.33 10*3/MM3 (ref 3.2–9.8)

## 2017-04-06 PROCEDURE — 63710000001 INSULIN DETEMIR PER 5 UNITS: Performed by: FAMILY MEDICINE

## 2017-04-06 PROCEDURE — 25010000002 ONDANSETRON PER 1 MG: Performed by: FAMILY MEDICINE

## 2017-04-06 PROCEDURE — 80048 BASIC METABOLIC PNL TOTAL CA: CPT | Performed by: INTERNAL MEDICINE

## 2017-04-06 PROCEDURE — 93010 ELECTROCARDIOGRAM REPORT: CPT | Performed by: INTERNAL MEDICINE

## 2017-04-06 PROCEDURE — 85027 COMPLETE CBC AUTOMATED: CPT | Performed by: INTERNAL MEDICINE

## 2017-04-06 PROCEDURE — 25010000002 ENOXAPARIN PER 10 MG: Performed by: FAMILY MEDICINE

## 2017-04-06 PROCEDURE — 63710000001 INSULIN ASPART PER 5 UNITS: Performed by: FAMILY MEDICINE

## 2017-04-06 PROCEDURE — 93005 ELECTROCARDIOGRAM TRACING: CPT | Performed by: FAMILY MEDICINE

## 2017-04-06 PROCEDURE — 25810000003 SODIUM CHLORIDE 0.9 % WITH KCL 40 MEQ/L 40-0.9 MEQ/L-% SOLUTION: Performed by: FAMILY MEDICINE

## 2017-04-06 PROCEDURE — 25010000002 MORPHINE SULFATE (PF) 2 MG/ML SOLUTION: Performed by: FAMILY MEDICINE

## 2017-04-06 PROCEDURE — 82962 GLUCOSE BLOOD TEST: CPT

## 2017-04-06 RX ORDER — ONDANSETRON 4 MG/1
4 TABLET, ORALLY DISINTEGRATING ORAL EVERY 6 HOURS PRN
Status: DISCONTINUED | OUTPATIENT
Start: 2017-04-06 | End: 2017-04-08 | Stop reason: HOSPADM

## 2017-04-06 RX ORDER — NITROGLYCERIN 0.4 MG/1
0.4 TABLET SUBLINGUAL
Status: DISCONTINUED | OUTPATIENT
Start: 2017-04-06 | End: 2017-04-08 | Stop reason: HOSPADM

## 2017-04-06 RX ORDER — ONDANSETRON 4 MG/1
4 TABLET, FILM COATED ORAL EVERY 6 HOURS PRN
Status: DISCONTINUED | OUTPATIENT
Start: 2017-04-06 | End: 2017-04-08 | Stop reason: HOSPADM

## 2017-04-06 RX ADMIN — BUSPIRONE HYDROCHLORIDE 10 MG: 5 TABLET ORAL at 08:14

## 2017-04-06 RX ADMIN — RANOLAZINE 500 MG: 500 TABLET, FILM COATED, EXTENDED RELEASE ORAL at 08:16

## 2017-04-06 RX ADMIN — INSULIN ASPART 3 UNITS: 100 INJECTION, SOLUTION INTRAVENOUS; SUBCUTANEOUS at 17:30

## 2017-04-06 RX ADMIN — ASPIRIN 81 MG 81 MG: 81 TABLET ORAL at 08:18

## 2017-04-06 RX ADMIN — GABAPENTIN 1200 MG: 400 CAPSULE ORAL at 11:10

## 2017-04-06 RX ADMIN — ESCITALOPRAM OXALATE 20 MG: 10 TABLET ORAL at 08:13

## 2017-04-06 RX ADMIN — GLIPIZIDE 5 MG: 5 TABLET ORAL at 08:13

## 2017-04-06 RX ADMIN — INSULIN ASPART 3 UNITS: 100 INJECTION, SOLUTION INTRAVENOUS; SUBCUTANEOUS at 08:19

## 2017-04-06 RX ADMIN — ATORVASTATIN CALCIUM 20 MG: 20 TABLET, FILM COATED ORAL at 21:00

## 2017-04-06 RX ADMIN — GABAPENTIN 1200 MG: 400 CAPSULE ORAL at 08:12

## 2017-04-06 RX ADMIN — NITROGLYCERIN 0.4 MG: 0.4 TABLET SUBLINGUAL at 11:57

## 2017-04-06 RX ADMIN — HYDROCODONE BITARTRATE AND ACETAMINOPHEN 1 TABLET: 7.5; 325 TABLET ORAL at 11:09

## 2017-04-06 RX ADMIN — ONDANSETRON 4 MG: 4 TABLET, FILM COATED ORAL at 11:09

## 2017-04-06 RX ADMIN — TICAGRELOR 90 MG: 90 TABLET ORAL at 17:29

## 2017-04-06 RX ADMIN — ONDANSETRON 4 MG: 2 INJECTION INTRAMUSCULAR; INTRAVENOUS at 05:05

## 2017-04-06 RX ADMIN — POTASSIUM CHLORIDE 40 MEQ: 750 CAPSULE, EXTENDED RELEASE ORAL at 08:14

## 2017-04-06 RX ADMIN — POTASSIUM CHLORIDE AND SODIUM CHLORIDE 50 ML/HR: 900; 300 INJECTION, SOLUTION INTRAVENOUS at 07:44

## 2017-04-06 RX ADMIN — GLIPIZIDE 5 MG: 5 TABLET ORAL at 17:29

## 2017-04-06 RX ADMIN — RANOLAZINE 500 MG: 500 TABLET, FILM COATED, EXTENDED RELEASE ORAL at 21:00

## 2017-04-06 RX ADMIN — TICAGRELOR 90 MG: 90 TABLET ORAL at 08:14

## 2017-04-06 RX ADMIN — MORPHINE SULFATE 2 MG: 2 INJECTION, SOLUTION INTRAMUSCULAR; INTRAVENOUS at 01:13

## 2017-04-06 RX ADMIN — INSULIN DETEMIR 14 UNITS: 100 INJECTION, SOLUTION SUBCUTANEOUS at 20:54

## 2017-04-06 RX ADMIN — ONDANSETRON 4 MG: 4 TABLET, FILM COATED ORAL at 17:29

## 2017-04-06 RX ADMIN — ENOXAPARIN SODIUM 40 MG: 40 INJECTION SUBCUTANEOUS at 08:12

## 2017-04-06 RX ADMIN — GABAPENTIN 1200 MG: 400 CAPSULE ORAL at 21:00

## 2017-04-06 RX ADMIN — HYDROCODONE BITARTRATE AND ACETAMINOPHEN 1 TABLET: 7.5; 325 TABLET ORAL at 17:29

## 2017-04-06 RX ADMIN — BUSPIRONE HYDROCHLORIDE 10 MG: 5 TABLET ORAL at 21:00

## 2017-04-06 RX ADMIN — HYDROCODONE BITARTRATE AND ACETAMINOPHEN 1 TABLET: 7.5; 325 TABLET ORAL at 05:05

## 2017-04-06 NOTE — PLAN OF CARE
Problem: Patient Care Overview (Adult)  Goal: Plan of Care Review  Outcome: Ongoing (interventions implemented as appropriate)    04/06/17 1720   Coping/Psychosocial Response Interventions   Plan Of Care Reviewed With patient;daughter   Patient Care Overview   Progress improving   Outcome Evaluation   Outcome Summary/Follow up Plan interventions for nausea given appropriately; pt having intermittent chest pain, STAT EKG and sl nitro given; Norco given for chronic hip pain        Goal: Adult Individualization and Mutuality  Outcome: Ongoing (interventions implemented as appropriate)  Goal: Discharge Needs Assessment  Outcome: Ongoing (interventions implemented as appropriate)    Problem: Acute Coronary Syndrome (ACS) (Adult)  Goal: Signs and Symptoms of Listed Potential Problems Will be Absent or Manageable (Acute Coronary Syndrome)  Outcome: Ongoing (interventions implemented as appropriate)    Problem: Cardiac Catheterization with/without PCI (Adult)  Goal: Signs and Symptoms of Listed Potential Problems Will be Absent or Manageable (Cardiac Catheterization with/without PCI)  Outcome: Ongoing (interventions implemented as appropriate)

## 2017-04-06 NOTE — NURSING NOTE
Pt change of HR to First degree block, STAT EKG, Pt asymptomatic, Notified Dr Kaiser, No orders received, Will continue to monitor

## 2017-04-06 NOTE — PLAN OF CARE
Problem: Patient Care Overview (Adult)  Goal: Plan of Care Review  Outcome: Ongoing (interventions implemented as appropriate)    04/06/17 0428   Coping/Psychosocial Response Interventions   Plan Of Care Reviewed With patient   Patient Care Overview   Progress improving   Outcome Evaluation   Outcome Summary/Follow up Plan Uncomplicated line pull, Pt denies CP overnight         Problem: Acute Coronary Syndrome (ACS) (Adult)  Goal: Signs and Symptoms of Listed Potential Problems Will be Absent or Manageable (Acute Coronary Syndrome)  Outcome: Ongoing (interventions implemented as appropriate)    04/06/17 0428   Acute Coronary Syndrome (ACS)   Problems Assessed (Acute Coronary Syndrome (ACS)) all   Problems Present (Acute Coronary Syndrome (ACS)) dysrhythmia/arrhythmia

## 2017-04-06 NOTE — NURSING NOTE
Pt complains that pain is uncontrolled with current orders. Notified Dr Larsen, Changed Norco ordered to q 6 hours PRN for pain

## 2017-04-06 NOTE — CONSULTS
Adult Nutrition  Assessment    Patient Name:  Nikki Felder  YOB: 1950  MRN: [id number]  Admit Date:  4/4/2017    Assessment Date:  4/6/2017          Reason for Assessment       04/06/17 1348    Reason for Assessment    Reason For Assessment/Visit (P)  identified at risk by screening criteria;nurse/nurse practitioner consult;admission assessment    Identified At Risk By Screening Criteria (P)  reduced oral intake over the last month    Time Spent (min) (P)  5    Diagnosis (P)  Diagnosis    Cardiac (P)  MI    Factors Affecting Nutrition (P)  Factors    Appetite (P)  Improved    Reported GI Symptoms (P)  Other   nausea                    Labs/Tests/Procedures/Meds       04/06/17 1350    Labs/Tests/Procedures/Meds    Diagnostic Test/Procedure Review (P)  reviewed, pertinent    Labs/Tests Review (P)  Reviewed;Glucose    Medication Review (P)  Reviewed, pertinent    Significant Vitals (P)  reviewed, pertinent                Nutrition Prescription Ordered       04/06/17 1353    Nutrition Prescription PO    Current PO Diet (P)  Regular    Common Modifiers (P)  Cardiac;Consistent Carbohydrate            Evaluation of Received Nutrient/Fluid Intake       04/06/17 1355    PO Evaluation    Number of Days PO Intake Evaluated (P)  1 day    % PO Intake (P)  100% avg.            Comments:  RDN staff encouraged pt to continue consuming the majority of her hospital meals for energy and protein to maintain her normal BMI and prevent further unintentional wt loss. Pt stated she has lost 5-6# within 6 mo. Unintentionally; she believes this loss is r/t stress. She states that now her appetite is improved. Pt reports no GI concerns or mechanical issues, except for having some nausea. Pt's documented intake is a 100% avg. X 1 day. RDN staff advised the pt on snacks and alternative menu options. RDN staff will continue to monitor pt and provide education as necessary.        Electronically signed by:  Brett  Abraham  04/06/17 2:01 PM

## 2017-04-07 LAB
ANION GAP SERPL CALCULATED.3IONS-SCNC: 10 MMOL/L (ref 5–15)
BUN BLD-MCNC: 8 MG/DL (ref 7–21)
BUN/CREAT SERPL: 10.3 (ref 7–25)
CALCIUM SPEC-SCNC: 8.4 MG/DL (ref 8.4–10.2)
CHLORIDE SERPL-SCNC: 107 MMOL/L (ref 95–110)
CO2 SERPL-SCNC: 23 MMOL/L (ref 22–31)
CREAT BLD-MCNC: 0.78 MG/DL (ref 0.5–1)
GFR SERPL CREATININE-BSD FRML MDRD: 73 ML/MIN/1.73 (ref 60–104)
GLUCOSE BLD-MCNC: 70 MG/DL (ref 60–100)
GLUCOSE BLDC GLUCOMTR-MCNC: 157 MG/DL (ref 70–130)
GLUCOSE BLDC GLUCOMTR-MCNC: 163 MG/DL (ref 70–130)
GLUCOSE BLDC GLUCOMTR-MCNC: 228 MG/DL (ref 70–130)
POTASSIUM BLD-SCNC: 4.2 MMOL/L (ref 3.5–5.1)
SODIUM BLD-SCNC: 140 MMOL/L (ref 137–145)

## 2017-04-07 PROCEDURE — 25010000002 MORPHINE SULFATE (PF) 2 MG/ML SOLUTION: Performed by: FAMILY MEDICINE

## 2017-04-07 PROCEDURE — 82962 GLUCOSE BLOOD TEST: CPT

## 2017-04-07 PROCEDURE — 80048 BASIC METABOLIC PNL TOTAL CA: CPT | Performed by: FAMILY MEDICINE

## 2017-04-07 PROCEDURE — 25010000002 ENOXAPARIN PER 10 MG: Performed by: FAMILY MEDICINE

## 2017-04-07 PROCEDURE — 63710000001 INSULIN ASPART PER 5 UNITS: Performed by: FAMILY MEDICINE

## 2017-04-07 PROCEDURE — 63710000001 INSULIN DETEMIR PER 5 UNITS: Performed by: FAMILY MEDICINE

## 2017-04-07 RX ADMIN — GABAPENTIN 1200 MG: 400 CAPSULE ORAL at 06:04

## 2017-04-07 RX ADMIN — CARVEDILOL 3.12 MG: 3.12 TABLET, FILM COATED ORAL at 08:52

## 2017-04-07 RX ADMIN — INSULIN ASPART 2 UNITS: 100 INJECTION, SOLUTION INTRAVENOUS; SUBCUTANEOUS at 17:11

## 2017-04-07 RX ADMIN — ENOXAPARIN SODIUM 40 MG: 40 INJECTION SUBCUTANEOUS at 08:52

## 2017-04-07 RX ADMIN — BUSPIRONE HYDROCHLORIDE 10 MG: 5 TABLET ORAL at 20:49

## 2017-04-07 RX ADMIN — RANOLAZINE 500 MG: 500 TABLET, FILM COATED, EXTENDED RELEASE ORAL at 20:50

## 2017-04-07 RX ADMIN — CARVEDILOL 3.12 MG: 3.12 TABLET, FILM COATED ORAL at 17:11

## 2017-04-07 RX ADMIN — GABAPENTIN 1200 MG: 400 CAPSULE ORAL at 11:21

## 2017-04-07 RX ADMIN — ESCITALOPRAM OXALATE 20 MG: 10 TABLET ORAL at 08:52

## 2017-04-07 RX ADMIN — GABAPENTIN 1200 MG: 400 CAPSULE ORAL at 21:48

## 2017-04-07 RX ADMIN — TICAGRELOR 90 MG: 90 TABLET ORAL at 17:11

## 2017-04-07 RX ADMIN — ONDANSETRON 4 MG: 4 TABLET, FILM COATED ORAL at 06:04

## 2017-04-07 RX ADMIN — ONDANSETRON 4 MG: 4 TABLET, FILM COATED ORAL at 00:04

## 2017-04-07 RX ADMIN — HYDROCODONE BITARTRATE AND ACETAMINOPHEN 1 TABLET: 7.5; 325 TABLET ORAL at 12:47

## 2017-04-07 RX ADMIN — ASPIRIN 81 MG 81 MG: 81 TABLET ORAL at 08:52

## 2017-04-07 RX ADMIN — POTASSIUM CHLORIDE 40 MEQ: 750 CAPSULE, EXTENDED RELEASE ORAL at 08:52

## 2017-04-07 RX ADMIN — INSULIN ASPART 4 UNITS: 100 INJECTION, SOLUTION INTRAVENOUS; SUBCUTANEOUS at 11:22

## 2017-04-07 RX ADMIN — MORPHINE SULFATE 2 MG: 2 INJECTION, SOLUTION INTRAMUSCULAR; INTRAVENOUS at 19:29

## 2017-04-07 RX ADMIN — GLIPIZIDE 5 MG: 5 TABLET ORAL at 08:52

## 2017-04-07 RX ADMIN — HYDROCODONE BITARTRATE AND ACETAMINOPHEN 1 TABLET: 7.5; 325 TABLET ORAL at 00:04

## 2017-04-07 RX ADMIN — HYDROCODONE BITARTRATE AND ACETAMINOPHEN 1 TABLET: 7.5; 325 TABLET ORAL at 20:56

## 2017-04-07 RX ADMIN — GLIPIZIDE 5 MG: 5 TABLET ORAL at 17:11

## 2017-04-07 RX ADMIN — TICAGRELOR 90 MG: 90 TABLET ORAL at 08:52

## 2017-04-07 RX ADMIN — INSULIN DETEMIR 14 UNITS: 100 INJECTION, SOLUTION SUBCUTANEOUS at 20:50

## 2017-04-07 RX ADMIN — INSULIN ASPART 2 UNITS: 100 INJECTION, SOLUTION INTRAVENOUS; SUBCUTANEOUS at 20:56

## 2017-04-07 RX ADMIN — ATORVASTATIN CALCIUM 20 MG: 20 TABLET, FILM COATED ORAL at 20:49

## 2017-04-07 RX ADMIN — NICOTINE 1 PATCH: 21 PATCH TRANSDERMAL at 00:05

## 2017-04-07 RX ADMIN — RANOLAZINE 500 MG: 500 TABLET, FILM COATED, EXTENDED RELEASE ORAL at 09:00

## 2017-04-07 RX ADMIN — HYDROCODONE BITARTRATE AND ACETAMINOPHEN 1 TABLET: 7.5; 325 TABLET ORAL at 06:04

## 2017-04-07 RX ADMIN — BUSPIRONE HYDROCHLORIDE 10 MG: 5 TABLET ORAL at 08:51

## 2017-04-07 NOTE — PLAN OF CARE
Problem: Patient Care Overview (Adult)  Goal: Plan of Care Review  Outcome: Ongoing (interventions implemented as appropriate)    04/07/17 0550   Coping/Psychosocial Response Interventions   Plan Of Care Reviewed With patient;daughter   Patient Care Overview   Progress improving   Outcome Evaluation   Outcome Summary/Follow up Plan to control pain and nausea       Goal: Adult Individualization and Mutuality  Outcome: Ongoing (interventions implemented as appropriate)    Problem: Acute Coronary Syndrome (ACS) (Adult)  Goal: Signs and Symptoms of Listed Potential Problems Will be Absent or Manageable (Acute Coronary Syndrome)  Outcome: Ongoing (interventions implemented as appropriate)    04/07/17 0550   Acute Coronary Syndrome (ACS)   Problems Assessed (Acute Coronary Syndrome (ACS)) all   Problems Present (Acute Coronary Syndrome (ACS)) dysrhythmia/arrhythmia         Problem: Cardiac Catheterization with/without PCI (Adult)  Goal: Signs and Symptoms of Listed Potential Problems Will be Absent or Manageable (Cardiac Catheterization with/without PCI)  Outcome: Ongoing (interventions implemented as appropriate)    04/07/17 0550   Cardiac Catheterization with/without PCI   Problems Assessed (Cardiac Catheterization) all   Problems Present (Cardiac Catheterization) none

## 2017-04-07 NOTE — PLAN OF CARE
Problem: Patient Care Overview (Adult)  Goal: Plan of Care Review  Outcome: Ongoing (interventions implemented as appropriate)  Goal: Adult Individualization and Mutuality  Outcome: Ongoing (interventions implemented as appropriate)  Goal: Discharge Needs Assessment  Outcome: Ongoing (interventions implemented as appropriate)    Problem: Acute Coronary Syndrome (ACS) (Adult)  Goal: Signs and Symptoms of Listed Potential Problems Will be Absent or Manageable (Acute Coronary Syndrome)  Outcome: Ongoing (interventions implemented as appropriate)    Problem: Cardiac Catheterization with/without PCI (Adult)  Goal: Signs and Symptoms of Listed Potential Problems Will be Absent or Manageable (Cardiac Catheterization with/without PCI)  Outcome: Ongoing (interventions implemented as appropriate)

## 2017-04-08 VITALS
WEIGHT: 131.39 LBS | OXYGEN SATURATION: 96 % | BODY MASS INDEX: 24.81 KG/M2 | RESPIRATION RATE: 16 BRPM | HEART RATE: 73 BPM | HEIGHT: 61 IN | DIASTOLIC BLOOD PRESSURE: 55 MMHG | SYSTOLIC BLOOD PRESSURE: 109 MMHG | TEMPERATURE: 98 F

## 2017-04-08 PROBLEM — R07.9 CHEST PAIN AT REST: Status: RESOLVED | Noted: 2017-02-02 | Resolved: 2017-04-08

## 2017-04-08 PROBLEM — E87.6 HYPOKALEMIA: Status: RESOLVED | Noted: 2017-04-05 | Resolved: 2017-04-08

## 2017-04-08 LAB — GLUCOSE BLDC GLUCOMTR-MCNC: 183 MG/DL (ref 70–130)

## 2017-04-08 PROCEDURE — 82962 GLUCOSE BLOOD TEST: CPT

## 2017-04-08 PROCEDURE — 25010000002 ENOXAPARIN PER 10 MG: Performed by: FAMILY MEDICINE

## 2017-04-08 PROCEDURE — 99238 HOSP IP/OBS DSCHRG MGMT 30/<: CPT | Performed by: FAMILY MEDICINE

## 2017-04-08 PROCEDURE — 63710000001 INSULIN ASPART PER 5 UNITS: Performed by: FAMILY MEDICINE

## 2017-04-08 RX ORDER — LISINOPRIL 5 MG/1
5 TABLET ORAL DAILY
Qty: 30 TABLET | Refills: 2 | Status: SHIPPED | OUTPATIENT
Start: 2017-04-08 | End: 2017-11-03 | Stop reason: HOSPADM

## 2017-04-08 RX ADMIN — POTASSIUM CHLORIDE 40 MEQ: 750 CAPSULE, EXTENDED RELEASE ORAL at 08:47

## 2017-04-08 RX ADMIN — GLIPIZIDE 5 MG: 5 TABLET ORAL at 08:47

## 2017-04-08 RX ADMIN — RANOLAZINE 500 MG: 500 TABLET, FILM COATED, EXTENDED RELEASE ORAL at 08:47

## 2017-04-08 RX ADMIN — CARVEDILOL 3.12 MG: 3.12 TABLET, FILM COATED ORAL at 08:47

## 2017-04-08 RX ADMIN — HYDROCODONE BITARTRATE AND ACETAMINOPHEN 1 TABLET: 7.5; 325 TABLET ORAL at 03:04

## 2017-04-08 RX ADMIN — BUSPIRONE HYDROCHLORIDE 10 MG: 5 TABLET ORAL at 08:47

## 2017-04-08 RX ADMIN — INSULIN ASPART 2 UNITS: 100 INJECTION, SOLUTION INTRAVENOUS; SUBCUTANEOUS at 08:48

## 2017-04-08 RX ADMIN — TICAGRELOR 90 MG: 90 TABLET ORAL at 08:47

## 2017-04-08 RX ADMIN — ESCITALOPRAM OXALATE 20 MG: 10 TABLET ORAL at 08:47

## 2017-04-08 RX ADMIN — ASPIRIN 81 MG 81 MG: 81 TABLET ORAL at 08:47

## 2017-04-08 RX ADMIN — GABAPENTIN 1200 MG: 400 CAPSULE ORAL at 06:00

## 2017-04-08 RX ADMIN — NICOTINE 1 PATCH: 21 PATCH TRANSDERMAL at 00:45

## 2017-04-08 RX ADMIN — ENOXAPARIN SODIUM 40 MG: 40 INJECTION SUBCUTANEOUS at 08:54

## 2017-04-08 RX ADMIN — HYDROCODONE BITARTRATE AND ACETAMINOPHEN 1 TABLET: 7.5; 325 TABLET ORAL at 08:53

## 2017-04-08 NOTE — DISCHARGE SUMMARY
[address] [address]  T - [phone]     DISCHARGE SUMMARY         PATIENT  DEMOGRAPHICS   PATIENT NAME: Nikki Felder                      PHYSICIAN: Jordon Islas DO  : 1950  MRN: [id number]    ADMISSION/DISCHARGE INFO   ADMISSION DATE: 2017   DISCHARGE DATE:     ADMISSION DIAGNOSES: Hyperglycemia [R73.9]  Chest pain at rest [R07.9]  NSTEMI (non-ST elevated myocardial infarction) [I21.4]  NSTEMI (non-ST elevated myocardial infarction) [I21.4]  DISCHARGE DIAGNOSES:     Principal Problem:    NSTEMI (non-ST elevated myocardial infarction)  Active Problems:    Essential hypertension, benign    Anxiety    Tobacco dependence syndrome    Diabetes mellitus type 2 in nonobese    Noncompliance with medication regimen      SERVICE: Family Medicine  ATTENDING PROVIDER: Dr. Islas  RESIDENT: Jordon Islas DO     CONSULTS   Consult Orders (all)     Start     Ordered    17 1632  Inpatient Consult to Nutrition  Once     Provider:  (Not yet assigned)    17 1636    17 1157  Inpatient Consult to Case Management   Once     Provider:  (Not yet assigned)    17 1157    17 0006  Inpatient Consult to Cardiology  Once     Specialty:  Cardiology  Provider:  Raheel Martinez MD    17 0006    17 2227  Family Practice - Faculty (on-call MD unless specified)  Once     Specialty:  Family Medicine  Provider:  Yasmin Stewart MD    17 2228          PROCEDURES     Imaging Results (last 24 hours)     ** No results found for the last 24 hours. **          Xr Chest 1 View    Result Date: 2017  Narrative: Patient Name:  NIKKI FELDER Patient ID:  [id number] Ordering:  ELANA LOPEZ Attending:  ELANA LOPEZ Referring:  ELANA LOPEZ ------------------------------------------------ PORTABLE CHEST HISTORY: Chest pain. Central chest pain. Portable AP upright film of the chest was obtained at 8:22 PM.  COMPARISON: March 24, 2017 EKG leads in place. Linear atelectasis or scar lung bases. Old granulomatous disease Coronary artery stents. The heart is not enlarged. The pulmonary vasculature is not increased. No pleural effusion.. No pneumothorax. No acute osseous abnormality. Degenerative changes are present in the thoracic spine.     Impression: CONCLUSION: Linear atelectasis or scar lung bases. Coronary artery stents. [id number] Electronically signed by:  Viral Whitlock MD  4/4/2017 8:57 PM CDT Workstation: [facility]    Xr Chest 1 View    Result Date: 3/24/2017  Narrative: Patient Name:  NIKKI FELDER Patient ID:  [id number] Ordering:  [facility] Referring:  [facility] ------------------------------------------------ PORTABLE CHEST HISTORY: Generalized chest pain. Shortness of air. Portable AP upright film of the chest was obtained at 6:46 PM. COMPARISON: February 6, 2017 EKG leads in place. Linear scarring lung bases. Old granulomatous disease is present. The heart is not enlarged. The pulmonary vasculature is not increased. No pleural effusion. No pneumothorax. No acute osseous abnormality. Degenerative changes are present in the thoracic spine.     Impression: CONCLUSION: No Acute Disease [id number] Electronically signed by:  Viral Whitlock MD  3/24/2017 7:19 PM CDT Workstation: [facility]      HISTORY OF PRESENT ILLNESS   Nikki Felder is a 66 y.o. female noncompliant patient re-admitted with NSTEMI    DIAGNOSTIC DATA     Reason for the procedure: Chest pain class III elevated troponin suggestive of a non-Q myocardial infarction         Procedure performed:  1. Left heart catheterization.  2. PTCA and stenting of the 100% occluded first diagonal branch with deployment of a 2.0 mm x 15 mm mini vision stent  3. Iliofemoral arteriogram.     Procedure Details  The risks, benefits, complications, treatment options, and expected outcomes were discussed with the patient. The patient and/or family  concurred with the proposed plan, giving informed consent. Patient was brought to the cath lab after IV hydration was begun and oral premedication was given. She was further sedated with midazolam. She was prepped and draped in the usual manner.     Using 1% local lidocaine infiltration, a 6-Sinhala introducer sheath was inserted using the modified Seldinger technique in the right femoral artery without any difficulty. The left Gama catheter and advanced under fluoroscopic guidance up to the right coronary cusp. With minimal manipulation, the left coronary artery was cannulated and a selective angiogram of the left coronary artery was performed, using right and the left PIERCE, URBANO, and PA cranial-caudal views. The right coronary artery was not cannulated.        Hemodynamic   Aortic pressure: 140/70  Mean: 95  Left ventricular pressure: Not accessed.  Left ventriculogram: Not performed        Coronaries: Right dominant system     Left Main coronary artery: Left main coronary artery is free of any disease.     Left anterior descending arleft anterior descending artery was a medium caliber vessel which has sustained patency site of previous angioplasty and stenting. The first diagonal branch was 100% occluded beyond the stented area without any evidence of any antegrade flow suggestive of subacute in-stent thrombosis of the diagonal branch.      Circumflex arcircumflex artery was a medium caliber vessel with evidence of luminal irregularity.     Right coronary artery: The right coronary artery was not cannulated        Estimated Blood Loss: Minimal      Complications: None; patient tolerated the procedure well.     1. 100% occluded of the first diagonal branch.  2. SUSTAINED patency in the left anterior descending artery site of previous angioplasty and stenting With 20-30% stenosis.  3. No evidence of any obstructive disease noted in the circumflex artery.        Patient was recommended percutaneous intervention to  the diagonal branch.     PTCA and stent Report     The continuation of the left heart catheterization using a 6 French left Gama guiding catheter selective cannulation was done of the left coronary artery and angiogram of performed which confirmed the stenosis.     Angiomax bolus and drip was given. Integrilin bolus and was given. Trace PT wire was advanced into the distal end of the diagonal branch and predilatation was done with a 2.5 mm x 15 mm noncompliant balloon. The balloon was deflated pullback into the guiding catheter and a 2.0 mm x 15 mm mini vision stent was deployed in the distal end of the diagonal branch beyond the stented area with reduction of stenosis from 100% to less than 0% stenosis        Final impression successful PTCA and stenting of the 100% occluded diagonal branch #11 done in using a 2.5 mm x 15 mm noncompliant balloon with deployment of a 2 mm x 15 mm mini vision stent in the distal end of the diagonal branch.         Raheel Martinez MD  4/5/2017  3:05 PM     EMR Dragon/Transcription disclaimer:   Some of this note may be an electronic transcription/translation of spoken language to printed text. The electronic translation of spoken language may permit erroneous, or at times, nonsensical words or phrases to be inadvertently transcribed; Although I have reviewed the note for such errors, some may still exist.           Radiation          HOSPITAL COURSE   Chest pain resolved following cath and stenting.     DISCHARGE CONDITION   Stable and improved    DISPOSITION   Discharge home with f/u with cardiology and PCP     DISCHARGE MEDICATIONS      Nikki Felder Romelia   Home Medication Instructions ARUN:[id number]    Printed on:04/08/17 1122   Medication Information                      albuterol (PROVENTIL HFA;VENTOLIN HFA) 108 (90 BASE) MCG/ACT inhaler  Inhale 2 puffs every 4 (four) hours as needed for wheezing.             aspirin 81 MG tablet  Take 1 tablet by mouth Daily.              atorvastatin (LIPITOR) 20 MG tablet  Take 20 mg by mouth every night.             busPIRone (BUSPAR) 10 MG tablet  Take 1 tablet by mouth Every 12 (Twelve) Hours.             carvedilol (COREG) 3.125 MG tablet  Take 1 tablet by mouth 2 (Two) Times a Day.             clopidogrel (PLAVIX) 75 MG tablet  Take 1 tablet by mouth Daily.             escitalopram (LEXAPRO) 20 MG tablet  Take 1 tablet by mouth Daily.             gabapentin (NEURONTIN) 400 MG capsule  Take 400 mg by mouth 3 (three) times a day.             gabapentin (NEURONTIN) 800 MG tablet  Take 800 mg by mouth 3 (three) times a day.             glipiZIDE (GLUCOTROL) 5 MG tablet  Take 1 tablet by mouth 2 (Two) Times a Day Before Meals.             HYDROcodone-acetaminophen (NORCO) 7.5-325 MG per tablet  Take 1 tablet by mouth 3 (three) times a day as needed for moderate pain (4-6).             insulin detemir (LEVEMIR) 100 UNIT/ML injection  Inject 14 Units under the skin Every Night.             Insulin Pen Needle 33G X 8 MM misc  1 Units Every Night.             ipratropium-albuterol (COMBIVENT RESPIMAT)  MCG/ACT inhaler  Inhale 2 puffs every 4 (four) hours as needed for wheezing.             lisinopril (ZESTRIL) 5 MG tablet  Take 1 tablet by mouth Daily.             metFORMIN (GLUCOPHAGE) 1000 MG tablet  Take 1 tablet by mouth 2 (Two) Times a Day With Meals.             nicotine (NICODERM CQ) 21 MG/24HR patch  Place 1 patch on the skin every day.             nitroglycerin (NITRODUR) 0.2 MG/HR patch  Place 1 patch on the skin Daily.             ondansetron ODT (ZOFRAN ODT) 4 MG disintegrating tablet  Take 1 tablet by mouth Every 8 (Eight) Hours As Needed for nausea or vomiting.             ranolazine (RANEXA) 500 MG 12 hr tablet  Take 1 tablet by mouth Every 12 (Twelve) Hours.                   INSTRUCTIONS   Activity:   Activity Instructions     Activity as Tolerated                     Diet:   Diet Instructions     Diet: Cardiac; Pudding Thick  Liquids       Discharge Diet:  Cardiac   Fluid Consistency:  Pudding Thick Liquids                 Special Instructions: Patient instructed to call M.D. or return to ED with worsening shortness of breath, chest pain, fever greater than 100.4°F or any other medical concerns.    FOLLOW UP      PENDING TEST RESULTS AT DISCHARGE    Order Current Status    POC Glucose Fingerstick In process    POC Glucose Fingerstick In process         TIME   Time: 30 minutes were spent planning this discharge.          Dr. Islas  is the attending at time of discharge, He is aware of the patient's status and agrees with the above discharge summary.             This document has been electronically signed by Jordon Islas DO on April 8, 2017 11:29 AM

## 2017-04-08 NOTE — PROGRESS NOTES
LOS: 4 days   Patient Care Team:  Yasmin Stewart MD as PCP - General    Chief Complaint: NSTEMI (non-ST elevated myocardial infarction)    Subjective     Interval History:     Feeling better. Little if any chest pain. Ambulating without difficulty. Ready to go home    Patient Complaints: none  Patient Denies:  Chest pain  History taken from: patient    Review of Systems:    Review of Systems   Constitutional: Negative for activity change, appetite change, fatigue and fever.   HENT: Negative for ear pain and sore throat.    Eyes: Negative for pain and visual disturbance.   Respiratory: Positive for shortness of breath. Negative for cough.    Cardiovascular: Negative for chest pain and palpitations.   Gastrointestinal: Negative for abdominal pain and nausea.   Endocrine: Negative for cold intolerance and heat intolerance.   Genitourinary: Negative for difficulty urinating and dysuria.   Musculoskeletal: Negative for arthralgias and gait problem.   Skin: Negative for color change and rash.   Neurological: Negative for dizziness, weakness and headaches.   Hematological: Negative for adenopathy. Does not bruise/bleed easily.   Psychiatric/Behavioral: Negative for agitation, confusion and sleep disturbance.       Objective     Vital Signs  Temp:  [97 °F (36.1 °C)-98.7 °F (37.1 °C)] 98.1 °F (36.7 °C)  Heart Rate:  [62-71] 62  Resp:  [16-18] 16  BP: (111-123)/(55-62) 123/62    Physical Exam:   Physical Exam   Constitutional: She is oriented to person, place, and time. She appears well-developed and well-nourished.   HENT:   Head: Normocephalic and atraumatic.   Right Ear: External ear normal.   Left Ear: External ear normal.   Nose: Nose normal.   Mouth/Throat: Oropharynx is clear and moist.   Eyes: Conjunctivae and EOM are normal.   Neck: Normal range of motion. Neck supple.   Cardiovascular: Normal rate, regular rhythm and normal heart sounds.    Pulmonary/Chest: Effort normal. She has decreased breath sounds in the  right upper field, the right middle field, the right lower field, the left upper field, the left middle field and the left lower field.   Abdominal: Soft. Bowel sounds are normal. She exhibits no distension. There is no tenderness.   Musculoskeletal: Normal range of motion.   Neurological: She is alert and oriented to person, place, and time.   Skin: Skin is warm and dry.   Psychiatric: She has a normal mood and affect. Her speech is normal and behavior is normal. Cognition and memory are normal.        Results Review:       Lab Results (last 24 hours)     Procedure Component Value Units Date/Time    POC Glucose Fingerstick [[id number]]  (Abnormal) Collected:  04/07/17 1045    Specimen:  Blood Updated:  04/07/17 1059     Glucose 228 (H) mg/dL       RN NotifiedMeter: [id number]: [id number] GONZALEZ ANKUR       POC Glucose Fingerstick [[id number]]  (Abnormal) Collected:  04/07/17 1651    Specimen:  Blood Updated:  04/07/17 1703     Glucose 163 (H) mg/dL       RN NotifiedMeter: [id number]: [id number] GONZALEZ MALIKIA       POC Glucose Fingerstick [[id number]]  (Abnormal) Collected:  04/07/17 2043    Specimen:  Blood Updated:  04/07/17 2056     Glucose 157 (H) mg/dL       RN NotifiedMeter: [id number]: [id number] PARTH GAVIRIA             Medication Review:   Current Facility-Administered Medications   Medication Dose Route Frequency Provider Last Rate Last Dose   • albuterol (PROVENTIL) nebulizer solution 0.083% 2.5 mg/3mL  2.5 mg Nebulization Q4H PRN Yasmin Stewart MD       • aspirin chewable tablet 81 mg  81 mg Oral Daily Yasmin Stewart MD   81 mg at 04/08/17 0847   • atorvastatin (LIPITOR) tablet 20 mg  20 mg Oral Nightly Yasmin Stewart MD   20 mg at 04/07/17 2049   • busPIRone (BUSPAR) tablet 10 mg  10 mg Oral Q12H Yasmin Stewart MD   10 mg at 04/08/17 0847   • carvedilol (COREG) tablet 3.125 mg  3.125 mg Oral BID Yasmin Stewart MD   3.125 mg at 04/08/17 0847   • dextrose (D50W) solution 25 g  25 g  Intravenous Q15 Min PRN Yasmin Stewart MD       • dextrose (GLUTOSE) oral gel 15 g  15 g Oral Q15 Min PRN Yasmin Stewart MD       • enoxaparin (LOVENOX) syringe 40 mg  40 mg Subcutaneous Daily Yasmin Stewart MD   40 mg at 04/08/17 0854   • escitalopram (LEXAPRO) tablet 20 mg  20 mg Oral Daily Yasmin Stewart MD   20 mg at 04/08/17 0847   • gabapentin (NEURONTIN) capsule 1,200 mg  1,200 mg Oral Q8H Yasmin Stewart MD   1,200 mg at 04/08/17 0600   • glipiZIDE (GLUCOTROL) tablet 5 mg  5 mg Oral BID AC Yasmin Stewart MD   5 mg at 04/08/17 0847   • glucagon (human recombinant) (GLUCAGEN DIAGNOSTIC) injection 1 mg  1 mg Subcutaneous Q15 Min PRN Yasmin Stewart MD       • HYDROcodone-acetaminophen (NORCO) 7.5-325 MG per tablet 1 tablet  1 tablet Oral Q6H PRN Sharona Larsen MD   1 tablet at 04/08/17 0853   • insulin aspart (novoLOG) injection 0-7 Units  0-7 Units Subcutaneous 4x Daily AC & at Bedtime Yasmin Stewart MD   2 Units at 04/08/17 0848   • insulin detemir (LEVEMIR) injection 14 Units  14 Units Subcutaneous Nightly Yasmin Stewart MD   14 Units at 04/07/17 2050   • Morphine sulfate (PF) injection 2 mg  2 mg Intravenous Q4H PRN Yasmin Stewart MD   2 mg at 04/07/17 1929   • nicotine (NICODERM CQ) 21 MG/24HR patch 1 patch  1 patch Transdermal Q24H Yasmin Stewart MD   1 patch at 04/08/17 0045   • nitroglycerin (NITROSTAT) ointment 1 inch  1 inch Topical Once Yasmin Stewart MD       • nitroglycerin (NITROSTAT) SL tablet 0.4 mg  0.4 mg Sublingual Q5 Min PRN Jordon Islas DO   0.4 mg at 04/06/17 1157   • ondansetron (ZOFRAN) 8 mg in sodium chloride 0.9 % 50 mL IVPB  8 mg Intravenous Q6H PRN Jordon Islas DO        Or   • ondansetron (ZOFRAN) tablet 4 mg  4 mg Oral Q6H PRN Jordon Islas DO   4 mg at 04/07/17 0604    Or   • ondansetron ODT (ZOFRAN-ODT) disintegrating tablet 4 mg  4 mg Oral Q6H PRN Jordon Islas DO       • potassium chloride (MICRO-K) CR capsule 40 mEq  40 mEq Oral Daily Yasmin Stewart,  MD   40 mEq at 04/08/17 0847   • ranolazine (RANEXA) 12 hr tablet 500 mg  500 mg Oral Q12H Yasmin Stewart MD   500 mg at 04/08/17 0847   • sodium chloride 0.9 % flush 1-10 mL  1-10 mL Intravenous PRN Yasmin Stewart MD       • sodium chloride 0.9 % flush 10 mL  10 mL Intravenous PRN Ramirez Sanches MD   10 mL at 04/04/17 2057   • ticagrelor (BRILINTA) tablet 90 mg  90 mg Oral BID Raheel Martinez MD   90 mg at 04/08/17 0847       Assessment/Plan     Principal Problem:    NSTEMI (non-ST elevated myocardial infarction)  Active Problems:    Essential hypertension, benign    Anxiety    Chest pain at rest    Tobacco dependence syndrome    Diabetes mellitus type 2 in nonobese    Noncompliance with medication regimen    Hypokalemia    Plan    Recovering from NSTEMI.      Plan for disposition:Where: home today if no problems.          This document has been electronically signed by Jordon Islas DO on April 8, 2017 9:24 AM

## 2017-04-12 ENCOUNTER — TELEPHONE (OUTPATIENT)
Dept: FAMILY MEDICINE CLINIC | Facility: CLINIC | Age: 67
End: 2017-04-12

## 2017-04-12 NOTE — TELEPHONE ENCOUNTER
Home health called regarding several medication issues.  Pt has samples of Brilinta but plavix on medicine list.  Advised to finish Brilinta samples and then switch to Plavix.  Would recommend going to [facility] because they have programs to assist with medications that we do not have.  Lantus sample was in place of the Levemir on her medicine list since I do not have samples of Levemir.  Per nursing pt had bottle of plavix in the house she just had not been taking it.

## 2017-04-13 NOTE — DISCHARGE SUMMARY
DISCHARGE SUMMARY       Date of Admission: 3/24/2017  Date of Discharge:  4/13/2017  Primary Care Physician: Yasmin Stewart MD    Presenting Problem/History of Present Illness:  Chest pain, unspecified type [R07.9]       Final Discharge Diagnoses:  Active Hospital Problems (** Indicates Principal Problem)    Diagnosis Date Noted   • Atherosclerosis of native coronary artery of native heart with angina pectoris [I25.119] 10/08/2016     Priority: High   • Noncompliance with medication regimen [Z91.14] 03/31/2017   • Tobacco dependence syndrome [F17.200] 02/02/2017   • Type 2 diabetes mellitus with hyperglycemia, without long-term current use of insulin [E11.65] 11/09/2016   • Depression [F32.9] 11/09/2016      Resolved Hospital Problems    Diagnosis Date Noted Date Resolved   • **Chest pain [R07.9] 02/06/2017 03/31/2017       Consults:   Consults     Date and Time Order Name Status Description    3/26/2017 0939 Inpatient Consult to Cardiology Completed           Procedures Performed: Procedure(s):  Left Heart Cath                Pertinent Test Results:     Chief Complaint on Day of Discharge: Chest pain    Hospital Course:  The patient is a 66 y.o. female who presented to [facility] with chest pain.  Patient was originally admitted over the weekend with chest pain.  Cardiology was not available for the first 24 hours and was then consulted on Sunday. Cardiology recommended her seeing her own cardiologist Dr. Martinez on Monday.  Patient had previously been on Coumadin for a thrombus in her heart.  Patient has been noncompliant with her medications.  Dr. Martinez ordered a heart catheter.  He wanted her INR to be 1.2-1.3 before the procedure, so patient was given vitamin K to reverse the Coumadin.  Patient underwent a heart catheter and had to have stents placed again.  Patient was then transferred to the stepdown unit for monitoring.  Prior to her heart catheter patient was requesting nursing home  placement so case management was consulted.  Patient then decided not to go the nursing home after her daughter got house arrest instead of being sent to [facility].  Patient was educated on smoking cessation.  She was advised on her uncontrolled sugars and the need for insulin.  Patient had dietary consultation for diabetic education.  Patient's adamantly stated that she was taking all of her medications however, when confronted the patient finally admitted to not taking her medications.  The pharmacy was called and stated the only medication she had been getting filled was her Norco and her Neurontin.  Patient had missed her previous appointment with pain management and had been out of her Norco.  She had not been getting her Coumadin filled nor her Plavix.  Patient was educated that this is why her stents keep clotting off.    Repeat echo did not show cardiac thrombus so it was decided to take her off Coumadin at that time  Patient was originally going to be placed on Brilinta however the medication was over $300 and patient cannot afford that.  It was thought that the patient was clotting off while taking the Plavix however she was not getting the Plavix filled nor taking the medication so that was causing her stents to clot off.  Patient was told she is going home with home health and she complained of chest pain.  Patient had a stat EKG and cardiac enzymes were normal.  Home health called the patient and she stated she was not one home because she was having chest pain still.  Patient was advised that there was no further workup needed at this time as she just had the stent placed and she was stable to go home.  Patient was educated on taking all of her medications as prescribed.  She was advised to stop smoking cigarettes and to use that money to buy her medication.  Patient was wanting rehabilitation placement however she did not qualify for rehabilitation.  Patient was advised that she was going to die if she did  "not take her medications as directed.    Condition on Discharge:  Stable  Review of Systems   Constitutional: Positive for fatigue.   Respiratory: Positive for cough. Negative for shortness of breath.    Cardiovascular: Positive for chest pain. Negative for palpitations and leg swelling.   Gastrointestinal: Positive for nausea. Negative for vomiting.   Musculoskeletal: Positive for arthralgias, back pain, myalgias, neck pain and neck stiffness.   Neurological: Positive for weakness.   Psychiatric/Behavioral: The patient is nervous/anxious.        Physical Exam on Discharge:  /60 (BP Location: Right arm, Patient Position: Lying)  Pulse 82  Temp 97.3 °F (36.3 °C) (Temporal Artery )   Resp 16  Ht 61\" (154.9 cm)  Wt 139 lb 15.9 oz (63.5 kg)  LMP  (LMP Unknown)  SpO2 94%  BMI 26.45 kg/m2  Physical Exam   Constitutional: She appears well-developed and well-nourished. No distress.   Cardiovascular: Normal rate, regular rhythm, normal heart sounds and intact distal pulses.  Exam reveals no gallop and no friction rub.    No murmur heard.  Pulmonary/Chest: Effort normal and breath sounds normal. No respiratory distress. She has no wheezes. She has no rales.   Abdominal: Soft. Bowel sounds are normal. She exhibits no distension. There is no tenderness. There is no rebound and no guarding.   Psychiatric: She has a normal mood and affect. Her behavior is normal. Judgment and thought content normal.   Nursing note and vitals reviewed.        Discharge Disposition: Home with home health      Discharge Medications:   Nikki Felder   Home Medication Instructions ARUN:[id number]    Printed on:04/13/17 0054   Medication Information                      albuterol (PROVENTIL HFA;VENTOLIN HFA) 108 (90 BASE) MCG/ACT inhaler  Inhale 2 puffs every 4 (four) hours as needed for wheezing.             aspirin 81 MG tablet  Take 1 tablet by mouth Daily.             atorvastatin (LIPITOR) 20 MG tablet  Take 20 mg by mouth " every night.             busPIRone (BUSPAR) 10 MG tablet  Take 1 tablet by mouth Every 12 (Twelve) Hours.             gabapentin (NEURONTIN) 400 MG capsule  Take 400 mg by mouth 3 (three) times a day.             gabapentin (NEURONTIN) 800 MG tablet  Take 800 mg by mouth 3 (three) times a day.             HYDROcodone-acetaminophen (NORCO) 7.5-325 MG per tablet  Take 1 tablet by mouth 3 (three) times a day as needed for moderate pain (4-6).             insulin detemir (LEVEMIR) 100 UNIT/ML injection  Inject 14 Units under the skin Every Night.             ipratropium-albuterol (COMBIVENT RESPIMAT)  MCG/ACT inhaler  Inhale 2 puffs every 4 (four) hours as needed for wheezing.             nicotine (NICODERM CQ) 21 MG/24HR patch  Place 1 patch on the skin every day.             nitroglycerin (NITRODUR) 0.2 MG/HR patch  Place 1 patch on the skin Daily.             ondansetron ODT (ZOFRAN ODT) 4 MG disintegrating tablet  Take 1 tablet by mouth Every 8 (Eight) Hours As Needed for nausea or vomiting.             ranolazine (RANEXA) 500 MG 12 hr tablet  Take 1 tablet by mouth Every 12 (Twelve) Hours.                 Discharge Diet:   Diet Instructions     Diet:       Diet Texture / Consistency:  Regular   Fluid Consistency:  Thin   Common Modifiers:   Consistent Carbohydrate  Cardiac                    Activity at Discharge:   Activity Instructions     Activity as Tolerated               Additional Activity Instructions:      No lifting greater than 10 lbs for the next 5 days. Shower daily. Rinse groin site gently with soap and water. Pat dry and cover with bandaid. No tub baths or swimming for 1 week. Notify cardiologist for any redness, drainage, or fever to groin site. If bleeding or rapid swelling occurs, lay down flat, hold pressure, and call 911.                    Special Instructions: Patient instructed to call MD or return to ED with worsening shortness of breath, chest pain, fever greater than 100.4 degrees F  or any other medical concerns patient may have.   Follow-up Appointments:   Follow-up Information     Follow up with [facility] REFERRAL .    Specialty:  Home Health Services    Contact information:    [address] [place] [address]          Follow up with [facility] .    Specialty:  Home Health Services    Contact information:    [address] [facility] [address] [phone]        Follow up with Yasmin Stewart MD .    Specialty:  Family Medicine    Contact information:    [address] [place] [address] [phone]          Future Appointments  Date Time Provider Department Center   4/27/2017 8:50 AM Yan Melendez MD [facility] None                 This document has been electronically signed by Yasmin Stewart MD on April 13, 2017 2:26 AM          This document has been electronically signed by Yasmin Stewart MD on April 13, 2017 2:26 AM     Time: 35

## 2017-04-20 ENCOUNTER — HOSPITAL ENCOUNTER (OUTPATIENT)
Facility: HOSPITAL | Age: 67
Setting detail: OBSERVATION
Discharge: HOME HEALTH | End: 2017-04-22
Attending: FAMILY MEDICINE | Admitting: FAMILY MEDICINE

## 2017-04-20 DIAGNOSIS — I25.119 ATHEROSCLEROSIS OF NATIVE CORONARY ARTERY OF NATIVE HEART WITH ANGINA PECTORIS (HCC): ICD-10-CM

## 2017-04-20 DIAGNOSIS — R07.89 OTHER CHEST PAIN: Primary | ICD-10-CM

## 2017-04-20 DIAGNOSIS — R53.1 WEAKNESS GENERALIZED: ICD-10-CM

## 2017-04-20 DIAGNOSIS — Z91.14 NONCOMPLIANCE WITH MEDICATION REGIMEN: ICD-10-CM

## 2017-04-20 DIAGNOSIS — I21.4 NSTEMI (NON-ST ELEVATED MYOCARDIAL INFARCTION) (HCC): ICD-10-CM

## 2017-04-20 DIAGNOSIS — F32.A DEPRESSION, UNSPECIFIED DEPRESSION TYPE: ICD-10-CM

## 2017-04-20 DIAGNOSIS — E11.65 TYPE 2 DIABETES MELLITUS WITH HYPERGLYCEMIA, WITHOUT LONG-TERM CURRENT USE OF INSULIN (HCC): ICD-10-CM

## 2017-04-20 DIAGNOSIS — I25.119 ATHEROSCLEROSIS OF NATIVE CORONARY ARTERY OF NATIVE HEART WITH ANGINA PECTORIS (HCC): Primary | ICD-10-CM

## 2017-04-20 PROBLEM — R07.9 CHEST PAIN: Status: ACTIVE | Noted: 2017-04-20

## 2017-04-20 LAB
ALBUMIN SERPL-MCNC: 3.6 G/DL (ref 3.4–4.8)
ALBUMIN/GLOB SERPL: 1.3 G/DL (ref 1.1–1.8)
ALP SERPL-CCNC: 75 U/L (ref 38–126)
ALT SERPL W P-5'-P-CCNC: 22 U/L (ref 9–52)
ANION GAP SERPL CALCULATED.3IONS-SCNC: 9 MMOL/L (ref 5–15)
AST SERPL-CCNC: 20 U/L (ref 14–36)
BASOPHILS # BLD AUTO: 0.03 10*3/MM3 (ref 0–0.2)
BASOPHILS NFR BLD AUTO: 0.6 % (ref 0–2)
BILIRUB SERPL-MCNC: 0.4 MG/DL (ref 0.2–1.3)
BUN BLD-MCNC: 8 MG/DL (ref 7–21)
BUN/CREAT SERPL: 12.9 (ref 7–25)
CALCIUM SPEC-SCNC: 8.8 MG/DL (ref 8.4–10.2)
CHLORIDE SERPL-SCNC: 110 MMOL/L (ref 95–110)
CK MB SERPL-CCNC: 1.41 NG/ML (ref 0–5)
CK MB SERPL-CCNC: 1.43 NG/ML (ref 0–5)
CK SERPL-CCNC: 35 U/L (ref 30–135)
CK SERPL-CCNC: 42 U/L (ref 30–135)
CO2 SERPL-SCNC: 18 MMOL/L (ref 22–31)
CREAT BLD-MCNC: 0.62 MG/DL (ref 0.5–1)
DEPRECATED RDW RBC AUTO: 45.8 FL (ref 36.4–46.3)
EOSINOPHIL # BLD AUTO: 0.15 10*3/MM3 (ref 0–0.7)
EOSINOPHIL NFR BLD AUTO: 3.1 % (ref 0–7)
ERYTHROCYTE [DISTWIDTH] IN BLOOD BY AUTOMATED COUNT: 13.9 % (ref 11.5–14.5)
GFR SERPL CREATININE-BSD FRML MDRD: 96 ML/MIN/1.73 (ref 45–104)
GLOBULIN UR ELPH-MCNC: 2.7 GM/DL (ref 2.3–3.5)
GLUCOSE BLD-MCNC: 89 MG/DL (ref 60–100)
GLUCOSE BLDC GLUCOMTR-MCNC: 192 MG/DL (ref 70–130)
HCT VFR BLD AUTO: 34.2 % (ref 35–45)
HGB BLD-MCNC: 11.7 G/DL (ref 12–15.5)
HOLD SPECIMEN: NORMAL
IMM GRANULOCYTES # BLD: 0.01 10*3/MM3 (ref 0–0.02)
IMM GRANULOCYTES NFR BLD: 0.2 % (ref 0–0.5)
LYMPHOCYTES # BLD AUTO: 1.72 10*3/MM3 (ref 0.6–4.2)
LYMPHOCYTES NFR BLD AUTO: 35.4 % (ref 10–50)
MCH RBC QN AUTO: 31.3 PG (ref 26.5–34)
MCHC RBC AUTO-ENTMCNC: 34.2 G/DL (ref 31.4–36)
MCV RBC AUTO: 91.4 FL (ref 80–98)
MONOCYTES # BLD AUTO: 0.31 10*3/MM3 (ref 0–0.9)
MONOCYTES NFR BLD AUTO: 6.4 % (ref 0–12)
NEUTROPHILS # BLD AUTO: 2.64 10*3/MM3 (ref 2–8.6)
NEUTROPHILS NFR BLD AUTO: 54.3 % (ref 37–80)
NT-PROBNP SERPL-MCNC: 775 PG/ML (ref 0–900)
PLATELET # BLD AUTO: 282 10*3/MM3 (ref 150–450)
PMV BLD AUTO: 8.1 FL (ref 8–12)
POTASSIUM BLD-SCNC: 3.3 MMOL/L (ref 3.5–5.1)
PROT SERPL-MCNC: 6.3 G/DL (ref 6.3–8.6)
RBC # BLD AUTO: 3.74 10*6/MM3 (ref 3.77–5.16)
SODIUM BLD-SCNC: 137 MMOL/L (ref 137–145)
TROPONIN I SERPL-MCNC: <0.012 NG/ML
TROPONIN I SERPL-MCNC: <0.012 NG/ML
WBC NRBC COR # BLD: 4.86 10*3/MM3 (ref 3.2–9.8)
WHOLE BLOOD HOLD SPECIMEN: NORMAL
WHOLE BLOOD HOLD SPECIMEN: NORMAL

## 2017-04-20 PROCEDURE — 93010 ELECTROCARDIOGRAM REPORT: CPT | Performed by: INTERNAL MEDICINE

## 2017-04-20 PROCEDURE — 94799 UNLISTED PULMONARY SVC/PX: CPT

## 2017-04-20 PROCEDURE — 85025 COMPLETE CBC W/AUTO DIFF WBC: CPT | Performed by: FAMILY MEDICINE

## 2017-04-20 PROCEDURE — 80053 COMPREHEN METABOLIC PANEL: CPT | Performed by: FAMILY MEDICINE

## 2017-04-20 PROCEDURE — 99285 EMERGENCY DEPT VISIT HI MDM: CPT

## 2017-04-20 PROCEDURE — 93005 ELECTROCARDIOGRAM TRACING: CPT | Performed by: EMERGENCY MEDICINE

## 2017-04-20 PROCEDURE — 82550 ASSAY OF CK (CPK): CPT | Performed by: FAMILY MEDICINE

## 2017-04-20 PROCEDURE — 96376 TX/PRO/DX INJ SAME DRUG ADON: CPT

## 2017-04-20 PROCEDURE — 63710000001 INSULIN ASPART PER 5 UNITS: Performed by: FAMILY MEDICINE

## 2017-04-20 PROCEDURE — 82962 GLUCOSE BLOOD TEST: CPT

## 2017-04-20 PROCEDURE — 82553 CREATINE MB FRACTION: CPT | Performed by: FAMILY MEDICINE

## 2017-04-20 PROCEDURE — G0378 HOSPITAL OBSERVATION PER HR: HCPCS

## 2017-04-20 PROCEDURE — 25010000002 MORPHINE PER 10 MG: Performed by: FAMILY MEDICINE

## 2017-04-20 PROCEDURE — 25010000002 ONDANSETRON PER 1 MG: Performed by: FAMILY MEDICINE

## 2017-04-20 PROCEDURE — 63710000001 INSULIN DETEMIR PER 5 UNITS: Performed by: FAMILY MEDICINE

## 2017-04-20 PROCEDURE — 93005 ELECTROCARDIOGRAM TRACING: CPT

## 2017-04-20 PROCEDURE — 83880 ASSAY OF NATRIURETIC PEPTIDE: CPT | Performed by: FAMILY MEDICINE

## 2017-04-20 PROCEDURE — 99220 PR INITIAL OBSERVATION CARE/DAY 70 MINUTES: CPT | Performed by: FAMILY MEDICINE

## 2017-04-20 PROCEDURE — 84484 ASSAY OF TROPONIN QUANT: CPT | Performed by: FAMILY MEDICINE

## 2017-04-20 PROCEDURE — 96375 TX/PRO/DX INJ NEW DRUG ADDON: CPT

## 2017-04-20 PROCEDURE — 94760 N-INVAS EAR/PLS OXIMETRY 1: CPT

## 2017-04-20 RX ORDER — HYDROCODONE BITARTRATE AND ACETAMINOPHEN 7.5; 325 MG/1; MG/1
1 TABLET ORAL 3 TIMES DAILY PRN
Status: DISCONTINUED | OUTPATIENT
Start: 2017-04-20 | End: 2017-04-22 | Stop reason: HOSPADM

## 2017-04-20 RX ORDER — LISINOPRIL 5 MG/1
5 TABLET ORAL DAILY
Status: DISCONTINUED | OUTPATIENT
Start: 2017-04-21 | End: 2017-04-22 | Stop reason: HOSPADM

## 2017-04-20 RX ORDER — ONDANSETRON 2 MG/ML
4 INJECTION INTRAMUSCULAR; INTRAVENOUS EVERY 6 HOURS PRN
Status: DISCONTINUED | OUTPATIENT
Start: 2017-04-20 | End: 2017-04-22 | Stop reason: HOSPADM

## 2017-04-20 RX ORDER — ONDANSETRON 4 MG/1
4 TABLET, ORALLY DISINTEGRATING ORAL EVERY 6 HOURS PRN
Status: DISCONTINUED | OUTPATIENT
Start: 2017-04-20 | End: 2017-04-22 | Stop reason: HOSPADM

## 2017-04-20 RX ORDER — DEXTROSE MONOHYDRATE 25 G/50ML
25 INJECTION, SOLUTION INTRAVENOUS
Status: DISCONTINUED | OUTPATIENT
Start: 2017-04-20 | End: 2017-04-22 | Stop reason: HOSPADM

## 2017-04-20 RX ORDER — RANOLAZINE 500 MG/1
500 TABLET, EXTENDED RELEASE ORAL EVERY 12 HOURS SCHEDULED
Status: DISCONTINUED | OUTPATIENT
Start: 2017-04-20 | End: 2017-04-22 | Stop reason: HOSPADM

## 2017-04-20 RX ORDER — BUSPIRONE HYDROCHLORIDE 5 MG/1
10 TABLET ORAL EVERY 12 HOURS SCHEDULED
Status: DISCONTINUED | OUTPATIENT
Start: 2017-04-20 | End: 2017-04-22 | Stop reason: HOSPADM

## 2017-04-20 RX ORDER — ASPIRIN 325 MG
325 TABLET, DELAYED RELEASE (ENTERIC COATED) ORAL DAILY
Status: DISCONTINUED | OUTPATIENT
Start: 2017-04-21 | End: 2017-04-22 | Stop reason: HOSPADM

## 2017-04-20 RX ORDER — GABAPENTIN 400 MG/1
800 CAPSULE ORAL 3 TIMES DAILY
Status: DISCONTINUED | OUTPATIENT
Start: 2017-04-20 | End: 2017-04-22 | Stop reason: HOSPADM

## 2017-04-20 RX ORDER — ATORVASTATIN CALCIUM 20 MG/1
20 TABLET, FILM COATED ORAL NIGHTLY
Qty: 90 TABLET | Refills: 3 | Status: SHIPPED | OUTPATIENT
Start: 2017-04-20 | End: 2017-07-21 | Stop reason: HOSPADM

## 2017-04-20 RX ORDER — NICOTINE 21 MG/24HR
1 PATCH, TRANSDERMAL 24 HOURS TRANSDERMAL EVERY 24 HOURS
Status: DISCONTINUED | OUTPATIENT
Start: 2017-04-20 | End: 2017-04-22 | Stop reason: HOSPADM

## 2017-04-20 RX ORDER — SODIUM CHLORIDE 0.9 % (FLUSH) 0.9 %
1-10 SYRINGE (ML) INJECTION AS NEEDED
Status: DISCONTINUED | OUTPATIENT
Start: 2017-04-20 | End: 2017-04-22 | Stop reason: HOSPADM

## 2017-04-20 RX ORDER — GABAPENTIN 400 MG/1
400 CAPSULE ORAL 3 TIMES DAILY
Status: DISCONTINUED | OUTPATIENT
Start: 2017-04-20 | End: 2017-04-22 | Stop reason: HOSPADM

## 2017-04-20 RX ORDER — ESCITALOPRAM OXALATE 10 MG/1
20 TABLET ORAL NIGHTLY
Status: DISCONTINUED | OUTPATIENT
Start: 2017-04-20 | End: 2017-04-22 | Stop reason: HOSPADM

## 2017-04-20 RX ORDER — ONDANSETRON 4 MG/1
4 TABLET, FILM COATED ORAL EVERY 6 HOURS PRN
Status: DISCONTINUED | OUTPATIENT
Start: 2017-04-20 | End: 2017-04-22 | Stop reason: HOSPADM

## 2017-04-20 RX ORDER — MORPHINE SULFATE 4 MG/ML
4 INJECTION, SOLUTION INTRAMUSCULAR; INTRAVENOUS ONCE
Status: COMPLETED | OUTPATIENT
Start: 2017-04-20 | End: 2017-04-20

## 2017-04-20 RX ORDER — POTASSIUM CHLORIDE 750 MG/1
30 CAPSULE, EXTENDED RELEASE ORAL ONCE
Status: COMPLETED | OUTPATIENT
Start: 2017-04-20 | End: 2017-04-20

## 2017-04-20 RX ORDER — NICOTINE POLACRILEX 4 MG
15 LOZENGE BUCCAL
Status: DISCONTINUED | OUTPATIENT
Start: 2017-04-20 | End: 2017-04-22 | Stop reason: HOSPADM

## 2017-04-20 RX ORDER — ATORVASTATIN CALCIUM 20 MG/1
20 TABLET, FILM COATED ORAL NIGHTLY
Status: DISCONTINUED | OUTPATIENT
Start: 2017-04-20 | End: 2017-04-22 | Stop reason: HOSPADM

## 2017-04-20 RX ORDER — ONDANSETRON 2 MG/ML
4 INJECTION INTRAMUSCULAR; INTRAVENOUS ONCE
Status: COMPLETED | OUTPATIENT
Start: 2017-04-20 | End: 2017-04-20

## 2017-04-20 RX ORDER — CARVEDILOL 3.12 MG/1
3.12 TABLET ORAL 2 TIMES DAILY
Status: DISCONTINUED | OUTPATIENT
Start: 2017-04-20 | End: 2017-04-22 | Stop reason: HOSPADM

## 2017-04-20 RX ORDER — RANOLAZINE 500 MG/1
500 TABLET, EXTENDED RELEASE ORAL EVERY 12 HOURS SCHEDULED
Qty: 180 TABLET | Refills: 3 | Status: SHIPPED | OUTPATIENT
Start: 2017-04-20 | End: 2017-05-27

## 2017-04-20 RX ORDER — GLIPIZIDE 5 MG/1
5 TABLET ORAL
Status: DISCONTINUED | OUTPATIENT
Start: 2017-04-21 | End: 2017-04-22 | Stop reason: HOSPADM

## 2017-04-20 RX ADMIN — ONDANSETRON 4 MG: 2 INJECTION INTRAMUSCULAR; INTRAVENOUS at 15:57

## 2017-04-20 RX ADMIN — ESCITALOPRAM OXALATE 20 MG: 10 TABLET ORAL at 21:46

## 2017-04-20 RX ADMIN — NITROGLYCERIN 1 INCH: 20 OINTMENT TOPICAL at 15:59

## 2017-04-20 RX ADMIN — GABAPENTIN 400 MG: 400 CAPSULE ORAL at 21:48

## 2017-04-20 RX ADMIN — RANOLAZINE 500 MG: 500 TABLET, FILM COATED, EXTENDED RELEASE ORAL at 21:46

## 2017-04-20 RX ADMIN — MORPHINE SULFATE 4 MG: 4 INJECTION, SOLUTION INTRAMUSCULAR; INTRAVENOUS at 15:56

## 2017-04-20 RX ADMIN — CARVEDILOL 3.12 MG: 3.12 TABLET, FILM COATED ORAL at 21:46

## 2017-04-20 RX ADMIN — GABAPENTIN 800 MG: 400 CAPSULE ORAL at 21:45

## 2017-04-20 RX ADMIN — HYDROCODONE BITARTRATE AND ACETAMINOPHEN 1 TABLET: 7.5; 325 TABLET ORAL at 19:50

## 2017-04-20 RX ADMIN — BUSPIRONE HYDROCHLORIDE 10 MG: 5 TABLET ORAL at 21:46

## 2017-04-20 RX ADMIN — TICAGRELOR 90 MG: 90 TABLET ORAL at 21:46

## 2017-04-20 RX ADMIN — POTASSIUM CHLORIDE 30 MEQ: 750 CAPSULE, EXTENDED RELEASE ORAL at 17:25

## 2017-04-20 RX ADMIN — ATORVASTATIN CALCIUM 20 MG: 20 TABLET, FILM COATED ORAL at 21:46

## 2017-04-20 RX ADMIN — NICOTINE 1 PATCH: 14 PATCH, EXTENDED RELEASE TRANSDERMAL at 21:44

## 2017-04-20 RX ADMIN — INSULIN ASPART 2 UNITS: 100 INJECTION, SOLUTION INTRAVENOUS; SUBCUTANEOUS at 21:43

## 2017-04-20 RX ADMIN — INSULIN DETEMIR 14 UNITS: 100 INJECTION, SOLUTION SUBCUTANEOUS at 21:44

## 2017-04-20 NOTE — H&P
Chest pain  Subjective:     Nikki Felder is a 66 y.o. female who presents for chest pain.  She states it started earlier in the day. She states she is taking the Brilinta samples.  She is complaining of right sided chest pain that is sharp and pressure.  She gets short of breath, nausea, but no vomiting.  She is still smoking cigarettes.  She denies any hemoptysis.  She hurts all over.  She has been out of her Norco.  She has an appt next week with pain management.  She had a heart cath on 4/5 with another stent placement.  She has had 4 heart caths since September 2016.  She states she did not have any follow up appts scheduled with me or Dr. Martinez.      The following portions of the patient's history were reviewed and updated as appropriate: allergies, current medications, past family history, past medical history, past social history, past surgical history and problem list.    Concurrent Medical Hx:  Past Medical History:   Diagnosis Date   • Acute bronchitis    • Allergic    • Allergic rhinitis    • Anxiety state    • Anxiety state     Anxiety state, unspecified      • Arthritis    • Bacterial pneumonia    • Chronic pain    • Chronic pain     Other chronic pain      • Contact dermatitis    • COPD (chronic obstructive pulmonary disease)    • Coronary arteriosclerosis    • Depressive disorder    • Depressive disorder    • Diabetes mellitus due to underlying condition with diabetic autonomic neuropathy     Diabetes mellitus due to underlying condition with diabetic autonomic (poly)neuropathy      • Drug therapy     Long-term drug therapy      • Dysuria    • Encounter for immunization    • Encounter for screening for malignant neoplasm of colon    • Essential hypertension    • Folliculitis    • Headache    • Heart murmur    • Herpes zoster    • Herpes zoster with nervous system complication    • History of screening mammography    • Hyperlipidemia    • Insomnia    • Insomnia    • Kidney stone    • Lumbar  radiculopathy    • Lung nodule    • Migraine    • Migraine     Migraine, unspecified, without mention of intractable migraine, without mention of status migrainosus      • Migraine    • Myocardial infarction    • Nausea and vomiting    • Nausea with vomiting    • Neck pain    • Need for immunization against influenza    • Need for prophylactic vaccination and inoculation against diphtheria alone    • Need for prophylactic vaccination and inoculation against unspecified single disease     Need for prophylactic vaccination and inoculation against Streptococcus pneumoniae [pneumococcus] and influenza      • Non-alcoholic fatty liver disease    • Rectus sheath hematoma    • Senile osteoporosis    • Shoulder pain    • Solitary pulmonary nodule present on computed tomography of lung    • Spasm of back muscles    • Spinal stenosis of lumbar region    • Systolic congestive heart failure    • TIA (transient ischemic attack)    • Tobacco dependence syndrome    • Type 2 diabetes mellitus    • Urinary tract infectious disease    • Viral gastroenteritis    • Vitamin D deficiency     Unspecified vitamin D deficiency      • Vitamin D deficiency        Past Surgical Hx:  Past Surgical History:   Procedure Laterality Date   • CARDIAC CATHETERIZATION      plaque noted ef 55% 2010    • CARDIAC CATHETERIZATION N/A 3/29/2017    Procedure: Left Heart Cath;  Surgeon: Raheel Martinez MD;  Location: [facility] INVASIVE LOCATION;  Service:    • CARDIAC CATHETERIZATION N/A 2017    Procedure: Left Heart Cath;  Surgeon: Raheel Martinez MD;  Location: [facility] INVASIVE LOCATION;  Service:    •  SECTION       Low cervical  1981       • CHOLECYSTECTOMY     • DILATATION AND CURETTAGE  1975   • DILATATION AND CURETTAGE  1975   • INJECTION OF MEDICATION  2015     Phenergan (3)      • INJECTION OF MEDICATION      Kenalog (1)      2011    • INJECTION OF MEDICATION  2014    Toradol (3)    • INJECTION OF MEDICATION  11/17/2011     Vistaril (1)    • INJECTION OF MEDICATION  05/22/2012    Zofran (1)   • ND RT/LT HEART CATHETERS N/A 4/5/2017    Procedure: Percutaneous Coronary Intervention;  Surgeon: Raheel Martinez MD;  Location: [facility] INVASIVE LOCATION;  Service: Cardiovascular   • TUBAL ABDOMINAL LIGATION       Brenden tubal ligation 08/20/1981      Family Hx:  Family History   Problem Relation Age of Onset   • Alzheimer's disease Mother    • Diabetes Brother    • Alzheimer's disease Other    • Diabetes Other    • Lung cancer Other    • Pulmonary embolism Other    • Hypertension Father    • Depression Daughter    • Hypertension Daughter    • Anxiety disorder Daughter    • Lung cancer Brother       Social History:  Social History     Social History   • Marital status:      Spouse name: N/A   • Number of children: N/A   • Years of education: N/A     Occupational History   • Not on file.     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  Lives with daughter.        Home Medications:  No current facility-administered medications on file prior to encounter.      Current Outpatient Prescriptions on File Prior to Encounter   Medication Sig Dispense Refill   • aspirin 81 MG tablet Take 1 tablet by mouth Daily. 30 tablet 5   • atorvastatin (LIPITOR) 20 MG tablet Take 1 tablet by mouth Every Night. 90 tablet 3   • busPIRone (BUSPAR) 10 MG tablet Take 1 tablet by mouth Every 12 (Twelve) Hours. 60 tablet 5   • carvedilol (COREG) 3.125 MG tablet Take 1 tablet by mouth 2 (Two) Times a Day. 180 tablet 3   • escitalopram (LEXAPRO) 20 MG tablet Take 1 tablet by mouth  Daily. (Patient taking differently: Take 20 mg by mouth Every Night.) 90 tablet 3   • gabapentin (NEURONTIN) 400 MG capsule Take 400 mg by mouth 3 (Three) Times a Day. Pt takes 400 mg + 800 mg three times daily.     • gabapentin (NEURONTIN) 800 MG tablet Take 800 mg by mouth 3 (Three) Times a Day. Pt takes 400 mg + 800 mg three times daily.     • glipiZIDE (GLUCOTROL) 5 MG tablet Take 1 tablet by mouth 2 (Two) Times a Day Before Meals. 180 tablet 3   • insulin detemir (LEVEMIR) 100 UNIT/ML injection Inject 14 Units under the skin Every Night. 2 pen 5   • metFORMIN (GLUCOPHAGE) 1000 MG tablet Take 1 tablet by mouth 2 (Two) Times a Day With Meals. 180 tablet 3   • nitroglycerin (NITRODUR) 0.2 MG/HR patch Place 1 patch on the skin Daily. 90 patch 12   • HYDROcodone-acetaminophen (NORCO) 7.5-325 MG per tablet Take 1 tablet by mouth 3 (three) times a day as needed for moderate pain (4-6).     • Insulin Pen Needle 33G X 8 MM misc 1 Units Every Night. 100 each 3   • lisinopril (ZESTRIL) 5 MG tablet Take 1 tablet by mouth Daily. (Patient taking differently: Take 5 mg by mouth Daily. New med - hasn't started yet.) 30 tablet 2   • ondansetron ODT (ZOFRAN ODT) 4 MG disintegrating tablet Take 1 tablet by mouth Every 8 (Eight) Hours As Needed for nausea or vomiting. 90 tablet 3   • ranolazine (RANEXA) 500 MG 12 hr tablet Take 1 tablet by mouth Every 12 (Twelve) Hours. (Patient taking differently: Take 500 mg by mouth Every 12 (Twelve) Hours. New med - hasn't started taking yet.) 180 tablet 3   • [DISCONTINUED] albuterol (PROVENTIL HFA;VENTOLIN HFA) 108 (90 BASE) MCG/ACT inhaler Inhale 2 puffs every 4 (four) hours as needed for wheezing.     • [DISCONTINUED] atorvastatin (LIPITOR) 20 MG tablet Take 20 mg by mouth every night.     • [DISCONTINUED] clopidogrel (PLAVIX) 75 MG tablet Take 1 tablet by mouth Daily. 90 tablet 3   • [DISCONTINUED] ipratropium-albuterol (COMBIVENT RESPIMAT)  MCG/ACT inhaler Inhale 2 puffs every 4  "(four) hours as needed for wheezing.     • [DISCONTINUED] nicotine (NICODERM CQ) 21 MG/24HR patch Place 1 patch on the skin every day.     • [DISCONTINUED] ranolazine (RANEXA) 500 MG 12 hr tablet Take 1 tablet by mouth Every 12 (Twelve) Hours. 180 tablet 12       Allergies:  Review of patient's allergies indicates no known allergies.  I reviewed the patient's new clinical results.  Review of Systems  Review of Systems   Constitutional: Positive for chills and fatigue. Negative for activity change, appetite change, diaphoresis, fever and unexpected weight change.   HENT: Positive for congestion, dental problem and hearing loss. Negative for nosebleeds and sneezing.    Eyes: Positive for visual disturbance.   Respiratory: Positive for cough, shortness of breath and wheezing.    Cardiovascular: Positive for chest pain. Negative for palpitations and leg swelling.   Gastrointestinal: Positive for constipation and nausea.   Genitourinary: Negative for difficulty urinating, dysuria, hematuria, vaginal bleeding and vaginal discharge.   Musculoskeletal: Positive for arthralgias, back pain, joint swelling, myalgias, neck pain and neck stiffness.   Skin: Positive for wound.   Allergic/Immunologic: Positive for environmental allergies.   Neurological: Positive for dizziness, weakness, light-headedness and headaches.   Hematological: Bruises/bleeds easily.   Psychiatric/Behavioral: Positive for sleep disturbance. Negative for agitation, behavioral problems, confusion, decreased concentration, dysphoric mood, hallucinations, self-injury and suicidal ideas. The patient is nervous/anxious. The patient is not hyperactive.        Objective:     /60  Pulse 62  Temp 99 °F (37.2 °C) (Oral)   Resp 18  Ht 61\" (154.9 cm)  Wt 141 lb 12.8 oz (64.3 kg)  LMP  (LMP Unknown)  SpO2 96%  BMI 26.79 kg/m2  Physical Exam   Constitutional: She is oriented to person, place, and time. She appears well-developed and well-nourished. No " distress.   HENT:   Head: Normocephalic and atraumatic.   Right Ear: External ear normal.   Left Ear: External ear normal.   Nose: Nose normal.   Mouth/Throat: Oropharynx is clear and moist. No oropharyngeal exudate.   Eyes: Conjunctivae and EOM are normal. Pupils are equal, round, and reactive to light. No scleral icterus.   Neck: Normal range of motion. Neck supple. No tracheal deviation present.   Cardiovascular: Normal rate, regular rhythm, normal heart sounds and intact distal pulses.  Exam reveals no gallop and no friction rub.    No murmur heard.  Pulmonary/Chest: Effort normal and breath sounds normal. No respiratory distress. She has no wheezes. She has no rales.   Abdominal: Soft. Bowel sounds are normal. She exhibits no distension. There is no tenderness. There is no rebound and no guarding.   Musculoskeletal: She exhibits tenderness. She exhibits no edema.   Lymphadenopathy:     She has no cervical adenopathy.   Neurological: She is alert and oriented to person, place, and time. She has normal reflexes. She displays normal reflexes. No cranial nerve deficit. Coordination normal.   Skin: Skin is warm and dry. She is not diaphoretic.   Multiple ecchymosis on extremities, abrasions left forearm   Psychiatric: She has a normal mood and affect. Her behavior is normal. Judgment and thought content normal.   Nursing note and vitals reviewed.    I reviewed the patient's new clinical results.  Assessment/Plan:     Active Hospital Problems (** Indicates Principal Problem)    Diagnosis Date Noted   • **Chest pain [R07.9] 04/20/2017     Priority: Medium   • Atherosclerosis of native coronary artery of native heart with angina pectoris [I25.119] 10/08/2016     Priority: High   • Tobacco dependence syndrome [F17.200] 02/02/2017   • Diabetic polyneuropathy [E11.42] 11/09/2016   • Essential hypertension, benign [I10] 11/09/2016   • Depression [F32.9] 11/09/2016   • Anxiety [F41.9] 11/09/2016   • Type 2 diabetes mellitus  with hyperglycemia, without long-term current use of insulin [E11.65] 11/09/2016      Resolved Hospital Problems    Diagnosis Date Noted Date Resolved   No resolved problems to display.     Jose Alfredo reviewed, scanned, and appropriate.  Trend troponins.  Patient is again advised on smoking cessation.  Discuss with cardiology as needed.  Consult case management regarding frequent admits and home health.          This document has been electronically signed by Yasmin Stewart MD on April 20, 2017 6:08 PM          This document has been electronically signed by Yasmin Stewart MD on April 20, 2017 6:08 PM

## 2017-04-20 NOTE — ED TRIAGE NOTES
"Pt is presented to ED via EMS with c/o chest pain, shortness of air and nausea.  Pt states these symptoms started three hours pta.  Pt states she was \"doing laundry\" when chest pain started. Pt also states she placed a nitroglycerin patch on her left chest shortly after symptoms began.  Pt states her pain has been \"somewhat\" relieved within 15-20 mintues after application of patch.  Pt had two cardiac stents placed per dr. mariee two and one half weeks ago.    "

## 2017-04-20 NOTE — ED PROVIDER NOTES
Subjective   Patient is a 66 y.o. female presenting with chest pain.   History provided by:  Patient   used: No    Chest Pain   Pain location:  R chest and substernal area  Pain quality: stabbing    Pain radiates to:  R shoulder  Pain severity:  Moderate  Onset quality:  Gradual  Duration:  3 hours  Timing:  Intermittent  Progression:  Unchanged  Chronicity:  Recurrent  Context: not lifting    Context comment:  Doing a load of clothes  Relieved by:  Nitroglycerin  Associated symptoms: anxiety, diaphoresis, fatigue, nausea, palpitations and weakness    Associated symptoms: no abdominal pain, no altered mental status, no anorexia, no back pain, no claudication, no cough, no dizziness, no dysphagia, no fever, no headache, no heartburn, no lower extremity edema, no near-syncope, no numbness, no orthopnea, no shortness of breath, no syncope and no vomiting    Risk factors: coronary artery disease, diabetes mellitus, high cholesterol, hypertension, obesity and smoking    Risk factors: no aortic disease, no birth control, no immobilization, not male, not pregnant, no prior DVT/PE and no surgery    Patient states that earlier today she was putting away a load of laundry and had a sharp chest pain on the right which got gradually worse it is currently a 5/10 pain, the pain started in the mid sternum area and went to the right chest and all the way to the right shoulder.  Pain did not radiate down arm, she denies numbness or tingling of either arm.  She does state she got nauseaous and sweaty when this happened, which lasted for a few minutes.  She tried sublingual nitroglycerin which did not help but she then placed a patch and that helped with the pain.      Review of Systems   Constitutional: Positive for diaphoresis and fatigue. Negative for appetite change, chills and fever.   HENT: Positive for hearing loss. Negative for congestion, nosebleeds, rhinorrhea, sinus pressure, sneezing and trouble  swallowing.    Respiratory: Negative for cough, chest tightness, shortness of breath and wheezing.    Cardiovascular: Positive for chest pain and palpitations. Negative for orthopnea, claudication, syncope and near-syncope.   Gastrointestinal: Positive for diarrhea and nausea. Negative for abdominal pain, anorexia, blood in stool, constipation, heartburn and vomiting.   Endocrine: Negative.    Genitourinary: Negative for dysuria and hematuria.   Musculoskeletal: Negative for back pain, neck pain and neck stiffness.   Skin: Negative for rash and wound.   Neurological: Positive for weakness. Negative for dizziness, numbness and headaches.   Psychiatric/Behavioral: Negative for confusion and suicidal ideas.       Past Medical History:   Diagnosis Date   • Acute bronchitis    • Allergic    • Allergic rhinitis    • Anxiety state    • Anxiety state     Anxiety state, unspecified      • Arthritis    • Bacterial pneumonia    • Chronic pain    • Chronic pain     Other chronic pain      • Contact dermatitis    • COPD (chronic obstructive pulmonary disease)    • Coronary arteriosclerosis    • Depressive disorder    • Depressive disorder    • Diabetes mellitus due to underlying condition with diabetic autonomic neuropathy     Diabetes mellitus due to underlying condition with diabetic autonomic (poly)neuropathy      • Drug therapy     Long-term drug therapy      • Dysuria    • Encounter for immunization    • Encounter for screening for malignant neoplasm of colon    • Essential hypertension    • Folliculitis    • Headache    • Heart murmur    • Herpes zoster    • Herpes zoster with nervous system complication    • History of screening mammography    • Hyperlipidemia    • Insomnia    • Insomnia    • Kidney stone    • Lumbar radiculopathy    • Lung nodule    • Migraine    • Migraine     Migraine, unspecified, without mention of intractable migraine, without mention of status migrainosus      • Migraine    • Myocardial infarction     • Nausea and vomiting    • Nausea with vomiting    • Neck pain    • Need for immunization against influenza    • Need for prophylactic vaccination and inoculation against diphtheria alone    • Need for prophylactic vaccination and inoculation against unspecified single disease     Need for prophylactic vaccination and inoculation against Streptococcus pneumoniae [pneumococcus] and influenza      • Non-alcoholic fatty liver disease    • Rectus sheath hematoma    • Senile osteoporosis    • Shoulder pain    • Solitary pulmonary nodule present on computed tomography of lung    • Spasm of back muscles    • Spinal stenosis of lumbar region    • Systolic congestive heart failure    • TIA (transient ischemic attack)    • Tobacco dependence syndrome    • Type 2 diabetes mellitus    • Urinary tract infectious disease    • Viral gastroenteritis    • Vitamin D deficiency     Unspecified vitamin D deficiency      • Vitamin D deficiency        No Known Allergies    Past Surgical History:   Procedure Laterality Date   • CARDIAC CATHETERIZATION      plaque noted ef 55% 2010    • CARDIAC CATHETERIZATION N/A 3/29/2017    Procedure: Left Heart Cath;  Surgeon: Raheel Martinez MD;  Location: [facility] INVASIVE LOCATION;  Service:    • CARDIAC CATHETERIZATION N/A 2017    Procedure: Left Heart Cath;  Surgeon: Raheel Martinez MD;  Location: [facility] INVASIVE LOCATION;  Service:    •  SECTION       Low cervical  1981       • CHOLECYSTECTOMY     • DILATATION AND CURETTAGE  1975   • DILATATION AND CURETTAGE  1975   • INJECTION OF MEDICATION  2015     Phenergan (3)      • INJECTION OF MEDICATION      Kenalog (1)      2011    • INJECTION OF MEDICATION  2014    Toradol (3)   • INJECTION OF MEDICATION  2011     Vistaril (1)    • INJECTION OF MEDICATION  2012    Zofran (1)   • OH RT/LT HEART CATHETERS N/A 2017    Procedure: Percutaneous Coronary Intervention;   Surgeon: Raheel Martinez MD;  Location: [facility] INVASIVE LOCATION;  Service: Cardiovascular   • TUBAL ABDOMINAL LIGATION       B[place]den tubal ligation 08/20/1981        Family History   Problem Relation Age of Onset   • Alzheimer's disease Mother    • Diabetes Brother    • Alzheimer's disease Other    • Diabetes Other    • Lung cancer Other    • Pulmonary embolism Other    • Hypertension Father    • Depression Daughter    • Hypertension Daughter    • Anxiety disorder Daughter    • Lung cancer Brother        Social History     Social History   • Marital status:      Spouse name: N/A   • Number of children: N/A   • Years of education: N/A     Social History Main Topics   • Smoking status: Current Every Day Smoker     Packs/day: 0.25     Years: 45.00     Types: Cigarettes   • Smokeless tobacco: Never Used      Comment: maybe 2 or 3 a day   • Alcohol use No   • Drug use: No   • Sexual activity: Defer     Other Topics Concern   • None     Social History Narrative    Previously worked in [employer] at [employer] [facility] [place].  Lives with daughter.            Objective   Physical Exam   Constitutional: She appears well-developed and well-nourished. No distress.   HENT:   Head: Normocephalic and atraumatic.   Cardiovascular: Normal rate and intact distal pulses.  Exam reveals distant heart sounds. Exam reveals no gallop and no friction rub.    No murmur heard.  Capillary refill <3 seconds    Pulmonary/Chest: Effort normal and breath sounds normal. No respiratory distress. She has no wheezes. She has no rales. She exhibits no tenderness.   Chest nontender to palpation b/l, nontender to palpation over sternum   Abdominal: Soft. Bowel sounds are normal. She exhibits no distension and no mass. There is no tenderness. There is no rebound and no guarding. No hernia.   Skin: Skin is warm and dry. No rash noted. She is not diaphoretic. No erythema. No pallor.   Psychiatric: She has a normal mood and affect. Her behavior is  normal. Judgment and thought content normal.       Procedures         ED Course  ED Course   Value Comment By Time    Patient seen and evaluated then discussed with Dr. Terry. Bri Larkin MD 04/20 1509   Potassium: (!) 3.3 Ordered 30 meq potassium. Bri Larkin MD 04/20 7824    Patient discussed with Dr. Stewart, she agreed to admit patient. Bri Larkin MD 04/20 1708      Results for orders placed or performed during the hospital encounter of 04/20/17   Comprehensive Metabolic Panel   Result Value Ref Range    Glucose 89 60 - 100 mg/dL    BUN 8 7 - 21 mg/dL    Creatinine 0.62 0.50 - 1.00 mg/dL    Sodium 137 137 - 145 mmol/L    Potassium 3.3 (L) 3.5 - 5.1 mmol/L    Chloride 110 95 - 110 mmol/L    CO2 18.0 (L) 22.0 - 31.0 mmol/L    Calcium 8.8 8.4 - 10.2 mg/dL    Total Protein 6.3 6.3 - 8.6 g/dL    Albumin 3.60 3.40 - 4.80 g/dL    ALT (SGPT) 22 9 - 52 U/L    AST (SGOT) 20 14 - 36 U/L    Alkaline Phosphatase 75 38 - 126 U/L    Total Bilirubin 0.4 0.2 - 1.3 mg/dL    eGFR Non  Amer 96 45 - 104 mL/min/1.73    Globulin 2.7 2.3 - 3.5 gm/dL    A/G Ratio 1.3 1.1 - 1.8 g/dL    BUN/Creatinine Ratio 12.9 7.0 - 25.0    Anion Gap 9.0 5.0 - 15.0 mmol/L   BNP   Result Value Ref Range    proBNP 775.0 0.0 - 900.0 pg/mL   CK   Result Value Ref Range    Creatine Kinase 42 30 - 135 U/L   CK-MB   Result Value Ref Range    CKMB 1.43 0.00 - 5.00 ng/mL   Troponin   Result Value Ref Range    Troponin I <0.012 <=0.034 ng/mL   CBC Auto Differential   Result Value Ref Range    WBC 4.86 3.20 - 9.80 10*3/mm3    RBC 3.74 (L) 3.77 - 5.16 10*6/mm3    Hemoglobin 11.7 (L) 12.0 - 15.5 g/dL    Hematocrit 34.2 (L) 35.0 - 45.0 %    MCV 91.4 80.0 - 98.0 fL    MCH 31.3 26.5 - 34.0 pg    MCHC 34.2 31.4 - 36.0 g/dL    RDW 13.9 11.5 - 14.5 %    RDW-SD 45.8 36.4 - 46.3 fl    MPV 8.1 8.0 - 12.0 fL    Platelets 282 150 - 450 10*3/mm3    Neutrophil % 54.3 37.0 - 80.0 %    Lymphocyte % 35.4 10.0 - 50.0 %    Monocyte % 6.4  0.0 - 12.0 %    Eosinophil % 3.1 0.0 - 7.0 %    Basophil % 0.6 0.0 - 2.0 %    Immature Grans % 0.2 0.0 - 0.5 %    Neutrophils, Absolute 2.64 2.00 - 8.60 10*3/mm3    Lymphocytes, Absolute 1.72 0.60 - 4.20 10*3/mm3    Monocytes, Absolute 0.31 0.00 - 0.90 10*3/mm3    Eosinophils, Absolute 0.15 0.00 - 0.70 10*3/mm3    Basophils, Absolute 0.03 0.00 - 0.20 10*3/mm3    Immature Grans, Absolute 0.01 0.00 - 0.02 10*3/mm3   Light Blue Top   Result Value Ref Range    Extra Tube hold for add-on    Gold Top - SST   Result Value Ref Range    Extra Tube Hold for add-ons.                  [facility]    Final diagnoses:   Other chest pain            Bri Larkin MD  Resident  04/20/17 4285

## 2017-04-21 ENCOUNTER — APPOINTMENT (OUTPATIENT)
Dept: CT IMAGING | Facility: HOSPITAL | Age: 67
End: 2017-04-21

## 2017-04-21 ENCOUNTER — APPOINTMENT (OUTPATIENT)
Dept: GENERAL RADIOLOGY | Facility: HOSPITAL | Age: 67
End: 2017-04-21

## 2017-04-21 LAB
ALBUMIN SERPL-MCNC: 3 G/DL (ref 3.4–4.8)
ALBUMIN/GLOB SERPL: 1.2 G/DL (ref 1.1–1.8)
ALP SERPL-CCNC: 60 U/L (ref 38–126)
ALT SERPL W P-5'-P-CCNC: 21 U/L (ref 9–52)
ANION GAP SERPL CALCULATED.3IONS-SCNC: 7 MMOL/L (ref 5–15)
AST SERPL-CCNC: 15 U/L (ref 14–36)
BILIRUB SERPL-MCNC: 0.3 MG/DL (ref 0.2–1.3)
BUN BLD-MCNC: 8 MG/DL (ref 7–21)
BUN/CREAT SERPL: 12.9 (ref 7–25)
CALCIUM SPEC-SCNC: 8.4 MG/DL (ref 8.4–10.2)
CHLORIDE SERPL-SCNC: 113 MMOL/L (ref 95–110)
CK MB SERPL-CCNC: 1.14 NG/ML (ref 0–5)
CK MB SERPL-CCNC: 1.16 NG/ML (ref 0–5)
CK MB SERPL-CCNC: 1.23 NG/ML (ref 0–5)
CK SERPL-CCNC: 25 U/L (ref 30–135)
CK SERPL-CCNC: 28 U/L (ref 30–135)
CK SERPL-CCNC: 31 U/L (ref 30–135)
CO2 SERPL-SCNC: 21 MMOL/L (ref 22–31)
CREAT BLD-MCNC: 0.62 MG/DL (ref 0.5–1)
D-DIMER, QUANTITATIVE (MAD,POW, STR): 548 NG/ML (FEU) (ref 0–470)
DEPRECATED RDW RBC AUTO: 46.1 FL (ref 36.4–46.3)
ERYTHROCYTE [DISTWIDTH] IN BLOOD BY AUTOMATED COUNT: 13.7 % (ref 11.5–14.5)
GFR SERPL CREATININE-BSD FRML MDRD: 96 ML/MIN/1.73 (ref 45–104)
GLOBULIN UR ELPH-MCNC: 2.5 GM/DL (ref 2.3–3.5)
GLUCOSE BLD-MCNC: 100 MG/DL (ref 60–100)
GLUCOSE BLDC GLUCOMTR-MCNC: 137 MG/DL (ref 70–130)
GLUCOSE BLDC GLUCOMTR-MCNC: 75 MG/DL (ref 70–130)
GLUCOSE BLDC GLUCOMTR-MCNC: 88 MG/DL (ref 70–130)
HCT VFR BLD AUTO: 33 % (ref 35–45)
HGB BLD-MCNC: 11.2 G/DL (ref 12–15.5)
L PNEUMO1 AG UR QL IA: NEGATIVE
MCH RBC QN AUTO: 31.4 PG (ref 26.5–34)
MCHC RBC AUTO-ENTMCNC: 33.9 G/DL (ref 31.4–36)
MCV RBC AUTO: 92.4 FL (ref 80–98)
PLATELET # BLD AUTO: 227 10*3/MM3 (ref 150–450)
PMV BLD AUTO: 8.3 FL (ref 8–12)
POTASSIUM BLD-SCNC: 3.9 MMOL/L (ref 3.5–5.1)
PROT SERPL-MCNC: 5.5 G/DL (ref 6.3–8.6)
RBC # BLD AUTO: 3.57 10*6/MM3 (ref 3.77–5.16)
S PNEUM AG SPEC QL LA: NEGATIVE
SODIUM BLD-SCNC: 141 MMOL/L (ref 137–145)
TROPONIN I SERPL-MCNC: <0.012 NG/ML
WBC NRBC COR # BLD: 4.82 10*3/MM3 (ref 3.2–9.8)

## 2017-04-21 PROCEDURE — 82553 CREATINE MB FRACTION: CPT | Performed by: FAMILY MEDICINE

## 2017-04-21 PROCEDURE — 96376 TX/PRO/DX INJ SAME DRUG ADON: CPT

## 2017-04-21 PROCEDURE — 80053 COMPREHEN METABOLIC PANEL: CPT | Performed by: FAMILY MEDICINE

## 2017-04-21 PROCEDURE — 87040 BLOOD CULTURE FOR BACTERIA: CPT | Performed by: FAMILY MEDICINE

## 2017-04-21 PROCEDURE — 82962 GLUCOSE BLOOD TEST: CPT

## 2017-04-21 PROCEDURE — 25010000002 CEFTRIAXONE: Performed by: FAMILY MEDICINE

## 2017-04-21 PROCEDURE — 87899 AGENT NOS ASSAY W/OPTIC: CPT | Performed by: FAMILY MEDICINE

## 2017-04-21 PROCEDURE — 84484 ASSAY OF TROPONIN QUANT: CPT | Performed by: FAMILY MEDICINE

## 2017-04-21 PROCEDURE — 99226 PR SBSQ OBSERVATION CARE/DAY 35 MINUTES: CPT | Performed by: FAMILY MEDICINE

## 2017-04-21 PROCEDURE — 96365 THER/PROPH/DIAG IV INF INIT: CPT

## 2017-04-21 PROCEDURE — 82550 ASSAY OF CK (CPK): CPT | Performed by: FAMILY MEDICINE

## 2017-04-21 PROCEDURE — G0378 HOSPITAL OBSERVATION PER HR: HCPCS

## 2017-04-21 PROCEDURE — 63710000001 INSULIN DETEMIR PER 5 UNITS: Performed by: FAMILY MEDICINE

## 2017-04-21 PROCEDURE — 93005 ELECTROCARDIOGRAM TRACING: CPT | Performed by: FAMILY MEDICINE

## 2017-04-21 PROCEDURE — 96375 TX/PRO/DX INJ NEW DRUG ADDON: CPT

## 2017-04-21 PROCEDURE — 85027 COMPLETE CBC AUTOMATED: CPT | Performed by: FAMILY MEDICINE

## 2017-04-21 PROCEDURE — 71275 CT ANGIOGRAPHY CHEST: CPT

## 2017-04-21 PROCEDURE — 85379 FIBRIN DEGRADATION QUANT: CPT | Performed by: FAMILY MEDICINE

## 2017-04-21 PROCEDURE — 63710000001 INSULIN ASPART PER 5 UNITS: Performed by: FAMILY MEDICINE

## 2017-04-21 PROCEDURE — 87581 M.PNEUMON DNA AMP PROBE: CPT | Performed by: FAMILY MEDICINE

## 2017-04-21 PROCEDURE — 25010000002 KETOROLAC TROMETHAMINE PER 15 MG: Performed by: FAMILY MEDICINE

## 2017-04-21 PROCEDURE — 94799 UNLISTED PULMONARY SVC/PX: CPT

## 2017-04-21 PROCEDURE — 25010000002 ONDANSETRON PER 1 MG: Performed by: FAMILY MEDICINE

## 2017-04-21 PROCEDURE — 0 IOPAMIDOL PER 1 ML: Performed by: FAMILY MEDICINE

## 2017-04-21 PROCEDURE — 71020 HC CHEST PA AND LATERAL: CPT

## 2017-04-21 PROCEDURE — 25010000002 MORPHINE SULFATE (PF) 2 MG/ML SOLUTION: Performed by: FAMILY MEDICINE

## 2017-04-21 PROCEDURE — 93010 ELECTROCARDIOGRAM REPORT: CPT | Performed by: INTERNAL MEDICINE

## 2017-04-21 PROCEDURE — 87449 NOS EACH ORGANISM AG IA: CPT | Performed by: FAMILY MEDICINE

## 2017-04-21 RX ORDER — MORPHINE SULFATE 2 MG/ML
2 INJECTION, SOLUTION INTRAMUSCULAR; INTRAVENOUS ONCE
Status: COMPLETED | OUTPATIENT
Start: 2017-04-21 | End: 2017-04-21

## 2017-04-21 RX ORDER — AZITHROMYCIN 250 MG/1
500 TABLET, FILM COATED ORAL
Status: DISCONTINUED | OUTPATIENT
Start: 2017-04-21 | End: 2017-04-22 | Stop reason: HOSPADM

## 2017-04-21 RX ORDER — NITROGLYCERIN 40 MG/1
1 PATCH TRANSDERMAL DAILY
Status: DISCONTINUED | OUTPATIENT
Start: 2017-04-21 | End: 2017-04-22 | Stop reason: HOSPADM

## 2017-04-21 RX ORDER — SODIUM CHLORIDE 9 MG/ML
INJECTION, SOLUTION INTRAVENOUS
Status: COMPLETED
Start: 2017-04-21 | End: 2017-04-21

## 2017-04-21 RX ORDER — KETOROLAC TROMETHAMINE 30 MG/ML
30 INJECTION, SOLUTION INTRAMUSCULAR; INTRAVENOUS EVERY 6 HOURS PRN
Status: DISCONTINUED | OUTPATIENT
Start: 2017-04-21 | End: 2017-04-22 | Stop reason: HOSPADM

## 2017-04-21 RX ADMIN — GABAPENTIN 400 MG: 400 CAPSULE ORAL at 16:08

## 2017-04-21 RX ADMIN — ASPIRIN 325 MG: 325 TABLET, DELAYED RELEASE ORAL at 08:14

## 2017-04-21 RX ADMIN — LISINOPRIL 5 MG: 5 TABLET ORAL at 08:14

## 2017-04-21 RX ADMIN — BUSPIRONE HYDROCHLORIDE 10 MG: 5 TABLET ORAL at 20:30

## 2017-04-21 RX ADMIN — ATORVASTATIN CALCIUM 20 MG: 20 TABLET, FILM COATED ORAL at 20:30

## 2017-04-21 RX ADMIN — CARVEDILOL 3.12 MG: 3.12 TABLET, FILM COATED ORAL at 18:05

## 2017-04-21 RX ADMIN — GLIPIZIDE 5 MG: 5 TABLET ORAL at 18:05

## 2017-04-21 RX ADMIN — Medication 10 ML: at 20:27

## 2017-04-21 RX ADMIN — Medication 10 ML: at 13:08

## 2017-04-21 RX ADMIN — ONDANSETRON 4 MG: 2 INJECTION INTRAMUSCULAR; INTRAVENOUS at 13:08

## 2017-04-21 RX ADMIN — SODIUM CHLORIDE 250 ML: 9 INJECTION, SOLUTION INTRAVENOUS at 18:15

## 2017-04-21 RX ADMIN — HYDROCODONE BITARTRATE AND ACETAMINOPHEN 1 TABLET: 7.5; 325 TABLET ORAL at 21:04

## 2017-04-21 RX ADMIN — NITROGLYCERIN 1 PATCH: 0.2 PATCH TRANSDERMAL at 18:11

## 2017-04-21 RX ADMIN — CEFTRIAXONE 1 G: 1 INJECTION, POWDER, FOR SOLUTION INTRAMUSCULAR; INTRAVENOUS at 18:09

## 2017-04-21 RX ADMIN — CARVEDILOL 3.12 MG: 3.12 TABLET, FILM COATED ORAL at 08:15

## 2017-04-21 RX ADMIN — NICOTINE 1 PATCH: 14 PATCH, EXTENDED RELEASE TRANSDERMAL at 20:54

## 2017-04-21 RX ADMIN — KETOROLAC TROMETHAMINE 30 MG: 30 INJECTION, SOLUTION INTRAMUSCULAR; INTRAVENOUS at 15:03

## 2017-04-21 RX ADMIN — HYDROCODONE BITARTRATE AND ACETAMINOPHEN 1 TABLET: 7.5; 325 TABLET ORAL at 13:12

## 2017-04-21 RX ADMIN — RANOLAZINE 500 MG: 500 TABLET, FILM COATED, EXTENDED RELEASE ORAL at 20:30

## 2017-04-21 RX ADMIN — INSULIN ASPART 2 UNITS: 100 INJECTION, SOLUTION INTRAVENOUS; SUBCUTANEOUS at 20:51

## 2017-04-21 RX ADMIN — INSULIN DETEMIR 14 UNITS: 100 INJECTION, SOLUTION SUBCUTANEOUS at 20:28

## 2017-04-21 RX ADMIN — IOPAMIDOL 66 ML: 755 INJECTION, SOLUTION INTRAVENOUS at 15:15

## 2017-04-21 RX ADMIN — GABAPENTIN 800 MG: 400 CAPSULE ORAL at 16:07

## 2017-04-21 RX ADMIN — GABAPENTIN 800 MG: 400 CAPSULE ORAL at 08:14

## 2017-04-21 RX ADMIN — ESCITALOPRAM OXALATE 20 MG: 10 TABLET ORAL at 20:30

## 2017-04-21 RX ADMIN — GABAPENTIN 400 MG: 400 CAPSULE ORAL at 20:42

## 2017-04-21 RX ADMIN — BUSPIRONE HYDROCHLORIDE 10 MG: 5 TABLET ORAL at 08:15

## 2017-04-21 RX ADMIN — MORPHINE SULFATE 2 MG: 2 INJECTION, SOLUTION INTRAMUSCULAR; INTRAVENOUS at 09:18

## 2017-04-21 RX ADMIN — TICAGRELOR 90 MG: 90 TABLET ORAL at 08:15

## 2017-04-21 RX ADMIN — TICAGRELOR 90 MG: 90 TABLET ORAL at 20:30

## 2017-04-21 RX ADMIN — HYDROCODONE BITARTRATE AND ACETAMINOPHEN 1 TABLET: 7.5; 325 TABLET ORAL at 05:15

## 2017-04-21 RX ADMIN — Medication 10 ML: at 09:18

## 2017-04-21 RX ADMIN — AZITHROMYCIN 500 MG: 250 TABLET, FILM COATED ORAL at 18:11

## 2017-04-21 RX ADMIN — GABAPENTIN 400 MG: 400 CAPSULE ORAL at 08:15

## 2017-04-21 RX ADMIN — RANOLAZINE 500 MG: 500 TABLET, FILM COATED, EXTENDED RELEASE ORAL at 08:15

## 2017-04-21 RX ADMIN — GLIPIZIDE 5 MG: 5 TABLET ORAL at 07:54

## 2017-04-21 RX ADMIN — GABAPENTIN 800 MG: 400 CAPSULE ORAL at 20:48

## 2017-04-21 NOTE — PLAN OF CARE
Problem: Patient Care Overview (Adult)  Goal: Plan of Care Review  Outcome: Ongoing (interventions implemented as appropriate)    04/21/17 0519   Coping/Psychosocial Response Interventions   Plan Of Care Reviewed With patient   Patient Care Overview   Progress improving   Outcome Evaluation   Outcome Summary/Follow up Plan pt c/o mild chest pain when awake this shift. HA on arrival. c/o chronic hip pain this am. Telemetry on. Mahad hose on. Tolerating PO intake.       Goal: Adult Individualization and Mutuality  Outcome: Ongoing (interventions implemented as appropriate)  Goal: Discharge Needs Assessment  Outcome: Ongoing (interventions implemented as appropriate)    Problem: Acute Coronary Syndrome (ACS) (Adult)  Goal: Signs and Symptoms of Listed Potential Problems Will be Absent or Manageable (Acute Coronary Syndrome)  Outcome: Ongoing (interventions implemented as appropriate)

## 2017-04-21 NOTE — PROGRESS NOTES
FAMILY MEDICINE PROGRESS NOTE  NAME: Nikki Felder  : 1950  MRN: [id number]     LOS: 0 days   Full Code  PROVIDER OF SERVICE:     Chief Complaint:  Chest pain    Subjective     Interval History:  History taken from: patient chart RN  Subjective: Patient is a 67 yo  female who presented to the ER with chest pain.  She has had 4 heart caths since her MI in September.  She was recently stented 2 weeks ago for the 4th time.  She states she is taking the Brilinta at home.  She is still having chest pain on the right side and doesn't feel right.  She feels like something is wrong.    Review of Systems:   Review of Systems   Constitutional: Positive for fatigue.   Respiratory: Positive for cough and shortness of breath.    Cardiovascular: Positive for chest pain.   Gastrointestinal: Positive for nausea. Negative for vomiting.   Musculoskeletal: Positive for arthralgias, back pain, myalgias, neck pain and neck stiffness.   Neurological: Positive for dizziness and weakness.   Psychiatric/Behavioral: The patient is nervous/anxious.        Objective     Vital Signs  Temp:  [98 °F (36.7 °C)-98.6 °F (37 °C)] 98.6 °F (37 °C)  Heart Rate:  [54-66] 57  Resp:  [18] 18  BP: (104-130)/(53-66) 116/58    Physical Exam  Physical Exam   Constitutional: She is oriented to person, place, and time. She appears well-developed and well-nourished.   Cardiovascular: Normal rate, regular rhythm, normal heart sounds and intact distal pulses.  Exam reveals no gallop and no friction rub.    No murmur heard.  Pulmonary/Chest: Effort normal and breath sounds normal. No respiratory distress. She has no wheezes. She has no rales.   Abdominal: Soft. Bowel sounds are normal. She exhibits no distension and no mass. There is no tenderness. There is no rebound and no guarding. No hernia.   Neurological: She is alert and oriented to person, place, and time.   Psychiatric: She has a normal mood and affect. Her behavior is normal. Judgment  and thought content normal.   Vitals reviewed.      Medication Review    Current Facility-Administered Medications:   •  aspirin EC tablet 325 mg, 325 mg, Oral, Daily, Yasmin Stewart MD, 325 mg at 04/21/17 0814  •  atorvastatin (LIPITOR) tablet 20 mg, 20 mg, Oral, Nightly, Yasmin Stewart MD, 20 mg at 04/20/17 2146  •  azithromycin (ZITHROMAX) tablet 500 mg, 500 mg, Oral, Q24H, Yasmin Stewart MD  •  busPIRone (BUSPAR) tablet 10 mg, 10 mg, Oral, Q12H, Yasmin Stewart MD, 10 mg at 04/21/17 0815  •  carvedilol (COREG) tablet 3.125 mg, 3.125 mg, Oral, BID, Yasmin Stewart MD, 3.125 mg at 04/21/17 0815  •  cefTRIAXone (ROCEPHIN) 1 g/100 mL 0.9% NS (MBP), 1 g, Intravenous, Q24H, Yasmin Stewart MD  •  dextrose (D50W) solution 25 g, 25 g, Intravenous, Q15 Min PRN, Yasmin Stewart MD  •  dextrose (GLUTOSE) oral gel 15 g, 15 g, Oral, Q15 Min PRN, Yasmin Stewart MD  •  escitalopram (LEXAPRO) tablet 20 mg, 20 mg, Oral, Nightly, Yasmin Stewart MD, 20 mg at 04/20/17 2146  •  gabapentin (NEURONTIN) capsule 400 mg, 400 mg, Oral, TID, Yasmin Stewart MD, 400 mg at 04/21/17 1608  •  gabapentin (NEURONTIN) capsule 800 mg, 800 mg, Oral, TID, Yasmin Stewart MD, 800 mg at 04/21/17 1607  •  glipiZIDE (GLUCOTROL) tablet 5 mg, 5 mg, Oral, BID AC, Yasmin Stewart MD, 5 mg at 04/21/17 0754  •  glucagon (human recombinant) (GLUCAGEN DIAGNOSTIC) injection 1 mg, 1 mg, Subcutaneous, Q15 Min PRN, Yasmin Stewart MD  •  HYDROcodone-acetaminophen (NORCO) 7.5-325 MG per tablet 1 tablet, 1 tablet, Oral, TID PRN, Yasmin Stewart MD, 1 tablet at 04/21/17 1312  •  insulin aspart (novoLOG) injection 0-7 Units, 0-7 Units, Subcutaneous, 4x Daily AC & at Bedtime, Yasmin Stewart MD, 2 Units at 04/20/17 2143  •  insulin detemir (LEVEMIR) injection 14 Units, 14 Units, Subcutaneous, Nightly, Yasmin Stewart MD, 14 Units at 04/20/17 2144  •  ketorolac (TORADOL) injection 30 mg, 30 mg, Intramuscular, Q6H PRN, Yasmin Stewart MD, 30 mg at 04/21/17 1503  •  lisinopril  (PRINIVIL,ZESTRIL) tablet 5 mg, 5 mg, Oral, Daily, Yasmin Stewart MD, 5 mg at 04/21/17 0814  •  magnesium hydroxide (MILK OF MAGNESIA) suspension 2400 mg/10mL 10 mL, 10 mL, Oral, Daily PRN, Yasmin Stewart MD  •  nicotine (NICODERM CQ) 14 MG/24HR patch 1 patch, 1 patch, Transdermal, Q24H, Yasmin Stewart MD, 1 patch at 04/20/17 2144  •  ondansetron (ZOFRAN) tablet 4 mg, 4 mg, Oral, Q6H PRN **OR** ondansetron ODT (ZOFRAN-ODT) disintegrating tablet 4 mg, 4 mg, Oral, Q6H PRN **OR** ondansetron (ZOFRAN) injection 4 mg, 4 mg, Intravenous, Q6H PRN, Yasmin Stewart MD, 4 mg at 04/21/17 1308  •  ranolazine (RANEXA) 12 hr tablet 500 mg, 500 mg, Oral, Q12H, Yasmin Stewart MD, 500 mg at 04/21/17 0815  •  sodium chloride 0.9 % flush 1-10 mL, 1-10 mL, Intravenous, PRN, Yasmin Stewart MD, 10 mL at 04/21/17 1308  •  ticagrelor (BRILINTA) tablet 90 mg, 90 mg, Oral, BID, Yasmin Stewart MD, 90 mg at 04/21/17 0815     Diagnostic Data      I reviewed the patient's new clinical results and imaging.      Assessment/Plan     Active Hospital Problems (** Indicates Principal Problem)    Diagnosis Date Noted   • **Chest pain [R07.9] 04/20/2017     Priority: Medium   • Atherosclerosis of native coronary artery of native heart with angina pectoris [I25.119] 10/08/2016     Priority: High   • Tobacco dependence syndrome [F17.200] 02/02/2017   • Diabetic polyneuropathy [E11.42] 11/09/2016   • Essential hypertension, benign [I10] 11/09/2016   • Depression [F32.9] 11/09/2016   • Anxiety [F41.9] 11/09/2016   • Type 2 diabetes mellitus with hyperglycemia, without long-term current use of insulin [E11.65] 11/09/2016      Resolved Hospital Problems    Diagnosis Date Noted Date Resolved   No resolved problems to display.     Repeat EKG, cardiac enzymes, and order D-dimer.  May need a CTA.     DVT prophylaxis: SCDs/TEDs      Disposition:Home with home health          This document has been electronically signed by Yasmin Stewart MD on April 21, 2017 4:40  PM          This document has been electronically signed by Yasmin Stewart MD on April 21, 2017 4:40 PM

## 2017-04-21 NOTE — PLAN OF CARE
Problem: Patient Care Overview (Adult)  Goal: Plan of Care Review  Outcome: Ongoing (interventions implemented as appropriate)    04/21/17 1722   Coping/Psychosocial Response Interventions   Plan Of Care Reviewed With patient   Patient Care Overview   Progress no change   Outcome Evaluation   Outcome Summary/Follow up Plan pt still having chest pain, MD has been notified multiple times today with patient c/o continued chest pain, pt started on antibiotics, CT chest done today, will continue to monitor and notify MD of any changes.        Goal: Adult Individualization and Mutuality  Outcome: Ongoing (interventions implemented as appropriate)  Goal: Discharge Needs Assessment  Outcome: Ongoing (interventions implemented as appropriate)    Problem: Acute Coronary Syndrome (ACS) (Adult)  Goal: Signs and Symptoms of Listed Potential Problems Will be Absent or Manageable (Acute Coronary Syndrome)  Outcome: Ongoing (interventions implemented as appropriate)

## 2017-04-22 VITALS
HEART RATE: 62 BPM | DIASTOLIC BLOOD PRESSURE: 61 MMHG | OXYGEN SATURATION: 97 % | WEIGHT: 136 LBS | BODY MASS INDEX: 25.68 KG/M2 | HEIGHT: 61 IN | TEMPERATURE: 99.8 F | SYSTOLIC BLOOD PRESSURE: 113 MMHG | RESPIRATION RATE: 18 BRPM

## 2017-04-22 PROBLEM — I21.4 NSTEMI (NON-ST ELEVATED MYOCARDIAL INFARCTION) (HCC): Status: RESOLVED | Noted: 2017-04-05 | Resolved: 2017-04-22

## 2017-04-22 PROBLEM — G44.209 TENSION TYPE HEADACHE: Status: ACTIVE | Noted: 2017-04-22

## 2017-04-22 PROBLEM — J15.9 BACTERIAL PNEUMONIA: Status: ACTIVE | Noted: 2017-04-22

## 2017-04-22 LAB
GLUCOSE BLDC GLUCOMTR-MCNC: 164 MG/DL (ref 70–130)
GLUCOSE BLDC GLUCOMTR-MCNC: 61 MG/DL (ref 70–130)
GLUCOSE BLDC GLUCOMTR-MCNC: 67 MG/DL (ref 70–130)
GLUCOSE BLDC GLUCOMTR-MCNC: 67 MG/DL (ref 70–130)
GLUCOSE BLDC GLUCOMTR-MCNC: 85 MG/DL (ref 70–130)
MYCOPLASMAE PNEUMONIAE BY PCR: NEGATIVE

## 2017-04-22 PROCEDURE — 82962 GLUCOSE BLOOD TEST: CPT

## 2017-04-22 PROCEDURE — G0378 HOSPITAL OBSERVATION PER HR: HCPCS

## 2017-04-22 PROCEDURE — 25010000002 KETOROLAC TROMETHAMINE PER 15 MG: Performed by: FAMILY MEDICINE

## 2017-04-22 PROCEDURE — 99217 PR OBSERVATION CARE DISCHARGE MANAGEMENT: CPT | Performed by: FAMILY MEDICINE

## 2017-04-22 PROCEDURE — 25010000002 ONDANSETRON PER 1 MG: Performed by: FAMILY MEDICINE

## 2017-04-22 PROCEDURE — 25010000002 CEFTRIAXONE: Performed by: FAMILY MEDICINE

## 2017-04-22 RX ORDER — BUTALBITAL, ACETAMINOPHEN AND CAFFEINE 50; 325; 40 MG/1; MG/1; MG/1
2 TABLET ORAL EVERY 4 HOURS PRN
Status: DISCONTINUED | OUTPATIENT
Start: 2017-04-22 | End: 2017-04-22 | Stop reason: HOSPADM

## 2017-04-22 RX ORDER — LANCETS
EACH MISCELLANEOUS
Qty: 200 EACH | Refills: 5 | Status: SHIPPED | OUTPATIENT
Start: 2017-04-22

## 2017-04-22 RX ORDER — AZITHROMYCIN 500 MG/1
500 TABLET, FILM COATED ORAL DAILY
Qty: 8 TABLET | Refills: 0 | Status: SHIPPED | OUTPATIENT
Start: 2017-04-22 | End: 2017-05-27

## 2017-04-22 RX ORDER — BLOOD-GLUCOSE METER
1 KIT MISCELLANEOUS AS NEEDED
Qty: 1 EACH | Refills: 1 | Status: SHIPPED | OUTPATIENT
Start: 2017-04-22 | End: 2017-07-19

## 2017-04-22 RX ORDER — BUTALBITAL, ACETAMINOPHEN AND CAFFEINE 50; 325; 40 MG/1; MG/1; MG/1
1 TABLET ORAL EVERY 6 HOURS PRN
Qty: 30 TABLET | Refills: 0 | Status: SHIPPED | OUTPATIENT
Start: 2017-04-22 | End: 2017-07-19

## 2017-04-22 RX ADMIN — Medication 10 ML: at 03:18

## 2017-04-22 RX ADMIN — ASPIRIN 325 MG: 325 TABLET, DELAYED RELEASE ORAL at 08:07

## 2017-04-22 RX ADMIN — LISINOPRIL 5 MG: 5 TABLET ORAL at 08:18

## 2017-04-22 RX ADMIN — ONDANSETRON 4 MG: 2 INJECTION INTRAMUSCULAR; INTRAVENOUS at 03:18

## 2017-04-22 RX ADMIN — GLIPIZIDE 5 MG: 5 TABLET ORAL at 08:07

## 2017-04-22 RX ADMIN — GABAPENTIN 800 MG: 400 CAPSULE ORAL at 08:18

## 2017-04-22 RX ADMIN — BUSPIRONE HYDROCHLORIDE 10 MG: 5 TABLET ORAL at 08:07

## 2017-04-22 RX ADMIN — ONDANSETRON 4 MG: 2 INJECTION INTRAMUSCULAR; INTRAVENOUS at 11:39

## 2017-04-22 RX ADMIN — HYDROCODONE BITARTRATE AND ACETAMINOPHEN 1 TABLET: 7.5; 325 TABLET ORAL at 05:21

## 2017-04-22 RX ADMIN — CARVEDILOL 3.12 MG: 3.12 TABLET, FILM COATED ORAL at 08:09

## 2017-04-22 RX ADMIN — KETOROLAC TROMETHAMINE 30 MG: 30 INJECTION, SOLUTION INTRAMUSCULAR; INTRAVENOUS at 02:14

## 2017-04-22 RX ADMIN — HYDROCODONE BITARTRATE AND ACETAMINOPHEN 1 TABLET: 7.5; 325 TABLET ORAL at 13:01

## 2017-04-22 RX ADMIN — BUTALBITAL, ACETAMINOPHEN, AND CAFFEINE 2 TABLET: 50; 325; 40 TABLET ORAL at 11:39

## 2017-04-22 RX ADMIN — Medication 10 ML: at 11:39

## 2017-04-22 RX ADMIN — GABAPENTIN 400 MG: 400 CAPSULE ORAL at 08:09

## 2017-04-22 RX ADMIN — NITROGLYCERIN 1 PATCH: 0.2 PATCH TRANSDERMAL at 08:19

## 2017-04-22 RX ADMIN — TICAGRELOR 90 MG: 90 TABLET ORAL at 08:18

## 2017-04-22 RX ADMIN — KETOROLAC TROMETHAMINE 30 MG: 30 INJECTION, SOLUTION INTRAMUSCULAR; INTRAVENOUS at 09:21

## 2017-04-22 RX ADMIN — RANOLAZINE 500 MG: 500 TABLET, FILM COATED, EXTENDED RELEASE ORAL at 08:18

## 2017-04-22 RX ADMIN — AZITHROMYCIN 500 MG: 250 TABLET, FILM COATED ORAL at 08:07

## 2017-04-22 NOTE — DISCHARGE PLACEMENT REQUEST
"Nikki Felder (66 y.o. Female)     Date of Birth Social Security Number Address Home Phone MRN    1950  [address] [address] [phone] [id number]    Orthodox Marital Status          [employer]        Admission Date Admission Type Admitting Provider Attending Provider Department, Room/Bed    4/20/17 Emergency Yasmin Stewart MD Henson, Tara H, MD [facility], 709/1    Discharge Date Discharge Disposition Discharge Destination         [place] [facility] [place]             Attending Provider: Yasmin Stewart MD     Allergies:  No Known Allergies    Isolation:  None   Infection:  None   Code Status:  FULL    Ht:  61\" (154.9 cm)   Wt:  136 lb (61.7 kg)    Admission Cmt:  None   Principal Problem:  Bacterial pneumonia [J15.9]                 Active Insurance as of 4/20/2017     Primary Coverage     Payor Plan Insurance Group Employer/Plan Group    MEDICARE MEDICARE A & B      Payor Plan Address Payor Plan Phone Number Effective From Effective To    [address] [phone] 10/1/2013     [place] [address]       Subscriber Name Subscriber Birth Date Member ID       [id number] ROBERTO 1950 [id number]                 Emergency Contacts      (Rel.) Home Phone Work Phone Mobile Phone    Madhavi Morrison (Daughter) [phone]            Emergency Contact Information     Name Relation Home Work Mobile    Madhavi Morrison Daughter [phone]          Insurance Information                MEDICARE/MEDICARE A & B Phone: [phone]    Subscriber: Nikki Felder Subscriber#: [id number]    Group#:  Precert#:           History & Physical     No notes of this type exist for this encounter.        Physician Progress Notes (last 24 hours) (Notes from 4/21/2017  1:13 PM through 4/22/2017  1:13 PM)     No notes of this type exist for this encounter.          Psychiatric " PEDIATRICS  [address] [place] [id number]  Phone: [phone]  Fax:  Date: 2017      Referral to Home Health     Patient: Nikki Felder MRN: [id number]   [address] [address] : 1950  SSN:    Phone: [phone] Sex: F      INSURANCE PAYOR PLAN GROUP # SUBSCRIBER ID   Primary: MEDICARE [id number]   [id number]      Referring Provider Information:  GERI HINOJOSA Phone: [phone] Fax:       Referral Information:   # Visits: 1 Referral Type: Home Health [42]   Urgency: Routine Referral Reason: Specialty Services Required   Start Date: 2017 End Date: To be determined by Insurer   Diagnosis: Weakness generalized (R53.1 [ICD-10-CM] 780.79 [ICD-9-CM])  Atherosclerosis of native coronary artery of native heart with angina pectoris (I25.119 [ICD-10-CM] 414.01,413.9 [ICD-9-CM])  Depression, unspecified depression type (F32.9 [ICD-10-CM] 311 [ICD-9-CM])  Type 2 diabetes mellitus with hyperglycemia, without long-term current use of insulin (E11.65 [ICD-10-CM] 250.00,790.29 [ICD-9-CM])  Noncompliance with medication regimen (Z91.14 [ICD-10-CM] V15.81 [ICD-9-CM])      Refer to Dept: [facility] HOME CARE  Refer to Provider:   Refer to Facility:      Face to Face Visit Date: 2017  Follow-up Provider for Plan of Care? I will be treating the patient on an ongoing basis. Please send me the Plan of Care for signature.  Follow-up Provider: GERI HINOJOSA [[id number]]  Reason/Clinical Findings: weakness, deconditioning, medication compliance  Describe mobility limitations that make leaving home difficult: pt has avascular necrosis of hip, high risk for fall, weakened immune system  Nursing/Therapeutic Services Requested: Skilled Nursing  Nursing/Therapeutic Services Requested: Physical Therapy  Nursing/Therapeutic Services Requested: Occupational Therapy  Nursing/Therapeutic Services Requested: Medical / Social Work  Nursing/Therapeutic Services Requested:  Dietician  Skilled nursing orders: Medication education  Skilled nursing orders: Mental health  Skilled nursing orders: Other  PT orders: Strengthening  PT orders: Home safety assessment  Occupational orders: Activities of daily living  Occupational orders: Strengthening  Occupational orders: Home safety assessment  Social work orders: Community resources  Social work orders: Long range planning  Skilled nursing orders: Diet instruction  Skilled nursing orders: Diet modification  Frequency: 1 Week 1     This document serves as a request of services and does not constitute Insurance authorization or approval of services.  To determine eligibility, please contact the members Insurance carrier to verify and review coverage.     If you have medical questions regarding this request for services. Please contact [facility] at [phone] between the hours of 8:00am - 5:00pm (Mon-Fri).             Authorizing Provider:Yasmin Stewart MD  Order Entered By: Yasmin Stewart MD 4/22/2017 11:47 AM     Electronically signed by: Yasmin Stewart MD (NPI: [id number]) 4/22/2017 11:47 AM            Consult Notes (all)     No notes of this type exist for this encounter.

## 2017-04-22 NOTE — PLAN OF CARE
Problem: Patient Care Overview (Adult)  Goal: Plan of Care Review  Outcome: Ongoing (interventions implemented as appropriate)    04/22/17 0511   Coping/Psychosocial Response Interventions   Plan Of Care Reviewed With patient   Patient Care Overview   Progress progress toward functional goals as expected       Goal: Adult Individualization and Mutuality  Outcome: Ongoing (interventions implemented as appropriate)  Goal: Discharge Needs Assessment  Outcome: Ongoing (interventions implemented as appropriate)    Problem: Acute Coronary Syndrome (ACS) (Adult)  Goal: Signs and Symptoms of Listed Potential Problems Will be Absent or Manageable (Acute Coronary Syndrome)  Outcome: Ongoing (interventions implemented as appropriate)

## 2017-04-24 NOTE — DISCHARGE SUMMARY
DISCHARGE SUMMARY       Date of Admission: 4/20/2017  Date of Discharge:  4/23/2017  Primary Care Physician: Yasmin Stewart MD    Presenting Problem/History of Present Illness:  Other chest pain [R07.89]       Final Discharge Diagnoses:  Active Hospital Problems (** Indicates Principal Problem)    Diagnosis Date Noted   • **Bacterial pneumonia [J15.9] 04/22/2017   • Atherosclerosis of native coronary artery of native heart with angina pectoris [I25.119] 10/08/2016     Priority: High   • Chest pain [R07.9] 04/20/2017     Priority: Medium   • Tension type headache [G44.209] 04/22/2017   • Tobacco dependence syndrome [F17.200] 02/02/2017   • Diabetic polyneuropathy [E11.42] 11/09/2016   • Essential hypertension, benign [I10] 11/09/2016   • Depression [F32.9] 11/09/2016   • Anxiety [F41.9] 11/09/2016   • Type 2 diabetes mellitus with hyperglycemia, without long-term current use of insulin [E11.65] 11/09/2016      Resolved Hospital Problems    Diagnosis Date Noted Date Resolved   No resolved problems to display.       Consults:   Consults     Date and Time Order Name Status Description    3/26/2017 0939 Inpatient Consult to Cardiology Completed           Procedures Performed:                 Pertinent Test Results:   PROCEDURE: CT -CTA Thorax/PE with contrast      REASON FOR EXAM: chest pain, sob, R07.89 Other chest pain     FINDINGS: Comparison study dated July 11, 2014. Axial computer  tomography sequential imaging was performed from the thoracic  inlet through the diaphragms after administration of IV contrast  .3-D chest sagittal and coronal reformates was performed. This  exam was performed according to our departmental dose  optimization program, which includes automated exposure control,  adjustment of the mA and/or KV according to patient size and/or  use of iterative reconstruction technique.     The pulmonary arteries are normal. There is no filling defect  identified to suggest pulmonary embolus.  Mediastinal structures  of the chest are normal. No lymphadenopathy or pericardial  effusion. 2 left lower lobe lateral basilar small linear opacity.  Right lower lobe posterior basilar segment small linear opacity.  Left upper lobe lingula small triangular shaped opacity. Right  middle lobe anterior medial small triangular shaped opacity.  Scattered small calcified lung parenchymal granulomas consistent  with old granulomatous disease. Lungs are otherwise clear.  Pleural spaces are normal. Visualized upper abdominal structures  are unremarkable. No acute osseous abnormality.     IMPRESSION:  1. No evidence of pulmonary embolus..  2. 2 left lower lobe lateral basilar segment as well as single  right lower lobe posterior basilar segment small linear opacities  suspicious for discoid atelectasis versus less likely lung  parenchymal scarring..  3. Right middle lobe as well as left upper lobe lingula small  triangular shaped opacities suspicious for subsegmental  atelectasis versus less likely pneumonia..  4. Evidence of old granulomatous disease..     Electronically signed by: Mason Franco MD 4/21/2017 3:02 PM CDT    Chief Complaint on Day of Discharge: Bacterial pneumonia    Hospital Course:  The patient is a 66 y.o. female who presented to [facility] with chest pain.  Patient was admitted and ruled out.  She continued to complain of chest pain and felt like something was wrong.  She had a CTA chest that showed a pneumonia.  She was started on Rocephin and Zithromax.  She continued to complain of pain and wanted Morphine. She was advised the Morphine was not indicated for the pneumonia and was given her regular dose of Norco. She was also given some Toradol and Fioricet for a headache.  She was felt stable to be discharged home. She was given a script for glucometer, lancets, and test strips.  She was tolerating ADA diet.  She was advised on smoking cessation again.  Case management was consulted for  "home health.  Patient is to follow up with Dr. Whitman on 4/27.  If she doesn't keep this appointment, she will most likely be on a same day appointment because of how many appointments she has missed.  She is advised to finish all of her medications.    Condition on Discharge:  Stable  Review of Systems    Physical Exam on Discharge:  /61 (BP Location: Right arm, Patient Position: Sitting)  Pulse 62  Temp 99.8 °F (37.7 °C) (Temporal Artery )   Resp 18  Ht 61\" (154.9 cm)  Wt 136 lb (61.7 kg)  LMP  (LMP Unknown)  SpO2 97%  BMI 25.7 kg/m2  Physical Exam      Discharge Disposition: Home with home health      Discharge Medications:   Nikki Felder   Home Medication Instructions ARUN:[id number]    Printed on:04/23/17 2051   Medication Information                      aspirin 81 MG tablet  Take 1 tablet by mouth Daily.             atorvastatin (LIPITOR) 20 MG tablet  Take 1 tablet by mouth Every Night.             azithromycin (ZITHROMAX) 500 MG tablet  Take 1 tablet by mouth Daily. Indications: Pneumonia             busPIRone (BUSPAR) 10 MG tablet  Take 1 tablet by mouth Every 12 (Twelve) Hours.             butalbital-acetaminophen-caffeine (FIORICET, ESGIC) -40 MG per tablet  Take 1 tablet by mouth Every 6 (Six) Hours As Needed for Headache.             carvedilol (COREG) 3.125 MG tablet  Take 1 tablet by mouth 2 (Two) Times a Day.             escitalopram (LEXAPRO) 20 MG tablet  Take 1 tablet by mouth Daily.             FINGERSTIX LANCETS misc  To check sugar tid dx code E11.65             gabapentin (NEURONTIN) 400 MG capsule  Take 400 mg by mouth 3 (Three) Times a Day. Pt takes 400 mg + 800 mg three times daily.             gabapentin (NEURONTIN) 800 MG tablet  Take 800 mg by mouth 3 (Three) Times a Day. Pt takes 400 mg + 800 mg three times daily.             glipiZIDE (GLUCOTROL) 5 MG tablet  Take 1 tablet by mouth 2 (Two) Times a Day Before Meals.             glucose blood test strip  To " check sugar tid dx code E11.65             glucose monitor monitoring kit  1 each As Needed (to check sugar).             HYDROcodone-acetaminophen (NORCO) 7.5-325 MG per tablet  Take 1 tablet by mouth 3 (three) times a day as needed for moderate pain (4-6).             insulin detemir (LEVEMIR) 100 UNIT/ML injection  Inject 14 Units under the skin Every Night.             Insulin Pen Needle 33G X 8 MM misc  1 Units Every Night.             lisinopril (ZESTRIL) 5 MG tablet  Take 1 tablet by mouth Daily.             metFORMIN (GLUCOPHAGE) 1000 MG tablet  Take 1 tablet by mouth 2 (Two) Times a Day With Meals.             nitroglycerin (NITRODUR) 0.2 MG/HR patch  Place 1 patch on the skin Daily.             ondansetron ODT (ZOFRAN ODT) 4 MG disintegrating tablet  Take 1 tablet by mouth Every 8 (Eight) Hours As Needed for nausea or vomiting.             ranolazine (RANEXA) 500 MG 12 hr tablet  Take 1 tablet by mouth Every 12 (Twelve) Hours.             ticagrelor (BRILINTA) 90 MG tablet tablet  Take 90 mg by mouth 2 (Two) Times a Day.                 Discharge Diet:   Diet Instructions     Diet: Consistent Carbohydrate, Cardiac; Thin Liquids, No Restrictions       Discharge Diet:   Consistent Carbohydrate  Cardiac      Fluid Consistency:  Thin Liquids, No Restrictions                 Activity at Discharge:   Activity Instructions     Activity as Tolerated                       Special Instructions: Patient instructed to call MD or return to ED with worsening shortness of breath, chest pain, fever greater than 100.4 degrees F or any other medical concerns patient may have.   Follow-up Appointments:   Follow-up Information     Follow up with [facility] C[facility] REFERR[facility] .    Specialty:  Home Health Services    Contact information:    [address] [id number]          Follow up with Yasmin Stewart MD .    Specialty:  Family Medicine    Contact information:    [address] [place] [address]  [phone]          Follow up with [facility] .    Specialty:  Home Health Services    Contact information:    [address] [place] [address] [phone]        Follow up with Micheal Whitman MD .    Specialty:  Family Medicine    Why:  April 27 at 2pm ( doctor Pat is precepting / will be there)     Contact information:    [address] [place] [address] [phone]          Future Appointments  Date Time Provider Department Center   4/27/2017 8:50 AM Yan Melendez MD [facility] None                 This document has been electronically signed by Yasmin Stewart MD on April 23, 2017 8:51 PM          This document has been electronically signed by Yasmin Stewart MD on April 23, 2017 8:51 PM     Time: 30

## 2017-04-26 LAB
BACTERIA SPEC AEROBE CULT: NORMAL
BACTERIA SPEC AEROBE CULT: NORMAL

## 2017-04-27 ENCOUNTER — OFFICE VISIT (OUTPATIENT)
Dept: PAIN MEDICINE | Facility: CLINIC | Age: 67
End: 2017-04-27

## 2017-04-27 VITALS
DIASTOLIC BLOOD PRESSURE: 62 MMHG | HEIGHT: 61 IN | WEIGHT: 134.5 LBS | BODY MASS INDEX: 25.39 KG/M2 | SYSTOLIC BLOOD PRESSURE: 128 MMHG

## 2017-04-27 DIAGNOSIS — M47.817 LUMBOSACRAL SPONDYLOSIS WITHOUT MYELOPATHY: Primary | ICD-10-CM

## 2017-04-27 DIAGNOSIS — M46.1 SACROILIITIS (HCC): ICD-10-CM

## 2017-04-27 DIAGNOSIS — Z79.899 HIGH RISK MEDICATIONS (NOT ANTICOAGULANTS) LONG-TERM USE: ICD-10-CM

## 2017-04-27 DIAGNOSIS — M48.061 LUMBAR STENOSIS: ICD-10-CM

## 2017-04-27 PROCEDURE — 99214 OFFICE O/P EST MOD 30 MIN: CPT | Performed by: PAIN MEDICINE

## 2017-04-27 RX ORDER — HYDROCODONE BITARTRATE AND ACETAMINOPHEN 7.5; 325 MG/1; MG/1
1 TABLET ORAL 3 TIMES DAILY
Qty: 90 TABLET | Refills: 0 | Status: SHIPPED | OUTPATIENT
Start: 2017-04-27 | End: 2017-05-25 | Stop reason: HOSPADM

## 2017-04-27 NOTE — PROGRESS NOTES
Nikki Felder is a 66 y.o. female.   1950    HPI:   Location: right hip  Quality: shooting, aching and sharp  Severity: 5/10  Timing: constant  Alleviating: pain medication and lying down  Aggravating: ambulating  and increased activity     Pt has been hospitalized for MI in interim.  Has had cardiac cath x2 in interim.  Opioid was helping, however she has been out of opioid for a month, but was just released from the hospital a few days ago.  [facility] [place] [facility] records show that she was d/c'd because of negative uds's.  Pt states this was because she was in the hospital and had run out.       The following portions of the patient's history were reviewed by me and updated as appropriate: allergies, current medications, past family history, past medical history, past social history, past surgical history and problem list.    Past Medical History:   Diagnosis Date   • Acute bronchitis    • Allergic    • Allergic rhinitis    • Anxiety state    • Anxiety state     Anxiety state, unspecified      • Arthritis    • Bacterial pneumonia    • Chronic pain    • Chronic pain     Other chronic pain      • Contact dermatitis    • COPD (chronic obstructive pulmonary disease)    • Coronary arteriosclerosis    • Depressive disorder    • Depressive disorder    • Diabetes mellitus due to underlying condition with diabetic autonomic neuropathy     Diabetes mellitus due to underlying condition with diabetic autonomic (poly)neuropathy      • Drug therapy     Long-term drug therapy      • Dysuria    • Encounter for immunization    • Encounter for screening for malignant neoplasm of colon    • Essential hypertension    • Folliculitis    • Headache    • Heart murmur    • Herpes zoster    • Herpes zoster with nervous system complication    • History of screening mammography    • Hyperlipidemia    • Insomnia    • Insomnia    • Kidney stone    • Lumbar radiculopathy    • Lung nodule    • Migraine    • Migraine     Migraine, unspecified,  without mention of intractable migraine, without mention of status migrainosus      • Migraine    • Myocardial infarction    • Nausea and vomiting    • Nausea with vomiting    • Neck pain    • Need for immunization against influenza    • Need for prophylactic vaccination and inoculation against diphtheria alone    • Need for prophylactic vaccination and inoculation against unspecified single disease     Need for prophylactic vaccination and inoculation against Streptococcus pneumoniae [pneumococcus] and influenza      • Non-alcoholic fatty liver disease    • Rectus sheath hematoma    • Senile osteoporosis    • Shoulder pain    • Solitary pulmonary nodule present on computed tomography of lung    • Spasm of back muscles    • Spinal stenosis of lumbar region    • Systolic congestive heart failure    • TIA (transient ischemic attack)    • Tobacco dependence syndrome    • Type 2 diabetes mellitus    • Urinary tract infectious disease    • Viral gastroenteritis    • Vitamin D deficiency     Unspecified vitamin D deficiency      • Vitamin D deficiency        Social History     Social History   • Marital status:      Spouse name: N/A   • Number of children: N/A   • Years of education: N/A     Occupational History   • Not on file.     Social History Main Topics   • Smoking status: Current Every Day Smoker     Packs/day: 0.25     Years: 45.00     Types: Cigarettes   • Smokeless tobacco: Never Used      Comment: maybe 2 or 3 a day   • Alcohol use No   • Drug use: No   • Sexual activity: Defer     Other Topics Concern   • Not on file     Social History Narrative    Previously worked in [place] [employer] [place]  Lives with daughter.        Family History   Problem Relation Age of Onset   • Alzheimer's disease Mother    • Diabetes Brother    • Alzheimer's disease Other    • Diabetes Other    • Lung cancer Other    • Pulmonary embolism Other    • Hypertension Father    • Depression Daughter    • Hypertension Daughter     • Anxiety disorder Daughter    • Lung cancer Brother          Current Outpatient Prescriptions:   •  aspirin 81 MG tablet, Take 1 tablet by mouth Daily., Disp: 30 tablet, Rfl: 5  •  atorvastatin (LIPITOR) 20 MG tablet, Take 1 tablet by mouth Every Night., Disp: 90 tablet, Rfl: 3  •  busPIRone (BUSPAR) 10 MG tablet, Take 1 tablet by mouth Every 12 (Twelve) Hours., Disp: 60 tablet, Rfl: 5  •  butalbital-acetaminophen-caffeine (FIORICET, ESGIC) -40 MG per tablet, Take 1 tablet by mouth Every 6 (Six) Hours As Needed for Headache., Disp: 30 tablet, Rfl: 0  •  carvedilol (COREG) 3.125 MG tablet, Take 1 tablet by mouth 2 (Two) Times a Day., Disp: 180 tablet, Rfl: 3  •  escitalopram (LEXAPRO) 20 MG tablet, Take 1 tablet by mouth Daily. (Patient taking differently: Take 20 mg by mouth Every Night.), Disp: 90 tablet, Rfl: 3  •  FINGERSTIX LANCETS misc, To check sugar tid dx code E11.65, Disp: 200 each, Rfl: 5  •  gabapentin (NEURONTIN) 400 MG capsule, Take 400 mg by mouth 3 (Three) Times a Day. Pt takes 400 mg + 800 mg three times daily., Disp: , Rfl:   •  gabapentin (NEURONTIN) 800 MG tablet, Take 800 mg by mouth 3 (Three) Times a Day. Pt takes 400 mg + 800 mg three times daily., Disp: , Rfl:   •  glipiZIDE (GLUCOTROL) 5 MG tablet, Take 1 tablet by mouth 2 (Two) Times a Day Before Meals., Disp: 180 tablet, Rfl: 3  •  glucose blood test strip, To check sugar tid dx code E11.65, Disp: 100 each, Rfl: 12  •  glucose monitor monitoring kit, 1 each As Needed (to check sugar)., Disp: 1 each, Rfl: 1  •  HYDROcodone-acetaminophen (NORCO) 7.5-325 MG per tablet, Take 1 tablet by mouth 3 (three) times a day as needed for moderate pain (4-6)., Disp: , Rfl:   •  insulin detemir (LEVEMIR) 100 UNIT/ML injection, Inject 14 Units under the skin Every Night., Disp: 2 pen, Rfl: 5  •  Insulin Pen Needle 33G X 8 MM misc, 1 Units Every Night., Disp: 100 each, Rfl: 3  •  lisinopril (ZESTRIL) 5 MG tablet, Take 1 tablet by mouth Daily.  (Patient taking differently: Take 5 mg by mouth Daily. New med - hasn't started yet.), Disp: 30 tablet, Rfl: 2  •  metFORMIN (GLUCOPHAGE) 1000 MG tablet, Take 1 tablet by mouth 2 (Two) Times a Day With Meals., Disp: 180 tablet, Rfl: 3  •  nitroglycerin (NITRODUR) 0.2 MG/HR patch, Place 1 patch on the skin Daily., Disp: 90 patch, Rfl: 12  •  ondansetron ODT (ZOFRAN ODT) 4 MG disintegrating tablet, Take 1 tablet by mouth Every 8 (Eight) Hours As Needed for nausea or vomiting., Disp: 90 tablet, Rfl: 3  •  ranolazine (RANEXA) 500 MG 12 hr tablet, Take 1 tablet by mouth Every 12 (Twelve) Hours. (Patient taking differently: Take 500 mg by mouth Every 12 (Twelve) Hours. New med - hasn't started taking yet.), Disp: 180 tablet, Rfl: 3  •  ticagrelor (BRILINTA) 90 MG tablet tablet, Take 90 mg by mouth 2 (Two) Times a Day., Disp: , Rfl:   •  azithromycin (ZITHROMAX) 500 MG tablet, Take 1 tablet by mouth Daily. Indications: Pneumonia, Disp: 8 tablet, Rfl: 0  •  HYDROcodone-acetaminophen (NORCO) 7.5-325 MG per tablet, Take 1 tablet by mouth 3 (Three) Times a Day for 30 days., Disp: 90 tablet, Rfl: 0  •  HYDROcodone-acetaminophen (NORCO) 7.5-325 MG per tablet, Take 1 tablet by mouth 3 (Three) Times a Day for 30 days., Disp: 90 tablet, Rfl: 0    No Known Allergies      Review of Systems   Musculoskeletal:        Right hip pain     10 system review of systems was reviewed and negative except for above.    Physical Exam   Constitutional: She appears well-developed and well-nourished. No distress.   Musculoskeletal:        Lumbar back: She exhibits decreased range of motion (r lumbar facet loading with ext limited to approx 10 deg due to pain.).   Neurological: She is alert.   Psychiatric: She has a normal mood and affect. Her behavior is normal. Judgment normal.       Nikki was seen today for hip pain.    Diagnoses and all orders for this visit:    Lumbosacral spondylosis without myelopathy    Sacroiliitis    Lumbar  stenosis    High risk medications (not anticoagulants) long-term use    Other orders  -     HYDROcodone-acetaminophen (NORCO) 7.5-325 MG per tablet; Take 1 tablet by mouth 3 (Three) Times a Day for 30 days.  -     HYDROcodone-acetaminophen (NORCO) 7.5-325 MG per tablet; Take 1 tablet by mouth 3 (Three) Times a Day for 30 days.      Medication: Patient reports no negative side effects, Patient reports appropriate usage and storage habits, Patient's opioid provides enough reflief to be more active and perform activities of daily living with less discomfort. and Refill opioid medication as above    Interventional: none at this time    Rehab: none at this time    Behavioral: No aberrant behavior noted. PRUDENCE Report #[id number]  was reviewed and is consistent with stated history.  It does show Dr. Martinez as prescribing her opioid, but i suspect this is a mistake on the prudence.  We will call the pharmacy to verify.     Urine drug screen None at this time          This document has been electronically signed by Yan Melendez MD on April 27, 2017 9:00 AM

## 2017-05-24 ENCOUNTER — APPOINTMENT (OUTPATIENT)
Dept: GENERAL RADIOLOGY | Facility: HOSPITAL | Age: 67
End: 2017-05-24

## 2017-05-24 ENCOUNTER — HOSPITAL ENCOUNTER (OUTPATIENT)
Facility: HOSPITAL | Age: 67
Setting detail: OBSERVATION
Discharge: HOME | End: 2017-05-25
Attending: EMERGENCY MEDICINE | Admitting: FAMILY MEDICINE

## 2017-05-24 DIAGNOSIS — R07.89 RIGHT-SIDED CHEST WALL PAIN: Primary | ICD-10-CM

## 2017-05-24 DIAGNOSIS — R07.89 ATYPICAL CHEST PAIN: ICD-10-CM

## 2017-05-24 LAB
ALBUMIN SERPL-MCNC: 4 G/DL (ref 3.4–4.8)
ALBUMIN/GLOB SERPL: 1.5 G/DL (ref 1.1–1.8)
ALP SERPL-CCNC: 81 U/L (ref 38–126)
ALT SERPL W P-5'-P-CCNC: 31 U/L (ref 9–52)
ANION GAP SERPL CALCULATED.3IONS-SCNC: 11 MMOL/L (ref 5–15)
AST SERPL-CCNC: 24 U/L (ref 14–36)
BACTERIA UR QL AUTO: ABNORMAL /HPF
BASOPHILS # BLD AUTO: 0.03 10*3/MM3 (ref 0–0.2)
BASOPHILS NFR BLD AUTO: 0.6 % (ref 0–2)
BILIRUB SERPL-MCNC: 0.7 MG/DL (ref 0.2–1.3)
BILIRUB UR QL STRIP: NEGATIVE
BUN BLD-MCNC: 10 MG/DL (ref 7–21)
BUN/CREAT SERPL: 13.3 (ref 7–25)
CALCIUM SPEC-SCNC: 9.1 MG/DL (ref 8.4–10.2)
CHLORIDE SERPL-SCNC: 107 MMOL/L (ref 95–110)
CK MB SERPL-CCNC: 1.6 NG/ML (ref 0–5)
CK SERPL-CCNC: 54 U/L (ref 30–135)
CLARITY UR: CLEAR
CO2 SERPL-SCNC: 19 MMOL/L (ref 22–31)
COLOR UR: YELLOW
CREAT BLD-MCNC: 0.75 MG/DL (ref 0.5–1)
DEPRECATED RDW RBC AUTO: 44.1 FL (ref 36.4–46.3)
EOSINOPHIL # BLD AUTO: 0.12 10*3/MM3 (ref 0–0.7)
EOSINOPHIL NFR BLD AUTO: 2.4 % (ref 0–7)
ERYTHROCYTE [DISTWIDTH] IN BLOOD BY AUTOMATED COUNT: 13.2 % (ref 11.5–14.5)
GFR SERPL CREATININE-BSD FRML MDRD: 77 ML/MIN/1.73 (ref 60–104)
GLOBULIN UR ELPH-MCNC: 2.7 GM/DL (ref 2.3–3.5)
GLUCOSE BLD-MCNC: 91 MG/DL (ref 60–100)
GLUCOSE UR STRIP-MCNC: NEGATIVE MG/DL
HCT VFR BLD AUTO: 36.4 % (ref 35–45)
HGB BLD-MCNC: 12.7 G/DL (ref 12–15.5)
HGB UR QL STRIP.AUTO: ABNORMAL
HOLD SPECIMEN: NORMAL
HOLD SPECIMEN: NORMAL
HYALINE CASTS UR QL AUTO: ABNORMAL /LPF
IMM GRANULOCYTES # BLD: 0.01 10*3/MM3 (ref 0–0.02)
IMM GRANULOCYTES NFR BLD: 0.2 % (ref 0–0.5)
INR PPP: 1.04 (ref 0.8–1.2)
KETONES UR QL STRIP: ABNORMAL
LEUKOCYTE ESTERASE UR QL STRIP.AUTO: ABNORMAL
LIPASE SERPL-CCNC: 88 U/L (ref 23–300)
LYMPHOCYTES # BLD AUTO: 2.08 10*3/MM3 (ref 0.6–4.2)
LYMPHOCYTES NFR BLD AUTO: 41.7 % (ref 10–50)
MAGNESIUM SERPL-MCNC: 2.3 MG/DL (ref 1.6–2.3)
MCH RBC QN AUTO: 31.9 PG (ref 26.5–34)
MCHC RBC AUTO-ENTMCNC: 34.9 G/DL (ref 31.4–36)
MCV RBC AUTO: 91.5 FL (ref 80–98)
MONOCYTES # BLD AUTO: 0.42 10*3/MM3 (ref 0–0.9)
MONOCYTES NFR BLD AUTO: 8.4 % (ref 0–12)
NEUTROPHILS # BLD AUTO: 2.33 10*3/MM3 (ref 2–8.6)
NEUTROPHILS NFR BLD AUTO: 46.7 % (ref 37–80)
NITRITE UR QL STRIP: NEGATIVE
NT-PROBNP SERPL-MCNC: 892 PG/ML (ref 0–900)
PH UR STRIP.AUTO: 8.5 [PH] (ref 5–9)
PLATELET # BLD AUTO: 243 10*3/MM3 (ref 150–450)
PMV BLD AUTO: 9.1 FL (ref 8–12)
POTASSIUM BLD-SCNC: 3.2 MMOL/L (ref 3.5–5.1)
PROT SERPL-MCNC: 6.7 G/DL (ref 6.3–8.6)
PROT UR QL STRIP: NEGATIVE
PROTHROMBIN TIME: 13.5 SECONDS (ref 11.1–15.3)
RBC # BLD AUTO: 3.98 10*6/MM3 (ref 3.77–5.16)
RBC # UR: ABNORMAL /HPF
REF LAB TEST METHOD: ABNORMAL
SODIUM BLD-SCNC: 137 MMOL/L (ref 137–145)
SP GR UR STRIP: 1.01 (ref 1–1.03)
SQUAMOUS #/AREA URNS HPF: ABNORMAL /HPF
TROPONIN I SERPL-MCNC: <0.012 NG/ML
TSH SERPL DL<=0.05 MIU/L-ACNC: 0.27 MIU/ML (ref 0.46–4.68)
UROBILINOGEN UR QL STRIP: ABNORMAL
WBC NRBC COR # BLD: 4.99 10*3/MM3 (ref 3.2–9.8)
WBC UR QL AUTO: ABNORMAL /HPF
WHOLE BLOOD HOLD SPECIMEN: NORMAL
WHOLE BLOOD HOLD SPECIMEN: NORMAL

## 2017-05-24 PROCEDURE — 71010 HC CHEST PA OR AP: CPT

## 2017-05-24 PROCEDURE — 85025 COMPLETE CBC W/AUTO DIFF WBC: CPT | Performed by: EMERGENCY MEDICINE

## 2017-05-24 PROCEDURE — 83880 ASSAY OF NATRIURETIC PEPTIDE: CPT | Performed by: EMERGENCY MEDICINE

## 2017-05-24 PROCEDURE — 80053 COMPREHEN METABOLIC PANEL: CPT | Performed by: EMERGENCY MEDICINE

## 2017-05-24 PROCEDURE — 82553 CREATINE MB FRACTION: CPT | Performed by: EMERGENCY MEDICINE

## 2017-05-24 PROCEDURE — 84484 ASSAY OF TROPONIN QUANT: CPT | Performed by: EMERGENCY MEDICINE

## 2017-05-24 PROCEDURE — 82550 ASSAY OF CK (CPK): CPT | Performed by: EMERGENCY MEDICINE

## 2017-05-24 PROCEDURE — 93005 ELECTROCARDIOGRAM TRACING: CPT

## 2017-05-24 PROCEDURE — 96375 TX/PRO/DX INJ NEW DRUG ADDON: CPT

## 2017-05-24 PROCEDURE — G0378 HOSPITAL OBSERVATION PER HR: HCPCS

## 2017-05-24 PROCEDURE — 81001 URINALYSIS AUTO W/SCOPE: CPT | Performed by: EMERGENCY MEDICINE

## 2017-05-24 PROCEDURE — 84443 ASSAY THYROID STIM HORMONE: CPT | Performed by: EMERGENCY MEDICINE

## 2017-05-24 PROCEDURE — 99285 EMERGENCY DEPT VISIT HI MDM: CPT

## 2017-05-24 PROCEDURE — 93010 ELECTROCARDIOGRAM REPORT: CPT | Performed by: INTERNAL MEDICINE

## 2017-05-24 PROCEDURE — 85610 PROTHROMBIN TIME: CPT | Performed by: EMERGENCY MEDICINE

## 2017-05-24 PROCEDURE — 96365 THER/PROPH/DIAG IV INF INIT: CPT

## 2017-05-24 PROCEDURE — 83735 ASSAY OF MAGNESIUM: CPT | Performed by: EMERGENCY MEDICINE

## 2017-05-24 PROCEDURE — 83690 ASSAY OF LIPASE: CPT | Performed by: EMERGENCY MEDICINE

## 2017-05-24 PROCEDURE — 25010000002 ONDANSETRON PER 1 MG: Performed by: EMERGENCY MEDICINE

## 2017-05-24 PROCEDURE — 93005 ELECTROCARDIOGRAM TRACING: CPT | Performed by: EMERGENCY MEDICINE

## 2017-05-24 RX ORDER — SODIUM CHLORIDE 0.9 % (FLUSH) 0.9 %
10 SYRINGE (ML) INJECTION AS NEEDED
Status: DISCONTINUED | OUTPATIENT
Start: 2017-05-24 | End: 2017-05-25 | Stop reason: HOSPADM

## 2017-05-24 RX ORDER — POTASSIUM CHLORIDE 7.45 MG/ML
10 INJECTION INTRAVENOUS ONCE
Status: COMPLETED | OUTPATIENT
Start: 2017-05-24 | End: 2017-05-25

## 2017-05-24 RX ORDER — ACETAMINOPHEN 500 MG
1000 TABLET ORAL ONCE
Status: COMPLETED | OUTPATIENT
Start: 2017-05-24 | End: 2017-05-24

## 2017-05-24 RX ORDER — ONDANSETRON 2 MG/ML
4 INJECTION INTRAMUSCULAR; INTRAVENOUS ONCE
Status: COMPLETED | OUTPATIENT
Start: 2017-05-24 | End: 2017-05-24

## 2017-05-24 RX ORDER — NITROGLYCERIN 0.4 MG/1
0.4 TABLET SUBLINGUAL
Status: DISCONTINUED | OUTPATIENT
Start: 2017-05-24 | End: 2017-05-25 | Stop reason: HOSPADM

## 2017-05-24 RX ORDER — POTASSIUM CHLORIDE 750 MG/1
40 CAPSULE, EXTENDED RELEASE ORAL ONCE
Status: COMPLETED | OUTPATIENT
Start: 2017-05-24 | End: 2017-05-24

## 2017-05-24 RX ORDER — ASPIRIN 325 MG
325 TABLET ORAL ONCE
Status: COMPLETED | OUTPATIENT
Start: 2017-05-24 | End: 2017-05-24

## 2017-05-24 RX ADMIN — NITROGLYCERIN 1 INCH: 20 OINTMENT TOPICAL at 20:37

## 2017-05-24 RX ADMIN — NITROGLYCERIN 0.4 MG: 0.4 TABLET SUBLINGUAL at 23:23

## 2017-05-24 RX ADMIN — ONDANSETRON 4 MG: 2 INJECTION INTRAMUSCULAR; INTRAVENOUS at 22:12

## 2017-05-24 RX ADMIN — ACETAMINOPHEN 1000 MG: 500 TABLET ORAL at 22:12

## 2017-05-24 RX ADMIN — POTASSIUM CHLORIDE 40 MEQ: 750 CAPSULE, EXTENDED RELEASE ORAL at 22:31

## 2017-05-24 RX ADMIN — ASPIRIN 325 MG: 325 TABLET, COATED ORAL at 20:57

## 2017-05-25 VITALS
RESPIRATION RATE: 20 BRPM | HEIGHT: 61 IN | OXYGEN SATURATION: 98 % | TEMPERATURE: 98.9 F | HEART RATE: 57 BPM | DIASTOLIC BLOOD PRESSURE: 59 MMHG | SYSTOLIC BLOOD PRESSURE: 121 MMHG | WEIGHT: 139.25 LBS | BODY MASS INDEX: 26.29 KG/M2

## 2017-05-25 PROBLEM — R07.89 RIGHT-SIDED CHEST WALL PAIN: Status: ACTIVE | Noted: 2017-05-25

## 2017-05-25 LAB
ALBUMIN SERPL-MCNC: 3.2 G/DL (ref 3.4–4.8)
ALBUMIN/GLOB SERPL: 1.3 G/DL (ref 1.1–1.8)
ALP SERPL-CCNC: 60 U/L (ref 38–126)
ALT SERPL W P-5'-P-CCNC: 30 U/L (ref 9–52)
ANION GAP SERPL CALCULATED.3IONS-SCNC: 8 MMOL/L (ref 5–15)
AST SERPL-CCNC: 16 U/L (ref 14–36)
BILIRUB SERPL-MCNC: 0.5 MG/DL (ref 0.2–1.3)
BUN BLD-MCNC: 11 MG/DL (ref 7–21)
BUN/CREAT SERPL: 12.2 (ref 7–25)
CALCIUM SPEC-SCNC: 8.2 MG/DL (ref 8.4–10.2)
CHLORIDE SERPL-SCNC: 110 MMOL/L (ref 95–110)
CK MB SERPL-CCNC: 1.58 NG/ML (ref 0–5)
CK MB SERPL-CCNC: 1.83 NG/ML (ref 0–5)
CK SERPL-CCNC: 51 U/L (ref 30–135)
CK SERPL-CCNC: 55 U/L (ref 30–135)
CO2 SERPL-SCNC: 21 MMOL/L (ref 22–31)
CREAT BLD-MCNC: 0.9 MG/DL (ref 0.5–1)
GFR SERPL CREATININE-BSD FRML MDRD: 62 ML/MIN/1.73 (ref 45–104)
GLOBULIN UR ELPH-MCNC: 2.5 GM/DL (ref 2.3–3.5)
GLUCOSE BLD-MCNC: 160 MG/DL (ref 60–100)
GLUCOSE BLDC GLUCOMTR-MCNC: 144 MG/DL (ref 70–130)
POTASSIUM BLD-SCNC: 4.4 MMOL/L (ref 3.5–5.1)
PROT SERPL-MCNC: 5.7 G/DL (ref 6.3–8.6)
SODIUM BLD-SCNC: 139 MMOL/L (ref 137–145)
TROPONIN I SERPL-MCNC: <0.012 NG/ML

## 2017-05-25 PROCEDURE — 82962 GLUCOSE BLOOD TEST: CPT

## 2017-05-25 PROCEDURE — 84484 ASSAY OF TROPONIN QUANT: CPT | Performed by: EMERGENCY MEDICINE

## 2017-05-25 PROCEDURE — 25010000003 POTASSIUM CHLORIDE 10 MEQ/100ML SOLUTION: Performed by: EMERGENCY MEDICINE

## 2017-05-25 PROCEDURE — 80053 COMPREHEN METABOLIC PANEL: CPT | Performed by: FAMILY MEDICINE

## 2017-05-25 PROCEDURE — 99225 PR SBSQ OBSERVATION CARE/DAY 25 MINUTES: CPT | Performed by: FAMILY MEDICINE

## 2017-05-25 PROCEDURE — 82553 CREATINE MB FRACTION: CPT | Performed by: EMERGENCY MEDICINE

## 2017-05-25 PROCEDURE — 82550 ASSAY OF CK (CPK): CPT | Performed by: FAMILY MEDICINE

## 2017-05-25 PROCEDURE — G0378 HOSPITAL OBSERVATION PER HR: HCPCS

## 2017-05-25 PROCEDURE — 82550 ASSAY OF CK (CPK): CPT | Performed by: EMERGENCY MEDICINE

## 2017-05-25 PROCEDURE — 82553 CREATINE MB FRACTION: CPT | Performed by: FAMILY MEDICINE

## 2017-05-25 PROCEDURE — 84484 ASSAY OF TROPONIN QUANT: CPT | Performed by: FAMILY MEDICINE

## 2017-05-25 RX ORDER — LISINOPRIL 5 MG/1
5 TABLET ORAL DAILY
Status: DISCONTINUED | OUTPATIENT
Start: 2017-05-25 | End: 2017-05-25 | Stop reason: HOSPADM

## 2017-05-25 RX ORDER — BUTALBITAL, ACETAMINOPHEN AND CAFFEINE 50; 325; 40 MG/1; MG/1; MG/1
1 TABLET ORAL EVERY 6 HOURS PRN
Status: DISCONTINUED | OUTPATIENT
Start: 2017-05-25 | End: 2017-05-25 | Stop reason: HOSPADM

## 2017-05-25 RX ORDER — GLIPIZIDE 5 MG/1
5 TABLET ORAL
Status: DISCONTINUED | OUTPATIENT
Start: 2017-05-25 | End: 2017-05-25 | Stop reason: HOSPADM

## 2017-05-25 RX ORDER — CLOPIDOGREL BISULFATE 75 MG/1
75 TABLET ORAL DAILY
COMMUNITY
End: 2017-11-03 | Stop reason: HOSPADM

## 2017-05-25 RX ORDER — GABAPENTIN 400 MG/1
800 CAPSULE ORAL ONCE
Status: COMPLETED | OUTPATIENT
Start: 2017-05-25 | End: 2017-05-25

## 2017-05-25 RX ORDER — ESCITALOPRAM OXALATE 10 MG/1
20 TABLET ORAL DAILY
Status: DISCONTINUED | OUTPATIENT
Start: 2017-05-25 | End: 2017-05-25 | Stop reason: HOSPADM

## 2017-05-25 RX ORDER — HYDROCODONE BITARTRATE AND ACETAMINOPHEN 7.5; 325 MG/1; MG/1
1 TABLET ORAL 3 TIMES DAILY PRN
Status: DISCONTINUED | OUTPATIENT
Start: 2017-05-25 | End: 2017-05-25 | Stop reason: HOSPADM

## 2017-05-25 RX ORDER — ASPIRIN 81 MG/1
81 TABLET, CHEWABLE ORAL DAILY
Status: DISCONTINUED | OUTPATIENT
Start: 2017-05-25 | End: 2017-05-25 | Stop reason: HOSPADM

## 2017-05-25 RX ORDER — GABAPENTIN 400 MG/1
400 CAPSULE ORAL 3 TIMES DAILY
Status: DISCONTINUED | OUTPATIENT
Start: 2017-05-25 | End: 2017-05-25 | Stop reason: HOSPADM

## 2017-05-25 RX ORDER — NITROGLYCERIN 40 MG/1
1 PATCH TRANSDERMAL DAILY
Status: DISCONTINUED | OUTPATIENT
Start: 2017-05-25 | End: 2017-05-25 | Stop reason: HOSPADM

## 2017-05-25 RX ORDER — ONDANSETRON 4 MG/1
4 TABLET, ORALLY DISINTEGRATING ORAL EVERY 8 HOURS PRN
Status: DISCONTINUED | OUTPATIENT
Start: 2017-05-25 | End: 2017-05-25 | Stop reason: HOSPADM

## 2017-05-25 RX ORDER — SODIUM CHLORIDE 0.9 % (FLUSH) 0.9 %
1-10 SYRINGE (ML) INJECTION AS NEEDED
Status: DISCONTINUED | OUTPATIENT
Start: 2017-05-25 | End: 2017-05-25 | Stop reason: HOSPADM

## 2017-05-25 RX ORDER — RANOLAZINE 500 MG/1
500 TABLET, EXTENDED RELEASE ORAL EVERY 12 HOURS SCHEDULED
Status: DISCONTINUED | OUTPATIENT
Start: 2017-05-25 | End: 2017-05-25 | Stop reason: HOSPADM

## 2017-05-25 RX ORDER — ATORVASTATIN CALCIUM 20 MG/1
20 TABLET, FILM COATED ORAL NIGHTLY
Status: DISCONTINUED | OUTPATIENT
Start: 2017-05-25 | End: 2017-05-25 | Stop reason: HOSPADM

## 2017-05-25 RX ORDER — CARVEDILOL 3.12 MG/1
3.12 TABLET ORAL 2 TIMES DAILY
Status: DISCONTINUED | OUTPATIENT
Start: 2017-05-25 | End: 2017-05-25 | Stop reason: HOSPADM

## 2017-05-25 RX ORDER — BUSPIRONE HYDROCHLORIDE 5 MG/1
10 TABLET ORAL EVERY 12 HOURS SCHEDULED
Status: DISCONTINUED | OUTPATIENT
Start: 2017-05-25 | End: 2017-05-25 | Stop reason: HOSPADM

## 2017-05-25 RX ADMIN — POTASSIUM CHLORIDE 10 MEQ: 7.46 INJECTION, SOLUTION INTRAVENOUS at 00:47

## 2017-05-25 RX ADMIN — CARVEDILOL 3.12 MG: 3.12 TABLET, FILM COATED ORAL at 08:08

## 2017-05-25 RX ADMIN — NITROGLYCERIN 1 PATCH: 0.2 PATCH TRANSDERMAL at 08:08

## 2017-05-25 RX ADMIN — GLIPIZIDE 5 MG: 5 TABLET ORAL at 08:08

## 2017-05-25 RX ADMIN — BUSPIRONE HYDROCHLORIDE 10 MG: 5 TABLET ORAL at 08:08

## 2017-05-25 RX ADMIN — GABAPENTIN 400 MG: 400 CAPSULE ORAL at 08:08

## 2017-05-25 RX ADMIN — LISINOPRIL 5 MG: 5 TABLET ORAL at 08:08

## 2017-05-25 RX ADMIN — ESCITALOPRAM OXALATE 20 MG: 10 TABLET ORAL at 08:08

## 2017-05-25 RX ADMIN — HYDROCODONE BITARTRATE AND ACETAMINOPHEN 1 TABLET: 7.5; 325 TABLET ORAL at 08:08

## 2017-05-25 RX ADMIN — GABAPENTIN 800 MG: 400 CAPSULE ORAL at 00:46

## 2017-05-25 RX ADMIN — ASPIRIN 81 MG 81 MG: 81 TABLET ORAL at 08:08

## 2017-05-25 RX ADMIN — RANOLAZINE 500 MG: 500 TABLET, FILM COATED, EXTENDED RELEASE ORAL at 08:08

## 2017-05-25 RX ADMIN — BUTALBITAL, ACETAMINOPHEN, AND CAFFEINE 1 TABLET: 50; 325; 40 TABLET ORAL at 02:37

## 2017-05-27 ENCOUNTER — HOSPITAL ENCOUNTER (OUTPATIENT)
Facility: HOSPITAL | Age: 67
Setting detail: OBSERVATION
Discharge: HOME | End: 2017-05-29
Attending: EMERGENCY MEDICINE | Admitting: HOSPITALIST

## 2017-05-27 ENCOUNTER — APPOINTMENT (OUTPATIENT)
Dept: GENERAL RADIOLOGY | Facility: HOSPITAL | Age: 67
End: 2017-05-27

## 2017-05-27 DIAGNOSIS — R07.9 CHEST PAIN, UNSPECIFIED TYPE: Primary | ICD-10-CM

## 2017-05-27 DIAGNOSIS — R06.00 DYSPNEA, UNSPECIFIED TYPE: ICD-10-CM

## 2017-05-27 LAB
ALBUMIN SERPL-MCNC: 3.7 G/DL (ref 3.4–4.8)
ALBUMIN/GLOB SERPL: 1.5 G/DL (ref 1.1–1.8)
ALP SERPL-CCNC: 75 U/L (ref 38–126)
ALT SERPL W P-5'-P-CCNC: 32 U/L (ref 9–52)
ANION GAP SERPL CALCULATED.3IONS-SCNC: 11 MMOL/L (ref 5–15)
AST SERPL-CCNC: 18 U/L (ref 14–36)
BASOPHILS # BLD AUTO: 0.02 10*3/MM3 (ref 0–0.2)
BASOPHILS NFR BLD AUTO: 0.4 % (ref 0–2)
BILIRUB SERPL-MCNC: 0.5 MG/DL (ref 0.2–1.3)
BUN BLD-MCNC: 9 MG/DL (ref 7–21)
BUN/CREAT SERPL: 11.3 (ref 7–25)
CALCIUM SPEC-SCNC: 9.2 MG/DL (ref 8.4–10.2)
CHLORIDE SERPL-SCNC: 110 MMOL/L (ref 95–110)
CK MB SERPL-CCNC: 1.42 NG/ML (ref 0–5)
CK SERPL-CCNC: 51 U/L (ref 30–135)
CO2 SERPL-SCNC: 18 MMOL/L (ref 22–31)
CREAT BLD-MCNC: 0.8 MG/DL (ref 0.5–1)
DEPRECATED RDW RBC AUTO: 44.3 FL (ref 36.4–46.3)
EOSINOPHIL # BLD AUTO: 0.22 10*3/MM3 (ref 0–0.7)
EOSINOPHIL NFR BLD AUTO: 4.3 % (ref 0–7)
ERYTHROCYTE [DISTWIDTH] IN BLOOD BY AUTOMATED COUNT: 13.3 % (ref 11.5–14.5)
GFR SERPL CREATININE-BSD FRML MDRD: 71 ML/MIN/1.73 (ref 45–104)
GLOBULIN UR ELPH-MCNC: 2.5 GM/DL (ref 2.3–3.5)
GLUCOSE BLD-MCNC: 96 MG/DL (ref 60–100)
HCT VFR BLD AUTO: 35.1 % (ref 35–45)
HGB BLD-MCNC: 12.2 G/DL (ref 12–15.5)
HOLD SPECIMEN: NORMAL
HOLD SPECIMEN: NORMAL
IMM GRANULOCYTES # BLD: 0 10*3/MM3 (ref 0–0.02)
IMM GRANULOCYTES NFR BLD: 0 % (ref 0–0.5)
INR PPP: 1.06 (ref 0.8–1.2)
LYMPHOCYTES # BLD AUTO: 2.03 10*3/MM3 (ref 0.6–4.2)
LYMPHOCYTES NFR BLD AUTO: 40 % (ref 10–50)
MCH RBC QN AUTO: 32 PG (ref 26.5–34)
MCHC RBC AUTO-ENTMCNC: 34.8 G/DL (ref 31.4–36)
MCV RBC AUTO: 92.1 FL (ref 80–98)
MONOCYTES # BLD AUTO: 0.46 10*3/MM3 (ref 0–0.9)
MONOCYTES NFR BLD AUTO: 9.1 % (ref 0–12)
NEUTROPHILS # BLD AUTO: 2.34 10*3/MM3 (ref 2–8.6)
NEUTROPHILS NFR BLD AUTO: 46.2 % (ref 37–80)
NT-PROBNP SERPL-MCNC: 478 PG/ML (ref 0–900)
PLATELET # BLD AUTO: 268 10*3/MM3 (ref 150–450)
PMV BLD AUTO: 9.4 FL (ref 8–12)
POTASSIUM BLD-SCNC: 3.8 MMOL/L (ref 3.5–5.1)
PROT SERPL-MCNC: 6.2 G/DL (ref 6.3–8.6)
PROTHROMBIN TIME: 13.7 SECONDS (ref 11.1–15.3)
RBC # BLD AUTO: 3.81 10*6/MM3 (ref 3.77–5.16)
SODIUM BLD-SCNC: 139 MMOL/L (ref 137–145)
TROPONIN I SERPL-MCNC: <0.012 NG/ML
WBC NRBC COR # BLD: 5.07 10*3/MM3 (ref 3.2–9.8)
WHOLE BLOOD HOLD SPECIMEN: NORMAL
WHOLE BLOOD HOLD SPECIMEN: NORMAL

## 2017-05-27 PROCEDURE — G0378 HOSPITAL OBSERVATION PER HR: HCPCS

## 2017-05-27 PROCEDURE — 85025 COMPLETE CBC W/AUTO DIFF WBC: CPT | Performed by: EMERGENCY MEDICINE

## 2017-05-27 PROCEDURE — 85610 PROTHROMBIN TIME: CPT | Performed by: EMERGENCY MEDICINE

## 2017-05-27 PROCEDURE — 71010 HC CHEST PA OR AP: CPT

## 2017-05-27 PROCEDURE — 80053 COMPREHEN METABOLIC PANEL: CPT | Performed by: EMERGENCY MEDICINE

## 2017-05-27 PROCEDURE — 83880 ASSAY OF NATRIURETIC PEPTIDE: CPT | Performed by: EMERGENCY MEDICINE

## 2017-05-27 PROCEDURE — 82553 CREATINE MB FRACTION: CPT | Performed by: EMERGENCY MEDICINE

## 2017-05-27 PROCEDURE — 99285 EMERGENCY DEPT VISIT HI MDM: CPT

## 2017-05-27 PROCEDURE — 25010000002 ONDANSETRON PER 1 MG: Performed by: EMERGENCY MEDICINE

## 2017-05-27 PROCEDURE — 93005 ELECTROCARDIOGRAM TRACING: CPT | Performed by: EMERGENCY MEDICINE

## 2017-05-27 PROCEDURE — 93010 ELECTROCARDIOGRAM REPORT: CPT | Performed by: INTERNAL MEDICINE

## 2017-05-27 PROCEDURE — 25010000002 MORPHINE PER 10 MG: Performed by: EMERGENCY MEDICINE

## 2017-05-27 PROCEDURE — 96375 TX/PRO/DX INJ NEW DRUG ADDON: CPT

## 2017-05-27 PROCEDURE — 82550 ASSAY OF CK (CPK): CPT | Performed by: EMERGENCY MEDICINE

## 2017-05-27 PROCEDURE — 84484 ASSAY OF TROPONIN QUANT: CPT | Performed by: EMERGENCY MEDICINE

## 2017-05-27 PROCEDURE — 96374 THER/PROPH/DIAG INJ IV PUSH: CPT

## 2017-05-27 RX ORDER — MORPHINE SULFATE 2 MG/ML
1 INJECTION, SOLUTION INTRAMUSCULAR; INTRAVENOUS EVERY 4 HOURS PRN
Status: DISCONTINUED | OUTPATIENT
Start: 2017-05-27 | End: 2017-05-29 | Stop reason: HOSPADM

## 2017-05-27 RX ORDER — ONDANSETRON 2 MG/ML
4 INJECTION INTRAMUSCULAR; INTRAVENOUS ONCE
Status: COMPLETED | OUTPATIENT
Start: 2017-05-27 | End: 2017-05-27

## 2017-05-27 RX ORDER — ACETAMINOPHEN 325 MG/1
650 TABLET ORAL EVERY 4 HOURS PRN
Status: DISCONTINUED | OUTPATIENT
Start: 2017-05-27 | End: 2017-05-29 | Stop reason: HOSPADM

## 2017-05-27 RX ORDER — GLIPIZIDE 5 MG/1
5 TABLET ORAL
Status: DISCONTINUED | OUTPATIENT
Start: 2017-05-28 | End: 2017-05-29 | Stop reason: HOSPADM

## 2017-05-27 RX ORDER — NITROGLYCERIN 40 MG/1
1 PATCH TRANSDERMAL DAILY
Status: DISCONTINUED | OUTPATIENT
Start: 2017-05-28 | End: 2017-05-29 | Stop reason: HOSPADM

## 2017-05-27 RX ORDER — ASPIRIN 81 MG/1
81 TABLET, CHEWABLE ORAL DAILY
Status: DISCONTINUED | OUTPATIENT
Start: 2017-05-28 | End: 2017-05-29 | Stop reason: HOSPADM

## 2017-05-27 RX ORDER — ASPIRIN 325 MG
325 TABLET ORAL ONCE
Status: DISCONTINUED | OUTPATIENT
Start: 2017-05-27 | End: 2017-05-27

## 2017-05-27 RX ORDER — ONDANSETRON 4 MG/1
4 TABLET, FILM COATED ORAL EVERY 6 HOURS PRN
Status: DISCONTINUED | OUTPATIENT
Start: 2017-05-27 | End: 2017-05-29 | Stop reason: HOSPADM

## 2017-05-27 RX ORDER — HYDROCODONE BITARTRATE AND ACETAMINOPHEN 7.5; 325 MG/1; MG/1
1 TABLET ORAL 3 TIMES DAILY PRN
Status: DISCONTINUED | OUTPATIENT
Start: 2017-05-27 | End: 2017-05-29 | Stop reason: HOSPADM

## 2017-05-27 RX ORDER — NALOXONE HCL 0.4 MG/ML
0.4 VIAL (ML) INJECTION
Status: DISCONTINUED | OUTPATIENT
Start: 2017-05-27 | End: 2017-05-29 | Stop reason: HOSPADM

## 2017-05-27 RX ORDER — LISINOPRIL 5 MG/1
5 TABLET ORAL DAILY
Status: DISCONTINUED | OUTPATIENT
Start: 2017-05-28 | End: 2017-05-29 | Stop reason: HOSPADM

## 2017-05-27 RX ORDER — BUSPIRONE HYDROCHLORIDE 15 MG/1
15 TABLET ORAL NIGHTLY
Status: ON HOLD | COMMUNITY
End: 2017-11-02

## 2017-05-27 RX ORDER — GABAPENTIN 400 MG/1
1200 CAPSULE ORAL EVERY 8 HOURS SCHEDULED
Status: DISCONTINUED | OUTPATIENT
Start: 2017-05-27 | End: 2017-05-29 | Stop reason: HOSPADM

## 2017-05-27 RX ORDER — CLOPIDOGREL BISULFATE 75 MG/1
75 TABLET ORAL DAILY
Status: DISCONTINUED | OUTPATIENT
Start: 2017-05-28 | End: 2017-05-29 | Stop reason: HOSPADM

## 2017-05-27 RX ORDER — ATORVASTATIN CALCIUM 20 MG/1
20 TABLET, FILM COATED ORAL NIGHTLY
Status: DISCONTINUED | OUTPATIENT
Start: 2017-05-27 | End: 2017-05-29 | Stop reason: HOSPADM

## 2017-05-27 RX ORDER — ESCITALOPRAM OXALATE 10 MG/1
20 TABLET ORAL NIGHTLY
Status: DISCONTINUED | OUTPATIENT
Start: 2017-05-27 | End: 2017-05-29 | Stop reason: HOSPADM

## 2017-05-27 RX ORDER — BUSPIRONE HYDROCHLORIDE 15 MG/1
15 TABLET ORAL NIGHTLY
Status: DISCONTINUED | OUTPATIENT
Start: 2017-05-27 | End: 2017-05-29 | Stop reason: HOSPADM

## 2017-05-27 RX ORDER — SODIUM CHLORIDE 0.9 % (FLUSH) 0.9 %
1-10 SYRINGE (ML) INJECTION AS NEEDED
Status: DISCONTINUED | OUTPATIENT
Start: 2017-05-27 | End: 2017-05-29 | Stop reason: HOSPADM

## 2017-05-27 RX ORDER — SODIUM CHLORIDE 0.9 % (FLUSH) 0.9 %
10 SYRINGE (ML) INJECTION AS NEEDED
Status: DISCONTINUED | OUTPATIENT
Start: 2017-05-27 | End: 2017-05-29 | Stop reason: HOSPADM

## 2017-05-27 RX ORDER — MORPHINE SULFATE 4 MG/ML
4 INJECTION, SOLUTION INTRAMUSCULAR; INTRAVENOUS ONCE
Status: COMPLETED | OUTPATIENT
Start: 2017-05-27 | End: 2017-05-27

## 2017-05-27 RX ORDER — BUTALBITAL, ACETAMINOPHEN AND CAFFEINE 50; 325; 40 MG/1; MG/1; MG/1
1 TABLET ORAL EVERY 6 HOURS PRN
Status: DISCONTINUED | OUTPATIENT
Start: 2017-05-27 | End: 2017-05-29 | Stop reason: HOSPADM

## 2017-05-27 RX ORDER — CARVEDILOL 3.12 MG/1
3.12 TABLET ORAL 2 TIMES DAILY
Status: DISCONTINUED | OUTPATIENT
Start: 2017-05-27 | End: 2017-05-29 | Stop reason: HOSPADM

## 2017-05-27 RX ADMIN — GABAPENTIN 1200 MG: 400 CAPSULE ORAL at 22:34

## 2017-05-27 RX ADMIN — CARVEDILOL 3.12 MG: 3.12 TABLET, FILM COATED ORAL at 22:34

## 2017-05-27 RX ADMIN — ESCITALOPRAM OXALATE 20 MG: 10 TABLET ORAL at 22:34

## 2017-05-27 RX ADMIN — ATORVASTATIN CALCIUM 20 MG: 20 TABLET, FILM COATED ORAL at 22:34

## 2017-05-27 RX ADMIN — MORPHINE SULFATE 4 MG: 4 INJECTION, SOLUTION INTRAMUSCULAR; INTRAVENOUS at 20:21

## 2017-05-27 RX ADMIN — ONDANSETRON 4 MG: 2 INJECTION INTRAMUSCULAR; INTRAVENOUS at 20:20

## 2017-05-27 RX ADMIN — BUSPIRONE HYDROCHLORIDE 15 MG: 15 TABLET ORAL at 22:34

## 2017-05-28 LAB
ALBUMIN SERPL-MCNC: 3.3 G/DL (ref 3.4–4.8)
ALBUMIN/GLOB SERPL: 1.4 G/DL (ref 1.1–1.8)
ALP SERPL-CCNC: 57 U/L (ref 38–126)
ALT SERPL W P-5'-P-CCNC: 32 U/L (ref 9–52)
ANION GAP SERPL CALCULATED.3IONS-SCNC: 10 MMOL/L (ref 5–15)
AST SERPL-CCNC: 15 U/L (ref 14–36)
BASOPHILS # BLD AUTO: 0.06 10*3/MM3 (ref 0–0.2)
BASOPHILS NFR BLD AUTO: 1.2 % (ref 0–2)
BILIRUB SERPL-MCNC: 0.3 MG/DL (ref 0.2–1.3)
BUN BLD-MCNC: 14 MG/DL (ref 7–21)
BUN/CREAT SERPL: 15.9 (ref 7–25)
CALCIUM SPEC-SCNC: 8.4 MG/DL (ref 8.4–10.2)
CHLORIDE SERPL-SCNC: 112 MMOL/L (ref 95–110)
CO2 SERPL-SCNC: 19 MMOL/L (ref 22–31)
CREAT BLD-MCNC: 0.88 MG/DL (ref 0.5–1)
DEPRECATED RDW RBC AUTO: 48.1 FL (ref 36.4–46.3)
EOSINOPHIL # BLD AUTO: 0.31 10*3/MM3 (ref 0–0.7)
EOSINOPHIL NFR BLD AUTO: 6.1 % (ref 0–7)
ERYTHROCYTE [DISTWIDTH] IN BLOOD BY AUTOMATED COUNT: 13.9 % (ref 11.5–14.5)
GFR SERPL CREATININE-BSD FRML MDRD: 64 ML/MIN/1.73 (ref 45–104)
GLOBULIN UR ELPH-MCNC: 2.3 GM/DL (ref 2.3–3.5)
GLUCOSE BLD-MCNC: 110 MG/DL (ref 60–100)
GLUCOSE BLDC GLUCOMTR-MCNC: 114 MG/DL (ref 70–130)
GLUCOSE BLDC GLUCOMTR-MCNC: 145 MG/DL (ref 70–130)
GLUCOSE BLDC GLUCOMTR-MCNC: 87 MG/DL (ref 70–130)
GLUCOSE BLDC GLUCOMTR-MCNC: 94 MG/DL (ref 70–130)
HCT VFR BLD AUTO: 36.1 % (ref 35–45)
HGB BLD-MCNC: 12.1 G/DL (ref 12–15.5)
IMM GRANULOCYTES # BLD: 0.03 10*3/MM3 (ref 0–0.02)
IMM GRANULOCYTES NFR BLD: 0.6 % (ref 0–0.5)
LYMPHOCYTES # BLD AUTO: 2.09 10*3/MM3 (ref 0.6–4.2)
LYMPHOCYTES NFR BLD AUTO: 41.3 % (ref 10–50)
MCH RBC QN AUTO: 31.8 PG (ref 26.5–34)
MCHC RBC AUTO-ENTMCNC: 33.5 G/DL (ref 31.4–36)
MCV RBC AUTO: 95 FL (ref 80–98)
MONOCYTES # BLD AUTO: 0.44 10*3/MM3 (ref 0–0.9)
MONOCYTES NFR BLD AUTO: 8.7 % (ref 0–12)
NEUTROPHILS # BLD AUTO: 2.13 10*3/MM3 (ref 2–8.6)
NEUTROPHILS NFR BLD AUTO: 42.1 % (ref 37–80)
NRBC BLD MANUAL-RTO: 0 /100 WBC (ref 0–0)
PLATELET # BLD AUTO: 248 10*3/MM3 (ref 150–450)
PMV BLD AUTO: 8.9 FL (ref 8–12)
POTASSIUM BLD-SCNC: 4.4 MMOL/L (ref 3.5–5.1)
PROT SERPL-MCNC: 5.6 G/DL (ref 6.3–8.6)
RBC # BLD AUTO: 3.8 10*6/MM3 (ref 3.77–5.16)
SODIUM BLD-SCNC: 141 MMOL/L (ref 137–145)
TROPONIN I SERPL-MCNC: <0.012 NG/ML
TROPONIN I SERPL-MCNC: <0.012 NG/ML
WBC NRBC COR # BLD: 5.06 10*3/MM3 (ref 3.2–9.8)

## 2017-05-28 PROCEDURE — 96376 TX/PRO/DX INJ SAME DRUG ADON: CPT

## 2017-05-28 PROCEDURE — 84484 ASSAY OF TROPONIN QUANT: CPT | Performed by: NURSE PRACTITIONER

## 2017-05-28 PROCEDURE — 85025 COMPLETE CBC W/AUTO DIFF WBC: CPT | Performed by: HOSPITALIST

## 2017-05-28 PROCEDURE — 80053 COMPREHEN METABOLIC PANEL: CPT | Performed by: HOSPITALIST

## 2017-05-28 PROCEDURE — 84484 ASSAY OF TROPONIN QUANT: CPT | Performed by: EMERGENCY MEDICINE

## 2017-05-28 PROCEDURE — G0378 HOSPITAL OBSERVATION PER HR: HCPCS

## 2017-05-28 PROCEDURE — 82962 GLUCOSE BLOOD TEST: CPT

## 2017-05-28 PROCEDURE — 25010000002 MORPHINE SULFATE (PF) 2 MG/ML SOLUTION: Performed by: HOSPITALIST

## 2017-05-28 RX ADMIN — Medication 10 ML: at 21:53

## 2017-05-28 RX ADMIN — NITROGLYCERIN 1 PATCH: 0.2 PATCH TRANSDERMAL at 08:16

## 2017-05-28 RX ADMIN — BUSPIRONE HYDROCHLORIDE 15 MG: 15 TABLET ORAL at 20:36

## 2017-05-28 RX ADMIN — CARVEDILOL 3.12 MG: 3.12 TABLET, FILM COATED ORAL at 17:03

## 2017-05-28 RX ADMIN — LISINOPRIL 5 MG: 5 TABLET ORAL at 08:15

## 2017-05-28 RX ADMIN — BUTALBITAL, ACETAMINOPHEN, AND CAFFEINE 1 TABLET: 50; 325; 40 TABLET ORAL at 10:06

## 2017-05-28 RX ADMIN — ACETAMINOPHEN 650 MG: 325 TABLET ORAL at 13:11

## 2017-05-28 RX ADMIN — ESCITALOPRAM OXALATE 20 MG: 10 TABLET ORAL at 20:36

## 2017-05-28 RX ADMIN — GABAPENTIN 1200 MG: 400 CAPSULE ORAL at 13:09

## 2017-05-28 RX ADMIN — GLIPIZIDE 5 MG: 5 TABLET ORAL at 08:16

## 2017-05-28 RX ADMIN — HYDROCODONE BITARTRATE AND ACETAMINOPHEN 1 TABLET: 7.5; 325 TABLET ORAL at 16:12

## 2017-05-28 RX ADMIN — ASPIRIN 81 MG 81 MG: 81 TABLET ORAL at 08:16

## 2017-05-28 RX ADMIN — ATORVASTATIN CALCIUM 20 MG: 20 TABLET, FILM COATED ORAL at 20:36

## 2017-05-28 RX ADMIN — HYDROCODONE BITARTRATE AND ACETAMINOPHEN 1 TABLET: 7.5; 325 TABLET ORAL at 08:16

## 2017-05-28 RX ADMIN — MORPHINE SULFATE 1 MG: 2 INJECTION, SOLUTION INTRAMUSCULAR; INTRAVENOUS at 11:17

## 2017-05-28 RX ADMIN — MORPHINE SULFATE 1 MG: 2 INJECTION, SOLUTION INTRAMUSCULAR; INTRAVENOUS at 21:52

## 2017-05-28 RX ADMIN — CLOPIDOGREL BISULFATE 75 MG: 75 TABLET ORAL at 08:16

## 2017-05-28 RX ADMIN — Medication 10 ML: at 11:17

## 2017-05-28 RX ADMIN — GLIPIZIDE 5 MG: 5 TABLET ORAL at 17:04

## 2017-05-28 RX ADMIN — GABAPENTIN 1200 MG: 400 CAPSULE ORAL at 21:05

## 2017-05-28 RX ADMIN — MORPHINE SULFATE 1 MG: 2 INJECTION, SOLUTION INTRAMUSCULAR; INTRAVENOUS at 17:02

## 2017-05-28 RX ADMIN — Medication 10 ML: at 08:16

## 2017-05-28 RX ADMIN — ONDANSETRON 4 MG: 4 TABLET, FILM COATED ORAL at 17:03

## 2017-05-28 RX ADMIN — GABAPENTIN 1200 MG: 400 CAPSULE ORAL at 05:53

## 2017-05-28 RX ADMIN — CARVEDILOL 3.12 MG: 3.12 TABLET, FILM COATED ORAL at 08:15

## 2017-05-28 RX ADMIN — Medication 10 ML: at 17:03

## 2017-05-29 VITALS
HEART RATE: 54 BPM | HEIGHT: 61 IN | RESPIRATION RATE: 18 BRPM | WEIGHT: 134 LBS | OXYGEN SATURATION: 97 % | BODY MASS INDEX: 25.3 KG/M2 | SYSTOLIC BLOOD PRESSURE: 114 MMHG | TEMPERATURE: 97.6 F | DIASTOLIC BLOOD PRESSURE: 59 MMHG

## 2017-05-29 LAB
ALBUMIN SERPL-MCNC: 3.1 G/DL (ref 3.4–4.8)
ALBUMIN/GLOB SERPL: 1.3 G/DL (ref 1.1–1.8)
ALP SERPL-CCNC: 56 U/L (ref 38–126)
ALT SERPL W P-5'-P-CCNC: 25 U/L (ref 9–52)
ANION GAP SERPL CALCULATED.3IONS-SCNC: 5 MMOL/L (ref 5–15)
AST SERPL-CCNC: 14 U/L (ref 14–36)
BASOPHILS # BLD AUTO: 0.05 10*3/MM3 (ref 0–0.2)
BASOPHILS NFR BLD AUTO: 0.9 % (ref 0–2)
BILIRUB SERPL-MCNC: 0.2 MG/DL (ref 0.2–1.3)
BUN BLD-MCNC: 16 MG/DL (ref 7–21)
BUN/CREAT SERPL: 16.8 (ref 7–25)
CALCIUM SPEC-SCNC: 8.5 MG/DL (ref 8.4–10.2)
CHLORIDE SERPL-SCNC: 108 MMOL/L (ref 95–110)
CO2 SERPL-SCNC: 24 MMOL/L (ref 22–31)
CREAT BLD-MCNC: 0.95 MG/DL (ref 0.5–1)
DEPRECATED RDW RBC AUTO: 47 FL (ref 36.4–46.3)
EOSINOPHIL # BLD AUTO: 0.47 10*3/MM3 (ref 0–0.7)
EOSINOPHIL NFR BLD AUTO: 8.5 % (ref 0–7)
ERYTHROCYTE [DISTWIDTH] IN BLOOD BY AUTOMATED COUNT: 13.5 % (ref 11.5–14.5)
GFR SERPL CREATININE-BSD FRML MDRD: 58 ML/MIN/1.73 (ref 60–104)
GLOBULIN UR ELPH-MCNC: 2.4 GM/DL (ref 2.3–3.5)
GLUCOSE BLD-MCNC: 61 MG/DL (ref 60–100)
GLUCOSE BLDC GLUCOMTR-MCNC: 63 MG/DL (ref 70–130)
GLUCOSE BLDC GLUCOMTR-MCNC: 73 MG/DL (ref 70–130)
HCT VFR BLD AUTO: 34.9 % (ref 35–45)
HGB BLD-MCNC: 11.4 G/DL (ref 12–15.5)
IMM GRANULOCYTES # BLD: 0.02 10*3/MM3 (ref 0–0.02)
IMM GRANULOCYTES NFR BLD: 0.4 % (ref 0–0.5)
LYMPHOCYTES # BLD AUTO: 1.95 10*3/MM3 (ref 0.6–4.2)
LYMPHOCYTES NFR BLD AUTO: 35.2 % (ref 10–50)
MCH RBC QN AUTO: 31.3 PG (ref 26.5–34)
MCHC RBC AUTO-ENTMCNC: 32.7 G/DL (ref 31.4–36)
MCV RBC AUTO: 95.9 FL (ref 80–98)
MONOCYTES # BLD AUTO: 0.57 10*3/MM3 (ref 0–0.9)
MONOCYTES NFR BLD AUTO: 10.3 % (ref 0–12)
NEUTROPHILS # BLD AUTO: 2.48 10*3/MM3 (ref 2–8.6)
NEUTROPHILS NFR BLD AUTO: 44.7 % (ref 37–80)
PLATELET # BLD AUTO: 228 10*3/MM3 (ref 150–450)
PMV BLD AUTO: 9.4 FL (ref 8–12)
POTASSIUM BLD-SCNC: 4 MMOL/L (ref 3.5–5.1)
PROT SERPL-MCNC: 5.5 G/DL (ref 6.3–8.6)
RBC # BLD AUTO: 3.64 10*6/MM3 (ref 3.77–5.16)
SODIUM BLD-SCNC: 137 MMOL/L (ref 137–145)
WBC NRBC COR # BLD: 5.54 10*3/MM3 (ref 3.2–9.8)

## 2017-05-29 PROCEDURE — 82962 GLUCOSE BLOOD TEST: CPT

## 2017-05-29 PROCEDURE — 80053 COMPREHEN METABOLIC PANEL: CPT | Performed by: NURSE PRACTITIONER

## 2017-05-29 PROCEDURE — G0378 HOSPITAL OBSERVATION PER HR: HCPCS

## 2017-05-29 PROCEDURE — 93010 ELECTROCARDIOGRAM REPORT: CPT | Performed by: INTERNAL MEDICINE

## 2017-05-29 PROCEDURE — 25010000002 MORPHINE SULFATE (PF) 2 MG/ML SOLUTION: Performed by: HOSPITALIST

## 2017-05-29 PROCEDURE — 85025 COMPLETE CBC W/AUTO DIFF WBC: CPT | Performed by: NURSE PRACTITIONER

## 2017-05-29 PROCEDURE — 96376 TX/PRO/DX INJ SAME DRUG ADON: CPT

## 2017-05-29 PROCEDURE — 93005 ELECTROCARDIOGRAM TRACING: CPT | Performed by: INTERNAL MEDICINE

## 2017-05-29 RX ORDER — CIPROFLOXACIN HYDROCHLORIDE 3.5 MG/ML
1 SOLUTION/ DROPS TOPICAL 2 TIMES DAILY
Status: DISCONTINUED | OUTPATIENT
Start: 2017-05-29 | End: 2017-05-29 | Stop reason: CLARIF

## 2017-05-29 RX ORDER — CIPROFLOXACIN HYDROCHLORIDE 3.5 MG/ML
1 SOLUTION/ DROPS TOPICAL 2 TIMES DAILY
Status: DISCONTINUED | OUTPATIENT
Start: 2017-05-29 | End: 2017-05-29 | Stop reason: HOSPADM

## 2017-05-29 RX ADMIN — HYDROCODONE BITARTRATE AND ACETAMINOPHEN 1 TABLET: 7.5; 325 TABLET ORAL at 09:54

## 2017-05-29 RX ADMIN — Medication 10 ML: at 15:55

## 2017-05-29 RX ADMIN — CIPROFLOXACIN HYDROCHLORIDE 1 DROP: 3 SOLUTION/ DROPS OPHTHALMIC at 12:45

## 2017-05-29 RX ADMIN — CLOPIDOGREL BISULFATE 75 MG: 75 TABLET ORAL at 09:22

## 2017-05-29 RX ADMIN — ONDANSETRON 4 MG: 4 TABLET, FILM COATED ORAL at 01:22

## 2017-05-29 RX ADMIN — CARVEDILOL 3.12 MG: 3.12 TABLET, FILM COATED ORAL at 09:22

## 2017-05-29 RX ADMIN — GLIPIZIDE 5 MG: 5 TABLET ORAL at 09:22

## 2017-05-29 RX ADMIN — NITROGLYCERIN 1 PATCH: 0.2 PATCH TRANSDERMAL at 09:23

## 2017-05-29 RX ADMIN — ASPIRIN 81 MG 81 MG: 81 TABLET ORAL at 09:22

## 2017-05-29 RX ADMIN — ONDANSETRON 4 MG: 4 TABLET, FILM COATED ORAL at 12:44

## 2017-05-29 RX ADMIN — Medication 10 ML: at 11:44

## 2017-05-29 RX ADMIN — Medication 3 ML: at 07:32

## 2017-05-29 RX ADMIN — MORPHINE SULFATE 1 MG: 2 INJECTION, SOLUTION INTRAMUSCULAR; INTRAVENOUS at 07:32

## 2017-05-29 RX ADMIN — BUTALBITAL, ACETAMINOPHEN, AND CAFFEINE 1 TABLET: 50; 325; 40 TABLET ORAL at 06:17

## 2017-05-29 RX ADMIN — GABAPENTIN 1200 MG: 400 CAPSULE ORAL at 05:54

## 2017-05-29 RX ADMIN — LISINOPRIL 5 MG: 5 TABLET ORAL at 09:22

## 2017-05-29 RX ADMIN — MORPHINE SULFATE 1 MG: 2 INJECTION, SOLUTION INTRAMUSCULAR; INTRAVENOUS at 11:44

## 2017-05-29 RX ADMIN — HYDROCODONE BITARTRATE AND ACETAMINOPHEN 1 TABLET: 7.5; 325 TABLET ORAL at 01:22

## 2017-05-29 RX ADMIN — GABAPENTIN 1200 MG: 400 CAPSULE ORAL at 15:55

## 2017-05-29 RX ADMIN — MORPHINE SULFATE 1 MG: 2 INJECTION, SOLUTION INTRAMUSCULAR; INTRAVENOUS at 15:55

## 2017-06-22 ENCOUNTER — OFFICE VISIT (OUTPATIENT)
Dept: PAIN MEDICINE | Facility: CLINIC | Age: 67
End: 2017-06-22

## 2017-06-22 ENCOUNTER — APPOINTMENT (OUTPATIENT)
Dept: LAB | Facility: HOSPITAL | Age: 67
End: 2017-06-22

## 2017-06-22 VITALS
WEIGHT: 134.1 LBS | DIASTOLIC BLOOD PRESSURE: 80 MMHG | BODY MASS INDEX: 25.32 KG/M2 | SYSTOLIC BLOOD PRESSURE: 128 MMHG | HEIGHT: 61 IN

## 2017-06-22 DIAGNOSIS — M46.1 SACROILIITIS (HCC): ICD-10-CM

## 2017-06-22 DIAGNOSIS — Z79.899 HIGH RISK MEDICATIONS (NOT ANTICOAGULANTS) LONG-TERM USE: ICD-10-CM

## 2017-06-22 DIAGNOSIS — M47.817 LUMBOSACRAL SPONDYLOSIS WITHOUT MYELOPATHY: Primary | ICD-10-CM

## 2017-06-22 DIAGNOSIS — M48.061 LUMBAR STENOSIS: ICD-10-CM

## 2017-06-22 PROCEDURE — G0480 DRUG TEST DEF 1-7 CLASSES: HCPCS | Performed by: PAIN MEDICINE

## 2017-06-22 PROCEDURE — 80307 DRUG TEST PRSMV CHEM ANLYZR: CPT | Performed by: PAIN MEDICINE

## 2017-06-22 PROCEDURE — 99214 OFFICE O/P EST MOD 30 MIN: CPT | Performed by: PAIN MEDICINE

## 2017-06-22 PROCEDURE — G0481 DRUG TEST DEF 8-14 CLASSES: HCPCS | Performed by: PAIN MEDICINE

## 2017-06-22 RX ORDER — HYDROCODONE BITARTRATE AND ACETAMINOPHEN 7.5; 325 MG/1; MG/1
1 TABLET ORAL 4 TIMES DAILY
Qty: 120 TABLET | Refills: 0 | Status: SHIPPED | OUTPATIENT
Start: 2017-06-22 | End: 2017-07-22

## 2017-06-22 RX ORDER — HYDROCODONE BITARTRATE AND ACETAMINOPHEN 7.5; 325 MG/1; MG/1
1 TABLET ORAL 4 TIMES DAILY
Qty: 120 TABLET | Refills: 0 | Status: SHIPPED | OUTPATIENT
Start: 2017-06-22 | End: 2017-07-19 | Stop reason: ALTCHOICE

## 2017-06-29 ENCOUNTER — APPOINTMENT (OUTPATIENT)
Dept: CT IMAGING | Facility: HOSPITAL | Age: 67
End: 2017-06-29

## 2017-06-29 ENCOUNTER — HOSPITAL ENCOUNTER (EMERGENCY)
Facility: HOSPITAL | Age: 67
Discharge: HOME | End: 2017-06-29
Attending: FAMILY MEDICINE | Admitting: NURSE PRACTITIONER

## 2017-06-29 VITALS
RESPIRATION RATE: 18 BRPM | TEMPERATURE: 98.1 F | HEART RATE: 65 BPM | OXYGEN SATURATION: 97 % | WEIGHT: 134 LBS | DIASTOLIC BLOOD PRESSURE: 75 MMHG | HEIGHT: 61 IN | BODY MASS INDEX: 25.3 KG/M2 | SYSTOLIC BLOOD PRESSURE: 157 MMHG

## 2017-06-29 DIAGNOSIS — R31.9 URINARY TRACT INFECTION WITH HEMATURIA, SITE UNSPECIFIED: ICD-10-CM

## 2017-06-29 DIAGNOSIS — R10.9 RIGHT FLANK PAIN: Primary | ICD-10-CM

## 2017-06-29 DIAGNOSIS — N39.0 URINARY TRACT INFECTION WITH HEMATURIA, SITE UNSPECIFIED: ICD-10-CM

## 2017-06-29 LAB
ALBUMIN SERPL-MCNC: 3.5 G/DL (ref 3.4–4.8)
ALBUMIN/GLOB SERPL: 1.4 G/DL (ref 1.1–1.8)
ALP SERPL-CCNC: 87 U/L (ref 38–126)
ALT SERPL W P-5'-P-CCNC: 24 U/L (ref 9–52)
AMYLASE SERPL-CCNC: 98 U/L (ref 50–130)
ANION GAP SERPL CALCULATED.3IONS-SCNC: 10 MMOL/L (ref 5–15)
AST SERPL-CCNC: 18 U/L (ref 14–36)
BACTERIA UR QL AUTO: ABNORMAL /HPF
BASOPHILS # BLD AUTO: 0.03 10*3/MM3 (ref 0–0.2)
BASOPHILS NFR BLD AUTO: 0.5 % (ref 0–2)
BILIRUB SERPL-MCNC: 0.3 MG/DL (ref 0.2–1.3)
BILIRUB UR QL STRIP: ABNORMAL
BUN BLD-MCNC: 11 MG/DL (ref 7–21)
BUN/CREAT SERPL: 15.5 (ref 7–25)
CALCIUM SPEC-SCNC: 8.5 MG/DL (ref 8.4–10.2)
CHLORIDE SERPL-SCNC: 109 MMOL/L (ref 95–110)
CLARITY UR: ABNORMAL
CO2 SERPL-SCNC: 22 MMOL/L (ref 22–31)
COLOR UR: ABNORMAL
CREAT BLD-MCNC: 0.71 MG/DL (ref 0.5–1)
DEPRECATED RDW RBC AUTO: 44.4 FL (ref 36.4–46.3)
EOSINOPHIL # BLD AUTO: 0.15 10*3/MM3 (ref 0–0.7)
EOSINOPHIL NFR BLD AUTO: 2.6 % (ref 0–7)
ERYTHROCYTE [DISTWIDTH] IN BLOOD BY AUTOMATED COUNT: 13 % (ref 11.5–14.5)
GFR SERPL CREATININE-BSD FRML MDRD: 82 ML/MIN/1.73 (ref 45–104)
GLOBULIN UR ELPH-MCNC: 2.5 GM/DL (ref 2.3–3.5)
GLUCOSE BLD-MCNC: 143 MG/DL (ref 60–100)
GLUCOSE UR STRIP-MCNC: NEGATIVE MG/DL
HCT VFR BLD AUTO: 36.4 % (ref 35–45)
HGB BLD-MCNC: 12.3 G/DL (ref 12–15.5)
HGB UR QL STRIP.AUTO: ABNORMAL
HYALINE CASTS UR QL AUTO: ABNORMAL /LPF
IMM GRANULOCYTES # BLD: 0.02 10*3/MM3 (ref 0–0.02)
IMM GRANULOCYTES NFR BLD: 0.3 % (ref 0–0.5)
KETONES UR QL STRIP: NEGATIVE
LEUKOCYTE ESTERASE UR QL STRIP.AUTO: ABNORMAL
LIPASE SERPL-CCNC: 206 U/L (ref 23–300)
LYMPHOCYTES # BLD AUTO: 1.28 10*3/MM3 (ref 0.6–4.2)
LYMPHOCYTES NFR BLD AUTO: 22.3 % (ref 10–50)
MCH RBC QN AUTO: 31.6 PG (ref 26.5–34)
MCHC RBC AUTO-ENTMCNC: 33.8 G/DL (ref 31.4–36)
MCV RBC AUTO: 93.6 FL (ref 80–98)
MONOCYTES # BLD AUTO: 0.28 10*3/MM3 (ref 0–0.9)
MONOCYTES NFR BLD AUTO: 4.9 % (ref 0–12)
MUCOUS THREADS URNS QL MICRO: ABNORMAL /HPF
NEUTROPHILS # BLD AUTO: 3.98 10*3/MM3 (ref 2–8.6)
NEUTROPHILS NFR BLD AUTO: 69.4 % (ref 37–80)
NITRITE UR QL STRIP: NEGATIVE
PH UR STRIP.AUTO: 6 [PH] (ref 5–9)
PLATELET # BLD AUTO: 258 10*3/MM3 (ref 150–450)
PMV BLD AUTO: 8.8 FL (ref 8–12)
POTASSIUM BLD-SCNC: 3.5 MMOL/L (ref 3.5–5.1)
PROT SERPL-MCNC: 6 G/DL (ref 6.3–8.6)
PROT UR QL STRIP: NEGATIVE
RBC # BLD AUTO: 3.89 10*6/MM3 (ref 3.77–5.16)
RBC # UR: ABNORMAL /HPF
REF LAB TEST METHOD: ABNORMAL
SODIUM BLD-SCNC: 141 MMOL/L (ref 137–145)
SP GR UR STRIP: 1.02 (ref 1–1.03)
SQUAMOUS #/AREA URNS HPF: ABNORMAL /HPF
UROBILINOGEN UR QL STRIP: ABNORMAL
WBC NRBC COR # BLD: 5.74 10*3/MM3 (ref 3.2–9.8)
WBC UR QL AUTO: ABNORMAL /HPF

## 2017-06-29 PROCEDURE — 82150 ASSAY OF AMYLASE: CPT | Performed by: NURSE PRACTITIONER

## 2017-06-29 PROCEDURE — 83690 ASSAY OF LIPASE: CPT | Performed by: NURSE PRACTITIONER

## 2017-06-29 PROCEDURE — 96375 TX/PRO/DX INJ NEW DRUG ADDON: CPT

## 2017-06-29 PROCEDURE — 25010000002 KETOROLAC TROMETHAMINE PER 15 MG: Performed by: NURSE PRACTITIONER

## 2017-06-29 PROCEDURE — 74176 CT ABD & PELVIS W/O CONTRAST: CPT

## 2017-06-29 PROCEDURE — 80053 COMPREHEN METABOLIC PANEL: CPT | Performed by: NURSE PRACTITIONER

## 2017-06-29 PROCEDURE — 96374 THER/PROPH/DIAG INJ IV PUSH: CPT

## 2017-06-29 PROCEDURE — 85025 COMPLETE CBC W/AUTO DIFF WBC: CPT | Performed by: NURSE PRACTITIONER

## 2017-06-29 PROCEDURE — 87086 URINE CULTURE/COLONY COUNT: CPT | Performed by: NURSE PRACTITIONER

## 2017-06-29 PROCEDURE — 81001 URINALYSIS AUTO W/SCOPE: CPT | Performed by: NURSE PRACTITIONER

## 2017-06-29 PROCEDURE — 25010000002 ONDANSETRON PER 1 MG: Performed by: NURSE PRACTITIONER

## 2017-06-29 PROCEDURE — 99284 EMERGENCY DEPT VISIT MOD MDM: CPT

## 2017-06-29 RX ORDER — ONDANSETRON 4 MG/1
4 TABLET, ORALLY DISINTEGRATING ORAL EVERY 8 HOURS PRN
Qty: 12 TABLET | Refills: 0 | Status: SHIPPED | OUTPATIENT
Start: 2017-06-29 | End: 2017-07-19

## 2017-06-29 RX ORDER — HYDROCODONE BITARTRATE AND ACETAMINOPHEN 10; 325 MG/1; MG/1
1 TABLET ORAL EVERY 6 HOURS PRN
Qty: 15 TABLET | Refills: 0 | Status: SHIPPED | OUTPATIENT
Start: 2017-06-29 | End: 2017-07-19

## 2017-06-29 RX ORDER — PHENAZOPYRIDINE HYDROCHLORIDE 200 MG/1
200 TABLET, FILM COATED ORAL 3 TIMES DAILY PRN
Qty: 10 TABLET | Refills: 0 | Status: SHIPPED | OUTPATIENT
Start: 2017-06-29 | End: 2017-07-19

## 2017-06-29 RX ORDER — CIPROFLOXACIN 500 MG/1
500 TABLET, FILM COATED ORAL 2 TIMES DAILY
Qty: 14 TABLET | Refills: 0 | Status: SHIPPED | OUTPATIENT
Start: 2017-06-29 | End: 2017-07-19

## 2017-06-29 RX ORDER — KETOROLAC TROMETHAMINE 30 MG/ML
30 INJECTION, SOLUTION INTRAMUSCULAR; INTRAVENOUS ONCE
Status: COMPLETED | OUTPATIENT
Start: 2017-06-29 | End: 2017-06-29

## 2017-06-29 RX ORDER — ONDANSETRON 2 MG/ML
4 INJECTION INTRAMUSCULAR; INTRAVENOUS ONCE
Status: COMPLETED | OUTPATIENT
Start: 2017-06-29 | End: 2017-06-29

## 2017-06-29 RX ORDER — SODIUM CHLORIDE 0.9 % (FLUSH) 0.9 %
10 SYRINGE (ML) INJECTION AS NEEDED
Status: DISCONTINUED | OUTPATIENT
Start: 2017-06-29 | End: 2017-06-29 | Stop reason: HOSPADM

## 2017-06-29 RX ADMIN — KETOROLAC TROMETHAMINE 30 MG: 30 INJECTION, SOLUTION INTRAMUSCULAR; INTRAVENOUS at 11:58

## 2017-06-29 RX ADMIN — ONDANSETRON 4 MG: 2 INJECTION INTRAMUSCULAR; INTRAVENOUS at 11:58

## 2017-06-30 LAB — BACTERIA SPEC AEROBE CULT: NORMAL

## 2017-07-07 LAB
CONV REPORT SUMMARY: NORMAL
D-METHAMPHETAMINE: NORMAL %
L-METHAMPHETAMINE: NORMAL %

## 2017-07-19 ENCOUNTER — APPOINTMENT (OUTPATIENT)
Dept: GENERAL RADIOLOGY | Facility: HOSPITAL | Age: 67
End: 2017-07-19

## 2017-07-19 ENCOUNTER — HOSPITAL ENCOUNTER (OUTPATIENT)
Facility: HOSPITAL | Age: 67
Setting detail: OBSERVATION
Discharge: HOME | End: 2017-07-21
Attending: EMERGENCY MEDICINE | Admitting: INTERNAL MEDICINE

## 2017-07-19 DIAGNOSIS — R07.9 CHEST PAIN, UNSPECIFIED TYPE: Primary | ICD-10-CM

## 2017-07-19 DIAGNOSIS — R06.00 DYSPNEA, UNSPECIFIED TYPE: ICD-10-CM

## 2017-07-19 LAB
ALBUMIN SERPL-MCNC: 4.2 G/DL (ref 3.4–4.8)
ALBUMIN/GLOB SERPL: 1.4 G/DL (ref 1.1–1.8)
ALP SERPL-CCNC: 94 U/L (ref 38–126)
ALT SERPL W P-5'-P-CCNC: 29 U/L (ref 9–52)
ANION GAP SERPL CALCULATED.3IONS-SCNC: 12 MMOL/L (ref 5–15)
AST SERPL-CCNC: 27 U/L (ref 14–36)
BASOPHILS # BLD AUTO: 0.04 10*3/MM3 (ref 0–0.2)
BASOPHILS NFR BLD AUTO: 0.7 % (ref 0–2)
BILIRUB SERPL-MCNC: 0.4 MG/DL (ref 0.2–1.3)
BUN BLD-MCNC: 13 MG/DL (ref 7–21)
BUN/CREAT SERPL: 16.9 (ref 7–25)
CALCIUM SPEC-SCNC: 9.4 MG/DL (ref 8.4–10.2)
CHLORIDE SERPL-SCNC: 105 MMOL/L (ref 95–110)
CK MB SERPL-CCNC: 0.85 NG/ML (ref 0–5)
CK SERPL-CCNC: 27 U/L (ref 30–135)
CO2 SERPL-SCNC: 20 MMOL/L (ref 22–31)
CREAT BLD-MCNC: 0.77 MG/DL (ref 0.5–1)
DEPRECATED RDW RBC AUTO: 43 FL (ref 36.4–46.3)
EOSINOPHIL # BLD AUTO: 0.18 10*3/MM3 (ref 0–0.7)
EOSINOPHIL NFR BLD AUTO: 3.3 % (ref 0–7)
ERYTHROCYTE [DISTWIDTH] IN BLOOD BY AUTOMATED COUNT: 12.8 % (ref 11.5–14.5)
GFR SERPL CREATININE-BSD FRML MDRD: 75 ML/MIN/1.73 (ref 45–104)
GLOBULIN UR ELPH-MCNC: 3.1 GM/DL (ref 2.3–3.5)
GLUCOSE BLD-MCNC: 151 MG/DL (ref 60–100)
GLUCOSE BLDC GLUCOMTR-MCNC: 187 MG/DL (ref 70–130)
HCT VFR BLD AUTO: 39.7 % (ref 35–45)
HGB BLD-MCNC: 13.6 G/DL (ref 12–15.5)
HOLD SPECIMEN: NORMAL
IMM GRANULOCYTES # BLD: 0.02 10*3/MM3 (ref 0–0.02)
IMM GRANULOCYTES NFR BLD: 0.4 % (ref 0–0.5)
INR PPP: 0.93 (ref 0.8–1.2)
LYMPHOCYTES # BLD AUTO: 1.97 10*3/MM3 (ref 0.6–4.2)
LYMPHOCYTES NFR BLD AUTO: 36.3 % (ref 10–50)
MAGNESIUM SERPL-MCNC: 2.2 MG/DL (ref 1.6–2.3)
MCH RBC QN AUTO: 31.3 PG (ref 26.5–34)
MCHC RBC AUTO-ENTMCNC: 34.3 G/DL (ref 31.4–36)
MCV RBC AUTO: 91.5 FL (ref 80–98)
MONOCYTES # BLD AUTO: 0.33 10*3/MM3 (ref 0–0.9)
MONOCYTES NFR BLD AUTO: 6.1 % (ref 0–12)
NEUTROPHILS # BLD AUTO: 2.88 10*3/MM3 (ref 2–8.6)
NEUTROPHILS NFR BLD AUTO: 53.2 % (ref 37–80)
NT-PROBNP SERPL-MCNC: 246 PG/ML (ref 0–900)
PLATELET # BLD AUTO: 288 10*3/MM3 (ref 150–450)
PMV BLD AUTO: 8.9 FL (ref 8–12)
POTASSIUM BLD-SCNC: 3.2 MMOL/L (ref 3.5–5.1)
PROT SERPL-MCNC: 7.3 G/DL (ref 6.3–8.6)
PROTHROMBIN TIME: 12.4 SECONDS (ref 11.1–15.3)
RBC # BLD AUTO: 4.34 10*6/MM3 (ref 3.77–5.16)
SODIUM BLD-SCNC: 137 MMOL/L (ref 137–145)
TROPONIN I SERPL-MCNC: <0.012 NG/ML
TROPONIN I SERPL-MCNC: <0.012 NG/ML
WBC NRBC COR # BLD: 5.42 10*3/MM3 (ref 3.2–9.8)
WHOLE BLOOD HOLD SPECIMEN: NORMAL

## 2017-07-19 PROCEDURE — G0378 HOSPITAL OBSERVATION PER HR: HCPCS

## 2017-07-19 PROCEDURE — 93005 ELECTROCARDIOGRAM TRACING: CPT

## 2017-07-19 PROCEDURE — 83880 ASSAY OF NATRIURETIC PEPTIDE: CPT | Performed by: EMERGENCY MEDICINE

## 2017-07-19 PROCEDURE — 82553 CREATINE MB FRACTION: CPT | Performed by: EMERGENCY MEDICINE

## 2017-07-19 PROCEDURE — 99284 EMERGENCY DEPT VISIT MOD MDM: CPT

## 2017-07-19 PROCEDURE — 63710000001 INSULIN ASPART PER 5 UNITS: Performed by: FAMILY MEDICINE

## 2017-07-19 PROCEDURE — 96374 THER/PROPH/DIAG INJ IV PUSH: CPT

## 2017-07-19 PROCEDURE — 71010 HC CHEST PA OR AP: CPT

## 2017-07-19 PROCEDURE — 93010 ELECTROCARDIOGRAM REPORT: CPT | Performed by: INTERNAL MEDICINE

## 2017-07-19 PROCEDURE — 84484 ASSAY OF TROPONIN QUANT: CPT | Performed by: EMERGENCY MEDICINE

## 2017-07-19 PROCEDURE — 82962 GLUCOSE BLOOD TEST: CPT

## 2017-07-19 PROCEDURE — 96375 TX/PRO/DX INJ NEW DRUG ADDON: CPT

## 2017-07-19 PROCEDURE — 85610 PROTHROMBIN TIME: CPT | Performed by: EMERGENCY MEDICINE

## 2017-07-19 PROCEDURE — 82550 ASSAY OF CK (CPK): CPT | Performed by: EMERGENCY MEDICINE

## 2017-07-19 PROCEDURE — 25010000002 MORPHINE PER 10 MG: Performed by: EMERGENCY MEDICINE

## 2017-07-19 PROCEDURE — 80053 COMPREHEN METABOLIC PANEL: CPT | Performed by: EMERGENCY MEDICINE

## 2017-07-19 PROCEDURE — 83735 ASSAY OF MAGNESIUM: CPT | Performed by: FAMILY MEDICINE

## 2017-07-19 PROCEDURE — 25010000002 ONDANSETRON PER 1 MG: Performed by: EMERGENCY MEDICINE

## 2017-07-19 PROCEDURE — 85025 COMPLETE CBC W/AUTO DIFF WBC: CPT | Performed by: EMERGENCY MEDICINE

## 2017-07-19 RX ORDER — DEXTROSE MONOHYDRATE 25 G/50ML
25 INJECTION, SOLUTION INTRAVENOUS
Status: DISCONTINUED | OUTPATIENT
Start: 2017-07-19 | End: 2017-07-21 | Stop reason: HOSPADM

## 2017-07-19 RX ORDER — SODIUM CHLORIDE 0.9 % (FLUSH) 0.9 %
1-10 SYRINGE (ML) INJECTION AS NEEDED
Status: DISCONTINUED | OUTPATIENT
Start: 2017-07-19 | End: 2017-07-21 | Stop reason: HOSPADM

## 2017-07-19 RX ORDER — NICOTINE POLACRILEX 4 MG
15 LOZENGE BUCCAL
Status: DISCONTINUED | OUTPATIENT
Start: 2017-07-19 | End: 2017-07-21 | Stop reason: HOSPADM

## 2017-07-19 RX ORDER — BUSPIRONE HYDROCHLORIDE 15 MG/1
15 TABLET ORAL NIGHTLY
Status: DISCONTINUED | OUTPATIENT
Start: 2017-07-20 | End: 2017-07-21 | Stop reason: HOSPADM

## 2017-07-19 RX ORDER — ONDANSETRON 2 MG/ML
4 INJECTION INTRAMUSCULAR; INTRAVENOUS ONCE
Status: COMPLETED | OUTPATIENT
Start: 2017-07-19 | End: 2017-07-19

## 2017-07-19 RX ORDER — ATORVASTATIN CALCIUM 20 MG/1
20 TABLET, FILM COATED ORAL NIGHTLY
Status: DISCONTINUED | OUTPATIENT
Start: 2017-07-19 | End: 2017-07-21 | Stop reason: HOSPADM

## 2017-07-19 RX ORDER — BUTALBITAL, ACETAMINOPHEN AND CAFFEINE 50; 325; 40 MG/1; MG/1; MG/1
1 TABLET ORAL EVERY 6 HOURS PRN
Status: DISCONTINUED | OUTPATIENT
Start: 2017-07-19 | End: 2017-07-21 | Stop reason: HOSPADM

## 2017-07-19 RX ORDER — NALOXONE HCL 0.4 MG/ML
0.4 VIAL (ML) INJECTION
Status: DISCONTINUED | OUTPATIENT
Start: 2017-07-19 | End: 2017-07-21 | Stop reason: HOSPADM

## 2017-07-19 RX ORDER — POTASSIUM CHLORIDE 750 MG/1
40 CAPSULE, EXTENDED RELEASE ORAL DAILY
Status: DISCONTINUED | OUTPATIENT
Start: 2017-07-20 | End: 2017-07-21 | Stop reason: HOSPADM

## 2017-07-19 RX ORDER — ASPIRIN 81 MG/1
81 TABLET, CHEWABLE ORAL DAILY
Status: DISCONTINUED | OUTPATIENT
Start: 2017-07-20 | End: 2017-07-21 | Stop reason: HOSPADM

## 2017-07-19 RX ORDER — ASPIRIN 325 MG
325 TABLET ORAL ONCE
Status: COMPLETED | OUTPATIENT
Start: 2017-07-19 | End: 2017-07-19

## 2017-07-19 RX ORDER — HYDROCODONE BITARTRATE AND ACETAMINOPHEN 7.5; 325 MG/1; MG/1
1 TABLET ORAL 3 TIMES DAILY PRN
Status: DISCONTINUED | OUTPATIENT
Start: 2017-07-19 | End: 2017-07-20

## 2017-07-19 RX ORDER — ESCITALOPRAM OXALATE 10 MG/1
20 TABLET ORAL NIGHTLY
Status: DISCONTINUED | OUTPATIENT
Start: 2017-07-20 | End: 2017-07-21 | Stop reason: HOSPADM

## 2017-07-19 RX ORDER — MORPHINE SULFATE 2 MG/ML
1 INJECTION, SOLUTION INTRAMUSCULAR; INTRAVENOUS EVERY 4 HOURS PRN
Status: DISCONTINUED | OUTPATIENT
Start: 2017-07-19 | End: 2017-07-21 | Stop reason: HOSPADM

## 2017-07-19 RX ORDER — LISINOPRIL 5 MG/1
5 TABLET ORAL NIGHTLY
Status: DISCONTINUED | OUTPATIENT
Start: 2017-07-20 | End: 2017-07-21 | Stop reason: HOSPADM

## 2017-07-19 RX ORDER — GABAPENTIN 400 MG/1
800 CAPSULE ORAL EVERY 8 HOURS SCHEDULED
Status: DISCONTINUED | OUTPATIENT
Start: 2017-07-19 | End: 2017-07-21 | Stop reason: HOSPADM

## 2017-07-19 RX ORDER — SODIUM CHLORIDE 0.9 % (FLUSH) 0.9 %
10 SYRINGE (ML) INJECTION AS NEEDED
Status: DISCONTINUED | OUTPATIENT
Start: 2017-07-19 | End: 2017-07-21 | Stop reason: HOSPADM

## 2017-07-19 RX ORDER — CARVEDILOL 3.12 MG/1
3.12 TABLET ORAL 2 TIMES DAILY
Status: DISCONTINUED | OUTPATIENT
Start: 2017-07-19 | End: 2017-07-21 | Stop reason: HOSPADM

## 2017-07-19 RX ORDER — MORPHINE SULFATE 4 MG/ML
4 INJECTION, SOLUTION INTRAMUSCULAR; INTRAVENOUS ONCE
Status: COMPLETED | OUTPATIENT
Start: 2017-07-19 | End: 2017-07-19

## 2017-07-19 RX ORDER — ONDANSETRON 4 MG/1
4 TABLET, ORALLY DISINTEGRATING ORAL EVERY 8 HOURS PRN
Status: DISCONTINUED | OUTPATIENT
Start: 2017-07-19 | End: 2017-07-21 | Stop reason: HOSPADM

## 2017-07-19 RX ORDER — CLOPIDOGREL BISULFATE 75 MG/1
75 TABLET ORAL DAILY
Status: DISCONTINUED | OUTPATIENT
Start: 2017-07-20 | End: 2017-07-21 | Stop reason: HOSPADM

## 2017-07-19 RX ORDER — NITROGLYCERIN 40 MG/1
1 PATCH TRANSDERMAL DAILY
Status: DISCONTINUED | OUTPATIENT
Start: 2017-07-20 | End: 2017-07-21 | Stop reason: HOSPADM

## 2017-07-19 RX ADMIN — INSULIN ASPART 2 UNITS: 100 INJECTION, SOLUTION INTRAVENOUS; SUBCUTANEOUS at 21:31

## 2017-07-19 RX ADMIN — ASPIRIN 325 MG: 325 TABLET, COATED ORAL at 16:32

## 2017-07-19 RX ADMIN — HYDROCODONE BITARTRATE AND ACETAMINOPHEN 1 TABLET: 7.5; 325 TABLET ORAL at 20:37

## 2017-07-19 RX ADMIN — CARVEDILOL 3.12 MG: 3.12 TABLET, FILM COATED ORAL at 21:01

## 2017-07-19 RX ADMIN — ATORVASTATIN CALCIUM 20 MG: 20 TABLET, FILM COATED ORAL at 21:01

## 2017-07-19 RX ADMIN — ONDANSETRON 4 MG: 2 INJECTION INTRAMUSCULAR; INTRAVENOUS at 16:50

## 2017-07-19 RX ADMIN — Medication 10 ML: at 16:51

## 2017-07-19 RX ADMIN — MORPHINE SULFATE 4 MG: 4 INJECTION, SOLUTION INTRAMUSCULAR; INTRAVENOUS at 16:50

## 2017-07-19 RX ADMIN — GABAPENTIN 800 MG: 400 CAPSULE ORAL at 21:47

## 2017-07-19 NOTE — H&P
[address], [place]. [address]  T - [phone]     H&P         SUBJECTIVE:   Patient Care Team:  JUANA Mckeon as PCP - General (Emergency Medicine)    Chief Complaint:     Chief Complaint   Patient presents with   • Chest Pain       Patient is 66 y.o. female presents with chest pain which started today around 12pm. Pt states that she had just came from out and sat down when the pain started. Pain is radiating to her left jaw, pressure in nature, 6/10 currently it is 4/10. Denies any nausea or vomiting, states that she has been feeling shortness of air.   Pt denies any recent travels, denies any history of cancer, or any history of blood clot.     Pt has significant history of CAD s/p stent placement in Sept 2016 and March 2017 by Dr. Martinez.      HPI     ROS/HISTORY/ CURRENT MEDICATIONS/OBJECTIVE/VS/PE:   Review of Systems:   Review of Systems   Constitutional: Positive for fatigue. Negative for chills and fever.   HENT: Negative for drooling, ear discharge, hearing loss, rhinorrhea and sinus pressure.    Respiratory: Positive for shortness of breath. Negative for cough, wheezing and stridor.    Cardiovascular: Positive for chest pain. Negative for palpitations and leg swelling.   Gastrointestinal: Negative for abdominal pain, blood in stool, diarrhea, nausea and vomiting.   Endocrine: Negative for cold intolerance and heat intolerance.   Genitourinary: Negative for dysuria, flank pain and urgency.   Musculoskeletal: Positive for back pain. Negative for gait problem and neck stiffness.   Skin: Negative for wound.   Neurological: Negative for dizziness and light-headedness.   Hematological: Negative for adenopathy.   Psychiatric/Behavioral: Negative for agitation, behavioral problems and confusion.       History:     Past Medical History:   Diagnosis Date   • Acute bronchitis    • Allergic    • Allergic rhinitis    • Anxiety state    • Anxiety state     Anxiety  state, unspecified      • Arthritis    • Bacterial pneumonia    • Chronic pain    • Chronic pain     Other chronic pain      • Contact dermatitis    • COPD (chronic obstructive pulmonary disease)    • Coronary arteriosclerosis    • Depressive disorder    • Depressive disorder    • Diabetes mellitus due to underlying condition with diabetic autonomic neuropathy     Diabetes mellitus due to underlying condition with diabetic autonomic (poly)neuropathy      • Drug therapy     Long-term drug therapy      • Dysuria    • Encounter for immunization    • Encounter for screening for malignant neoplasm of colon    • Essential hypertension    • Folliculitis    • Headache    • Heart murmur    • Herpes zoster    • Herpes zoster with nervous system complication    • History of screening mammography    • Hyperlipidemia    • Insomnia    • Insomnia    • Kidney stone    • Lumbar radiculopathy    • Lung nodule    • Migraine    • Migraine     Migraine, unspecified, without mention of intractable migraine, without mention of status migrainosus      • Migraine    • Myocardial infarction    • Nausea and vomiting    • Nausea with vomiting    • Neck pain    • Need for immunization against influenza    • Need for prophylactic vaccination and inoculation against diphtheria alone    • Need for prophylactic vaccination and inoculation against unspecified single disease     Need for prophylactic vaccination and inoculation against Streptococcus pneumoniae [pneumococcus] and influenza      • Non-alcoholic fatty liver disease    • Rectus sheath hematoma    • Senile osteoporosis    • Shoulder pain    • Solitary pulmonary nodule present on computed tomography of lung    • Spasm of back muscles    • Spinal stenosis of lumbar region    • Systolic congestive heart failure    • TIA (transient ischemic attack)    • Tobacco dependence syndrome    • Type 2 diabetes mellitus    • Urinary tract infectious disease    • Viral gastroenteritis    • Vitamin D  deficiency     Unspecified vitamin D deficiency      • Vitamin D deficiency      Past Surgical History:   Procedure Laterality Date   • CARDIAC CATHETERIZATION      plaque noted ef 55% 2010    • CARDIAC CATHETERIZATION N/A 3/29/2017    Procedure: Left Heart Cath;  Surgeon: Raheel Martinez MD;  Location: [facility] INVASIVE LOCATION;  Service:    • CARDIAC CATHETERIZATION N/A 2017    Procedure: Left Heart Cath;  Surgeon: Raheel Martinez MD;  Location: [facility] INVASIVE LOCATION;  Service:    •  SECTION       Low cervical  1981       • CHOLECYSTECTOMY     • DILATATION AND CURETTAGE  1975   • DILATATION AND CURETTAGE  1975   • INJECTION OF MEDICATION  2015     Phenergan (3)      • INJECTION OF MEDICATION      Kenalog (1)      2011    • INJECTION OF MEDICATION  2014    Toradol (3)   • INJECTION OF MEDICATION  2011     Vistaril (1)    • INJECTION OF MEDICATION  2012    Zofran (1)   • [place] RT/LT HEART CATHETERS N/A 2017    Procedure: Percutaneous Coronary Intervention;  Surgeon: Raheel Martinez MD;  Location: [facility] INVASIVE LOCATION;  Service: Cardiovascular   • TUBAL ABDOMINAL LIGATION       [place] tubal ligation 1981      Family History   Problem Relation Age of Onset   • Alzheimer's disease Mother    • Diabetes Brother    • Alzheimer's disease Other    • Diabetes Other    • Lung cancer Other    • Pulmonary embolism Other    • Hypertension Father    • Depression Daughter    • Hypertension Daughter    • Anxiety disorder Daughter    • Lung cancer Brother      Social History   Substance Use Topics   • Smoking status: Current Every Day Smoker     Packs/day: 0.50     Years: 45.00     Types: Cigarettes   • Smokeless tobacco: Never Used   • Alcohol use No       (Not in a hospital admission)  Allergies:  Review of patient's allergies indicates no known allergies.    Current Medications:     Current Facility-Administered Medications    Medication Dose Route Frequency Provider Last Rate Last Dose   • insulin aspart (novoLOG) injection 0-7 Units  0-7 Units Subcutaneous 4x Daily AC & at Bedtime Kam Botello MD       • sodium chloride 0.9 % flush 10 mL  10 mL Intravenous PRN Sha Terry MD   10 mL at 07/19/17 1651     Current Outpatient Prescriptions   Medication Sig Dispense Refill   • aspirin 81 MG tablet Take 1 tablet by mouth Daily. 30 tablet 5   • atorvastatin (LIPITOR) 20 MG tablet Take 1 tablet by mouth Every Night. 90 tablet 3   • busPIRone (BUSPAR) 15 MG tablet Take 15 mg by mouth Every Night.     • carvedilol (COREG) 3.125 MG tablet Take 1 tablet by mouth 2 (Two) Times a Day. 180 tablet 3   • clopidogrel (PLAVIX) 75 MG tablet Take 75 mg by mouth Daily.     • escitalopram (LEXAPRO) 20 MG tablet Take 1 tablet by mouth Daily. (Patient taking differently: Take 20 mg by mouth Every Night.) 90 tablet 3   • FINGERSTIX LANCETS misc To check sugar tid dx code E11.65 200 each 5   • gabapentin (NEURONTIN) 400 MG capsule Take 400 mg by mouth 3 (Three) Times a Day. Pt takes 400 mg + 800 mg three times daily.     • gabapentin (NEURONTIN) 800 MG tablet Take 800 mg by mouth 3 (Three) Times a Day. Pt takes 400 mg + 800 mg three times daily.     • glipiZIDE (GLUCOTROL) 5 MG tablet Take 1 tablet by mouth 2 (Two) Times a Day Before Meals. 180 tablet 3   • glucose blood test strip To check sugar tid dx code E11.65 100 each 12   • glucose monitor monitoring kit 1 each As Needed (to check sugar). 1 each 1   • HYDROcodone-acetaminophen (NORCO) 7.5-325 MG per tablet Take 1 tablet by mouth 3 (three) times a day as needed for moderate pain (4-6).     • HYDROcodone-acetaminophen (NORCO) 7.5-325 MG per tablet Take 1 tablet by mouth 4 (Four) Times a Day for 30 days. 120 tablet 0   • HYDROcodone-acetaminophen (NORCO) 7.5-325 MG per tablet Take 1 tablet by mouth 4 (Four) Times a Day for 30 days. 120 tablet 0   • insulin detemir (LEVEMIR) 100 UNIT/ML injection  Inject 14 Units under the skin Every Night. 2 pen 5   • Insulin Pen Needle 33G X 8 MM misc 1 Units Every Night. 100 each 3   • lisinopril (ZESTRIL) 5 MG tablet Take 1 tablet by mouth Daily. (Patient taking differently: Take 5 mg by mouth Every Night.) 30 tablet 2   • nitroglycerin (NITRODUR) 0.2 MG/HR patch Place 1 patch on the skin Daily. 90 patch 12   • ondansetron ODT (ZOFRAN-ODT) 4 MG disintegrating tablet Take 1 tablet by mouth Every 8 (Eight) Hours As Needed for Nausea or Vomiting. 12 tablet 0   • phenazopyridine (PYRIDIUM) 200 MG tablet Take 1 tablet by mouth 3 (Three) Times a Day As Needed for bladder spasms. 10 tablet 0       Physical Exam:     Vital Sign Min/Max for last 24 hours  Temp  Min: 98.1 °F (36.7 °C)  Max: 98.1 °F (36.7 °C)   BP  Min: 126/76  Max: 130/74   Pulse  Min: 63  Max: 68   Resp  Min: 20  Max: 22   SpO2  Min: 98 %  Max: 100 %   No Data Recorded   Weight  Min: 134 lb (60.8 kg)  Max: 134 lb (60.8 kg)       Physical Exam:    Physical Exam   Constitutional: She is oriented to person, place, and time. She appears well-developed and well-nourished.   Eyes: EOM are normal. Pupils are equal, round, and reactive to light.   Neck: Normal range of motion. Neck supple.   Cardiovascular: Normal rate, regular rhythm and normal heart sounds.    No murmur heard.  Pulmonary/Chest: Effort normal and breath sounds normal. She has no wheezes.   Abdominal: Soft. Bowel sounds are normal.   Musculoskeletal: Normal range of motion.   Neurological: She is alert and oriented to person, place, and time. No cranial nerve deficit. Coordination normal.   Skin: Skin is warm and dry.   Psychiatric: She has a normal mood and affect. Her behavior is normal.   Vitals reviewed.       Results Review:   Lab Results (last 24 hours)     Procedure Component Value Units Date/Time    CBC & Differential [[id number]] Collected:  07/19/17 1642    Specimen:  Blood Updated:  07/19/17 1658    Narrative:       The following orders were  created for panel order CBC & Differential.  Procedure                               Abnormality         Status                     ---------                               -----------         ------                     CBC Auto Differential[[id number]]        Normal              Final result                 Please view results for these tests on the individual orders.    CBC Auto Differential [[id number]]  (Normal) Collected:  07/19/17 1642    Specimen:  Blood Updated:  07/19/17 1658     WBC 5.42 10*3/mm3      RBC 4.34 10*6/mm3      Hemoglobin 13.6 g/dL      Hematocrit 39.7 %      MCV 91.5 fL      MCH 31.3 pg      MCHC 34.3 g/dL      RDW 12.8 %      RDW-SD 43.0 fl      MPV 8.9 fL      Platelets 288 10*3/mm3      Neutrophil % 53.2 %      Lymphocyte % 36.3 %      Monocyte % 6.1 %      Eosinophil % 3.3 %      Basophil % 0.7 %      Immature Grans % 0.4 %      Neutrophils, Absolute 2.88 10*3/mm3      Lymphocytes, Absolute 1.97 10*3/mm3      Monocytes, Absolute 0.33 10*3/mm3      Eosinophils, Absolute 0.18 10*3/mm3      Basophils, Absolute 0.04 10*3/mm3      Immature Grans, Absolute 0.02 10*3/mm3     CK [[id number]]  (Abnormal) Collected:  07/19/17 1642    Specimen:  Blood Updated:  07/19/17 1719     Creatine Kinase 27 (L) U/L     Comprehensive Metabolic Panel [[id number]]  (Abnormal) Collected:  07/19/17 1642    Specimen:  Blood Updated:  07/19/17 1720     Glucose 151 (H) mg/dL      BUN 13 mg/dL      Creatinine 0.77 mg/dL      Sodium 137 mmol/L      Potassium 3.2 (L) mmol/L      Chloride 105 mmol/L      CO2 20.0 (L) mmol/L      Calcium 9.4 mg/dL      Total Protein 7.3 g/dL      Albumin 4.20 g/dL      ALT (SGPT) 29 U/L      AST (SGOT) 27 U/L      Alkaline Phosphatase 94 U/L      Total Bilirubin 0.4 mg/dL      eGFR Non African Amer 75 mL/min/1.73      Globulin 3.1 gm/dL      A/G Ratio 1.4 g/dL      BUN/Creatinine Ratio 16.9     Anion Gap 12.0 mmol/L     Protime-INR [[id number]]  (Normal) Collected:  07/19/17 1681    Specimen:   Blood Updated:  07/19/17 1728     Protime 12.4 Seconds      INR 0.93    Narrative:       Therapeutic range for most indications is 2.0-3.0 INR,  or 2.5-3.5 for mechanical heart valves.    Troponin [[id number]]  (Normal) Collected:  07/19/17 1642    Specimen:  Blood Updated:  07/19/17 1730     Troponin I <0.012 ng/mL     BNP [[id number]]  (Normal) Collected:  07/19/17 1642    Specimen:  Blood Updated:  07/19/17 1730     proBNP 246.0 pg/mL     CK-MB [[id number]]  (Normal) Collected:  07/19/17 1642    Specimen:  Blood Updated:  07/19/17 1730     CKMB 0.85 ng/mL     [place] Draw [[id number]] Collected:  07/19/17 1642    Specimen:  Blood Updated:  07/19/17 1801    Narrative:       The following orders were created for panel order [place] Draw.  Procedure                               Abnormality         Status                     ---------                               -----------         ------                     Light Blue Top[[id number]]                                   Final result               Green Top (Gel)[[id number]]                                  Final result               Lavender Top[[id number]]                                     Final result               Gold Top - SST[[id number]]                                   Final result                 Please view results for these tests on the individual orders.    Light Blue Top [[id number]] Collected:  07/19/17 1642    Specimen:  Blood Updated:  07/19/17 1801     Extra Tube hold for add-on      Auto resulted       Green Top (Gel) [[id number]] Collected:  07/19/17 1642    Specimen:  Blood Updated:  07/19/17 1801     Extra Tube Hold for add-ons.      Auto resulted.       Lavender Top [[id number]] Collected:  07/19/17 1642    Specimen:  Blood Updated:  07/19/17 1801     Extra Tube hold for add-on      Auto resulted       Gold Top - SST [[id number]] Collected:  07/19/17 1642    Specimen:  Blood Updated:  07/19/17 1801     Extra Tube Hold for add-ons.      Auto resulted.                                   Imaging Results (last 24 hours)     Procedure Component Value Units Date/Time    XR Chest 1 View [[id number]] Collected:  07/19/17 1645     Updated:  07/19/17 1701    Narrative:       Chest single view.     CLINICAL INDICATION: Chest pain.    COMPARISON: May 27, 2017.    FINDINGS: Cardiac silhouette is normal in size. Pulmonary  vascularity is unremarkable.     No focal infiltrate or consolidation.  No pleural effusion.  No  pneumothorax.  Discoid changes at both lung bases most likely discoid fibrosis.  No change since prior exam.          Impression:       CONCLUSION: No evidence of active disease.    Electronically signed by:  Ivan Stephens MD  7/19/2017 4:59 PM CDT  Workstation: [id number]           I reviewed the patient's new clinical results.  I reviewed the patient's new imaging results and agree with the interpretation.     ASSESSMENT/PLAN:   Assessment/Plan   Active Hospital Problems (** Indicates Principal Problem)    Diagnosis Date Noted   • **Chest pain [R07.9] 04/20/2017     Will trend cardiac enzymes  GALILEO treatment   Will consult cardiology.      • Hypokalemia [E87.6] 04/05/2017     Will check Mag level, and replace K.      • Tobacco dependence syndrome [F17.200] 02/02/2017     Will provide nicotine patch      • Diabetes mellitus type 2 in nonobese [E11.9] 02/02/2017     Will continue home medication levemir of 15 units and add sliding scale insulin.      • Diabetic polyneuropathy [E11.42] 11/09/2016     Continue with gabapentin      • Essential hypertension, benign [I10] 11/09/2016     Beta blocker and ACEi well control.      • Depression [F32.9] 11/09/2016     Will continue with lexapro      • Anxiety [F41.9] 11/09/2016     Continue with lexapro.      • Atherosclerosis of native coronary artery of native heart with angina pectoris [I25.119] 10/08/2016     Beta blockers  ACEi   Statin   ASA        Resolved Hospital Problems    Diagnosis Date Noted Date Resolved   No  resolved problems to display.     DVT ppx: SCD/TEDs     I discussed the patients findings and my recommendations with patient, family, nursing staff and consulting provider.              This document has been electronically signed by Kam Botello MD on July 19, 2017 6:47 PM

## 2017-07-19 NOTE — ED PROVIDER NOTES
Subjective   HPI Comments: Patient presents with chest pain since about noon today.  Patients latest cath was in April 2017 when she had stenting.  No f/c.  No n/v.  Has had shortness of breath and anxiety with some radiation of the pain to axillae and neck.  No syncope, presyncope.  Patient sees Dr. Martinez for her cardiology needs.        History provided by:  Patient   used: No        Review of Systems   Constitutional: Negative.  Negative for appetite change, chills and fever.   HENT: Negative.  Negative for congestion.    Eyes: Negative.  Negative for photophobia and visual disturbance.   Respiratory: Positive for shortness of breath. Negative for cough and chest tightness.    Cardiovascular: Positive for chest pain. Negative for palpitations.   Gastrointestinal: Negative.  Negative for abdominal pain, constipation, diarrhea, nausea and vomiting.   Endocrine: Negative.    Genitourinary: Negative.  Negative for decreased urine volume, dysuria, flank pain and hematuria.   Musculoskeletal: Negative.  Negative for arthralgias, back pain, myalgias, neck pain and neck stiffness.   Skin: Negative.  Negative for pallor.   Neurological: Negative.  Negative for dizziness, syncope, weakness, light-headedness, numbness and headaches.   Psychiatric/Behavioral: Negative.  Negative for confusion and suicidal ideas. The patient is not nervous/anxious.    All other systems reviewed and are negative.      Past Medical History:   Diagnosis Date   • Acute bronchitis    • Allergic    • Allergic rhinitis    • Anxiety state    • Anxiety state     Anxiety state, unspecified      • Arthritis    • Bacterial pneumonia    • Chronic pain    • Chronic pain     Other chronic pain      • Contact dermatitis    • COPD (chronic obstructive pulmonary disease)    • Coronary arteriosclerosis    • Depressive disorder    • Depressive disorder    • Diabetes mellitus due to underlying condition with diabetic autonomic neuropathy      Diabetes mellitus due to underlying condition with diabetic autonomic (poly)neuropathy      • Drug therapy     Long-term drug therapy      • Dysuria    • Encounter for immunization    • Encounter for screening for malignant neoplasm of colon    • Essential hypertension    • Folliculitis    • Headache    • Heart murmur    • Herpes zoster    • Herpes zoster with nervous system complication    • History of screening mammography    • Hyperlipidemia    • Insomnia    • Insomnia    • Kidney stone    • Lumbar radiculopathy    • Lung nodule    • Migraine    • Migraine     Migraine, unspecified, without mention of intractable migraine, without mention of status migrainosus      • Migraine    • Myocardial infarction    • Nausea and vomiting    • Nausea with vomiting    • Neck pain    • Need for immunization against influenza    • Need for prophylactic vaccination and inoculation against diphtheria alone    • Need for prophylactic vaccination and inoculation against unspecified single disease     Need for prophylactic vaccination and inoculation against Streptococcus pneumoniae [pneumococcus] and influenza      • Non-alcoholic fatty liver disease    • Rectus sheath hematoma    • Senile osteoporosis    • Shoulder pain    • Solitary pulmonary nodule present on computed tomography of lung    • Spasm of back muscles    • Spinal stenosis of lumbar region    • Systolic congestive heart failure    • TIA (transient ischemic attack)    • Tobacco dependence syndrome    • Type 2 diabetes mellitus    • Urinary tract infectious disease    • Viral gastroenteritis    • Vitamin D deficiency     Unspecified vitamin D deficiency      • Vitamin D deficiency        No Known Allergies    Past Surgical History:   Procedure Laterality Date   • CARDIAC CATHETERIZATION      plaque noted ef 55% 07/17/2010    • CARDIAC CATHETERIZATION N/A 3/29/2017    Procedure: Left Heart Cath;  Surgeon: Raheel Martinez MD;  Location: [facility] INVASIVE LOCATION;   Service:    • CARDIAC CATHETERIZATION N/A 2017    Procedure: Left Heart Cath;  Surgeon: Raheel Martinez MD;  Location: [facility] INVASIVE LOCATION;  Service:    •  SECTION       Low cervical  1981       • CHOLECYSTECTOMY     • DILATATION AND CURETTAGE  1975   • DILATATION AND CURETTAGE  1975   • INJECTION OF MEDICATION  2015     Phenergan (3)      • INJECTION OF MEDICATION      Kenalog (1)      2011    • INJECTION OF MEDICATION  2014    Toradol (3)   • INJECTION OF MEDICATION  2011     Vistaril (1)    • INJECTION OF MEDICATION  2012    Zofran (1)   • AK RT/LT HEART CATHETERS N/A 2017    Procedure: Percutaneous Coronary Intervention;  Surgeon: Raheel Martinez MD;  Location: [facility] INVASIVE LOCATION;  Service: Cardiovascular   • TUBAL ABDOMINAL LIGATION       [place] tubal ligation 1981        Family History   Problem Relation Age of Onset   • Alzheimer's disease Mother    • Diabetes Brother    • Alzheimer's disease Other    • Diabetes Other    • Lung cancer Other    • Pulmonary embolism Other    • Hypertension Father    • Depression Daughter    • Hypertension Daughter    • Anxiety disorder Daughter    • Lung cancer Brother        Social History     Social History   • Marital status:      Spouse name: N/A   • Number of children: N/A   • Years of education: N/A     Social History Main Topics   • Smoking status: Current Every Day Smoker     Packs/day: 0.50     Years: 45.00     Types: Cigarettes   • Smokeless tobacco: Never Used   • Alcohol use No   • Drug use: No   • Sexual activity: Defer     Other Topics Concern   • None     Social History Narrative    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  Lives with daughter.            Objective   Physical Exam   Constitutional: She is oriented to person, place, and time. She appears well-developed and well-nourished. She appears distressed (in pain).   HENT:   Head:  Normocephalic and atraumatic.   Nose: Nose normal.   Mouth/Throat: Oropharynx is clear and moist.   Eyes: Conjunctivae and EOM are normal. No scleral icterus.   Neck: Normal range of motion. Neck supple. No JVD present.   Cardiovascular: Normal rate, regular rhythm, normal heart sounds and intact distal pulses.  Exam reveals no gallop and no friction rub.    No murmur heard.  Pulmonary/Chest: Effort normal. No respiratory distress. She has no wheezes. She has no rales. She exhibits no tenderness.   Abdominal: Soft. She exhibits no distension and no mass. There is no tenderness. There is no rebound and no guarding.   Musculoskeletal: Normal range of motion. She exhibits no edema, tenderness or deformity.   Lymphadenopathy:     She has no cervical adenopathy.   Neurological: She is alert and oriented to person, place, and time. No cranial nerve deficit. She exhibits normal muscle tone.   Skin: Skin is warm and dry. No rash noted. She is not diaphoretic. No erythema. No pallor.   Psychiatric: She has a normal mood and affect. Her behavior is normal. Judgment and thought content normal.   Nursing note and vitals reviewed.      Procedures         ED Course  ED Course     Labs Reviewed   COMPREHENSIVE METABOLIC PANEL - Abnormal; Notable for the following:        Result Value    Glucose 151 (*)     Potassium 3.2 (*)     CO2 20.0 (*)     All other components within normal limits   CK - Abnormal; Notable for the following:     Creatine Kinase 27 (*)     All other components within normal limits   TROPONIN (IN-HOUSE) - Normal   BNP (IN-HOUSE) - Normal   PROTIME-INR - Normal    Narrative:     Therapeutic range for most indications is 2.0-3.0 INR,  or 2.5-3.5 for mechanical heart valves.   CK MB - Normal   CBC WITH AUTO DIFFERENTIAL - Normal   RAINBOW DRAW    Narrative:     The following orders were created for panel order [place] Draw.  Procedure                               Abnormality         Status                      ---------                               -----------         ------                     Light Blue Top[[id number]]                                   Final result               Green Top (Gel)[[id number]]                                  Final result               Lavender Top[[id number]]                                     Final result               Gold Top - SST[[id number]]                                   Final result                 Please view results for these tests on the individual orders.   TROPONIN (IN-HOUSE)   CBC AND DIFFERENTIAL    Narrative:     The following orders were created for panel order CBC & Differential.  Procedure                               Abnormality         Status                     ---------                               -----------         ------                     CBC Auto Differential[[id number]]        Normal              Final result                 Please view results for these tests on the individual orders.   LIGHT BLUE TOP   GREEN TOP   LAVENDER TOP   GOLD TOP - SST       XR Chest 1 View   Final Result   CONCLUSION: No evidence of active disease.      Electronically signed by:  Ivan Stephens MD  7/19/2017 4:59 PM CDT   Workstation: [id number]        CP.  Negative markers x 1 after 4 hours onset of symptoms.  HEART score 6.  Obs.                 MDM    Final diagnoses:   Chest pain, unspecified type   Dyspnea, unspecified type            Sha Terry MD  07/19/17 9251

## 2017-07-20 LAB
ALBUMIN SERPL-MCNC: 3.7 G/DL (ref 3.4–4.8)
ALBUMIN/GLOB SERPL: 1.5 G/DL (ref 1.1–1.8)
ALP SERPL-CCNC: 144 U/L (ref 38–126)
ALT SERPL W P-5'-P-CCNC: 504 U/L (ref 9–52)
ANION GAP SERPL CALCULATED.3IONS-SCNC: 7 MMOL/L (ref 5–15)
ANISOCYTOSIS BLD QL: ABNORMAL
ARTICHOKE IGE QN: 111 MG/DL (ref 1–129)
AST SERPL-CCNC: 573 U/L (ref 14–36)
BILIRUB SERPL-MCNC: 0.7 MG/DL (ref 0.2–1.3)
BUN BLD-MCNC: 15 MG/DL (ref 7–21)
BUN/CREAT SERPL: 18.1 (ref 7–25)
CALCIUM SPEC-SCNC: 8.5 MG/DL (ref 8.4–10.2)
CHLORIDE SERPL-SCNC: 107 MMOL/L (ref 95–110)
CHOLEST SERPL-MCNC: 164 MG/DL (ref 0–199)
CO2 SERPL-SCNC: 24 MMOL/L (ref 22–31)
CREAT BLD-MCNC: 0.83 MG/DL (ref 0.5–1)
DEPRECATED RDW RBC AUTO: 43.8 FL (ref 36.4–46.3)
EOSINOPHIL # BLD MANUAL: 0.08 10*3/MM3 (ref 0–0.7)
EOSINOPHIL NFR BLD MANUAL: 3 % (ref 0–7)
ERYTHROCYTE [DISTWIDTH] IN BLOOD BY AUTOMATED COUNT: 13 % (ref 11.5–14.5)
GFR SERPL CREATININE-BSD FRML MDRD: 68 ML/MIN/1.73 (ref 45–104)
GLOBULIN UR ELPH-MCNC: 2.5 GM/DL (ref 2.3–3.5)
GLUCOSE BLD-MCNC: 135 MG/DL (ref 60–100)
GLUCOSE BLDC GLUCOMTR-MCNC: 134 MG/DL (ref 70–130)
GLUCOSE BLDC GLUCOMTR-MCNC: 151 MG/DL (ref 70–130)
GLUCOSE BLDC GLUCOMTR-MCNC: 159 MG/DL (ref 70–130)
GLUCOSE BLDC GLUCOMTR-MCNC: 190 MG/DL (ref 70–130)
HCT VFR BLD AUTO: 37.5 % (ref 35–45)
HDLC SERPL-MCNC: 42 MG/DL (ref 60–200)
HGB BLD-MCNC: 12.5 G/DL (ref 12–15.5)
LDLC/HDLC SERPL: 2.54 {RATIO} (ref 0–3.22)
LYMPHOCYTES # BLD MANUAL: 1.11 10*3/MM3 (ref 0.6–4.2)
LYMPHOCYTES NFR BLD MANUAL: 4 % (ref 0–12)
LYMPHOCYTES NFR BLD MANUAL: 40 % (ref 10–50)
MCH RBC QN AUTO: 31 PG (ref 26.5–34)
MCHC RBC AUTO-ENTMCNC: 33.3 G/DL (ref 31.4–36)
MCV RBC AUTO: 93.1 FL (ref 80–98)
MONOCYTES # BLD AUTO: 0.11 10*3/MM3 (ref 0–0.9)
NEUTROPHILS # BLD AUTO: 1.47 10*3/MM3 (ref 2–8.6)
NEUTROPHILS NFR BLD MANUAL: 46 % (ref 37–80)
NEUTS BAND NFR BLD MANUAL: 7 % (ref 0–5)
PLATELET # BLD AUTO: 246 10*3/MM3 (ref 150–450)
PMV BLD AUTO: 9 FL (ref 8–12)
POTASSIUM BLD-SCNC: 3.9 MMOL/L (ref 3.5–5.1)
PROT SERPL-MCNC: 6.2 G/DL (ref 6.3–8.6)
RBC # BLD AUTO: 4.03 10*6/MM3 (ref 3.77–5.16)
SMALL PLATELETS BLD QL SMEAR: ADEQUATE
SODIUM BLD-SCNC: 138 MMOL/L (ref 137–145)
TRIGL SERPL-MCNC: 76 MG/DL (ref 20–199)
TROPONIN I SERPL-MCNC: <0.012 NG/ML
TROPONIN I SERPL-MCNC: <0.012 NG/ML
TSH SERPL DL<=0.05 MIU/L-ACNC: 0.18 MIU/ML (ref 0.46–4.68)
WBC MORPH BLD: NORMAL
WBC NRBC COR # BLD: 2.77 10*3/MM3 (ref 3.2–9.8)

## 2017-07-20 PROCEDURE — 80061 LIPID PANEL: CPT | Performed by: FAMILY MEDICINE

## 2017-07-20 PROCEDURE — 84443 ASSAY THYROID STIM HORMONE: CPT | Performed by: FAMILY MEDICINE

## 2017-07-20 PROCEDURE — 84484 ASSAY OF TROPONIN QUANT: CPT | Performed by: FAMILY MEDICINE

## 2017-07-20 PROCEDURE — 63710000001 INSULIN DETEMIR PER 5 UNITS: Performed by: FAMILY MEDICINE

## 2017-07-20 PROCEDURE — 85027 COMPLETE CBC AUTOMATED: CPT | Performed by: FAMILY MEDICINE

## 2017-07-20 PROCEDURE — 85007 BL SMEAR W/DIFF WBC COUNT: CPT | Performed by: FAMILY MEDICINE

## 2017-07-20 PROCEDURE — 80053 COMPREHEN METABOLIC PANEL: CPT | Performed by: FAMILY MEDICINE

## 2017-07-20 PROCEDURE — 63710000001 INSULIN ASPART PER 5 UNITS: Performed by: FAMILY MEDICINE

## 2017-07-20 PROCEDURE — G0378 HOSPITAL OBSERVATION PER HR: HCPCS

## 2017-07-20 PROCEDURE — 82962 GLUCOSE BLOOD TEST: CPT

## 2017-07-20 RX ORDER — HYDROCODONE BITARTRATE AND ACETAMINOPHEN 7.5; 325 MG/1; MG/1
1 TABLET ORAL EVERY 6 HOURS PRN
Status: DISCONTINUED | OUTPATIENT
Start: 2017-07-20 | End: 2017-07-21 | Stop reason: HOSPADM

## 2017-07-20 RX ORDER — HYDROXYZINE PAMOATE 25 MG/1
25 CAPSULE ORAL 3 TIMES DAILY PRN
Status: DISCONTINUED | OUTPATIENT
Start: 2017-07-20 | End: 2017-07-21 | Stop reason: HOSPADM

## 2017-07-20 RX ADMIN — HYDROCODONE BITARTRATE AND ACETAMINOPHEN 1 TABLET: 7.5; 325 TABLET ORAL at 17:17

## 2017-07-20 RX ADMIN — INSULIN ASPART 2 UNITS: 100 INJECTION, SOLUTION INTRAVENOUS; SUBCUTANEOUS at 17:18

## 2017-07-20 RX ADMIN — HYDROCODONE BITARTRATE AND ACETAMINOPHEN 1 TABLET: 7.5; 325 TABLET ORAL at 11:28

## 2017-07-20 RX ADMIN — INSULIN ASPART 2 UNITS: 100 INJECTION, SOLUTION INTRAVENOUS; SUBCUTANEOUS at 22:20

## 2017-07-20 RX ADMIN — ASPIRIN 81 MG 81 MG: 81 TABLET ORAL at 08:37

## 2017-07-20 RX ADMIN — GABAPENTIN 800 MG: 400 CAPSULE ORAL at 14:03

## 2017-07-20 RX ADMIN — HYDROCODONE BITARTRATE AND ACETAMINOPHEN 1 TABLET: 7.5; 325 TABLET ORAL at 03:40

## 2017-07-20 RX ADMIN — GABAPENTIN 800 MG: 400 CAPSULE ORAL at 22:15

## 2017-07-20 RX ADMIN — ATORVASTATIN CALCIUM 20 MG: 20 TABLET, FILM COATED ORAL at 22:16

## 2017-07-20 RX ADMIN — CLOPIDOGREL BISULFATE 75 MG: 75 TABLET ORAL at 08:37

## 2017-07-20 RX ADMIN — GABAPENTIN 800 MG: 400 CAPSULE ORAL at 06:24

## 2017-07-20 RX ADMIN — ESCITALOPRAM OXALATE 20 MG: 10 TABLET ORAL at 22:14

## 2017-07-20 RX ADMIN — LISINOPRIL 5 MG: 5 TABLET ORAL at 22:16

## 2017-07-20 RX ADMIN — POTASSIUM CHLORIDE 40 MEQ: 750 CAPSULE, EXTENDED RELEASE ORAL at 08:37

## 2017-07-20 RX ADMIN — Medication 10 ML: at 08:38

## 2017-07-20 RX ADMIN — BUTALBITAL, ACETAMINOPHEN, AND CAFFEINE 1 TABLET: 50; 325; 40 TABLET ORAL at 07:38

## 2017-07-20 RX ADMIN — INSULIN ASPART 2 UNITS: 100 INJECTION, SOLUTION INTRAVENOUS; SUBCUTANEOUS at 07:38

## 2017-07-20 RX ADMIN — CARVEDILOL 3.12 MG: 3.12 TABLET, FILM COATED ORAL at 17:17

## 2017-07-20 RX ADMIN — NITROGLYCERIN 1 PATCH: 0.2 PATCH TRANSDERMAL at 08:37

## 2017-07-20 RX ADMIN — CARVEDILOL 3.12 MG: 3.12 TABLET, FILM COATED ORAL at 08:37

## 2017-07-20 RX ADMIN — BUTALBITAL, ACETAMINOPHEN, AND CAFFEINE 1 TABLET: 50; 325; 40 TABLET ORAL at 14:07

## 2017-07-20 RX ADMIN — ONDANSETRON 4 MG: 4 TABLET, ORALLY DISINTEGRATING ORAL at 06:24

## 2017-07-20 RX ADMIN — INSULIN DETEMIR 14 UNITS: 100 INJECTION, SOLUTION SUBCUTANEOUS at 22:20

## 2017-07-20 RX ADMIN — BUSPIRONE HYDROCHLORIDE 15 MG: 15 TABLET ORAL at 22:16

## 2017-07-20 NOTE — PROGRESS NOTES
Discharge Planning Assessment  [facility]     Patient Name: Nikki Felder  MRN: [id number]  Today's Date: 7/20/2017    Admit Date: 7/19/2017          Discharge Needs Assessment       07/20/17 1247    Living Environment    Lives With child(xavi), adult;other (see comments)    Living Arrangements mobile home    Home Accessibility no concerns    Type of Financial/Environmental Concern none    Transportation Available car    Living Environment Comment Pt resides with adult daughter and teen-aged grand-son.    Living Environment    Provides Primary Care For no one    Quality Of Family Relationships supportive    Able to Return to Prior Living Arrangements yes    Discharge Needs Assessment    Concerns To Be Addressed no discharge needs identified    Readmission Within The Last 30 Days no previous admission in last 30 days    Anticipated Changes Related to Illness none    Equipment Currently Used at Home walker, rolling;shower chair    Equipment Needed After Discharge none    Discharge Disposition home or self-care    Discharge Planning Comments Pt states she has a rolling walker and shower chair but does not use them.            Discharge Plan       07/20/17 1302    Case Management/Social Work Plan    Plan Home No Services    Patient/Family In Agreement With Plan yes    Additional Comments LACE assessment completed. SW offered pt transitional visit, but pt declined. Pt reports that she takes medication for her anxiety and depression, but does not seek counceling. SW offered resources on counceling, but pt declined. Pt states she is compliant with all meds. Pt had no other concerns at this time...GIANCARLO Pavon.        Discharge Placement     No information found                Demographic Summary       07/20/17 1245    Referral Information    Admission Type observation    Arrived From home or self-care    Reason For Consult discharge planning    Record Reviewed history and physical    Contact Information     Permission Granted to Share Information With     Primary Care Physician Information    Name Sam            Functional Status       07/20/17 1246    Functional Status Prior    Ambulation 0-->independent    Transferring 0-->independent    Toileting 0-->independent    Bathing 0-->independent    Dressing 0-->independent    Eating 0-->independent    Communication 0-->understands/communicates without difficulty    Swallowing 0-->swallows foods/liquids without difficulty    IADL    Medications independent    Meal Preparation independent    Housekeeping independent    Laundry independent    Shopping independent    Oral Care independent    Cognitive/Perceptual/Developmental    Current Mental Status/Cognitive Functioning no deficits noted    Recent Changes in Mental Status/Cognitive Functioning no changes            Psychosocial     None            Abuse/Neglect     None            Legal     None            Substance Abuse     None            Patient Forms     None          Destiney Cabello

## 2017-07-20 NOTE — PROGRESS NOTES
[facility] Medicine Services  INPATIENT PROGRESS NOTE    Length of Stay: 0  Date of Admission: 7/19/2017  Primary Care Physician: JUANA Rhodes    Subjective   Chief Complaint: chest pain  HPI:  66 year old  female who presented with midsternal chest pain that started yesterday afternoon after she came in her hours from being outside in the heat.  She said that she became dizzy while outside and had a pressure in her mid-chest that was accompanied by dyspnea, nausea, and pain in her left jaw as well.  She has a history of stent placement x2 in the past and is followed by Dr. Martinez.  During today's visit, the pain is resolved but patient reports that this was only after receiving nitro paste and Morphine.      Review of Systems   Constitutional: Positive for fatigue. Negative for chills and fever.   Respiratory: Positive for shortness of breath. Negative for cough, chest tightness and wheezing.    Cardiovascular: Positive for chest pain. Negative for palpitations and leg swelling.   Gastrointestinal: Positive for nausea. Negative for abdominal pain, constipation, diarrhea and vomiting.   Musculoskeletal: Negative for back pain.   Neurological: Negative for dizziness, weakness, light-headedness and headaches.   Psychiatric/Behavioral: Negative for confusion.        All pertinent negatives and positives are as above. All other systems have been reviewed and are negative unless otherwise stated.     Objective    Temp:  [97.1 °F (36.2 °C)-98.4 °F (36.9 °C)] 97.1 °F (36.2 °C)  Heart Rate:  [55-71] 55  Resp:  [18-22] 20  BP: (126-140)/(62-76) 140/62    Physical Exam   Constitutional: She is oriented to person, place, and time. She appears well-developed and well-nourished.   HENT:   Head: Normocephalic and atraumatic.   Eyes: Conjunctivae are normal.   Neck: Neck supple.   Cardiovascular: Normal rate, regular rhythm, normal heart sounds and intact distal pulses.     Pulmonary/Chest: Effort normal and breath sounds normal. No respiratory distress. She has no wheezes.   Abdominal: Soft. Bowel sounds are normal. She exhibits no distension. There is no tenderness.   Musculoskeletal: Normal range of motion. She exhibits no edema.   Neurological: She is alert and oriented to person, place, and time.   Skin: Skin is warm and dry.   Psychiatric: She has a normal mood and affect. Her behavior is normal.   Vitals reviewed.          Results Review:  I have reviewed the labs, radiology results, and diagnostic studies.    Laboratory Data:     Results from last 7 days  Lab Units 07/20/17  0756 07/19/17  1642   SODIUM mmol/L 138 137   POTASSIUM mmol/L 3.9 3.2*   CHLORIDE mmol/L 107 105   CO2 mmol/L 24.0 20.0*   BUN mg/dL 15 13   CREATININE mg/dL 0.83 0.77   GLUCOSE mg/dL 135* 151*   CALCIUM mg/dL 8.5 9.4   BILIRUBIN mg/dL 0.7 0.4   ALK PHOS U/L 144* 94   ALT (SGPT) U/L 504* 29   AST (SGOT) U/L 573* 27   ANION GAP mmol/L 7.0 12.0     Estimated Creatinine Clearance: 54 mL/min (by C-G formula based on Cr of 0.83).    Results from last 7 days  Lab Units 07/19/17  1642   MAGNESIUM mg/dL 2.2           Results from last 7 days  Lab Units 07/20/17  0756 07/19/17  1642   WBC 10*3/mm3 2.77* 5.42   HEMOGLOBIN g/dL 12.5 13.6   HEMATOCRIT % 37.5 39.7   PLATELETS 10*3/mm3 246 288       Results from last 7 days  Lab Units 07/19/17  1642   INR  0.93       Culture Data:   No results found for: BLOODCX  No results found for: URINECX  No results found for: RESPCX  No results found for: WOUNDCX  No results found for: STOOLCX  No components found for: BODYFLD    Radiology Data:   Imaging Results (last 24 hours)     Procedure Component Value Units Date/Time    XR Chest 1 View [[id number]] Collected:  07/19/17 1645     Updated:  07/19/17 1701    Narrative:       Chest single view.     CLINICAL INDICATION: Chest pain.    COMPARISON: May 27, 2017.    FINDINGS: Cardiac silhouette is normal in size.  Pulmonary  vascularity is unremarkable.     No focal infiltrate or consolidation.  No pleural effusion.  No  pneumothorax.  Discoid changes at both lung bases most likely discoid fibrosis.  No change since prior exam.          Impression:       CONCLUSION: No evidence of active disease.    Electronically signed by:  Ivan Stephens MD  7/19/2017 4:59 PM CDT  Workstation: [id number]          I have reviewed the patient current medications.     Assessment/Plan     Hospital Problem List     * (Principal)Chest pain    Overview Signed 7/19/2017  6:41 PM by Kam Botello MD     Will trend cardiac enzymes  GALILEO treatment   Will consult cardiology.          Atherosclerosis of native coronary artery of native heart with angina pectoris    Overview Signed 7/19/2017  6:39 PM by Kam Botello MD     Beta blockers  ACEi   Statin   ASA         Diabetic polyneuropathy    Overview Signed 7/19/2017  6:39 PM by Kam Botello MD     Continue with gabapentin          Essential hypertension, benign    Overview Signed 7/19/2017  6:39 PM by Kam Botello MD     Beta blocker and ACEi well control.          Depression    Overview Signed 7/19/2017  6:40 PM by Kam Botello MD     Will continue with lexapro          Anxiety    Overview Signed 7/19/2017  6:40 PM by Kam Botello MD     Continue with lexapro.          Tobacco dependence syndrome (Chronic)    Overview Signed 7/19/2017  6:38 PM by Kam Botello MD     Will provide nicotine patch          Diabetes mellitus type 2 in nonobese (Chronic)    Overview Signed 7/19/2017  6:39 PM by Kam Botello MD     Will continue home medication levemir of 15 units and add sliding scale insulin.          Hypokalemia    Overview Signed 7/19/2017  6:44 PM by Kam Botello MD     Will check Mag level, and replace K.                Plan:   1. Chest pain r/o ACS: cardiac enzymes negative x3, relieved after application of nitro paste, awaiting cardiology consult.    2. Type 2 DM: FSBS AC and HS with SSI,  Levemir QHS  3. HTN: Coreg, Lisinopril  4. Hx CAD: ASA, Plavix, statin, Coreg, lisinopril, Nitro patch  5. Anxiety/Depression: Lexapro, Buspar  6. Tobacco dependence: counseled on cessation.  7. Hypokalemia: resolved.  8. Hx chronic pain (headaches and back pain): Norco changed to home frequency of every six hours prn.  Fioricet every six hours prn.  Continue home dose of Neurontin.        Discharge Planning: I expect patient to be discharged to home in 1 days.          This document has been electronically signed by JUANA Hicks on July 20, 2017 11:31 AM

## 2017-07-20 NOTE — CONSULTS
Cardiology Consultation Note.        Patient Name: Nikki Felder  Age/Sex: 66 y.o. female  : 1950  MRN: [id number]    Date of consultation: 2017  Consulting Physician: Raheel Martinez MD  Primary care Physician: JUANA Rhodes  Requesting Physician:   Kam Botello MD  Reason for consultation:  Chest pain shortness of breath      Subjective:       Chief Complaint:  Chest pain shortness of breath    History of Present Illness:      Nikki Felder is a 66 y.o. female     Body mass index is 25 kg/(m^2).  with a past medical history significant for atherosclerotic coronary artery disease with aborted anterior wall myocardial infarction on 09/15/2016 with a Pronto thrombectomy and PTCA and stenting of the 100% occluded left anterior descending artery, with deployment of a 2.5 mm x 8 mm, a 2.5 mm x 15 mm, and a 2.75 mm x 18 mm Alpine drug-eluting stent, with reduction of stenosis from 100% to less than 0% stenosis, and PTCA and stenting of the 100% occluded 1st diagonal branch with deployment of a 2 mm x 28 mm Mini Vision stent, and luminal irregularity in the circumflex artery, and no evidence of any obstructive disease noted in the right coronary artery, with with the repeat percutaneous intervention to the diagonal branch done a week ago with deployment of a 2.5 mm x 15 mm and a 2.5 mm x 18 mm Alpine drug-eluting stent.. Patient had repeat percutaneous intervention to the diagonal branch on 2017 with deployment  of a 2.0 mm x 15 mm mini vision stent.     Review of the record indicated patient had undergone a CT angiogram in 2017 which had not revealed off any evidence of any stent stenosis.  Patient subsequently underwent a coronary angiogram in 2017 and May 2017 with the last percutaneous intervention to the first diagonal branch in May 2017.  Patient often note has continued to smoke.  Patient has not been able to take Ranexa secondary to the cost.  Patient complained of  having symptoms of chest pain at rest which had been increasing in severity and frequency on and off.  Patient on further questioning denies any fever or chill patient denies any hemoptysis hematuria bright red blood per rectum patient does complain of having leg cramps.  Patient on admission also had an elevated SGOT and SGPT and is currently on statin.  Patient denies any right upper quadrant discomfort.    Patient 10 point review of system except for what is stated in the history of present illness is negative.          Past Medical History:    1. Atherosclerotic coronary artery disease.   2. PTCA and stenting of the first diagonal branch with deployment of a 2 mm x 15 mm mini vision stent done on April 4, 2017 with previous PTCA and stenting of the first diagonal branch with deployment of a 2.5 mm x 15 mm and a 2.5 mm x 18 mm Alpine drug-eluting stent  done in March 2017, with PTCA of the in-stent stenosis in the diagonal branch done in November 2016 with sustained patency in the left anterior descending artery stent and no evidence of any obstructive disease in the circumflex artery..  3. Aborted anterior wall myocardial infarction on 09/15/2016 with atherosclerotic coronary artery disease.   4. Pronto thrombectomy of the left anterior descending artery with PTCA and stenting of the proximal to mid left anterior descending artery with deployment of a 2.5 mm x 8 mm, and a 2.5 mm x 15 mm, and a 2.75 mm x 18 mm Alpine drug-eluting stent with reduction of stenosis from 100% to less than 0% stenosis.   5. PTCA and stenting of the 100% occluded 1st diagonal branch with deployment of a 2 mm x 28 mm Mini-Vision stent.  6. No evidence of any obstructive disease noted in the right coronary artery.   7. Ischemic cardiomyopathy with anterior apical wall hypokinesis with akinesis with an ejection fraction of 25-30%.   8. Left ventricular apical thrombus on previous anticoagulation with Coumadin.   9. Mild mitral and mild  tricuspid regurgitation.   10. Concentric left ventricular hypertrophy with diastolic dysfunction.   11. Hyperlipidemia.   12. Non-insulin-dependent diabetes.   13. Anxiety and panic disorder.   14. Chronic pain syndrome.   15. Chronic obstructive lung disease with tobacco abuse.   16. History of renal calculi.   17. Previous history of transient ischemic attack.   18. Rectus sheet hematoma secondary to supratherapeutic PT/INR from anticoagulation with Coumadin.   19. Acute rehab followup after the rectus sheath hematoma.   20. Ongoing tobacco abuse.        Past Medical History:   Diagnosis Date   • Acute bronchitis    • Allergic    • Allergic rhinitis    • Anxiety state    • Anxiety state     Anxiety state, unspecified      • Arthritis    • Bacterial pneumonia    • Chronic pain    • Chronic pain     Other chronic pain      • Contact dermatitis    • COPD (chronic obstructive pulmonary disease)    • Coronary arteriosclerosis    • Depressive disorder    • Depressive disorder    • Diabetes mellitus due to underlying condition with diabetic autonomic neuropathy     Diabetes mellitus due to underlying condition with diabetic autonomic (poly)neuropathy      • Drug therapy     Long-term drug therapy      • Dysuria    • Encounter for immunization    • Encounter for screening for malignant neoplasm of colon    • Essential hypertension    • Folliculitis    • Headache    • Heart murmur    • Herpes zoster    • Herpes zoster with nervous system complication    • History of screening mammography    • Hyperlipidemia    • Insomnia    • Insomnia    • Kidney stone    • Lumbar radiculopathy    • Lung nodule    • Migraine    • Migraine     Migraine, unspecified, without mention of intractable migraine, without mention of status migrainosus      • Migraine    • Myocardial infarction    • Nausea and vomiting    • Nausea with vomiting    • Neck pain    • Need for immunization against influenza    • Need for prophylactic vaccination and  inoculation against diphtheria alone    • Need for prophylactic vaccination and inoculation against unspecified single disease     Need for prophylactic vaccination and inoculation against Streptococcus pneumoniae [pneumococcus] and influenza      • Non-alcoholic fatty liver disease    • Rectus sheath hematoma    • Senile osteoporosis    • Shoulder pain    • Solitary pulmonary nodule present on computed tomography of lung    • Spasm of back muscles    • Spinal stenosis of lumbar region    • Systolic congestive heart failure    • TIA (transient ischemic attack)    • Tobacco dependence syndrome    • Type 2 diabetes mellitus    • Urinary tract infectious disease    • Viral gastroenteritis    • Vitamin D deficiency     Unspecified vitamin D deficiency      • Vitamin D deficiency        Past Surgical History:    1. Status post  times 2 occasions.   2. Status post bilateral tubal ligation.   3. Status post cholecystectomy.   4. Status post dilation and curettage procedure.     Past Surgical History:   Procedure Laterality Date   • CARDIAC CATHETERIZATION      plaque noted ef 55% 2010    • CARDIAC CATHETERIZATION N/A 3/29/2017    Procedure: Left Heart Cath;  Surgeon: Raheel Martinez MD;  Location: [facility] INVASIVE LOCATION;  Service:    • CARDIAC CATHETERIZATION N/A 2017    Procedure: Left Heart Cath;  Surgeon: Raheel Martinez MD;  Location: [facility] INVASIVE LOCATION;  Service:    •  SECTION       Low cervical  1981       • CHOLECYSTECTOMY     • DILATATION AND CURETTAGE  1975   • DILATATION AND CURETTAGE  1975   • INJECTION OF MEDICATION  2015     Phenergan (3)      • INJECTION OF MEDICATION      Kenalog (1)      2011    • INJECTION OF MEDICATION  2014    Toradol (3)   • INJECTION OF MEDICATION  2011     Vistaril (1)    • INJECTION OF MEDICATION  2012    Zofran (1)   • VA RT/LT HEART CATHETERS N/A 2017    Procedure: Percutaneous  Coronary Intervention;  Surgeon: Raheel Martinez MD;  Location: [facility] INVASIVE LOCATION;  Service: Cardiovascular   • TUBAL ABDOMINAL LIGATION       [place] tubal ligation 08/20/1981        Family History:  Family History   Problem Relation Age of Onset   • Alzheimer's disease Mother    • Diabetes Brother    • Alzheimer's disease Other    • Diabetes Other    • Lung cancer Other    • Pulmonary embolism Other    • Hypertension Father    • Depression Daughter    • Hypertension Daughter    • Anxiety disorder Daughter    • Lung cancer Brother        Social History:  Social History     Social History   • Marital status:      Spouse name: N/A   • Number of children: N/A   • Years of education: N/A     Occupational History   • Not on file.     Social History Main Topics   • Smoking status: Current Every Day Smoker     Packs/day: 0.50     Years: 45.00     Types: Cigarettes   • Smokeless tobacco: Never Used   • Alcohol use No   • Drug use: No   • Sexual activity: Defer     Other Topics Concern   • Not on file     Social History Narrative    Previously worked in [employer] at [employer] [facility] [place].  Lives with daughter.         Cardiac Risk factor:     1. Postmenopausal.   2. Arterial hypertension.   3. Hyperlipidemia.  4. Tobacco abuse.   5. Diabetes.      Allergies:  No Known Allergies    Medication::  Prescriptions Prior to Admission   Medication Sig Dispense Refill Last Dose   • aspirin 81 MG tablet Take 1 tablet by mouth Daily. 30 tablet 5 7/19/2017 at Unknown time   • atorvastatin (LIPITOR) 20 MG tablet Take 1 tablet by mouth Every Night. 90 tablet 3 7/18/2017 at Unknown time   • busPIRone (BUSPAR) 15 MG tablet Take 15 mg by mouth Every Night.   7/19/2017 at Unknown time   • carvedilol (COREG) 3.125 MG tablet Take 1 tablet by mouth 2 (Two) Times a Day. 180 tablet 3 7/19/2017 at Unknown time   • clopidogrel (PLAVIX) 75 MG tablet Take 75 mg by mouth Daily.   7/19/2017 at Unknown time   • escitalopram (LEXAPRO) 20 MG  tablet Take 1 tablet by mouth Daily. (Patient taking differently: Take 20 mg by mouth Every Night.) 90 tablet 3 7/19/2017 at Unknown time   • gabapentin (NEURONTIN) 400 MG capsule Take 400 mg by mouth 3 (Three) Times a Day. Pt takes 400 mg + 800 mg three times daily.   7/19/2017 at Unknown time   • gabapentin (NEURONTIN) 800 MG tablet Take 800 mg by mouth 3 (Three) Times a Day. Pt takes 400 mg + 800 mg three times daily.   7/19/2017 at Unknown time   • glipiZIDE (GLUCOTROL) 5 MG tablet Take 1 tablet by mouth 2 (Two) Times a Day Before Meals. (Patient taking differently: Take 5 mg by mouth Daily.) 180 tablet 3 7/19/2017 at Unknown time   • HYDROcodone-acetaminophen (NORCO) 7.5-325 MG per tablet Take 1 tablet by mouth 4 (Four) Times a Day for 30 days. 120 tablet 0 7/19/2017 at Unknown time   • lisinopril (ZESTRIL) 5 MG tablet Take 1 tablet by mouth Daily. (Patient taking differently: Take 5 mg by mouth Every Night.) 30 tablet 2 7/19/2017 at Unknown time   • FINGERSTIX LANCETS misc To check sugar tid dx code E11.65 200 each 5 Unknown at Unknown time   • nitroglycerin (NITRODUR) 0.2 MG/HR patch Place 1 patch on the skin Daily. 90 patch 12 Unknown at Unknown time           Review of Systems:       Constitutional:  Denies recent weight loss, weight gain, fever or chills, no change in exercise tolerance     HENT:  Denies any hearing loss, epistaxis, hoarseness, or difficulty speaking.     Eyes: Wears eyeglasses or contact lenses     Respiratory:  Denies dyspnea with exertion,no cough, wheezing, or hemoptysis.     Cardiovascular: Positive for chest pain.  Negative for palpations,, orthopnea, PND, peripheral edema, syncope, or claudication.     Gastrointestinal:  Denies change in bowel habits, dyspepsia, ulcer disease, hematochezia, or melena.  No nausea, no vomiting, no hematemesis, no diarrhea or constipation, no melena      Endocrine: Negative for cold intolerance, heat intolerance, polydipsia, polyphagia and polyuria.  Denies any history of weight change, heat/cold intolerance, polydipsia, polyuria     Genitourinary: Negative hor hematuria.      Musculoskeletal: Denies any history of arthritic symptoms or back problems .  No joint pain, joint stiffness, joint swelling, muscle pain, muscle weakness and neck pain    Skin:  Denies any change in hair or nails, rashes, or skin lesions.     Allergic/Immunologic: Negative.  Negative for environmental allergies, food allergies and immunocompromised state.     Neurological:  Denies any history of recurrent headaches, strokes, TIA, or seizure disorder.     Hematological: Denies excessive bleeding, easy bruising, fatigue, lymphadenopathy and petechiae or any bleeding disorders, or lymphadenopathy.     Psychiatric/Behavioral: Denies any history of depression, substance abuse, or change in cognitive function. Denies any psychomotor reaction or tangential thought.  No depression, homicidal ideations and suicidal ideations    Endocrine: No frequent urination and nocturia, temperature intolerance, weight gain, unintended and weight loss, unintended            Objective:     Objective:  Vitals:    07/20/17 0802   BP: 140/62   Pulse: 55   Resp: 20   Temp: 97.1 °F (36.2 °C)   SpO2: 99%     .    Body mass index is 25 kg/(m^2).           Physical Exam:   General Appearance:    Alert, oriented, cooperative, in no acute distress   Head:    Normocephalic, atraumatic, without obvious abnormality   Eyes:           BASIL  Lids and lashes normal, conjunctivae and sclerae normal, no icterus, no pallor   Ears:    Ears appear intact with no abnormalities noted   Throat:   Mucous membranes pink and moist   Neck:   Supple, trachea midline, no carotid bruit, no organomegaly or JVD   Lungs:     Clear to auscultation and percussion, respirations regular, even and Unlabored. No wheezes, rales, rhonchi    Heart:    Regular rhythm and normal rate, normal S1 and S2, no            murmur, no gallop, no rub, no click    Abdomen:     Soft, non-tender, non-distended, no guarding, no rebound tenderness, Normal bowel sounds in all four quadrant, no masses, liver and spleen nonpalpable,    Genitalia:    Deferred   Extremities:   Moves all extremities well, no edema, no cyanosis, no              Redness, no clubbing   Pulses:   Pulses palpable and equal bilaterally   Skin:   Moist and warm. No bleeding, bruising or rash   Neurologic/Psychiatric:   Alert and oriented to person, place, and time.  Motor, power and tone in upper and lower extremity is grossly intact.  No focal neurological deficits. Normal cognitive function. No psychomotor reaction or tangential thought. No depression, homicidal ideations and suicidal ideations           Lab Review:       Results from last 7 days  Lab Units 07/20/17  0756   SODIUM mmol/L 138   POTASSIUM mmol/L 3.9   CHLORIDE mmol/L 107   CO2 mmol/L 24.0   BUN mg/dL 15   CREATININE mg/dL 0.83   CALCIUM mg/dL 8.5   BILIRUBIN mg/dL 0.7   ALK PHOS U/L 144*   ALT (SGPT) U/L 504*   AST (SGOT) U/L 573*   GLUCOSE mg/dL 135*       Results from last 7 days  Lab Units 07/20/17  0756 07/20/17  0145 07/19/17  1927 07/19/17  1642   CK TOTAL U/L  --   --   --  27*   TROPONIN I ng/mL <0.012 <0.012 <0.012 <0.012           Results from last 7 days  Lab Units 07/20/17  0756   WBC 10*3/mm3 2.77*   HEMOGLOBIN g/dL 12.5   HEMATOCRIT % 37.5   PLATELETS 10*3/mm3 246       Results from last 7 days  Lab Units 07/19/17  1642   INR  0.93       Results from last 7 days  Lab Units 07/19/17  1642   MAGNESIUM mg/dL 2.2       Results from last 7 days  Lab Units 07/20/17  0756   CHOLESTEROL mg/dL 164   TRIGLYCERIDES mg/dL 76   HDL CHOL mg/dL 42*       Results from last 7 days  Lab Units 07/20/17  0756   TSH mIU/mL 0.180*       EKG:   ECG/EMG Results (last 24 hours)     Procedure Component Value Units Date/Time    SCANNED EKG [[id number]] Resulted:  07/19/17      Updated:  07/20/17 1005    SCANNED EKG [[id number]] Resulted:  07/19/17       Updated:  07/20/17 1005          Imaging:  Imaging Results (last 24 hours)     Procedure Component Value Units Date/Time    XR Chest 1 View [[id number]] Collected:  07/19/17 1645     Updated:  07/19/17 1701    Narrative:       Chest single view.     CLINICAL INDICATION: Chest pain.    COMPARISON: May 27, 2017.    FINDINGS: Cardiac silhouette is normal in size. Pulmonary  vascularity is unremarkable.     No focal infiltrate or consolidation.  No pleural effusion.  No  pneumothorax.  Discoid changes at both lung bases most likely discoid fibrosis.  No change since prior exam.          Impression:       CONCLUSION: No evidence of active disease.    Electronically signed by:  Ivan Stephens MD  7/19/2017 4:59 PM CDT  Workstation: [id number]          I personally viewed and interpreted the patient's EKG/Telemetry data.    Assessment:   1.  Chest pain.  2.  Atherosclerotic coronary artery disease with previous PTCA and stenting to the left anterior descending artery and the diagonal branch.  3.  Arterial hypertension.  4.  Hypertensive heart disease.  5.  Abnormal liver function tests.  6.  Low TSH.  7.  Tobacco abuse.          Plan:   1.  Chest pain. Patient has documented history of atherosclerotic coronary artery disease with ischemic cardiomyopathy with left ventricular systolic dysfunction with multiple percutaneous intervention.  Patient last PTCA and stenting to the first diagonal branch with deployment of a 2 mm x 15 mm mini vision stent was done in April 2017 with previous PTCA and stenting of the first diagonal branch with deployment of a 2.5 mm x 15 mm and a 2.5 mm x 18 mm Alpine drug-eluting stent done in March 2017.  Patient had in-stent stenosis in the diagonal branch in November 2016 and sustained patency in the left anterior descending artery stent.  Patient had an aborted anterior wall myocardial infarction in September 2016 with Pronto thrombectomy of the left anterior descending artery.  Patient had an  apical thrombus and subsequently was on anticoagulation with Coumadin.  Patient is currently not on anticoagulation with Coumadin.  Patient after the last hospitalization was recommended to Ranexa with the patient had not been taking.  At the present time patient is not on any nitrates.  As the patient resting electrocardiogram did not show any acute ST-T wave changes and the patient troponin negative patient at the present time would not be subjected to any invasive evaluation from the cardiac standpoint.  Patient would be treated medically.  Patient would be started on Ranexa 500 mg twice a day and isosorbide 30 mg once a day.  Patient has been recommended to be compliant with her medication.  2.  Ischemic cardiomyopathy.  Clinically at the present time patient is not in congestive heart failure.  3.  Arterial hypertension.  Patient blood pressure has been labile but well controlled.  Patient denies any symptoms of headache.   4.  Hypertensive heart disease with mitral and tricuspid regurgitation with ischemic cardiomyopathy with an ejection fraction which is poor.  Patient has been counseled to decrease his salt intake and to continue with the present pharmacological therapy.  Clinically patient is not in congestive heart failure.  5.  Low TSH level.  Patient would undergo a complete thyroid profile to rule out hypothyroidism.  6.  Abnormal liver function tests with an elevated SGOT and SGPT.  His review of the record indicated patient is currently on statin.  Would stop the lipid-lowering agent statin at the present time.  Patient would be started on Zetia after the liver function tests normalize his.  7.  History of transient ischemic attack is noted.  Patient had not complained of having any new symptoms of tingling sensation or amaurosis fugax.  8.  Risk factor modification.  Patient has been counseled to quit smoking.  Pharmacological therapy to quit smoking was offered to the patient  9.   Non-insulin-dependent diabetes.  Patient has been counseled on American diabetic Association diet.    Patient would be started on Imdur 30 mg once a day and Ranexa.  Patient would ambulate in the hallway and at the patient does not have recurrent symptoms of chest pain patient would be stable to be discharged.        Time: time spent in face-to-face evaluation off greater than 55 minutes minutes and interacting and formulating examining and discussing the plan with the patient with 50% of greater time spent in face-to-face interaction.    Raheel Martinez MD  07/20/17  10:56 AM  EMR Dragon/Transcription disclaimer:   Some of this note may be an electronic transcription/translation of spoken language to printed text. The electronic translation of spoken language may permit erroneous, or at times, nonsensical words or phrases to be inadvertently transcribed; Although I have reviewed the note for such errors, some may still exist.

## 2017-07-21 VITALS
HEART RATE: 64 BPM | DIASTOLIC BLOOD PRESSURE: 76 MMHG | OXYGEN SATURATION: 98 % | BODY MASS INDEX: 24.98 KG/M2 | HEIGHT: 61 IN | WEIGHT: 132.31 LBS | SYSTOLIC BLOOD PRESSURE: 133 MMHG | TEMPERATURE: 98.2 F | RESPIRATION RATE: 18 BRPM

## 2017-07-21 LAB
ANION GAP SERPL CALCULATED.3IONS-SCNC: 8 MMOL/L (ref 5–15)
BUN BLD-MCNC: 15 MG/DL (ref 7–21)
BUN/CREAT SERPL: 20.8 (ref 7–25)
CALCIUM SPEC-SCNC: 8.5 MG/DL (ref 8.4–10.2)
CHLORIDE SERPL-SCNC: 109 MMOL/L (ref 95–110)
CO2 SERPL-SCNC: 22 MMOL/L (ref 22–31)
CREAT BLD-MCNC: 0.72 MG/DL (ref 0.5–1)
DEPRECATED RDW RBC AUTO: 44.4 FL (ref 36.4–46.3)
ERYTHROCYTE [DISTWIDTH] IN BLOOD BY AUTOMATED COUNT: 13 % (ref 11.5–14.5)
GFR SERPL CREATININE-BSD FRML MDRD: 81 ML/MIN/1.73 (ref 45–104)
GLUCOSE BLD-MCNC: 142 MG/DL (ref 60–100)
GLUCOSE BLDC GLUCOMTR-MCNC: 116 MG/DL (ref 70–130)
HCT VFR BLD AUTO: 35.5 % (ref 35–45)
HGB BLD-MCNC: 11.6 G/DL (ref 12–15.5)
MCH RBC QN AUTO: 30.7 PG (ref 26.5–34)
MCHC RBC AUTO-ENTMCNC: 32.7 G/DL (ref 31.4–36)
MCV RBC AUTO: 93.9 FL (ref 80–98)
PLATELET # BLD AUTO: 224 10*3/MM3 (ref 150–450)
PMV BLD AUTO: 9.3 FL (ref 8–12)
POTASSIUM BLD-SCNC: 4.1 MMOL/L (ref 3.5–5.1)
RBC # BLD AUTO: 3.78 10*6/MM3 (ref 3.77–5.16)
SODIUM BLD-SCNC: 139 MMOL/L (ref 137–145)
WBC NRBC COR # BLD: 3.75 10*3/MM3 (ref 3.2–9.8)

## 2017-07-21 PROCEDURE — 80048 BASIC METABOLIC PNL TOTAL CA: CPT | Performed by: NURSE PRACTITIONER

## 2017-07-21 PROCEDURE — 82962 GLUCOSE BLOOD TEST: CPT

## 2017-07-21 PROCEDURE — G0378 HOSPITAL OBSERVATION PER HR: HCPCS

## 2017-07-21 PROCEDURE — 85027 COMPLETE CBC AUTOMATED: CPT | Performed by: NURSE PRACTITIONER

## 2017-07-21 RX ORDER — ISOSORBIDE MONONITRATE 30 MG/1
30 TABLET, EXTENDED RELEASE ORAL DAILY
Qty: 30 TABLET | Refills: 0 | Status: ON HOLD | OUTPATIENT
Start: 2017-07-21 | End: 2018-02-21

## 2017-07-21 RX ORDER — RANOLAZINE 500 MG/1
500 TABLET, EXTENDED RELEASE ORAL 2 TIMES DAILY
Qty: 60 TABLET | Refills: 0 | Status: SHIPPED | OUTPATIENT
Start: 2017-07-21 | End: 2017-09-23

## 2017-07-21 RX ADMIN — HYDROXYZINE PAMOATE 25 MG: 25 CAPSULE ORAL at 11:28

## 2017-07-21 RX ADMIN — NITROGLYCERIN 1 PATCH: 0.2 PATCH TRANSDERMAL at 08:00

## 2017-07-21 RX ADMIN — ASPIRIN 81 MG 81 MG: 81 TABLET ORAL at 08:01

## 2017-07-21 RX ADMIN — HYDROCODONE BITARTRATE AND ACETAMINOPHEN 1 TABLET: 7.5; 325 TABLET ORAL at 07:59

## 2017-07-21 RX ADMIN — CARVEDILOL 3.12 MG: 3.12 TABLET, FILM COATED ORAL at 08:01

## 2017-07-21 RX ADMIN — POTASSIUM CHLORIDE 40 MEQ: 750 CAPSULE, EXTENDED RELEASE ORAL at 08:01

## 2017-07-21 RX ADMIN — GABAPENTIN 800 MG: 400 CAPSULE ORAL at 06:34

## 2017-07-21 RX ADMIN — HYDROCODONE BITARTRATE AND ACETAMINOPHEN 1 TABLET: 7.5; 325 TABLET ORAL at 01:00

## 2017-07-21 RX ADMIN — CLOPIDOGREL BISULFATE 75 MG: 75 TABLET ORAL at 08:00

## 2017-07-21 NOTE — DISCHARGE SUMMARY
[facility] Medicine Services  DISCHARGE SUMMARY       Date of Admission: 7/19/2017  Date of Discharge:  7/21/2017  Primary Care Physician: JUANA Rhodes    Presenting Problem/History of Present Illness:  Dyspnea, unspecified type [R06.00]  Chest pain, unspecified type [R07.9]       Final Discharge Diagnoses:  Hospital Problem List     * (Principal)Chest pain    Overview Signed 7/19/2017  6:41 PM by Kam Botello MD     Will trend cardiac enzymes  GALILEO treatment   Will consult cardiology.          Atherosclerosis of native coronary artery of native heart with angina pectoris    Overview Signed 7/19/2017  6:39 PM by Kam Botello MD     Beta blockers  ACEi   Statin   ASA         Diabetic polyneuropathy    Overview Signed 7/19/2017  6:39 PM by Kam Botello MD     Continue with gabapentin          Essential hypertension, benign    Overview Signed 7/19/2017  6:39 PM by Kam Botello MD     Beta blocker and ACEi well control.          Depression    Overview Signed 7/19/2017  6:40 PM by Kam Botello MD     Will continue with lexapro          Anxiety    Overview Signed 7/19/2017  6:40 PM by Kam Botello MD     Continue with lexapro.          Tobacco dependence syndrome (Chronic)    Overview Signed 7/19/2017  6:38 PM by Kam Botello MD     Will provide nicotine patch          Diabetes mellitus type 2 in nonobese (Chronic)    Overview Signed 7/19/2017  6:39 PM by Kam Botello MD     Will continue home medication levemir of 15 units and add sliding scale insulin.          Hypokalemia    Overview Signed 7/19/2017  6:44 PM by Kam Botello MD     Will check Mag level, and replace K.                Consults:   Consults     Date and Time Order Name Status Description    7/19/2017 2030 Inpatient Consult to Cardiology Completed     7/19/2017 1817 Hospitalist (on-call MD unless specified)            Procedures Performed:                 Pertinent Test Results:   Lab Results (last  24 hours)     Procedure Component Value Units Date/Time    POC Glucose Fingerstick [[id number]]  (Abnormal) Collected:  07/20/17 1044    Specimen:  Blood Updated:  07/20/17 1055     Glucose 134 (H) mg/dL       RN NotifiedMeter: [id number]: [id number] KATIANA HURLEY       CBC & Differential [[id number]] Collected:  07/20/17 0756    Specimen:  Blood Updated:  07/20/17 1139    Narrative:       The following orders were created for panel order CBC & Differential.  Procedure                               Abnormality         Status                     ---------                               -----------         ------                     Manual Differential[[id number]]          Abnormal            Final result               CBC Auto Differential[[id number]]        Abnormal            Final result                 Please view results for these tests on the individual orders.    Manual Differential [[id number]]  (Abnormal) Collected:  07/20/17 0756    Specimen:  Blood Updated:  07/20/17 1139     Neutrophil % 46.0 %      Lymphocyte % 40.0 %      Monocyte % 4.0 %      Eosinophil % 3.0 %      Bands %  7.0 (H) %      Neutrophils Absolute 1.47 (L) 10*3/mm3      Lymphocytes Absolute 1.11 10*3/mm3      Monocytes Absolute 0.11 10*3/mm3      Eosinophils Absolute 0.08 10*3/mm3      Anisocytosis Slight/1+      Few Hypochromic cells observed        WBC Morphology Normal     Platelet Estimate Adequate    POC Glucose Fingerstick [[id number]]  (Abnormal) Collected:  07/20/17 1714    Specimen:  Blood Updated:  07/20/17 1733     Glucose 159 (H) mg/dL       RN NotifiedMeter: [id number]: [id number] HUAN RUST       POC Glucose Fingerstick [[id number]]  (Abnormal) Collected:  07/20/17 2026    Specimen:  Blood Updated:  07/20/17 2038     Glucose 190 (H) mg/dL       RN NotifiedMeter: [id number]: [id number] NAVNEET GONSALEZ       POC Glucose Fingerstick [[id number]]  (Normal) Collected:  07/21/17 0640    Specimen:  Blood  Updated:  07/21/17 0652     Glucose 116 mg/dL       RN NotifiedMeter: [id number]: [id number] NAVNEET GONSALEZ       CBC (No Diff) [[id number]]  (Abnormal) Collected:  07/21/17 0913    Specimen:  Blood Updated:  07/21/17 0951     WBC 3.75 10*3/mm3      RBC 3.78 10*6/mm3      Hemoglobin 11.6 (L) g/dL      Hematocrit 35.5 %      MCV 93.9 fL      MCH 30.7 pg      MCHC 32.7 g/dL      RDW 13.0 %      RDW-SD 44.4 fl      MPV 9.3 fL      Platelets 224 10*3/mm3     Basic Metabolic Panel [[id number]]  (Abnormal) Collected:  07/21/17 0913    Specimen:  Blood Updated:  07/21/17 0955     Glucose 142 (H) mg/dL      BUN 15 mg/dL      Creatinine 0.72 mg/dL      Sodium 139 mmol/L      Potassium 4.1 mmol/L      Chloride 109 mmol/L      CO2 22.0 mmol/L      Calcium 8.5 mg/dL      eGFR Non African Amer 81 mL/min/1.73      BUN/Creatinine Ratio 20.8     Anion Gap 8.0 mmol/L         Imaging Results (all)     Procedure Component Value Units Date/Time    XR Chest 1 View [[id number]] Collected:  07/19/17 1645     Updated:  07/19/17 1701    Narrative:       Chest single view.     CLINICAL INDICATION: Chest pain.    COMPARISON: May 27, 2017.    FINDINGS: Cardiac silhouette is normal in size. Pulmonary  vascularity is unremarkable.     No focal infiltrate or consolidation.  No pleural effusion.  No  pneumothorax.  Discoid changes at both lung bases most likely discoid fibrosis.  No change since prior exam.          Impression:       CONCLUSION: No evidence of active disease.    Electronically signed by:  Ivan Stephens MD  7/19/2017 4:59 PM CDT  Workstation: [id number]            Chief Complaint on Day of Discharge: none    Hospital Course:  66-year-old  female who presented on July 19, 2017 with complaints of midsternal chest pain that occurred after she had been outside in the heat.  Patient has past medical history of coronary artery disease with history of MI in 2016.  Patient has underwent when standing of the LAD,  "circumflex in the past.  The patient most recently underwent coronary angiogram and PCI in May 2017 related to stenosis of the first diagonal branch.  Patient has been noncompliant with medication regimen post PCI and continues to smoke upon return to home.  She returned with similar chest pains as before.  Observation revealed no signs of ischemia on EKGs, patient also had 3 negative sets of cardiac enzymes during her stay.  Patient was evaluated by Dr. Martinez with cardiology during her hospital stay and recommendations were made for Ranexa twice a day and Imdur 30 mg daily.  Patient has been thoroughly counseled on smoking cessation and medication compliance.  Please note that the patient's statin has been held at this time related to elevated liver enzymes and may need medication adjustment in the future after labs return to normal.  Patient will be discharged home today in stable condition with instructions to follow-up with her primary care provider within one week of discharge.  Arrangements of also been made for patient to follow-up with Dr. Martinez within one month of discharge.  Patient has requested refills on her Neurontin and pain medications, but the patient has been instructed to contact her pain management specialist for any refills on her pain meds.    Condition on Discharge:  stable    Physical Exam on Discharge:  /76 (BP Location: Right arm, Patient Position: Lying)  Pulse 64  Temp 98.2 °F (36.8 °C) (Temporal Artery )   Resp 18  Ht 61\" (154.9 cm)  Wt 132 lb 5 oz (60 kg)  SpO2 98%  BMI 25 kg/m2  Physical Exam   Constitutional: She is oriented to person, place, and time. She appears well-developed and well-nourished.   HENT:   Head: Normocephalic.   Eyes: Conjunctivae are normal.   Neck: Neck supple.   Cardiovascular: Normal rate, regular rhythm and intact distal pulses.    Pulmonary/Chest: Effort normal and breath sounds normal.   Abdominal: Soft. Bowel sounds are normal. She exhibits " no distension. There is no tenderness.   Musculoskeletal: Normal range of motion.   Neurological: She is alert and oriented to person, place, and time.   Skin: Skin is warm and dry.   Psychiatric: She has a normal mood and affect. Her behavior is normal.   Vitals reviewed.        Discharge Disposition:  Home or Self Care    Discharge Medications:   Nikki Felder   Home Medication Instructions ARUN:[id number]    Printed on:07/21/17 101   Medication Information                      aspirin 81 MG tablet  Take 1 tablet by mouth Daily.             busPIRone (BUSPAR) 15 MG tablet  Take 15 mg by mouth Every Night.             carvedilol (COREG) 3.125 MG tablet  Take 1 tablet by mouth 2 (Two) Times a Day.             clopidogrel (PLAVIX) 75 MG tablet  Take 75 mg by mouth Daily.             escitalopram (LEXAPRO) 20 MG tablet  Take 1 tablet by mouth Daily.             FINGERSTIX LANCETS misc  To check sugar tid dx code E11.65             gabapentin (NEURONTIN) 400 MG capsule  Take 400 mg by mouth 3 (Three) Times a Day. Pt takes 400 mg + 800 mg three times daily.             gabapentin (NEURONTIN) 800 MG tablet  Take 800 mg by mouth 3 (Three) Times a Day. Pt takes 400 mg + 800 mg three times daily.             glipiZIDE (GLUCOTROL) 5 MG tablet  Take 1 tablet by mouth 2 (Two) Times a Day Before Meals.             HYDROcodone-acetaminophen (NORCO) 7.5-325 MG per tablet  Take 1 tablet by mouth 4 (Four) Times a Day for 30 days.             isosorbide mononitrate (IMDUR) 30 MG 24 hr tablet  Take 1 tablet by mouth Daily.             lisinopril (ZESTRIL) 5 MG tablet  Take 1 tablet by mouth Daily.             ranolazine (RANEXA) 500 MG 12 hr tablet  Take 1 tablet by mouth 2 (Two) Times a Day.                 Discharge Diet:   Diet Instructions     Diet: Cardiac; Thin Liquids, No Restrictions       Discharge Diet:  Cardiac   Fluid Consistency:  Thin Liquids, No Restrictions                 Activity at Discharge:   Activity  Instructions     Activity as Tolerated                     Discharge Care Plan/Instructions: See PCP in one week.  Follow up with Dr. Martinez in one month.  Follow up with pain management for refills on any pain medications.    Follow-up Appointments:   Future Appointments  Date Time Provider Department Center   8/15/2017 9:50 AM Yan Melendez MD [facility] None       Test Results Pending at Discharge:           This document has been electronically signed by JUANA Hicks on July 21, 2017 10:19 AM

## 2017-07-21 NOTE — PLAN OF CARE
Problem: Patient Care Overview (Adult)  Goal: Plan of Care Review  Outcome: Ongoing (interventions implemented as appropriate)    07/21/17 0635   Coping/Psychosocial Response Interventions   Plan Of Care Reviewed With patient   Patient Care Overview   Progress improving       Goal: Adult Individualization and Mutuality  Outcome: Ongoing (interventions implemented as appropriate)  Goal: Discharge Needs Assessment  Outcome: Ongoing (interventions implemented as appropriate)    07/20/17 1247 07/21/17 0635   Discharge Needs Assessment   Concerns To Be Addressed --  denies needs/concerns at this time   Discharge Disposition home or self-care --    Discharge Planning Comments Pt states she has a rolling walker and shower chair but does not use them. --          Problem: Acute Coronary Syndrome (ACS) (Adult)  Goal: Signs and Symptoms of Listed Potential Problems Will be Absent or Manageable (Acute Coronary Syndrome)  Outcome: Ongoing (interventions implemented as appropriate)    07/21/17 0635   Acute Coronary Syndrome (ACS)   Problems Assessed (Acute Coronary Syndrome (ACS)) all   Problems Present (Acute Coronary Syndrome (ACS)) none

## 2017-08-02 ENCOUNTER — APPOINTMENT (OUTPATIENT)
Dept: GENERAL RADIOLOGY | Facility: HOSPITAL | Age: 67
End: 2017-08-02

## 2017-08-02 ENCOUNTER — HOSPITAL ENCOUNTER (OUTPATIENT)
Facility: HOSPITAL | Age: 67
Setting detail: OBSERVATION
Discharge: HOME | End: 2017-08-05
Attending: EMERGENCY MEDICINE | Admitting: INTERNAL MEDICINE

## 2017-08-02 DIAGNOSIS — R07.9 CHEST PAIN, UNSPECIFIED TYPE: Primary | ICD-10-CM

## 2017-08-02 DIAGNOSIS — E87.6 HYPOKALEMIA: ICD-10-CM

## 2017-08-02 LAB
ALBUMIN SERPL-MCNC: 3.5 G/DL (ref 3.4–4.8)
ALBUMIN/GLOB SERPL: 1.4 G/DL (ref 1.1–1.8)
ALP SERPL-CCNC: 91 U/L (ref 38–126)
ALT SERPL W P-5'-P-CCNC: 25 U/L (ref 9–52)
ANION GAP SERPL CALCULATED.3IONS-SCNC: 10 MMOL/L (ref 5–15)
AST SERPL-CCNC: 27 U/L (ref 14–36)
BASOPHILS # BLD AUTO: 0.02 10*3/MM3 (ref 0–0.2)
BASOPHILS NFR BLD AUTO: 0.4 % (ref 0–2)
BILIRUB SERPL-MCNC: 0.4 MG/DL (ref 0.2–1.3)
BUN BLD-MCNC: 13 MG/DL (ref 7–21)
BUN/CREAT SERPL: 19.7 (ref 7–25)
CALCIUM SPEC-SCNC: 8.9 MG/DL (ref 8.4–10.2)
CHLORIDE SERPL-SCNC: 111 MMOL/L (ref 95–110)
CK MB SERPL-CCNC: 0.67 NG/ML (ref 0–5)
CK MB SERPL-CCNC: 0.69 NG/ML (ref 0–5)
CK SERPL-CCNC: 30 U/L (ref 30–135)
CK SERPL-CCNC: 36 U/L (ref 30–135)
CO2 SERPL-SCNC: 16 MMOL/L (ref 22–31)
CREAT BLD-MCNC: 0.66 MG/DL (ref 0.5–1)
DEPRECATED RDW RBC AUTO: 42.8 FL (ref 36.4–46.3)
EOSINOPHIL # BLD AUTO: 0.11 10*3/MM3 (ref 0–0.7)
EOSINOPHIL NFR BLD AUTO: 2.4 % (ref 0–7)
ERYTHROCYTE [DISTWIDTH] IN BLOOD BY AUTOMATED COUNT: 12.8 % (ref 11.5–14.5)
GFR SERPL CREATININE-BSD FRML MDRD: 89 ML/MIN/1.73 (ref 60–104)
GLOBULIN UR ELPH-MCNC: 2.5 GM/DL (ref 2.3–3.5)
GLUCOSE BLD-MCNC: 142 MG/DL (ref 60–100)
GLUCOSE BLDC GLUCOMTR-MCNC: 110 MG/DL (ref 70–130)
GLUCOSE BLDC GLUCOMTR-MCNC: 231 MG/DL (ref 70–130)
HBA1C MFR BLD: 6.9 % (ref 4–5.6)
HCT VFR BLD AUTO: 37.3 % (ref 35–45)
HGB BLD-MCNC: 12.8 G/DL (ref 12–15.5)
HOLD SPECIMEN: NORMAL
HOLD SPECIMEN: NORMAL
IMM GRANULOCYTES # BLD: 0.01 10*3/MM3 (ref 0–0.02)
IMM GRANULOCYTES NFR BLD: 0.2 % (ref 0–0.5)
LYMPHOCYTES # BLD AUTO: 1.46 10*3/MM3 (ref 0.6–4.2)
LYMPHOCYTES NFR BLD AUTO: 32.2 % (ref 10–50)
MAGNESIUM SERPL-MCNC: 2.3 MG/DL (ref 1.6–2.3)
MCH RBC QN AUTO: 31.3 PG (ref 26.5–34)
MCHC RBC AUTO-ENTMCNC: 34.3 G/DL (ref 31.4–36)
MCV RBC AUTO: 91.2 FL (ref 80–98)
MONOCYTES # BLD AUTO: 0.21 10*3/MM3 (ref 0–0.9)
MONOCYTES NFR BLD AUTO: 4.6 % (ref 0–12)
NEUTROPHILS # BLD AUTO: 2.73 10*3/MM3 (ref 2–8.6)
NEUTROPHILS NFR BLD AUTO: 60.2 % (ref 37–80)
NT-PROBNP SERPL-MCNC: 378 PG/ML (ref 0–900)
PLATELET # BLD AUTO: 235 10*3/MM3 (ref 150–450)
PMV BLD AUTO: 9 FL (ref 8–12)
POTASSIUM BLD-SCNC: 3.2 MMOL/L (ref 3.5–5.1)
PROT SERPL-MCNC: 6 G/DL (ref 6.3–8.6)
RBC # BLD AUTO: 4.09 10*6/MM3 (ref 3.77–5.16)
SODIUM BLD-SCNC: 137 MMOL/L (ref 137–145)
TROPONIN I SERPL-MCNC: <0.012 NG/ML
TSH SERPL DL<=0.05 MIU/L-ACNC: 0.13 MIU/ML (ref 0.46–4.68)
WBC NRBC COR # BLD: 4.54 10*3/MM3 (ref 3.2–9.8)
WHOLE BLOOD HOLD SPECIMEN: NORMAL
WHOLE BLOOD HOLD SPECIMEN: NORMAL

## 2017-08-02 PROCEDURE — 84484 ASSAY OF TROPONIN QUANT: CPT | Performed by: INTERNAL MEDICINE

## 2017-08-02 PROCEDURE — 82962 GLUCOSE BLOOD TEST: CPT

## 2017-08-02 PROCEDURE — G0378 HOSPITAL OBSERVATION PER HR: HCPCS

## 2017-08-02 PROCEDURE — 82553 CREATINE MB FRACTION: CPT | Performed by: NURSE PRACTITIONER

## 2017-08-02 PROCEDURE — 99284 EMERGENCY DEPT VISIT MOD MDM: CPT

## 2017-08-02 PROCEDURE — 85025 COMPLETE CBC W/AUTO DIFF WBC: CPT

## 2017-08-02 PROCEDURE — 83036 HEMOGLOBIN GLYCOSYLATED A1C: CPT | Performed by: INTERNAL MEDICINE

## 2017-08-02 PROCEDURE — 82553 CREATINE MB FRACTION: CPT | Performed by: INTERNAL MEDICINE

## 2017-08-02 PROCEDURE — 83880 ASSAY OF NATRIURETIC PEPTIDE: CPT | Performed by: EMERGENCY MEDICINE

## 2017-08-02 PROCEDURE — 25010000002 MORPHINE SULFATE (PF) 2 MG/ML SOLUTION: Performed by: INTERNAL MEDICINE

## 2017-08-02 PROCEDURE — 83735 ASSAY OF MAGNESIUM: CPT | Performed by: NURSE PRACTITIONER

## 2017-08-02 PROCEDURE — 63710000001 INSULIN ASPART PER 5 UNITS: Performed by: INTERNAL MEDICINE

## 2017-08-02 PROCEDURE — 84443 ASSAY THYROID STIM HORMONE: CPT | Performed by: INTERNAL MEDICINE

## 2017-08-02 PROCEDURE — 93005 ELECTROCARDIOGRAM TRACING: CPT

## 2017-08-02 PROCEDURE — 71010 HC CHEST PA OR AP: CPT

## 2017-08-02 PROCEDURE — 82550 ASSAY OF CK (CPK): CPT | Performed by: INTERNAL MEDICINE

## 2017-08-02 PROCEDURE — 82550 ASSAY OF CK (CPK): CPT | Performed by: NURSE PRACTITIONER

## 2017-08-02 PROCEDURE — 96374 THER/PROPH/DIAG INJ IV PUSH: CPT

## 2017-08-02 PROCEDURE — 80053 COMPREHEN METABOLIC PANEL: CPT | Performed by: EMERGENCY MEDICINE

## 2017-08-02 PROCEDURE — 93010 ELECTROCARDIOGRAM REPORT: CPT | Performed by: INTERNAL MEDICINE

## 2017-08-02 PROCEDURE — 84484 ASSAY OF TROPONIN QUANT: CPT | Performed by: EMERGENCY MEDICINE

## 2017-08-02 RX ORDER — POTASSIUM CHLORIDE 750 MG/1
40 CAPSULE, EXTENDED RELEASE ORAL ONCE
Status: COMPLETED | OUTPATIENT
Start: 2017-08-02 | End: 2017-08-02

## 2017-08-02 RX ORDER — ACETAMINOPHEN 325 MG/1
650 TABLET ORAL EVERY 4 HOURS PRN
Status: DISCONTINUED | OUTPATIENT
Start: 2017-08-02 | End: 2017-08-05 | Stop reason: HOSPADM

## 2017-08-02 RX ORDER — HYDROCODONE BITARTRATE AND ACETAMINOPHEN 7.5; 325 MG/1; MG/1
1 TABLET ORAL EVERY 6 HOURS PRN
COMMUNITY
End: 2017-11-03 | Stop reason: HOSPADM

## 2017-08-02 RX ORDER — ISOSORBIDE MONONITRATE 30 MG/1
30 TABLET, EXTENDED RELEASE ORAL DAILY
Status: DISCONTINUED | OUTPATIENT
Start: 2017-08-02 | End: 2017-08-05 | Stop reason: HOSPADM

## 2017-08-02 RX ORDER — BUSPIRONE HYDROCHLORIDE 15 MG/1
15 TABLET ORAL NIGHTLY
Status: DISCONTINUED | OUTPATIENT
Start: 2017-08-02 | End: 2017-08-05 | Stop reason: HOSPADM

## 2017-08-02 RX ORDER — SODIUM CHLORIDE 0.9 % (FLUSH) 0.9 %
1-10 SYRINGE (ML) INJECTION AS NEEDED
Status: DISCONTINUED | OUTPATIENT
Start: 2017-08-02 | End: 2017-08-05 | Stop reason: HOSPADM

## 2017-08-02 RX ORDER — ASPIRIN 81 MG/1
243 TABLET, CHEWABLE ORAL ONCE
Status: COMPLETED | OUTPATIENT
Start: 2017-08-02 | End: 2017-08-02

## 2017-08-02 RX ORDER — CLOPIDOGREL BISULFATE 75 MG/1
75 TABLET ORAL DAILY
Status: DISCONTINUED | OUTPATIENT
Start: 2017-08-02 | End: 2017-08-05 | Stop reason: HOSPADM

## 2017-08-02 RX ORDER — ESCITALOPRAM OXALATE 10 MG/1
20 TABLET ORAL NIGHTLY
Status: DISCONTINUED | OUTPATIENT
Start: 2017-08-02 | End: 2017-08-05 | Stop reason: HOSPADM

## 2017-08-02 RX ORDER — MORPHINE SULFATE 2 MG/ML
2 INJECTION, SOLUTION INTRAMUSCULAR; INTRAVENOUS ONCE
Status: COMPLETED | OUTPATIENT
Start: 2017-08-02 | End: 2017-08-02

## 2017-08-02 RX ORDER — HYDROCODONE BITARTRATE AND ACETAMINOPHEN 7.5; 325 MG/1; MG/1
1 TABLET ORAL EVERY 6 HOURS PRN
Status: DISCONTINUED | OUTPATIENT
Start: 2017-08-02 | End: 2017-08-05 | Stop reason: HOSPADM

## 2017-08-02 RX ORDER — CARVEDILOL 3.12 MG/1
3.12 TABLET ORAL 2 TIMES DAILY
Status: DISCONTINUED | OUTPATIENT
Start: 2017-08-02 | End: 2017-08-05 | Stop reason: HOSPADM

## 2017-08-02 RX ORDER — NICOTINE POLACRILEX 4 MG
15 LOZENGE BUCCAL
Status: DISCONTINUED | OUTPATIENT
Start: 2017-08-02 | End: 2017-08-05 | Stop reason: HOSPADM

## 2017-08-02 RX ORDER — SODIUM CHLORIDE 0.9 % (FLUSH) 0.9 %
10 SYRINGE (ML) INJECTION AS NEEDED
Status: DISCONTINUED | OUTPATIENT
Start: 2017-08-02 | End: 2017-08-05 | Stop reason: HOSPADM

## 2017-08-02 RX ORDER — MORPHINE SULFATE 2 MG/ML
2 INJECTION, SOLUTION INTRAMUSCULAR; INTRAVENOUS EVERY 4 HOURS PRN
Status: DISCONTINUED | OUTPATIENT
Start: 2017-08-02 | End: 2017-08-02

## 2017-08-02 RX ORDER — ASPIRIN 81 MG/1
81 TABLET ORAL DAILY
Status: DISCONTINUED | OUTPATIENT
Start: 2017-08-02 | End: 2017-08-05 | Stop reason: HOSPADM

## 2017-08-02 RX ORDER — DEXTROSE MONOHYDRATE 25 G/50ML
25 INJECTION, SOLUTION INTRAVENOUS
Status: DISCONTINUED | OUTPATIENT
Start: 2017-08-02 | End: 2017-08-05 | Stop reason: HOSPADM

## 2017-08-02 RX ORDER — LISINOPRIL 5 MG/1
5 TABLET ORAL NIGHTLY
Status: DISCONTINUED | OUTPATIENT
Start: 2017-08-02 | End: 2017-08-05 | Stop reason: HOSPADM

## 2017-08-02 RX ORDER — HYDRALAZINE HYDROCHLORIDE 20 MG/ML
10 INJECTION INTRAMUSCULAR; INTRAVENOUS EVERY 6 HOURS PRN
Status: DISCONTINUED | OUTPATIENT
Start: 2017-08-02 | End: 2017-08-05 | Stop reason: HOSPADM

## 2017-08-02 RX ORDER — GABAPENTIN 400 MG/1
1200 CAPSULE ORAL 3 TIMES DAILY
Status: DISCONTINUED | OUTPATIENT
Start: 2017-08-02 | End: 2017-08-05 | Stop reason: HOSPADM

## 2017-08-02 RX ORDER — ONDANSETRON 2 MG/ML
4 INJECTION INTRAMUSCULAR; INTRAVENOUS EVERY 6 HOURS PRN
Status: DISCONTINUED | OUTPATIENT
Start: 2017-08-02 | End: 2017-08-05 | Stop reason: HOSPADM

## 2017-08-02 RX ORDER — RANOLAZINE 500 MG/1
500 TABLET, EXTENDED RELEASE ORAL 2 TIMES DAILY
Status: DISCONTINUED | OUTPATIENT
Start: 2017-08-02 | End: 2017-08-03

## 2017-08-02 RX ADMIN — Medication 10 ML: at 12:56

## 2017-08-02 RX ADMIN — ASPIRIN 81 MG 243 MG: 81 TABLET ORAL at 13:51

## 2017-08-02 RX ADMIN — GABAPENTIN 1200 MG: 400 CAPSULE ORAL at 21:23

## 2017-08-02 RX ADMIN — MORPHINE SULFATE 2 MG: 2 INJECTION, SOLUTION INTRAMUSCULAR; INTRAVENOUS at 15:37

## 2017-08-02 RX ADMIN — HYDROCODONE BITARTRATE AND ACETAMINOPHEN 1 TABLET: 7.5; 325 TABLET ORAL at 18:50

## 2017-08-02 RX ADMIN — BUSPIRONE HYDROCHLORIDE 15 MG: 15 TABLET ORAL at 21:23

## 2017-08-02 RX ADMIN — INSULIN ASPART 5 UNITS: 100 INJECTION, SOLUTION INTRAVENOUS; SUBCUTANEOUS at 21:24

## 2017-08-02 RX ADMIN — CARVEDILOL 3.12 MG: 3.12 TABLET, FILM COATED ORAL at 17:24

## 2017-08-02 RX ADMIN — ESCITALOPRAM OXALATE 20 MG: 10 TABLET ORAL at 21:23

## 2017-08-02 RX ADMIN — RANOLAZINE 500 MG: 500 TABLET, FILM COATED, EXTENDED RELEASE ORAL at 17:24

## 2017-08-02 RX ADMIN — GABAPENTIN 1200 MG: 400 CAPSULE ORAL at 17:24

## 2017-08-02 RX ADMIN — POTASSIUM CHLORIDE 40 MEQ: 750 CAPSULE, EXTENDED RELEASE ORAL at 15:37

## 2017-08-02 NOTE — ED PROVIDER NOTES
Subjective   HPI Comments: Pt was supposed to take ranexa and not on right now.  Took ASA 81mg and plavix today    Patient is a 66 y.o. female presenting with chest pain.   History provided by:  Patient and significant other  Chest Pain   Pain location:  L chest  Pain quality: dull and sharp    Pain radiates to:  L arm, L jaw and R jaw  Pain severity:  Severe  Onset quality:  Gradual  Duration: since about 9am today.  Timing:  Constant  Progression:  Waxing and waning  Chronicity:  Recurrent  Relieved by:  Nothing  Worsened by:  Nothing  Ineffective treatments:  Aspirin  Associated symptoms: diaphoresis, nausea (resolved) and shortness of breath (resolved)    Associated symptoms: no cough and no fever    Nausea:     Severity:  Moderate    Progression:  Resolved  Shortness of breath:     Severity:  Mild (w exertion)    Onset quality:  Gradual    Progression:  Resolved  Risk factors: coronary artery disease, high cholesterol and smoking    Risk factors comment:  Hx of MI in Sept, 2016 and April 2017. stents x 6. f//u with Dr. Martinez.      Review of Systems   Constitutional: Positive for diaphoresis. Negative for chills and fever.   HENT: Negative.    Eyes: Negative.    Respiratory: Positive for shortness of breath (resolved). Negative for cough.    Cardiovascular: Positive for chest pain.   Gastrointestinal: Positive for nausea (resolved).   Genitourinary: Negative.    Musculoskeletal: Negative.    Skin: Negative.    Neurological: Negative.    Psychiatric/Behavioral: Negative.    All other systems reviewed and are negative.      Past Medical History:   Diagnosis Date   • Acute bronchitis    • Allergic    • Allergic rhinitis    • Anxiety state    • Anxiety state     Anxiety state, unspecified      • Arthritis    • Bacterial pneumonia    • Chronic pain    • Chronic pain     Other chronic pain      • Contact dermatitis    • COPD (chronic obstructive pulmonary disease)    • Coronary arteriosclerosis    • Depressive  disorder    • Depressive disorder    • Diabetes mellitus due to underlying condition with diabetic autonomic neuropathy     Diabetes mellitus due to underlying condition with diabetic autonomic (poly)neuropathy      • Drug therapy     Long-term drug therapy      • Dysuria    • Encounter for immunization    • Encounter for screening for malignant neoplasm of colon    • Essential hypertension    • Folliculitis    • Headache    • Heart murmur    • Herpes zoster    • Herpes zoster with nervous system complication    • History of screening mammography    • Hyperlipidemia    • Insomnia    • Insomnia    • Kidney stone    • Lumbar radiculopathy    • Lung nodule    • Migraine    • Migraine     Migraine, unspecified, without mention of intractable migraine, without mention of status migrainosus      • Migraine    • Myocardial infarction    • Nausea and vomiting    • Nausea with vomiting    • Neck pain    • Need for immunization against influenza    • Need for prophylactic vaccination and inoculation against diphtheria alone    • Need for prophylactic vaccination and inoculation against unspecified single disease     Need for prophylactic vaccination and inoculation against Streptococcus pneumoniae [pneumococcus] and influenza      • Non-alcoholic fatty liver disease    • Rectus sheath hematoma    • Senile osteoporosis    • Shoulder pain    • Solitary pulmonary nodule present on computed tomography of lung    • Spasm of back muscles    • Spinal stenosis of lumbar region    • Systolic congestive heart failure    • TIA (transient ischemic attack)    • Tobacco dependence syndrome    • Type 2 diabetes mellitus    • Urinary tract infectious disease    • Viral gastroenteritis    • Vitamin D deficiency     Unspecified vitamin D deficiency      • Vitamin D deficiency        No Known Allergies    Past Surgical History:   Procedure Laterality Date   • CARDIAC CATHETERIZATION      plaque noted ef 55% 07/17/2010    • CARDIAC  CATHETERIZATION N/A 3/29/2017    Procedure: Left Heart Cath;  Surgeon: Raheel Martinez MD;  Location: [facility] CATH INVASIVE LOCATION;  Service:    • CARDIAC CATHETERIZATION N/A 2017    Procedure: Left Heart Cath;  Surgeon: Raheel Martinez MD;  Location: [facility] INVASIVE LOCATION;  Service:    •  SECTION       Low cervical  1981       • CHOLECYSTECTOMY     • DILATATION AND CURETTAGE  1975   • DILATATION AND CURETTAGE  1975   • INJECTION OF MEDICATION  2015     Phenergan (3)      • INJECTION OF MEDICATION      Kenalog (1)      2011    • INJECTION OF MEDICATION  2014    Toradol (3)   • INJECTION OF MEDICATION  2011     Vistaril (1)    • INJECTION OF MEDICATION  2012    Zofran (1)   • ME RT/LT HEART CATHETERS N/A 2017    Procedure: Percutaneous Coronary Intervention;  Surgeon: Raheel Martinez MD;  Location: [facility] INVASIVE LOCATION;  Service: Cardiovascular   • TUBAL ABDOMINAL LIGATION       Fulshear tubal ligation 1981        Family History   Problem Relation Age of Onset   • Alzheimer's disease Mother    • Diabetes Brother    • Alzheimer's disease Other    • Diabetes Other    • Lung cancer Other    • Pulmonary embolism Other    • Hypertension Father    • Depression Daughter    • Hypertension Daughter    • Anxiety disorder Daughter    • Lung cancer Brother        Social History     Social History   • Marital status:      Spouse name: N/A   • Number of children: N/A   • Years of education: N/A     Social History Main Topics   • Smoking status: Current Every Day Smoker     Packs/day: 0.50     Years: 45.00     Types: Cigarettes   • Smokeless tobacco: Never Used   • Alcohol use No   • Drug use: No   • Sexual activity: Defer     Other Topics Concern   • None     Social History Narrative    Previously worked in [employer] at [employer].  Lives with daughter.            Objective   Physical Exam   Constitutional: She is oriented  to person, place, and time. No distress.   Neck: Normal range of motion. Neck supple.   Cardiovascular: Normal rate, regular rhythm, normal heart sounds and intact distal pulses.    Pulmonary/Chest: Effort normal and breath sounds normal.   Abdominal: Soft. Bowel sounds are normal.   Musculoskeletal: She exhibits no edema or tenderness.   Neurological: She is alert and oriented to person, place, and time.   Skin: Skin is warm and dry.   Psychiatric: She has a normal mood and affect. Her behavior is normal. Judgment and thought content normal.   Nursing note and vitals reviewed.      ECG 12 Lead    Date/Time: 8/2/2017 12:30 PM  Performed by: ELIU CASANOVA  Authorized by: GELY GR   Interpreted by physician  Comparison: compared with previous ECG from 7/20/2017  Similar to previous ECG  Rhythm: sinus rhythm  Clinical impression: abnormal ECG               ED Course  ED Course   Comment By Time   D/w Dr. Rushing regarding pt's condition and test results, he accepted the admission. EZIO Kelly 08/02 1506      Labs Reviewed   COMPREHENSIVE METABOLIC PANEL - Abnormal; Notable for the following:        Result Value    Glucose 142 (*)     Potassium 3.2 (*)     Chloride 111 (*)     CO2 16.0 (*)     Total Protein 6.0 (*)     All other components within normal limits   TROPONIN (IN-HOUSE) - Normal   BNP (IN-HOUSE) - Normal   CBC WITH AUTO DIFFERENTIAL - Normal   CK - Normal   CK MB - Normal   MAGNESIUM - Normal   RAINBOW DRAW    Narrative:     The following orders were created for panel order [place] Draw.  Procedure                               Abnormality         Status                     ---------                               -----------         ------                     Light Blue Top[[id number]]                                   Final result               Green Top (Gel)[[id number]]                                  Final result               Lavender Top[[id number]]                                     Final  result               Gold Top - SST[[id number]]                                   Final result                 Please view results for these tests on the individual orders.   TROPONIN (IN-HOUSE)   CBC AND DIFFERENTIAL    Narrative:     The following orders were created for panel order CBC & Differential.  Procedure                               Abnormality         Status                     ---------                               -----------         ------                     CBC Auto Differential[[id number]]        Normal              Final result                 Please view results for these tests on the individual orders.   LIGHT BLUE TOP   GREEN TOP   LAVENDER TOP   GOLD TOP - SST       XR Chest 1 View   Final Result   CONCLUSION: No evidence of active disease.          Electronically signed by:  Ivan Stephens MD  8/2/2017 1:20 PM CDT   Workstation: [id number]                    MDM  Number of Diagnoses or Management Options  Chest pain, unspecified type: new and requires workup  Hypokalemia: new and requires workup     Amount and/or Complexity of Data Reviewed  Clinical lab tests: reviewed  Tests in the radiology section of CPT®: reviewed    Risk of Complications, Morbidity, and/or Mortality  Presenting problems: high  Diagnostic procedures: high  Management options: high    Critical Care  Total time providing critical care: < 30 minutes    Patient Progress  Patient progress: stable      Final diagnoses:   Chest pain, unspecified type   Hypokalemia            EZIO Kelly  08/02/17 1543

## 2017-08-02 NOTE — H&P
"      [facility] Medicine Admission      Date of Admission: 8/2/2017      Primary Care Physician: JUANA Rhodes      Chief Complaint:  Chest pain    HPI:  This 66 -year-old  female reported to the emergency part with complaints of chest pain since 9 AM this morning.  Patient states that the pain is \"right in the middle \"of her chest.  She states she is also having jaw pain and right radicular pain.  She denies shortness of air, nausea, vomiting.  She denies dizziness, syncope, presyncope.  Patient underwent a recent cardiac catheterization and has been compliant with her medications other than the Ranexa which she is waiting on due to cost.  Patient denies hematemesis, hematuria, hematochezia, melena.  Denies dysuria, fever, chills, new cough.  Denies paroxysmal nocturnal dyspnea, dyspnea upon exertion.  We have discussed the patient with Dr. Raheel Martinez of cardiology, the patient's cardiologist, who will see the patient.    Concurrent Medical History:  has a past medical history of Acute bronchitis; Allergic; Allergic rhinitis; Anxiety state; Anxiety state; Arthritis; Bacterial pneumonia; Chronic pain; Chronic pain; Contact dermatitis; COPD (chronic obstructive pulmonary disease); Coronary arteriosclerosis; Depressive disorder; Depressive disorder; Diabetes mellitus due to underlying condition with diabetic autonomic neuropathy; Drug therapy; Dysuria; Encounter for immunization; Encounter for screening for malignant neoplasm of colon; Essential hypertension; Folliculitis; Headache; Heart murmur; Herpes zoster; Herpes zoster with nervous system complication; History of screening mammography; Hyperlipidemia; Insomnia; Insomnia; Kidney stone; Lumbar radiculopathy; Lung nodule; Migraine; Migraine; Migraine; Myocardial infarction; Nausea and vomiting; Nausea with vomiting; Neck pain; Need for immunization against influenza; Need for prophylactic vaccination and " inoculation against diphtheria alone; Need for prophylactic vaccination and inoculation against unspecified single disease; Non-alcoholic fatty liver disease; Rectus sheath hematoma; Senile osteoporosis; Shoulder pain; Solitary pulmonary nodule present on computed tomography of lung; Spasm of back muscles; Spinal stenosis of lumbar region; Systolic congestive heart failure; TIA (transient ischemic attack); Tobacco dependence syndrome; Type 2 diabetes mellitus; Urinary tract infectious disease; Viral gastroenteritis; Vitamin D deficiency; and Vitamin D deficiency.    Past Surgical History:  has a past surgical history that includes Cardiac catheterization; Cholecystectomy; Dilation and curettage of uterus (1975); Injection of Medication (2015); Injection of Medication; Injection of Medication (2014); Tubal ligation; Injection of Medication (2011); Injection of Medication (2012);  section; Dilation and curettage of uterus (1975); Cardiac catheterization (N/A, 3/29/2017); rt/lt heart catheters (N/A, 2017); and Cardiac catheterization (N/A, 2017).    Family History: family history includes Alzheimer's disease in her mother and other; Anxiety disorder in her daughter; Depression in her daughter; Diabetes in her brother and other; Hypertension in her daughter and father; Lung cancer in her brother and other; Pulmonary embolism in her other.     Social History:  reports that she has been smoking Cigarettes.  She has a 22.50 pack-year smoking history. She has never used smokeless tobacco. She reports that she does not drink alcohol or use illicit drugs.    Allergies: No Known Allergies    Medications:       Current Facility-Administered Medications:   •  sodium chloride 0.9 % flush 10 mL, 10 mL, Intravenous, PRN, Jonathon Early MD, 10 mL at 17 1256    Current Outpatient Prescriptions:   •  aspirin 81 MG tablet, Take 1 tablet by mouth Daily., Disp: 30 tablet, Rfl:  5  •  busPIRone (BUSPAR) 15 MG tablet, Take 15 mg by mouth Every Night., Disp: , Rfl:   •  carvedilol (COREG) 3.125 MG tablet, Take 1 tablet by mouth 2 (Two) Times a Day., Disp: 180 tablet, Rfl: 3  •  clopidogrel (PLAVIX) 75 MG tablet, Take 75 mg by mouth Daily., Disp: , Rfl:   •  escitalopram (LEXAPRO) 20 MG tablet, Take 1 tablet by mouth Daily. (Patient taking differently: Take 20 mg by mouth Every Night.), Disp: 90 tablet, Rfl: 3  •  FINGERSTIX LANCETS misc, To check sugar tid dx code E11.65, Disp: 200 each, Rfl: 5  •  gabapentin (NEURONTIN) 400 MG capsule, Take 400 mg by mouth 3 (Three) Times a Day. Pt takes 400 mg + 800 mg three times daily., Disp: , Rfl:   •  gabapentin (NEURONTIN) 800 MG tablet, Take 600 mg by mouth 3 (Three) Times a Day. Pt takes 400 mg + 800 mg three times daily., Disp: , Rfl:   •  glipiZIDE (GLUCOTROL) 5 MG tablet, Take 1 tablet by mouth 2 (Two) Times a Day Before Meals. (Patient taking differently: Take 5 mg by mouth Daily.), Disp: 180 tablet, Rfl: 3  •  isosorbide mononitrate (IMDUR) 30 MG 24 hr tablet, Take 1 tablet by mouth Daily., Disp: 30 tablet, Rfl: 0  •  lisinopril (ZESTRIL) 5 MG tablet, Take 1 tablet by mouth Daily. (Patient taking differently: Take 5 mg by mouth Every Night.), Disp: 30 tablet, Rfl: 2  •  ranolazine (RANEXA) 500 MG 12 hr tablet, Take 1 tablet by mouth 2 (Two) Times a Day., Disp: 60 tablet, Rfl: 0    Review of Systems:  Review of Systems   Constitutional: Negative for chills, diaphoresis, fatigue and fever.   Respiratory: Negative for cough, chest tightness, shortness of breath and wheezing.    Cardiovascular: Positive for chest pain. Negative for palpitations and leg swelling.   Gastrointestinal: Negative for abdominal pain and vomiting.   Genitourinary: Negative for decreased urine volume, difficulty urinating, dysuria, flank pain and frequency.   Musculoskeletal: Negative for back pain.   Neurological: Negative for dizziness, syncope, weakness, light-headedness  and headaches.   Psychiatric/Behavioral: Negative for confusion.      Otherwise complete ROS is negative except as mentioned above.    Physical Exam:   Temp:  [98.4 °F (36.9 °C)] 98.4 °F (36.9 °C)  Heart Rate:  [52-65] 52  Resp:  [15] 15  BP: (118-121)/(59-63) 118/63  Physical Exam   Constitutional: She is oriented to person, place, and time. She appears well-developed and well-nourished.  Non-toxic appearance. She does not have a sickly appearance. She does not appear ill. No distress.   HENT:   Head: Normocephalic and atraumatic.   Eyes: Conjunctivae and EOM are normal. Pupils are equal, round, and reactive to light. Right eye exhibits no discharge and no exudate. Left eye exhibits no discharge and no exudate. No scleral icterus.   Neck: Normal range of motion. Neck supple.   Cardiovascular: Normal rate, regular rhythm, normal heart sounds and intact distal pulses.  Exam reveals no gallop and no friction rub.    No murmur heard.  Pulmonary/Chest: Effort normal and breath sounds normal. No accessory muscle usage. No apnea, no tachypnea and no bradypnea. No respiratory distress. She has no wheezes. She has no rales. She exhibits no tenderness.   Abdominal: Soft. Bowel sounds are normal. She exhibits no distension. There is no tenderness. There is no rebound and no guarding.   Musculoskeletal: She exhibits no deformity.   Neurological: She is alert and oriented to person, place, and time.   Skin: Skin is warm and dry. No abrasion, no bruising, no ecchymosis, no laceration, no lesion and no petechiae noted. She is not diaphoretic.   Psychiatric: She has a normal mood and affect. Her behavior is normal. Her mood appears not anxious. Her affect is not inappropriate.     Results Reviewed:  I have personally reviewed current lab, radiology, and data and agree with results.  Lab Results (last 24 hours)     Procedure Component Value Units Date/Time    CBC & Differential [[id number]] Collected:  08/02/17 1256    Specimen:   Blood Updated:  08/02/17 1311    Narrative:       The following orders were created for panel order CBC & Differential.  Procedure                               Abnormality         Status                     ---------                               -----------         ------                     CBC Auto Differential[[id number]]        Normal              Final result                 Please view results for these tests on the individual orders.    CBC Auto Differential [[id number]]  (Normal) Collected:  08/02/17 1256    Specimen:  Blood Updated:  08/02/17 1311     WBC 4.54 10*3/mm3      RBC 4.09 10*6/mm3      Hemoglobin 12.8 g/dL      Hematocrit 37.3 %      MCV 91.2 fL      MCH 31.3 pg      MCHC 34.3 g/dL      RDW 12.8 %      RDW-SD 42.8 fl      MPV 9.0 fL      Platelets 235 10*3/mm3      Neutrophil % 60.2 %      Lymphocyte % 32.2 %      Monocyte % 4.6 %      Eosinophil % 2.4 %      Basophil % 0.4 %      Immature Grans % 0.2 %      Neutrophils, Absolute 2.73 10*3/mm3      Lymphocytes, Absolute 1.46 10*3/mm3      Monocytes, Absolute 0.21 10*3/mm3      Eosinophils, Absolute 0.11 10*3/mm3      Basophils, Absolute 0.02 10*3/mm3      Immature Grans, Absolute 0.01 10*3/mm3     Comprehensive Metabolic Panel [[id number]]  (Abnormal) Collected:  08/02/17 1328    Specimen:  Blood Updated:  08/02/17 1401     Glucose 142 (H) mg/dL      BUN 13 mg/dL      Creatinine 0.66 mg/dL      Sodium 137 mmol/L      Potassium 3.2 (L) mmol/L      Chloride 111 (H) mmol/L      CO2 16.0 (L) mmol/L      Calcium 8.9 mg/dL      Total Protein 6.0 (L) g/dL      Albumin 3.50 g/dL      ALT (SGPT) 25 U/L      AST (SGOT) 27 U/L      Alkaline Phosphatase 91 U/L      Total Bilirubin 0.4 mg/dL      eGFR Non African Amer 89 mL/min/1.73      Globulin 2.5 gm/dL      A/G Ratio 1.4 g/dL      BUN/Creatinine Ratio 19.7     Anion Gap 10.0 mmol/L     Lavender Top [[id number]] Collected:  08/02/17 1256    Specimen:  Blood Updated:  08/02/17 1401     Extra Tube hold for  add-on      Auto resulted       Troponin [[id number]]  (Normal) Collected:  08/02/17 1328    Specimen:  Blood Updated:  08/02/17 1405     Troponin I <0.012 ng/mL     BNP [[id number]]  (Normal) Collected:  08/02/17 1328    Specimen:  Blood Updated:  08/02/17 1413     proBNP 378.0 pg/mL     CK [[id number]]  (Normal) Collected:  08/02/17 1328    Specimen:  Blood Updated:  08/02/17 1453     Creatine Kinase 36 U/L     Magnesium [[id number]]  (Normal) Collected:  08/02/17 1328    Specimen:  Blood Updated:  08/02/17 1453     Magnesium 2.3 mg/dL     Green Top (Gel) [[id number]] Collected:  08/02/17 1328    Specimen:  Blood Updated:  08/02/17 1501     Extra Tube Hold for add-ons.      Auto resulted.       Gold Top - SST [[id number]] Collected:  08/02/17 1328    Specimen:  Blood Updated:  08/02/17 1501     Extra Tube Hold for add-ons.      Auto resulted.       [place] Draw [[id number]] Collected:  08/02/17 1256    Specimen:  Blood Updated:  08/02/17 1501    Narrative:       The following orders were created for panel order [place] Draw.  Procedure                               Abnormality         Status                     ---------                               -----------         ------                     Light Blue Top[[id number]]                                   Final result               Green Top (Gel)[[id number]]                                  Final result               Lavender Top[[id number]]                                     Final result               Gold Top - SST[[id number]]                                   Final result                 Please view results for these tests on the individual orders.    Light Blue Top [[id number]] Collected:  08/02/17 1328    Specimen:  Blood Updated:  08/02/17 1501     Extra Tube hold for add-on      Auto resulted       CK-MB [[id number]]  (Normal) Collected:  08/02/17 1328    Specimen:  Blood Updated:  08/02/17 1503     CKMB 0.69 ng/mL         Imaging Results (last 24 hours)      Procedure Component Value Units Date/Time    XR Chest 1 View [[id number]] Collected:  08/02/17 1302     Updated:  08/02/17 1321    Narrative:       Chest single view.        CLINICAL INDICATION: Mid chest pain     COMPARISON: Chest May 27, 2017    FINDINGS: Cardiac silhouette is normal in size. Two left coronary  artery stents are observed. Pulmonary vascularity is  unremarkable.   Discoid fibrotic changes left lung base. No significant changes  since prior exam.  No focal infiltrate or consolidation.  No pleural effusion.  No  pneumothorax.      Impression:       CONCLUSION: No evidence of active disease.       Electronically signed by:  Ivan Stephens MD  8/2/2017 1:20 PM CDT  Workstation: [id number]            Assessment:  Chest pain        Plan:  Replete potassium, continue to trend cardiac enzymes, Dr. Martinez will see, continue to treat as hospital course dictates.        This document has been electronically signed by LEXIS Dumont on August 2, 2017 4:32 PM

## 2017-08-02 NOTE — ED PROVIDER NOTES
Subjective   History of Present Illness    Review of Systems    Past Medical History:   Diagnosis Date   • Acute bronchitis    • Allergic    • Allergic rhinitis    • Anxiety state    • Anxiety state     Anxiety state, unspecified      • Arthritis    • Bacterial pneumonia    • Chronic pain    • Chronic pain     Other chronic pain      • Contact dermatitis    • COPD (chronic obstructive pulmonary disease)    • Coronary arteriosclerosis    • Depressive disorder    • Depressive disorder    • Diabetes mellitus due to underlying condition with diabetic autonomic neuropathy     Diabetes mellitus due to underlying condition with diabetic autonomic (poly)neuropathy      • Drug therapy     Long-term drug therapy      • Dysuria    • Encounter for immunization    • Encounter for screening for malignant neoplasm of colon    • Essential hypertension    • Folliculitis    • Headache    • Heart murmur    • Herpes zoster    • Herpes zoster with nervous system complication    • History of screening mammography    • Hyperlipidemia    • Insomnia    • Insomnia    • Kidney stone    • Lumbar radiculopathy    • Lung nodule    • Migraine    • Migraine     Migraine, unspecified, without mention of intractable migraine, without mention of status migrainosus      • Migraine    • Myocardial infarction    • Nausea and vomiting    • Nausea with vomiting    • Neck pain    • Need for immunization against influenza    • Need for prophylactic vaccination and inoculation against diphtheria alone    • Need for prophylactic vaccination and inoculation against unspecified single disease     Need for prophylactic vaccination and inoculation against Streptococcus pneumoniae [pneumococcus] and influenza      • Non-alcoholic fatty liver disease    • Rectus sheath hematoma    • Senile osteoporosis    • Shoulder pain    • Solitary pulmonary nodule present on computed tomography of lung    • Spasm of back muscles    • Spinal stenosis of lumbar region    •  Systolic congestive heart failure    • TIA (transient ischemic attack)    • Tobacco dependence syndrome    • Type 2 diabetes mellitus    • Urinary tract infectious disease    • Viral gastroenteritis    • Vitamin D deficiency     Unspecified vitamin D deficiency      • Vitamin D deficiency        No Known Allergies    Past Surgical History:   Procedure Laterality Date   • CARDIAC CATHETERIZATION      plaque noted ef 55% 2010    • CARDIAC CATHETERIZATION N/A 3/29/2017    Procedure: Left Heart Cath;  Surgeon: Raheel Martinez MD;  Location: [facility] CATH INVASIVE LOCATION;  Service:    • CARDIAC CATHETERIZATION N/A 2017    Procedure: Left Heart Cath;  Surgeon: Raheel Martinez MD;  Location: [facility] CATH INVASIVE LOCATION;  Service:    •  SECTION       Low cervical  1981       • CHOLECYSTECTOMY     • DILATATION AND CURETTAGE  1975   • DILATATION AND CURETTAGE  1975   • INJECTION OF MEDICATION  2015     Phenergan (3)      • INJECTION OF MEDICATION      Kenalog (1)      2011    • INJECTION OF MEDICATION  2014    Toradol (3)   • INJECTION OF MEDICATION  2011     Vistaril (1)    • INJECTION OF MEDICATION  2012    Zofran (1)   • [place] RT/LT HEART CATHETERS N/A 2017    Procedure: Percutaneous Coronary Intervention;  Surgeon: Raheel Martinez MD;  Location: [facility] CATH INVASIVE LOCATION;  Service: Cardiovascular   • TUBAL ABDOMINAL LIGATION       Brenedn tubal ligation 1981        Family History   Problem Relation Age of Onset   • Alzheimer's disease Mother    • Diabetes Brother    • Alzheimer's disease Other    • Diabetes Other    • Lung cancer Other    • Pulmonary embolism Other    • Hypertension Father    • Depression Daughter    • Hypertension Daughter    • Anxiety disorder Daughter    • Lung cancer Brother        Social History     Social History   • Marital status:      Spouse name: N/A   • Number of children: N/A   • Years of education:  N/A     Social History Main Topics   • Smoking status: Current Every Day Smoker     Packs/day: 0.50     Years: 45.00     Types: Cigarettes   • Smokeless tobacco: Never Used   • Alcohol use No   • Drug use: No   • Sexual activity: Defer     Other Topics Concern   • None     Social History Narrative    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  Lives with daughter.            Objective   Physical Exam    Procedures         ED Course  ED Course   Comment By Time   D/w Dr. Rushing regarding pt's condition and test results, he accepted the admission. Doug Simeon [facility] 08/02 1506                  [facility]    Final diagnoses:   Chest pain, unspecified type   Hypokalemia            Jonathon Early MD  08/13/17 1941       Jonathon Early MD  08/13/17 1942

## 2017-08-03 ENCOUNTER — APPOINTMENT (OUTPATIENT)
Dept: CARDIOLOGY | Facility: HOSPITAL | Age: 67
End: 2017-08-03

## 2017-08-03 ENCOUNTER — APPOINTMENT (OUTPATIENT)
Dept: NUCLEAR MEDICINE | Facility: HOSPITAL | Age: 67
End: 2017-08-03

## 2017-08-03 LAB
ALBUMIN SERPL-MCNC: 3.3 G/DL (ref 3.4–4.8)
ALBUMIN/GLOB SERPL: 1.4 G/DL (ref 1.1–1.8)
ALP SERPL-CCNC: 84 U/L (ref 38–126)
ALT SERPL W P-5'-P-CCNC: 37 U/L (ref 9–52)
ANION GAP SERPL CALCULATED.3IONS-SCNC: 8 MMOL/L (ref 5–15)
AST SERPL-CCNC: 18 U/L (ref 14–36)
BASOPHILS # BLD AUTO: 0.02 10*3/MM3 (ref 0–0.2)
BASOPHILS NFR BLD AUTO: 0.3 % (ref 0–2)
BILIRUB SERPL-MCNC: 0.2 MG/DL (ref 0.2–1.3)
BUN BLD-MCNC: 14 MG/DL (ref 7–21)
BUN/CREAT SERPL: 20.6 (ref 7–25)
CALCIUM SPEC-SCNC: 8.4 MG/DL (ref 8.4–10.2)
CHLORIDE SERPL-SCNC: 112 MMOL/L (ref 95–110)
CO2 SERPL-SCNC: 19 MMOL/L (ref 22–31)
CREAT BLD-MCNC: 0.68 MG/DL (ref 0.5–1)
DEPRECATED RDW RBC AUTO: 43.5 FL (ref 36.4–46.3)
EOSINOPHIL # BLD AUTO: 0.17 10*3/MM3 (ref 0–0.7)
EOSINOPHIL NFR BLD AUTO: 2.6 % (ref 0–7)
ERYTHROCYTE [DISTWIDTH] IN BLOOD BY AUTOMATED COUNT: 13 % (ref 11.5–14.5)
GFR SERPL CREATININE-BSD FRML MDRD: 86 ML/MIN/1.73 (ref 60–104)
GLOBULIN UR ELPH-MCNC: 2.4 GM/DL (ref 2.3–3.5)
GLUCOSE BLD-MCNC: 172 MG/DL (ref 60–100)
GLUCOSE BLDC GLUCOMTR-MCNC: 110 MG/DL (ref 70–130)
GLUCOSE BLDC GLUCOMTR-MCNC: 130 MG/DL (ref 70–130)
GLUCOSE BLDC GLUCOMTR-MCNC: 159 MG/DL (ref 70–130)
GLUCOSE BLDC GLUCOMTR-MCNC: 220 MG/DL (ref 70–130)
HCT VFR BLD AUTO: 35.6 % (ref 35–45)
HGB BLD-MCNC: 12 G/DL (ref 12–15.5)
IMM GRANULOCYTES # BLD: 0.01 10*3/MM3 (ref 0–0.02)
IMM GRANULOCYTES NFR BLD: 0.2 % (ref 0–0.5)
LYMPHOCYTES # BLD AUTO: 1.37 10*3/MM3 (ref 0.6–4.2)
LYMPHOCYTES NFR BLD AUTO: 21.2 % (ref 10–50)
MCH RBC QN AUTO: 30.9 PG (ref 26.5–34)
MCHC RBC AUTO-ENTMCNC: 33.7 G/DL (ref 31.4–36)
MCV RBC AUTO: 91.8 FL (ref 80–98)
MONOCYTES # BLD AUTO: 0.37 10*3/MM3 (ref 0–0.9)
MONOCYTES NFR BLD AUTO: 5.7 % (ref 0–12)
NEUTROPHILS # BLD AUTO: 4.52 10*3/MM3 (ref 2–8.6)
NEUTROPHILS NFR BLD AUTO: 70 % (ref 37–80)
PLATELET # BLD AUTO: 197 10*3/MM3 (ref 150–450)
PMV BLD AUTO: 9 FL (ref 8–12)
POTASSIUM BLD-SCNC: 3.7 MMOL/L (ref 3.5–5.1)
PROT SERPL-MCNC: 5.7 G/DL (ref 6.3–8.6)
RBC # BLD AUTO: 3.88 10*6/MM3 (ref 3.77–5.16)
SODIUM BLD-SCNC: 139 MMOL/L (ref 137–145)
T3 SERPL-MCNC: 107 NG/DL (ref 97–169)
T4 FREE SERPL-MCNC: 1.15 NG/DL (ref 0.78–2.19)
TROPONIN I SERPL-MCNC: <0.012 NG/ML
WBC NRBC COR # BLD: 6.46 10*3/MM3 (ref 3.2–9.8)

## 2017-08-03 PROCEDURE — G0378 HOSPITAL OBSERVATION PER HR: HCPCS

## 2017-08-03 PROCEDURE — 78452 HT MUSCLE IMAGE SPECT MULT: CPT

## 2017-08-03 PROCEDURE — 84480 ASSAY TRIIODOTHYRONINE (T3): CPT | Performed by: INTERNAL MEDICINE

## 2017-08-03 PROCEDURE — 82962 GLUCOSE BLOOD TEST: CPT

## 2017-08-03 PROCEDURE — 93017 CV STRESS TEST TRACING ONLY: CPT

## 2017-08-03 PROCEDURE — 84484 ASSAY OF TROPONIN QUANT: CPT | Performed by: INTERNAL MEDICINE

## 2017-08-03 PROCEDURE — 93010 ELECTROCARDIOGRAM REPORT: CPT | Performed by: INTERNAL MEDICINE

## 2017-08-03 PROCEDURE — 85025 COMPLETE CBC W/AUTO DIFF WBC: CPT | Performed by: INTERNAL MEDICINE

## 2017-08-03 PROCEDURE — 84439 ASSAY OF FREE THYROXINE: CPT | Performed by: INTERNAL MEDICINE

## 2017-08-03 PROCEDURE — 0 TECHNETIUM SESTAMIBI: Performed by: INTERNAL MEDICINE

## 2017-08-03 PROCEDURE — 93005 ELECTROCARDIOGRAM TRACING: CPT | Performed by: INTERNAL MEDICINE

## 2017-08-03 PROCEDURE — 80053 COMPREHEN METABOLIC PANEL: CPT | Performed by: INTERNAL MEDICINE

## 2017-08-03 PROCEDURE — A9500 TC99M SESTAMIBI: HCPCS | Performed by: INTERNAL MEDICINE

## 2017-08-03 PROCEDURE — 25010000002 REGADENOSON 0.4 MG/5ML SOLUTION: Performed by: INTERNAL MEDICINE

## 2017-08-03 PROCEDURE — 63710000001 INSULIN ASPART PER 5 UNITS: Performed by: INTERNAL MEDICINE

## 2017-08-03 RX ORDER — RANOLAZINE 500 MG/1
1000 TABLET, EXTENDED RELEASE ORAL 2 TIMES DAILY
Status: DISCONTINUED | OUTPATIENT
Start: 2017-08-03 | End: 2017-08-05 | Stop reason: HOSPADM

## 2017-08-03 RX ADMIN — GABAPENTIN 1200 MG: 400 CAPSULE ORAL at 08:15

## 2017-08-03 RX ADMIN — ESCITALOPRAM OXALATE 20 MG: 10 TABLET ORAL at 20:22

## 2017-08-03 RX ADMIN — INSULIN ASPART 5 UNITS: 100 INJECTION, SOLUTION INTRAVENOUS; SUBCUTANEOUS at 17:32

## 2017-08-03 RX ADMIN — TECHNETIUM TC-99M SESTAMIBI 1 DOSE: 1 INJECTION INTRAVENOUS at 11:20

## 2017-08-03 RX ADMIN — TECHNETIUM TC-99M SESTAMIBI 1 DOSE: 1 INJECTION INTRAVENOUS at 12:50

## 2017-08-03 RX ADMIN — CLOPIDOGREL BISULFATE 75 MG: 75 TABLET ORAL at 08:15

## 2017-08-03 RX ADMIN — CARVEDILOL 3.12 MG: 3.12 TABLET, FILM COATED ORAL at 08:15

## 2017-08-03 RX ADMIN — GABAPENTIN 1200 MG: 400 CAPSULE ORAL at 15:24

## 2017-08-03 RX ADMIN — CARVEDILOL 3.12 MG: 3.12 TABLET, FILM COATED ORAL at 17:30

## 2017-08-03 RX ADMIN — HYDROCODONE BITARTRATE AND ACETAMINOPHEN 1 TABLET: 7.5; 325 TABLET ORAL at 08:15

## 2017-08-03 RX ADMIN — HYDROCODONE BITARTRATE AND ACETAMINOPHEN 1 TABLET: 7.5; 325 TABLET ORAL at 14:17

## 2017-08-03 RX ADMIN — HYDROCODONE BITARTRATE AND ACETAMINOPHEN 1 TABLET: 7.5; 325 TABLET ORAL at 20:22

## 2017-08-03 RX ADMIN — RANOLAZINE 1000 MG: 500 TABLET, FILM COATED, EXTENDED RELEASE ORAL at 08:15

## 2017-08-03 RX ADMIN — GABAPENTIN 1200 MG: 400 CAPSULE ORAL at 20:22

## 2017-08-03 RX ADMIN — RANOLAZINE 1000 MG: 500 TABLET, FILM COATED, EXTENDED RELEASE ORAL at 17:30

## 2017-08-03 RX ADMIN — REGADENOSON 0.4 MG: 0.08 INJECTION, SOLUTION INTRAVENOUS at 13:28

## 2017-08-03 RX ADMIN — ASPIRIN 81 MG: 81 TABLET, COATED ORAL at 08:15

## 2017-08-03 RX ADMIN — ISOSORBIDE MONONITRATE 30 MG: 30 TABLET, EXTENDED RELEASE ORAL at 08:15

## 2017-08-03 RX ADMIN — BUSPIRONE HYDROCHLORIDE 15 MG: 15 TABLET ORAL at 20:22

## 2017-08-03 RX ADMIN — Medication 10 ML: at 08:16

## 2017-08-03 RX ADMIN — HYDROCODONE BITARTRATE AND ACETAMINOPHEN 1 TABLET: 7.5; 325 TABLET ORAL at 01:54

## 2017-08-03 NOTE — PLAN OF CARE
Problem: Patient Care Overview (Adult)  Goal: Plan of Care Review  Outcome: Ongoing (interventions implemented as appropriate)    08/03/17 0456   Coping/Psychosocial Response Interventions   Plan Of Care Reviewed With patient   Patient Care Overview   Progress progress toward functional goals as expected       Goal: Adult Individualization and Mutuality  Outcome: Ongoing (interventions implemented as appropriate)  Goal: Discharge Needs Assessment  Outcome: Ongoing (interventions implemented as appropriate)    Problem: Acute Coronary Syndrome (ACS) (Adult)  Goal: Signs and Symptoms of Listed Potential Problems Will be Absent or Manageable (Acute Coronary Syndrome)  Outcome: Ongoing (interventions implemented as appropriate)

## 2017-08-03 NOTE — PLAN OF CARE
Problem: Patient Care Overview (Adult)  Goal: Plan of Care Review  Outcome: Ongoing (interventions implemented as appropriate)    08/03/17 1821   Coping/Psychosocial Response Interventions   Plan Of Care Reviewed With patient   Patient Care Overview   Progress no change   Outcome Evaluation   Outcome Summary/Follow up Plan minimal chest pain enzymes negative waiting for stress test results       Goal: Adult Individualization and Mutuality  Outcome: Ongoing (interventions implemented as appropriate)  Goal: Discharge Needs Assessment  Outcome: Ongoing (interventions implemented as appropriate)    Problem: Acute Coronary Syndrome (ACS) (Adult)  Goal: Signs and Symptoms of Listed Potential Problems Will be Absent or Manageable (Acute Coronary Syndrome)  Outcome: Ongoing (interventions implemented as appropriate)

## 2017-08-03 NOTE — CONSULTS
Cardiology Consultation Note.        Patient Name: Nikki Felder  Age/Sex: 66 y.o. female  : 1950  MRN: [id number]    Date of consultation: 2017  Consulting Physician: Raheel Martinez MD  Primary care Physician: JUANA Rhodes  Requesting Physician:  Tonio Carver MD  Reason for consultation: Chest pain      Subjective:       Chief Complaint: Chest pain     History of Present Illness:  Nikki Felder is a 66 y.o. female     Body mass index is 24.94 kg/(m^2).  with a past medical history significant for atherosclerotic coronary artery disease with aborted anterior wall myocardial infarction on 09/15/2016 with a Pronto thrombectomy and PTCA and stenting of the 100% occluded left anterior descending artery, with deployment of a 2.5 mm x 8 mm, a 2.5 mm x 15 mm, and a 2.75 mm x 18 mm Alpine drug-eluting stent, with reduction of stenosis from 100% to less than 0% stenosis, and PTCA and stenting of the 100% occluded 1st diagonal branch with deployment of a 2 mm x 28 mm Mini Vision stent, and luminal irregularity in the circumflex artery, and no evidence of any obstructive disease noted in the right coronary artery, with with the repeat percutaneous intervention to the diagonal branch done a week ago with deployment of a 2.5 mm x 15 mm and a 2.5 mm x 18 mm Alpine drug-eluting stent.. Patient had repeat percutaneous intervention to the diagonal branch on 2017 with deployment  of a 2.0 mm x 15 mm mini vision stent.  Patient was subsequently discharged home.  Patient had not been compliant with his pharmacological therapy.  Patient was started on Ranexa which the patient has not been taking.  Patient has continued to smoke.    Patient was hospitalized in May 2017 with symptoms of chest discomfort subsequently had negative troponin and patient was treated medically.  Patient was started on Ranexa 500 mg twice a day.  Patient had not been taking her Ranexa at home.  Patient had symptoms of chest pain  along with jaw pain.  Patient denies any associated shortness of breath or diaphoresis.  Patient chest pain increasing in severity when she started ambulating and was concerned and presented to the emergency room.  Patient resting electrocardiogram revealed T-wave inversion which is unchanged from the baseline EKG.    Patient on questioning denies any symptoms of palpitation patient denies any symptoms of lightheaded dizziness or complete loss of consciousness.    Patient 10 point review of system except for stated in the history of present illness is negative        Past Medical History:    1. Atherosclerotic coronary artery disease.   2. PTCA and stenting of the first diagonal branch with deployment of a 2 mm x 15 mm mini vision stent done on April 4, 2017 with previous PTCA and stenting of the first diagonal branch with deployment of a 2.5 mm x 15 mm and a 2.5 mm x 18 mm Alpine drug-eluting stent  done in March 2017, with PTCA of the in-stent stenosis in the diagonal branch done in November 2016 with sustained patency in the left anterior descending artery stent and no evidence of any obstructive disease in the circumflex artery..  3. Aborted anterior wall myocardial infarction on 09/15/2016 with atherosclerotic coronary artery disease.   4. Pronto thrombectomy of the left anterior descending artery with PTCA and stenting of the proximal to mid left anterior descending artery with deployment of a 2.5 mm x 8 mm, and a 2.5 mm x 15 mm, and a 2.75 mm x 18 mm Alpine drug-eluting stent with reduction of stenosis from 100% to less than 0% stenosis.   5. PTCA and stenting of the 100% occluded 1st diagonal branch with deployment of a 2 mm x 28 mm Mini-Vision stent.  6. No evidence of any obstructive disease noted in the right coronary artery.   7. Ischemic cardiomyopathy with anterior apical wall hypokinesis with akinesis with an ejection fraction of 25-30%.   8. Left ventricular apical thrombus on previous  anticoagulation with Coumadin.   9. Mild mitral and mild tricuspid regurgitation.   10. Concentric left ventricular hypertrophy with diastolic dysfunction.   11. Hyperlipidemia.   12. Non-insulin-dependent diabetes.   13. Anxiety and panic disorder.   14. Chronic pain syndrome.   15. Chronic obstructive lung disease with tobacco abuse.   16. History of renal calculi.   17. Previous history of transient ischemic attack.   18. Rectus sheet hematoma secondary to supratherapeutic PT/INR from anticoagulation with Coumadin.   19. Acute rehab followup after the rectus sheath hematoma.   20. Ongoing tobacco abuse.      Past Surgical History:          1. Status post  times 2 occasions.   2. Status post bilateral tubal ligation.   3. Status post cholecystectomy.   4. Status post dilation and curettage procedure.             Family History:  Family History   Problem Relation Age of Onset   • Alzheimer's disease Mother    • Diabetes Brother    • Alzheimer's disease Other    • Diabetes Other    • Lung cancer Other    • Pulmonary embolism Other    • Hypertension Father    • Depression Daughter    • Hypertension Daughter    • Anxiety disorder Daughter    • Lung cancer Brother        Social History:  Social History     Social History   • Marital status:      Spouse name: N/A   • Number of children: N/A   • Years of education: N/A     Occupational History   • Not on file.     Social History Main Topics   • Smoking status: Current Every Day Smoker     Packs/day: 0.50     Years: 45.00     Types: Cigarettes   • Smokeless tobacco: Never Used   • Alcohol use No   • Drug use: No   • Sexual activity: Defer     Other Topics Concern   • Not on file     Social History Narrative    Previously worked in [employer] at [employer] [facility] [employer] [facility] [place].  Lives with daughter.         Cardiac Risk factor:   1. Postmenopausal.   2. Arterial hypertension.   3. Hyperlipidemia.  4. Tobacco abuse.   5. Diabetes.    Allergies:  No Known  Allergies    Medication::  Prescriptions Prior to Admission   Medication Sig Dispense Refill Last Dose   • aspirin 81 MG tablet Take 1 tablet by mouth Daily. 30 tablet 5 7/19/2017 at Unknown time   • busPIRone (BUSPAR) 15 MG tablet Take 15 mg by mouth Every Night.   7/19/2017 at Unknown time   • carvedilol (COREG) 3.125 MG tablet Take 1 tablet by mouth 2 (Two) Times a Day. 180 tablet 3 7/19/2017 at Unknown time   • clopidogrel (PLAVIX) 75 MG tablet Take 75 mg by mouth Daily.   7/19/2017 at Unknown time   • escitalopram (LEXAPRO) 20 MG tablet Take 1 tablet by mouth Daily. (Patient taking differently: Take 20 mg by mouth Every Night.) 90 tablet 3 7/19/2017 at Unknown time   • FINGERSTIX LANCETS misc To check sugar tid dx code E11.65 200 each 5 Unknown at Unknown time   • gabapentin (NEURONTIN) 400 MG capsule Take 400 mg by mouth 3 (Three) Times a Day. Pt takes 400 mg + 800 mg three times daily.   7/19/2017 at Unknown time   • gabapentin (NEURONTIN) 800 MG tablet Take 600 mg by mouth 3 (Three) Times a Day. Pt takes 400 mg + 800 mg three times daily.   7/19/2017 at Unknown time   • glipiZIDE (GLUCOTROL) 5 MG tablet Take 1 tablet by mouth 2 (Two) Times a Day Before Meals. (Patient taking differently: Take 5 mg by mouth Daily.) 180 tablet 3 7/19/2017 at Unknown time   • HYDROcodone-acetaminophen (NORCO) 7.5-325 MG per tablet Take 1 tablet by mouth Every 6 (Six) Hours As Needed for Moderate Pain (4-6).      • isosorbide mononitrate (IMDUR) 30 MG 24 hr tablet Take 1 tablet by mouth Daily. 30 tablet 0    • lisinopril (ZESTRIL) 5 MG tablet Take 1 tablet by mouth Daily. (Patient taking differently: Take 5 mg by mouth Every Night.) 30 tablet 2 7/19/2017 at Unknown time   • ranolazine (RANEXA) 500 MG 12 hr tablet Take 1 tablet by mouth 2 (Two) Times a Day. 60 tablet 0 never           Review of Systems:       Constitutional:  Denies recent weight loss, weight gain, fever or chills, no change in exercise tolerance     HENT:   Denies any hearing loss, epistaxis, hoarseness, or difficulty speaking.     Eyes: Wears eyeglasses or contact lenses     Respiratory:  Denies dyspnea with exertion,no cough, wheezing, or hemoptysis.     Cardiovascular: Positive for chest pain.  Negative for palpations, , orthopnea, PND, peripheral edema, syncope, or claudication.     Gastrointestinal:  Denies change in bowel habits, dyspepsia, ulcer disease, hematochezia, or melena.  No nausea, no vomiting, no hematemesis, no diarrhea or constipation, no melena      Endocrine: Negative for cold intolerance, heat intolerance, polydipsia, polyphagia and polyuria. Denies any history of weight change, heat/cold intolerance, polydipsia, polyuria     Genitourinary: Negative hor hematuria.      Musculoskeletal: Denies any history of arthritic symptoms or back problems .  No joint pain, joint stiffness, joint swelling, muscle pain, muscle weakness and neck pain    Skin:  Denies any change in hair or nails, rashes, or skin lesions.     Allergic/Immunologic: Negative.  Negative for environmental allergies, food allergies and immunocompromised state.     Neurological:  Denies any history of recurrent headaches, strokes, TIA, or seizure disorder.     Hematological: Denies excessive bleeding, easy bruising, fatigue, lymphadenopathy and petechiae or any bleeding disorders, or lymphadenopathy.     Psychiatric/Behavioral: Denies any history of depression, substance abuse, or change in cognitive function. Denies any psychomotor reaction or tangential thought.  No depression, homicidal ideations and suicidal ideations    Endocrine: No frequent urination and nocturia, temperature intolerance, weight gain, unintended and weight loss, unintended            Objective:     Objective:  Vitals:    08/02/17 2007   BP: 108/57   Pulse: 66   Resp: 16   Temp: 98 °F (36.7 °C)   SpO2: 96%     .    Body mass index is 24.94 kg/(m^2).           Physical Exam:   General Appearance:    Alert, oriented,  cooperative, in no acute distress   Head:    Normocephalic, atraumatic, without obvious abnormality   Eyes:           BASIL  Lids and lashes normal, conjunctivae and sclerae normal, no icterus, no pallor   Ears:    Ears appear intact with no abnormalities noted   Throat:   Mucous membranes pink and moist   Neck:   Supple, trachea midline, no carotid bruit, no organomegaly or JVD   Lungs:     Clear to auscultation and percussion, respirations regular, even and Unlabored. No wheezes, rales, rhonchi    Heart:    Regular rhythm and normal rate, normal S1 and S2, no            murmur, no gallop, no rub, no click   Abdomen:     Soft, non-tender, non-distended, no guarding, no rebound tenderness, Normal bowel sounds in all four quadrant, no masses, liver and spleen nonpalpable,    Genitalia:    Deferred   Extremities:   Moves all extremities well, no edema, no cyanosis, no              Redness, no clubbing   Pulses:   Pulses palpable and equal bilaterally   Skin:   Moist and warm. No bleeding, bruising or rash   Neurologic/Psychiatric:   Alert and oriented to person, place, and time.  Motor, power and tone in upper and lower extremity is grossly intact.  No focal neurological deficits. Normal cognitive function. No psychomotor reaction or tangential thought. No depression, homicidal ideations and suicidal ideations           Lab Review:       Results from last 7 days  Lab Units 08/02/17  1328   SODIUM mmol/L 137   POTASSIUM mmol/L 3.2*   CHLORIDE mmol/L 111*   CO2 mmol/L 16.0*   BUN mg/dL 13   CREATININE mg/dL 0.66   CALCIUM mg/dL 8.9   BILIRUBIN mg/dL 0.4   ALK PHOS U/L 91   ALT (SGPT) U/L 25   AST (SGOT) U/L 27   GLUCOSE mg/dL 142*       Results from last 7 days  Lab Units 08/02/17  2253 08/02/17  1654 08/02/17  1328   CK TOTAL U/L  --  30 36   TROPONIN I ng/mL <0.012 <0.012 <0.012           Results from last 7 days  Lab Units 08/02/17  1256   WBC 10*3/mm3 4.54   HEMOGLOBIN g/dL 12.8   HEMATOCRIT % 37.3   PLATELETS  10*3/mm3 235           Results from last 7 days  Lab Units 08/02/17  1328   MAGNESIUM mg/dL 2.3           Results from last 7 days  Lab Units 08/02/17  1328   TSH mIU/mL 0.130*       EKG:   ECG/EMG Results (last 24 hours)     Procedure Component Value Units Date/Time    SCANNED EKG [[id number]] Resulted:  08/02/17      Updated:  08/02/17 1531    ECG 12 Lead [[id number]] Collected:  08/02/17 1230     Updated:  08/02/17 1907          Imaging:  Imaging Results (last 24 hours)     Procedure Component Value Units Date/Time    XR Chest 1 View [[id number]] Collected:  08/02/17 1302     Updated:  08/02/17 1321    Narrative:       Chest single view.        CLINICAL INDICATION: Mid chest pain     COMPARISON: Chest May 27, 2017    FINDINGS: Cardiac silhouette is normal in size. Two left coronary  artery stents are observed. Pulmonary vascularity is  unremarkable.   Discoid fibrotic changes left lung base. No significant changes  since prior exam.  No focal infiltrate or consolidation.  No pleural effusion.  No  pneumothorax.      Impression:       CONCLUSION: No evidence of active disease.       Electronically signed by:  Ivan Stephens MD  8/2/2017 1:20 PM CDT  Workstation: [id number]          I personally viewed and interpreted the patient's EKG/Telemetry data.    Assessment:       1.  Chest pain  2. Atherosclerotic coronary artery disease .   3. PTCA and stenting of the left anterior descending artery and the diagonal branch September 2016.  4. PTCA of the in-stent restenosis of the diagonal branch in November 2016.  5. Ischemic cardiomyopathy   6. Chronic obstructive lung disease with tobacco abuse.  7.  Hypokalemia.         Plan:   1. Chest pain with history of documented atherosclerotic coronary artery disease with aborted anterior wall myocardial infarction in September 2016, with Pronto thrombectomy and rescue PTCA and stenting of the 100% occluded left anterior descending artery, and PTCA and stenting of the diagonal branch a  week ago with deployment of a 2.5 mm x 18 mm Alpine drug-eluting stent to had not taken her Plavix and it continued to smoke.  Patient had undergone last percutaneous intervention to the diagonal branch which wasn't percent occluded in April 2017 with deployment of a 2 mm x 15 mm mini vision stent.  Patient subsequently had been on Plavix and Ranexa for diffuse distal disease.  Patient is currently not taking Ranexa secondary to the cost.  Patient now presents with recurrent symptoms of chest pain.  Patient troponin is negative.   patient had symptoms of chest discomfort with negative troponin.  At the present time due to the patient recurrent hospitalization with symptoms of chest pain with previous history of aborted anterior wall myocardial infarction with previous PTCA and stenting of the left anterior descending artery and the diagonal branch patient has been recommended a Lexiscan Cardiolite stress tests evaluation to rule out progression and restenosis of the previously intervened coronary artery.  Patient would also be started on Ranexa 500 mg twice a day.  Risk-benefit treatment option for the Lexiscan Cardiolite stress test were discussed with the patient and an informed consent was obtained for the Lexiscan Cardiolite stress tests.  2.  Arterial hypertension.  Patient blood pressure has been labile.  Patient has been recommended to continue with the present dose of the Coreg and the losartan.  3.  Hypertensive heart disease with mitral and tricuspid regurgitation.  Clinically at the present time patient is not in congestive heart failure.  4.  History of peripheral vascular disease.  Patient does have history of peripheral vascular disease which is noted.  5.  Chronic obstructive lung disease with history of tobacco abuse.  Patient has continued to smoke and has been counseled to quit smoking.  6.  History of transient ischemic attack.  Patient did not have any residual deficits.  7.  Ischemic  cardiomyopathy.  Patient has had previous history of left ventricular systolic dysfunction with apical thrombus.  Patient had been on anticoagulation.  Patient anticoagulation was stopped after the last echocardiogram which had not revealed off any evidence of any apical thrombus.  At the present time patient is at a higher risk for cerebrovascular accident and transient ischemic attack due to the patient previous history of cerebrovascular accident.  8.  Non-insulin-dependent diabetes.  Patient has been counseled on American diabetic Association diet.  9.  [id number].  Patient would be given potassium supplement.  10.  Low TSH level.  Patient would undergo a complete thyroid profile evaluation to rule out   hyperthyroidism.      Time: time spent in face-to-face evaluation off greater than 55 minutes and interacting and formulating examining and discussing the plan with the patient with 50% of greater time spent in face-to-face interaction.    Raheel Martinez MD  08/02/17  11:27 PM  EMR Dragon/Transcription disclaimer:   Some of this note may be an electronic transcription/translation of spoken language to printed text. The electronic translation of spoken language may permit erroneous, or at times, nonsensical words or phrases to be inadvertently transcribed; Although I have reviewed the note for such errors, some may still exist.

## 2017-08-04 LAB
GLUCOSE BLDC GLUCOMTR-MCNC: 129 MG/DL (ref 70–130)
GLUCOSE BLDC GLUCOMTR-MCNC: 135 MG/DL (ref 70–130)
GLUCOSE BLDC GLUCOMTR-MCNC: 156 MG/DL (ref 70–130)
GLUCOSE BLDC GLUCOMTR-MCNC: 176 MG/DL (ref 70–130)

## 2017-08-04 PROCEDURE — 25010000002 DIPHENHYDRAMINE PER 50 MG: Performed by: NURSE PRACTITIONER

## 2017-08-04 PROCEDURE — 25010000002 KETOROLAC TROMETHAMINE PER 15 MG: Performed by: NURSE PRACTITIONER

## 2017-08-04 PROCEDURE — 63710000001 INSULIN ASPART PER 5 UNITS: Performed by: INTERNAL MEDICINE

## 2017-08-04 PROCEDURE — 96375 TX/PRO/DX INJ NEW DRUG ADDON: CPT

## 2017-08-04 PROCEDURE — 82962 GLUCOSE BLOOD TEST: CPT

## 2017-08-04 PROCEDURE — G0378 HOSPITAL OBSERVATION PER HR: HCPCS

## 2017-08-04 RX ORDER — KETOROLAC TROMETHAMINE 30 MG/ML
15 INJECTION, SOLUTION INTRAMUSCULAR; INTRAVENOUS ONCE
Status: COMPLETED | OUTPATIENT
Start: 2017-08-04 | End: 2017-08-04

## 2017-08-04 RX ORDER — DIPHENHYDRAMINE HYDROCHLORIDE 50 MG/ML
25 INJECTION INTRAMUSCULAR; INTRAVENOUS ONCE
Status: COMPLETED | OUTPATIENT
Start: 2017-08-04 | End: 2017-08-04

## 2017-08-04 RX ADMIN — ASPIRIN 81 MG: 81 TABLET, COATED ORAL at 08:23

## 2017-08-04 RX ADMIN — RANOLAZINE 1000 MG: 500 TABLET, FILM COATED, EXTENDED RELEASE ORAL at 18:02

## 2017-08-04 RX ADMIN — CARVEDILOL 3.12 MG: 3.12 TABLET, FILM COATED ORAL at 18:02

## 2017-08-04 RX ADMIN — HYDROCODONE BITARTRATE AND ACETAMINOPHEN 1 TABLET: 7.5; 325 TABLET ORAL at 02:34

## 2017-08-04 RX ADMIN — GABAPENTIN 1200 MG: 400 CAPSULE ORAL at 16:14

## 2017-08-04 RX ADMIN — GABAPENTIN 1200 MG: 400 CAPSULE ORAL at 20:12

## 2017-08-04 RX ADMIN — HYDROCODONE BITARTRATE AND ACETAMINOPHEN 1 TABLET: 7.5; 325 TABLET ORAL at 20:57

## 2017-08-04 RX ADMIN — LISINOPRIL 5 MG: 5 TABLET ORAL at 20:12

## 2017-08-04 RX ADMIN — CLOPIDOGREL BISULFATE 75 MG: 75 TABLET ORAL at 08:24

## 2017-08-04 RX ADMIN — ACETAMINOPHEN 650 MG: 325 TABLET ORAL at 10:27

## 2017-08-04 RX ADMIN — ISOSORBIDE MONONITRATE 30 MG: 30 TABLET, EXTENDED RELEASE ORAL at 08:24

## 2017-08-04 RX ADMIN — Medication 3 ML: at 12:49

## 2017-08-04 RX ADMIN — BUSPIRONE HYDROCHLORIDE 15 MG: 15 TABLET ORAL at 20:12

## 2017-08-04 RX ADMIN — CARVEDILOL 3.12 MG: 3.12 TABLET, FILM COATED ORAL at 08:23

## 2017-08-04 RX ADMIN — GABAPENTIN 1200 MG: 400 CAPSULE ORAL at 08:24

## 2017-08-04 RX ADMIN — KETOROLAC TROMETHAMINE 15 MG: 30 INJECTION, SOLUTION INTRAMUSCULAR; INTRAVENOUS at 12:49

## 2017-08-04 RX ADMIN — ESCITALOPRAM OXALATE 20 MG: 10 TABLET ORAL at 20:12

## 2017-08-04 RX ADMIN — Medication 3 ML: at 17:06

## 2017-08-04 RX ADMIN — INSULIN ASPART 2 UNITS: 100 INJECTION, SOLUTION INTRAVENOUS; SUBCUTANEOUS at 08:21

## 2017-08-04 RX ADMIN — HYDROCODONE BITARTRATE AND ACETAMINOPHEN 1 TABLET: 7.5; 325 TABLET ORAL at 14:43

## 2017-08-04 RX ADMIN — INSULIN ASPART 2 UNITS: 100 INJECTION, SOLUTION INTRAVENOUS; SUBCUTANEOUS at 18:02

## 2017-08-04 RX ADMIN — RANOLAZINE 1000 MG: 500 TABLET, FILM COATED, EXTENDED RELEASE ORAL at 08:24

## 2017-08-04 RX ADMIN — DIPHENHYDRAMINE HYDROCHLORIDE 25 MG: 50 INJECTION INTRAMUSCULAR; INTRAVENOUS at 17:05

## 2017-08-04 RX ADMIN — HYDROCODONE BITARTRATE AND ACETAMINOPHEN 1 TABLET: 7.5; 325 TABLET ORAL at 08:37

## 2017-08-04 NOTE — PROGRESS NOTES
"    [facility] Medicine Services  INPATIENT PROGRESS NOTE    Length of Stay: 0  Date of Admission: 8/2/2017  Primary Care Physician: JUANA Rhodes    Subjective   Chief Complaint:  Headache    HPI:      8/4/2017:  The patient had a Lexiscan Cardiolite Stress test yesterday.  We are awaiting further orders from Dr. Martinez.      8/3/2017:  Patient has no complaints today.  She is scheduled for a Lexiscan Cardiolite Stress test by Dr. Martinez.      8/2/2017:  H&P This 66 -year-old  female reported to the emergency part with complaints of chest pain since 9 AM this morning.  Patient states that the pain is \"right in the middle \"of her chest.  She states she is also having jaw pain and right radicular pain.  She denies shortness of air, nausea, vomiting.  She denies dizziness, syncope, presyncope.  Patient underwent a recent cardiac catheterization and has been compliant with her medications other than the Ranexa which she is waiting on due to cost.  Patient denies hematemesis, hematuria, hematochezia, melena.  Denies dysuria, fever, chills, new cough.  Denies paroxysmal nocturnal dyspnea, dyspnea upon exertion.  We have discussed the patient with Dr. Raheel Martinez of cardiology, the patient's cardiologist, who will see the patient.    Review of Systems   All other systems reviewed and are negative.     All pertinent negatives and positives are as above. All other systems have been reviewed and are negative unless otherwise stated.     Objective    Temp:  [96.6 °F (35.9 °C)-97.9 °F (36.6 °C)] 97.9 °F (36.6 °C)  Heart Rate:  [55-64] 61  Resp:  [18-20] 18  BP: (102-161)/(52-76) 102/52    Physical Exam   Constitutional: She is oriented to person, place, and time. She appears well-developed and well-nourished.   HENT:   Head: Normocephalic and atraumatic.   Eyes: EOM are normal. Pupils are equal, round, and reactive to light.   Neck: Normal range of motion. Neck supple. "   Cardiovascular: Normal rate and regular rhythm.    Pulmonary/Chest: Effort normal and breath sounds normal.   Abdominal: Soft. Bowel sounds are normal.   Musculoskeletal: Normal range of motion.   Neurological: She is alert and oriented to person, place, and time.   Skin: Skin is warm and dry.   Psychiatric: She has a normal mood and affect.     Results Review:  I have reviewed the labs, radiology results, and diagnostic studies.    Laboratory Data:     Results from last 7 days  Lab Units 08/03/17  0336 08/02/17  1328   SODIUM mmol/L 139 137   POTASSIUM mmol/L 3.7 3.2*   CHLORIDE mmol/L 112* 111*   CO2 mmol/L 19.0* 16.0*   BUN mg/dL 14 13   CREATININE mg/dL 0.68 0.66   GLUCOSE mg/dL 172* 142*   CALCIUM mg/dL 8.4 8.9   BILIRUBIN mg/dL 0.2 0.4   ALK PHOS U/L 84 91   ALT (SGPT) U/L 37 25   AST (SGOT) U/L 18 27   ANION GAP mmol/L 8.0 10.0     Estimated Creatinine Clearance: 55.9 mL/min (by C-G formula based on Cr of 0.68).    Results from last 7 days  Lab Units 08/02/17  1328   MAGNESIUM mg/dL 2.3           Results from last 7 days  Lab Units 08/03/17  0336 08/02/17  1256   WBC 10*3/mm3 6.46 4.54   HEMOGLOBIN g/dL 12.0 12.8   HEMATOCRIT % 35.6 37.3   PLATELETS 10*3/mm3 197 235           Culture Data:   No results found for: BLOODCX  No results found for: URINECX  No results found for: RESPCX  No results found for: WOUNDCX  No results found for: STOOLCX  No components found for: BODYFLD    Radiology Data:   Imaging Results (last 24 hours)     ** No results found for the last 24 hours. **          I have reviewed the patient current medications.     Assessment/Plan     Plan:   1.  Chest pain, rule out acute coronary syndrome:    Cardiac enzymes are normal.  Dr. Martinez was following the patient.  Lexiscan Cardiolite stress test results pending.    2.  Headache:  Not controlled with Tylenol.  Toradol 15 mg iv once.     Discharge Planning: I expect patient to be discharged to home in 1-2 days.      This document has been  electronically signed by JUANA Sky on August 4, 2017 1:55 PM

## 2017-08-04 NOTE — PLAN OF CARE
Problem: Patient Care Overview (Adult)  Goal: Plan of Care Review  Outcome: Ongoing (interventions implemented as appropriate)    08/04/17 1603   Coping/Psychosocial Response Interventions   Plan Of Care Reviewed With patient   Patient Care Overview   Progress improving   Outcome Evaluation   Outcome Summary/Follow up Plan pt is not having any chest pain         Problem: Acute Coronary Syndrome (ACS) (Adult)  Goal: Signs and Symptoms of Listed Potential Problems Will be Absent or Manageable (Acute Coronary Syndrome)  Outcome: Ongoing (interventions implemented as appropriate)

## 2017-08-04 NOTE — PROGRESS NOTES
Cardiology Progress Note:     LOS: 0 days   Patient Care Team:  JUANA Mckeon as PCP - General (Emergency Medicine)      Subjective:   Chart reviewed , patient seen and examined. Patient denies any chest pain, shortness of breath palpitation.:  Patient complained of having symptoms of headache.  Patient underwent a Lexiscan cardiac stress test.  Results of which are pending at the present time.  Patient has been ambulating without any symptoms of chest pain.  Patient blood pressure has been stable.              Objective:     Objective:  Vitals:    08/03/17 2002   BP: 106/52   Pulse: 55   Resp: 18   Temp: 97.2 °F (36.2 °C)   SpO2: 96%       Intake/Output Summary (Last 24 hours) at 08/03/17 2256  Last data filed at 08/03/17 1300   Gross per 24 hour   Intake              480 ml   Output                0 ml   Net              480 ml             Physical Exam:   General Appearance:    Alert, oriented, cooperative, in no acute distress   Head:    Normocephalic, atraumatic, without obvious abnormality   Eyes:           BASIL  Lids and lashes normal, conjunctivae and sclerae normal, no icterus, no pallor   Ears:    Ears appear intact with no abnormalities noted   Throat:   Mucous membranes pink and moist   Neck:   Supple, trachea midline, no carotid bruit, no organomegaly or JVD   Lungs:     Clear to auscultation and percussion, respirations regular, even and Unlabored. No wheezes, rales, rhonchi    Heart:    Regular rhythm and normal rate, normal S1 and S2, no            murmur, no gallop, no rub, no click   Abdomen:     Soft, non-tender, non-distended, no guarding, no rebound tenderness, Normal bowel sounds in all four quadrant, no masses, liver and spleen nonpalpable,    Genitalia:    Deferred   Extremities:   Moves all extremities well, no edema, no cyanosis, no              Redness, no clubbing   Pulses:   Pulses palpable and equal bilaterally   Skin:   Moist and warm. No bleeding, bruising or rash    Neurologic/Psychiatric:   Alert and oriented to person, place, and time.  Motor, power and tone in upper and lower extremity is grossly intact.  No focal neurological deficits. Normal cognitive function. No psychomotor reaction or tangential thought. No depression, homicidal ideations and suicidal ideations            Results Review:    Lab Results (last 24 hours)     Procedure Component Value Units Date/Time    Troponin [[id number]]  (Normal) Collected:  08/02/17 2253    Specimen:  Blood Updated:  08/02/17 2325     Troponin I <0.012 ng/mL     CBC & Differential [[id number]] Collected:  08/03/17 0336    Specimen:  Blood Updated:  08/03/17 0357    Narrative:       The following orders were created for panel order CBC & Differential.  Procedure                               Abnormality         Status                     ---------                               -----------         ------                     CBC Auto Differential[[id number]]        Normal              Final result                 Please view results for these tests on the individual orders.    CBC Auto Differential [[id number]]  (Normal) Collected:  08/03/17 0336    Specimen:  Blood Updated:  08/03/17 0357     WBC 6.46 10*3/mm3      RBC 3.88 10*6/mm3      Hemoglobin 12.0 g/dL      Hematocrit 35.6 %      MCV 91.8 fL      MCH 30.9 pg      MCHC 33.7 g/dL      RDW 13.0 %      RDW-SD 43.5 fl      MPV 9.0 fL      Platelets 197 10*3/mm3      Neutrophil % 70.0 %      Lymphocyte % 21.2 %      Monocyte % 5.7 %      Eosinophil % 2.6 %      Basophil % 0.3 %      Immature Grans % 0.2 %      Neutrophils, Absolute 4.52 10*3/mm3      Lymphocytes, Absolute 1.37 10*3/mm3      Monocytes, Absolute 0.37 10*3/mm3      Eosinophils, Absolute 0.17 10*3/mm3      Basophils, Absolute 0.02 10*3/mm3      Immature Grans, Absolute 0.01 10*3/mm3     Comprehensive Metabolic Panel [[id number]]  (Abnormal) Collected:  08/03/17 0336    Specimen:  Blood Updated:  08/03/17 0400     Glucose 172  (H) mg/dL      BUN 14 mg/dL      Creatinine 0.68 mg/dL      Sodium 139 mmol/L      Potassium 3.7 mmol/L      Chloride 112 (H) mmol/L      CO2 19.0 (L) mmol/L      Calcium 8.4 mg/dL      Total Protein 5.7 (L) g/dL      Albumin 3.30 (L) g/dL      ALT (SGPT) 37 U/L      AST (SGOT) 18 U/L      Alkaline Phosphatase 84 U/L      Total Bilirubin 0.2 mg/dL      eGFR Non African Amer 86 mL/min/1.73      Globulin 2.4 gm/dL      A/G Ratio 1.4 g/dL      BUN/Creatinine Ratio 20.6     Anion Gap 8.0 mmol/L     Troponin [[id number]]  (Normal) Collected:  08/03/17 0336    Specimen:  Blood Updated:  08/03/17 0407     Troponin I <0.012 ng/mL     T4, Free [[id number]]  (Normal) Collected:  08/03/17 0343    Specimen:  Blood Updated:  08/03/17 0413     Free T4 1.15 ng/dL     T3 [[id number]]  (Normal) Collected:  08/03/17 0343    Specimen:  Blood Updated:  08/03/17 0427     T3, Total 107.0 ng/dl     POC Glucose Fingerstick [[id number]]  (Abnormal) Collected:  08/03/17 0631    Specimen:  Blood Updated:  08/03/17 0649     Glucose 159 (H) mg/dL       Meter: [id number]: [id number] JUAN DIEGO COULTER       POC Glucose Fingerstick [[id number]]  (Normal) Collected:  08/03/17 1040    Specimen:  Blood Updated:  08/03/17 1057     Glucose 110 mg/dL       Meter: [id number]: [id number] KATIANA HURLEY       POC Glucose Fingerstick [[id number]]  (Abnormal) Collected:  08/03/17 1726    Specimen:  Blood Updated:  08/03/17 1737     Glucose 220 (H) mg/dL       RN NotifiedMeter: [id number]: [id number] SAAB MEI       POC Glucose Fingerstick [[id number]]  (Normal) Collected:  08/03/17 2106    Specimen:  Blood Updated:  08/03/17 2116     Glucose 130 mg/dL       RN NotifiedMeter: [id number]: [id number] KIMBERLY RAE              Medication Review:   Current Facility-Administered Medications   Medication Dose Route Frequency Provider Last Rate Last Dose   • acetaminophen (TYLENOL) tablet 650 mg  650 mg Oral Q4H PRN Tonio  MD Wen       • aspirin EC tablet 81 mg  81 mg Oral Daily Tonio Carver MD   81 mg at 08/03/17 0815   • busPIRone (BUSPAR) tablet 15 mg  15 mg Oral Nightly Tonio Carver MD   15 mg at 08/03/17 2022   • carvedilol (COREG) tablet 3.125 mg  3.125 mg Oral BID Tonio Carver MD   3.125 mg at 08/03/17 1730   • clopidogrel (PLAVIX) tablet 75 mg  75 mg Oral Daily Tonio Carver MD   75 mg at 08/03/17 0815   • dextrose (D50W) solution 25 g  25 g Intravenous Q15 Min PRN Tonio Carver MD       • dextrose (GLUTOSE) oral gel 15 g  15 g Oral Q15 Min PRN Tonio Carver MD       • escitalopram (LEXAPRO) tablet 20 mg  20 mg Oral Nightly Tonio Carver MD   20 mg at 08/03/17 2022   • gabapentin (NEURONTIN) capsule 1,200 mg  1,200 mg Oral TID Tonio Carver MD   1,200 mg at 08/03/17 2022   • glucagon (human recombinant) (GLUCAGEN DIAGNOSTIC) injection 1 mg  1 mg Subcutaneous Q15 Min PRN Tonio Carver MD       • hydrALAZINE (APRESOLINE) injection 10 mg  10 mg Intravenous Q6H PRN Tonio Carver MD       • HYDROcodone-acetaminophen (NORCO) 7.5-325 MG per tablet 1 tablet  1 tablet Oral Q6H PRN LEXIS Dumont   1 tablet at 08/03/17 2022   • insulin aspart (novoLOG) injection 0-14 Units  0-14 Units Subcutaneous 4x Daily AC & at Bedtime Tonio Carver MD   5 Units at 08/03/17 1732   • isosorbide mononitrate (IMDUR) 24 hr tablet 30 mg  30 mg Oral Daily Tonio Carver MD   30 mg at 08/03/17 0815   • lisinopril (PRINIVIL,ZESTRIL) tablet 5 mg  5 mg Oral Nightly Tonio Carver MD       • nitroglycerin (NITROSTAT) ointment 1 inch  1 inch Topical Q6H PRN Tonio Carver MD       • ondansetron (ZOFRAN) injection 4 mg  4 mg Intravenous Q6H PRN Tonio Carver MD       • ranolazine (RANEXA) 12 hr tablet 1,000 mg  1,000 mg Oral BID Raheel Martinez MD   1,000 mg at 08/03/17 1730   • sodium chloride 0.9 % flush 1-10 mL  1-10 mL Intravenous PRN Tonio Carver MD   10 mL at 08/03/17 0816   • sodium chloride 0.9 % flush 10 mL  10 mL Intravenous  ROMULO Early MD   10 mL at 08/02/17 1256       Assessment and Plan:    Active Problems:    Chest pain  1.  Chest pain.  Patient does have history of documented atherosclerotic coronary artery disease with aborted anterior wall myocardial infarction on 09/15/2016 with a Pronto thrombectomy and PTCA and stenting of the 100% occluded left anterior descending artery, with deployment of a 2.5 mm x 8 mm, a 2.5 mm x 15 mm, and a 2.75 mm x 18 mm Alpine drug-eluting stent, with reduction of stenosis from 100% to less than 0% stenosis, and PTCA and stenting of the 100% occluded 1st diagonal branch with deployment of a 2 mm x 28 mm Mini Vision stent, and luminal irregularity in the circumflex artery, and no evidence of any obstructive disease noted in the right coronary artery, with with the repeat percutaneous intervention to the diagonal branch done a week ago with deployment of a 2.5 mm x 15 mm and a 2.5 mm x 18 mm Alpine drug-eluting stent.. Patient had repeat percutaneous intervention to the diagonal branch on 4/5/2017 with deployment  of a 2.0 mm x 15 mm mini vision stent.  Patient Lexiscan cardiac stress test is pending at the present time but was off for medical management.  2.  Arterial hypertension.  Patient blood pressure is currently well-controlled.  Patient been counseled to decrease his salt intake and to be compliant with her medication.   3.  Hypertensive heart disease with mitral and tricuspid regurgitation clinically patient not in congestive heart failure.  4.  Previous history of ischemic cardiomyopathy with improvement in the left ventricular systolic function.  Clinically patient not in congestive heart failure  5.  Risk factor modification patient has been counseled on to quit smoking            Raheel Martinez MD  08/03/17  10:56 PM      Time: Time spent on face-to-face interaction 25 minutes    EMR Dragon/Transcription disclaimer:   Some of this note may be an electronic  transcription/translation of spoken language to printed text. The electronic translation of spoken language may permit erroneous, or at times, nonsensical words or phrases to be inadvertently transcribed; Although I have reviewed the note for such errors, some may still exist.

## 2017-08-04 NOTE — PROGRESS NOTES
Discharge Planning Assessment  [facility]     Patient Name: Nikki Felder  MRN: [id number]  Today's Date: 8/4/2017    Admit Date: 8/2/2017          Discharge Needs Assessment       08/04/17 1234    Living Environment    Lives With child(xavi), adult;other relative(s) (specify)   13 yr old grandson    Living Arrangements house    Home Accessibility no concerns    Transportation Available car;family or friend will provide    Living Environment Comment pt resides at home with her daughter and 13 yr old grandson.    Living Environment    Provides Primary Care For no one    Quality Of Family Relationships supportive;helpful    Able to Return to Prior Living Arrangements yes    Living Arrangement Comments pt reports having good family support.    Discharge Needs Assessment    Concerns To Be Addressed adjustment to diagnosis/illness concerns    Community Agency Name(S) pt has used home health services in past, no services currently.    Anticipated Changes Related to Illness none    Equipment Currently Used at Home none    Equipment Needed After Discharge none    Current Discharge Risk chronically ill    Discharge Disposition home or self-care            Discharge Plan       08/04/17 1236    Case Management/Social Work Plan    Additional Comments LSW assesment complete. pt resides at home with her daughter and 13 yr old grandson. pt has good family support. pt reports being independent prior to hospitalization and did not anticipate any needs at this time. pt may benefit from transition visit. LSW awaiting recomendaitons from MD. LSW/case mgt will follow up as consulted and complete arrangements as ordered.      08/04/17 1117    Case Management/Social Work Plan    Additional Comments Observation review. Patient underwent lexiscan on 8/3 without complications. Plan to dc home today after seen by hospitalist..MLAW RN CM        Discharge Placement     No information found                Demographic Summary        08/04/17 1233    Referral Information    Admission Type observation    Referral Source high risk screening    Reason For Consult discharge planning    Record Reviewed medical record    Contact Information    Permission Granted to Share Information With     Primary Care Physician Information    Name  Vargas            Functional Status       08/04/17 1233    Functional Status Prior    Ambulation 0-->independent    Transferring 0-->independent    Toileting 0-->independent    Bathing 0-->independent    Dressing 0-->independent    Eating 0-->independent    Communication 0-->understands/communicates without difficulty    Swallowing 0-->swallows foods/liquids without difficulty    IADL    Medications independent    Meal Preparation independent    Housekeeping independent    Laundry independent    Shopping independent    Oral Care independent    Activity Tolerance    Current Activity Limitations none    Usual Activity Tolerance moderate    Current Activity Tolerance moderate    Cognitive/Perceptual/Developmental    Current Mental Status/Cognitive Functioning no deficits noted    Recent Changes in Mental Status/Cognitive Functioning no changes    Developmental Stage (Eriksson's Stages of Development) Stage 8 (65 years-death/Late Adulthood) Integrity vs. Despair            Psychosocial     None            Abuse/Neglect     None            Legal     None            Substance Abuse     None            Patient Forms     None          GIANCARLO Plasencia

## 2017-08-04 NOTE — PLAN OF CARE
Problem: Patient Care Overview (Adult)  Goal: Plan of Care Review  Outcome: Ongoing (interventions implemented as appropriate)    08/04/17 0601   Coping/Psychosocial Response Interventions   Plan Of Care Reviewed With patient   Patient Care Overview   Progress progress toward functional goals as expected       Goal: Adult Individualization and Mutuality  Outcome: Ongoing (interventions implemented as appropriate)  Goal: Discharge Needs Assessment  Outcome: Ongoing (interventions implemented as appropriate)    Problem: Acute Coronary Syndrome (ACS) (Adult)  Goal: Signs and Symptoms of Listed Potential Problems Will be Absent or Manageable (Acute Coronary Syndrome)  Outcome: Ongoing (interventions implemented as appropriate)

## 2017-08-04 NOTE — PROGRESS NOTES
"    [facility] Medicine Services  INPATIENT PROGRESS NOTE    Length of Stay: 0  Date of Admission: 8/2/2017  Primary Care Physician: JUANA Rhodes    Subjective   Chief Complaint:  No complaints    HPI:      8/3/2017:  Patient has no complaints today.  She is scheduled for a Lexiscan Cardiolite Stress test by Dr. Martinez.      8/2/2017:  H&P This 66 -year-old  female reported to the emergency part with complaints of chest pain since 9 AM this morning.  Patient states that the pain is \"right in the middle \"of her chest.  She states she is also having jaw pain and right radicular pain.  She denies shortness of air, nausea, vomiting.  She denies dizziness, syncope, presyncope.  Patient underwent a recent cardiac catheterization and has been compliant with her medications other than the Ranexa which she is waiting on due to cost.  Patient denies hematemesis, hematuria, hematochezia, melena.  Denies dysuria, fever, chills, new cough.  Denies paroxysmal nocturnal dyspnea, dyspnea upon exertion.  We have discussed the patient with Dr. Raheel Martinez of cardiology, the patient's cardiologist, who will see the patient.    Review of Systems   All other systems reviewed and are negative.     All pertinent negatives and positives are as above. All other systems have been reviewed and are negative unless otherwise stated.     Objective    Temp:  [97.4 °F (36.3 °C)-98.4 °F (36.9 °C)] 97.4 °F (36.3 °C)  Heart Rate:  [55-66] 57  Resp:  [16-20] 20  BP: (108-125)/(51-64) 112/56    Physical Exam   Constitutional: She is oriented to person, place, and time. She appears well-developed and well-nourished.   HENT:   Head: Normocephalic and atraumatic.   Eyes: EOM are normal. Pupils are equal, round, and reactive to light.   Neck: Normal range of motion. Neck supple.   Cardiovascular: Normal rate and regular rhythm.    Pulmonary/Chest: Effort normal and breath sounds normal.   Abdominal: " Soft. Bowel sounds are normal.   Musculoskeletal: Normal range of motion.   Neurological: She is alert and oriented to person, place, and time.   Skin: Skin is warm and dry.   Psychiatric: She has a normal mood and affect.     Results Review:  I have reviewed the labs, radiology results, and diagnostic studies.    Laboratory Data:     Results from last 7 days  Lab Units 08/03/17  0336 08/02/17  1328   SODIUM mmol/L 139 137   POTASSIUM mmol/L 3.7 3.2*   CHLORIDE mmol/L 112* 111*   CO2 mmol/L 19.0* 16.0*   BUN mg/dL 14 13   CREATININE mg/dL 0.68 0.66   GLUCOSE mg/dL 172* 142*   CALCIUM mg/dL 8.4 8.9   BILIRUBIN mg/dL 0.2 0.4   ALK PHOS U/L 84 91   ALT (SGPT) U/L 37 25   AST (SGOT) U/L 18 27   ANION GAP mmol/L 8.0 10.0     Estimated Creatinine Clearance: 55.9 mL/min (by C-G formula based on Cr of 0.68).    Results from last 7 days  Lab Units 08/02/17  1328   MAGNESIUM mg/dL 2.3           Results from last 7 days  Lab Units 08/03/17  0336 08/02/17  1256   WBC 10*3/mm3 6.46 4.54   HEMOGLOBIN g/dL 12.0 12.8   HEMATOCRIT % 35.6 37.3   PLATELETS 10*3/mm3 197 235           Culture Data:   No results found for: BLOODCX  No results found for: URINECX  No results found for: RESPCX  No results found for: WOUNDCX  No results found for: STOOLCX  No components found for: BODYFLD    Radiology Data:   Imaging Results (last 24 hours)     ** No results found for the last 24 hours. **          I have reviewed the patient current medications.     Assessment/Plan     Plan:   Chest pain, rule out acute coronary syndrome:    Cardiac enzymes are normal.  Dr. Martinez was following the patient.  She is scheduled for a Lexiscan Cardiolite stress test.        Discharge Planning: I expect patient to be discharged to home in 1-2 days.      This document has been electronically signed by JUANA Sky on August 3, 2017 7:53 PM

## 2017-08-05 VITALS
OXYGEN SATURATION: 95 % | DIASTOLIC BLOOD PRESSURE: 72 MMHG | SYSTOLIC BLOOD PRESSURE: 131 MMHG | TEMPERATURE: 96.4 F | BODY MASS INDEX: 24.92 KG/M2 | RESPIRATION RATE: 18 BRPM | WEIGHT: 132 LBS | HEART RATE: 59 BPM | HEIGHT: 61 IN

## 2017-08-05 LAB — GLUCOSE BLDC GLUCOMTR-MCNC: 113 MG/DL (ref 70–130)

## 2017-08-05 PROCEDURE — 82962 GLUCOSE BLOOD TEST: CPT

## 2017-08-05 PROCEDURE — G0378 HOSPITAL OBSERVATION PER HR: HCPCS

## 2017-08-05 RX ADMIN — RANOLAZINE 1000 MG: 500 TABLET, FILM COATED, EXTENDED RELEASE ORAL at 08:21

## 2017-08-05 RX ADMIN — HYDROCODONE BITARTRATE AND ACETAMINOPHEN 1 TABLET: 7.5; 325 TABLET ORAL at 02:57

## 2017-08-05 RX ADMIN — ASPIRIN 81 MG: 81 TABLET, COATED ORAL at 08:20

## 2017-08-05 RX ADMIN — ISOSORBIDE MONONITRATE 30 MG: 30 TABLET, EXTENDED RELEASE ORAL at 08:20

## 2017-08-05 RX ADMIN — CLOPIDOGREL BISULFATE 75 MG: 75 TABLET ORAL at 08:18

## 2017-08-05 RX ADMIN — HYDROCODONE BITARTRATE AND ACETAMINOPHEN 1 TABLET: 7.5; 325 TABLET ORAL at 08:50

## 2017-08-05 RX ADMIN — GABAPENTIN 1200 MG: 400 CAPSULE ORAL at 08:18

## 2017-08-05 RX ADMIN — CARVEDILOL 3.12 MG: 3.12 TABLET, FILM COATED ORAL at 08:20

## 2017-08-05 NOTE — DISCHARGE SUMMARY
[facility] Medicine Services  DISCHARGE SUMMARY       Date of Admission: 8/2/2017  Date of Discharge:  8/5/2017  Primary Care Physician: JUANA Rhodes    Presenting Problem/History of Present Illness:  Hypokalemia [E87.6]  Chest pain, unspecified type [R07.9]       Final Discharge Diagnoses:  Hospital Problem List     Chest pain    Overview Signed 7/19/2017  6:41 PM by Kam Botello MD     Will trend cardiac enzymes  GALILEO treatment   Will consult cardiology.                Consults:   Consults     Date and Time Order Name Status Description    8/2/2017 1639 Inpatient Consult to Cardiology Completed     7/19/2017 2030 Inpatient Consult to Cardiology Completed           Pertinent Test Results:   Lab Results (last 72 hours)     Procedure Component Value Units Date/Time    CBC & Differential [[id number]] Collected:  08/02/17 1256    Specimen:  Blood Updated:  08/02/17 1311    Narrative:       The following orders were created for panel order CBC & Differential.  Procedure                               Abnormality         Status                     ---------                               -----------         ------                     CBC Auto Differential[[id number]]        Normal              Final result                 Please view results for these tests on the individual orders.    CBC Auto Differential [[id number]]  (Normal) Collected:  08/02/17 1256    Specimen:  Blood Updated:  08/02/17 1311     WBC 4.54 10*3/mm3      RBC 4.09 10*6/mm3      Hemoglobin 12.8 g/dL      Hematocrit 37.3 %      MCV 91.2 fL      MCH 31.3 pg      MCHC 34.3 g/dL      RDW 12.8 %      RDW-SD 42.8 fl      MPV 9.0 fL      Platelets 235 10*3/mm3      Neutrophil % 60.2 %      Lymphocyte % 32.2 %      Monocyte % 4.6 %      Eosinophil % 2.4 %      Basophil % 0.4 %      Immature Grans % 0.2 %      Neutrophils, Absolute 2.73 10*3/mm3      Lymphocytes, Absolute 1.46 10*3/mm3      Monocytes, Absolute 0.21  10*3/mm3      Eosinophils, Absolute 0.11 10*3/mm3      Basophils, Absolute 0.02 10*3/mm3      Immature Grans, Absolute 0.01 10*3/mm3     Comprehensive Metabolic Panel [[id number]]  (Abnormal) Collected:  08/02/17 1328    Specimen:  Blood Updated:  08/02/17 1401     Glucose 142 (H) mg/dL      BUN 13 mg/dL      Creatinine 0.66 mg/dL      Sodium 137 mmol/L      Potassium 3.2 (L) mmol/L      Chloride 111 (H) mmol/L      CO2 16.0 (L) mmol/L      Calcium 8.9 mg/dL      Total Protein 6.0 (L) g/dL      Albumin 3.50 g/dL      ALT (SGPT) 25 U/L      AST (SGOT) 27 U/L      Alkaline Phosphatase 91 U/L      Total Bilirubin 0.4 mg/dL      eGFR Non African Amer 89 mL/min/1.73      Globulin 2.5 gm/dL      A/G Ratio 1.4 g/dL      BUN/Creatinine Ratio 19.7     Anion Gap 10.0 mmol/L     Lavender Top [[id number]] Collected:  08/02/17 1256    Specimen:  Blood Updated:  08/02/17 1401     Extra Tube hold for add-on      Auto resulted       Troponin [[id number]]  (Normal) Collected:  08/02/17 1328    Specimen:  Blood Updated:  08/02/17 1405     Troponin I <0.012 ng/mL     BNP [[id number]]  (Normal) Collected:  08/02/17 1328    Specimen:  Blood Updated:  08/02/17 1413     proBNP 378.0 pg/mL     CK [[id number]]  (Normal) Collected:  08/02/17 1328    Specimen:  Blood Updated:  08/02/17 1453     Creatine Kinase 36 U/L     Magnesium [[id number]]  (Normal) Collected:  08/02/17 1328    Specimen:  Blood Updated:  08/02/17 1453     Magnesium 2.3 mg/dL     Green Top (Gel) [[id number]] Collected:  08/02/17 1328    Specimen:  Blood Updated:  08/02/17 1501     Extra Tube Hold for add-ons.      Auto resulted.       Gold Top - SST [[id number]] Collected:  08/02/17 1328    Specimen:  Blood Updated:  08/02/17 1501     Extra Tube Hold for add-ons.      Auto resulted.       Libby Draw [[id number]] Collected:  08/02/17 1256    Specimen:  Blood Updated:  08/02/17 1501    Narrative:       The following orders were created for panel order Libby Draw.  Procedure                                Abnormality         Status                     ---------                               -----------         ------                     Light Blue Top[[id number]]                                   Final result               Green Top (Gel)[[id number]]                                  Final result               Lavender Top[[id number]]                                     Final result               Gold Top - SST[[id number]]                                   Final result                 Please view results for these tests on the individual orders.    Light Blue Top [[id number]] Collected:  08/02/17 1328    Specimen:  Blood Updated:  08/02/17 1501     Extra Tube hold for add-on      Auto resulted       CK-MB [[id number]]  (Normal) Collected:  08/02/17 1328    Specimen:  Blood Updated:  08/02/17 1503     CKMB 0.69 ng/mL     Hemoglobin A1c [[id number]]  (Abnormal) Collected:  08/02/17 1256    Specimen:  Blood Updated:  08/02/17 1701     Hemoglobin A1C 6.9 (H) %     CK [[id number]]  (Normal) Collected:  08/02/17 1654    Specimen:  Blood Updated:  08/02/17 1714     Creatine Kinase 30 U/L     TSH [[id number]]  (Abnormal) Collected:  08/02/17 1328    Specimen:  Blood Updated:  08/02/17 1726     TSH 0.130 (L) mIU/mL     Troponin [[id number]]  (Normal) Collected:  08/02/17 1654    Specimen:  Blood Updated:  08/02/17 1727     Troponin I <0.012 ng/mL     CK-MB [[id number]]  (Normal) Collected:  08/02/17 1654    Specimen:  Blood Updated:  08/02/17 1727     CKMB 0.67 ng/mL     POC Glucose Fingerstick [[id number]]  (Normal) Collected:  08/02/17 1723    Specimen:  Blood Updated:  08/02/17 1748     Glucose 110 mg/dL       Meter: [id number]: [id number] HUAN RUST       POC Glucose Fingerstick [[id number]]  (Abnormal) Collected:  08/02/17 2121    Specimen:  Blood Updated:  08/02/17 2140     Glucose 231 (H) mg/dL       Meter: [id number]: [id number] JUAN DIEGO COULTER       Troponin [[id number]]  (Normal)  Collected:  08/02/17 2253    Specimen:  Blood Updated:  08/02/17 2325     Troponin I <0.012 ng/mL     CBC & Differential [[id number]] Collected:  08/03/17 0336    Specimen:  Blood Updated:  08/03/17 0357    Narrative:       The following orders were created for panel order CBC & Differential.  Procedure                               Abnormality         Status                     ---------                               -----------         ------                     CBC Auto Differential[[id number]]        Normal              Final result                 Please view results for these tests on the individual orders.    CBC Auto Differential [[id number]]  (Normal) Collected:  08/03/17 0336    Specimen:  Blood Updated:  08/03/17 0357     WBC 6.46 10*3/mm3      RBC 3.88 10*6/mm3      Hemoglobin 12.0 g/dL      Hematocrit 35.6 %      MCV 91.8 fL      MCH 30.9 pg      MCHC 33.7 g/dL      RDW 13.0 %      RDW-SD 43.5 fl      MPV 9.0 fL      Platelets 197 10*3/mm3      Neutrophil % 70.0 %      Lymphocyte % 21.2 %      Monocyte % 5.7 %      Eosinophil % 2.6 %      Basophil % 0.3 %      Immature Grans % 0.2 %      Neutrophils, Absolute 4.52 10*3/mm3      Lymphocytes, Absolute 1.37 10*3/mm3      Monocytes, Absolute 0.37 10*3/mm3      Eosinophils, Absolute 0.17 10*3/mm3      Basophils, Absolute 0.02 10*3/mm3      Immature Grans, Absolute 0.01 10*3/mm3     Comprehensive Metabolic Panel [[id number]]  (Abnormal) Collected:  08/03/17 0336    Specimen:  Blood Updated:  08/03/17 0400     Glucose 172 (H) mg/dL      BUN 14 mg/dL      Creatinine 0.68 mg/dL      Sodium 139 mmol/L      Potassium 3.7 mmol/L      Chloride 112 (H) mmol/L      CO2 19.0 (L) mmol/L      Calcium 8.4 mg/dL      Total Protein 5.7 (L) g/dL      Albumin 3.30 (L) g/dL      ALT (SGPT) 37 U/L      AST (SGOT) 18 U/L      Alkaline Phosphatase 84 U/L      Total Bilirubin 0.2 mg/dL      eGFR Non African Amer 86 mL/min/1.73      Globulin 2.4 gm/dL      A/G Ratio 1.4 g/dL       BUN/Creatinine Ratio 20.6     Anion Gap 8.0 mmol/L     Troponin [[id number]]  (Normal) Collected:  08/03/17 0336    Specimen:  Blood Updated:  08/03/17 0407     Troponin I <0.012 ng/mL     T4, Free [[id number]]  (Normal) Collected:  08/03/17 0343    Specimen:  Blood Updated:  08/03/17 0413     Free T4 1.15 ng/dL     T3 [[id number]]  (Normal) Collected:  08/03/17 0343    Specimen:  Blood Updated:  08/03/17 0427     T3, Total 107.0 ng/dl     POC Glucose Fingerstick [[id number]]  (Abnormal) Collected:  08/03/17 0631    Specimen:  Blood Updated:  08/03/17 0649     Glucose 159 (H) mg/dL       Meter: [id number]: [id number] JUAN DIEGO COULTER       POC Glucose Fingerstick [[id number]]  (Normal) Collected:  08/03/17 1040    Specimen:  Blood Updated:  08/03/17 1057     Glucose 110 mg/dL       Meter: [id number]: [id number] SAAB MEI       POC Glucose Fingerstick [[id number]]  (Abnormal) Collected:  08/03/17 1726    Specimen:  Blood Updated:  08/03/17 1737     Glucose 220 (H) mg/dL       RN NotifiedMeter: [id number]: [id number] SAAB MEI       POC Glucose Fingerstick [[id number]]  (Normal) Collected:  08/03/17 2106    Specimen:  Blood Updated:  08/03/17 2116     Glucose 130 mg/dL       RN NotifiedMeter: [id number]: [id number] KIMBERLY BUTCH       POC Glucose Fingerstick [[id number]]  (Abnormal) Collected:  08/04/17 0633    Specimen:  Blood Updated:  08/04/17 0645     Glucose 156 (H) mg/dL       RN NotifiedMeter: [id number]: [id number] KIMBERLY BUTCH       POC Glucose Fingerstick [[id number]]  (Normal) Collected:  08/04/17 1035    Specimen:  Blood Updated:  08/04/17 1055     Glucose 129 mg/dL       Meter: [id number]: [id number] KATIANA HURLEY       POC Glucose Fingerstick [[id number]]  (Abnormal) Collected:  08/04/17 1627    Specimen:  Blood Updated:  08/04/17 1650     Glucose 176 (H) mg/dL       RN NotifiedMeter: [id number]: [id number] KATIANA HURLEY        "POC Glucose Fingerstick [[id number]]  (Abnormal) Collected:  08/04/17 2011    Specimen:  Blood Updated:  08/04/17 2059     Glucose 135 (H) mg/dL       Sliding Scale AdminMeter: [id number]: [id number] JEY CALIXTO       POC Glucose Fingerstick [[id number]]  (Normal) Collected:  08/05/17 0609    Specimen:  Blood Updated:  08/05/17 0621     Glucose 113 mg/dL       Sliding Scale AdminMeter: [id number]: [id number] JEY CALIXTO           Imaging Results (last 72 hours)     Procedure Component Value Units Date/Time    XR Chest 1 View [[id number]] Collected:  08/02/17 1302     Updated:  08/02/17 1321    Narrative:       Chest single view.        CLINICAL INDICATION: Mid chest pain     COMPARISON: Chest May 27, 2017    FINDINGS: Cardiac silhouette is normal in size. Two left coronary  artery stents are observed. Pulmonary vascularity is  unremarkable.   Discoid fibrotic changes left lung base. No significant changes  since prior exam.  No focal infiltrate or consolidation.  No pleural effusion.  No  pneumothorax.      Impression:       CONCLUSION: No evidence of active disease.       Electronically signed by:  Ivan Stephens MD  8/2/2017 1:20 PM CDT  Workstation: [id number]        Chief Complaint on Day of Discharge: No complaints    Hospital Course:      8/4/2017:  The patient had a Lexiscan Cardiolite Stress test yesterday.  We are awaiting further orders from Dr. Martinez.       8/3/2017:  Patient has no complaints today.  She is scheduled for a Lexiscan Cardiolite Stress test by Dr. Martinez.       8/2/2017:  H&P This 66 -year-old  female reported to the emergency part with complaints of chest pain since 9 AM this morning.  Patient states that the pain is \"right in the middle \"of her chest.  She states she is also having jaw pain and right radicular pain.  She denies shortness of air, nausea, vomiting.  She denies dizziness, syncope, presyncope.  Patient underwent a recent cardiac catheterization and " "has been compliant with her medications other than the Ranexa which she is waiting on due to cost.  Patient denies hematemesis, hematuria, hematochezia, melena.  Denies dysuria, fever, chills, new cough.  Denies paroxysmal nocturnal dyspnea, dyspnea upon exertion.  We have discussed the patient with Dr. Raheel Martinez of cardiology, the patient's cardiologist, who will see the patient.       Condition on Discharge:  Stable    Physical Exam on Discharge:  /72 (BP Location: Right arm, Patient Position: Lying)  Pulse 59  Temp 96.4 °F (35.8 °C) (Temporal Artery )   Resp 18  Ht 61\" (154.9 cm)  Wt 132 lb (59.9 kg)  SpO2 95%  BMI 24.94 kg/m2  Physical Exam   Constitutional: She is oriented to person, place, and time. She appears well-developed and well-nourished.   HENT:   Head: Normocephalic and atraumatic.   Eyes: EOM are normal. Pupils are equal, round, and reactive to light.   Neck: Normal range of motion. Neck supple.   Cardiovascular: Normal rate and regular rhythm.    Pulmonary/Chest: Effort normal and breath sounds normal.   Abdominal: Soft. Bowel sounds are normal.   Musculoskeletal: Normal range of motion.   Neurological: She is alert and oriented to person, place, and time.   Skin: Skin is warm and dry.   Psychiatric: She has a normal mood and affect.     Discharge Disposition:  Home or Self Care    Discharge Medications:   Nikki Felder   Home Medication Instructions ARUN:[id number]    Printed on:08/05/17 0271   Medication Information                      aspirin 81 MG tablet  Take 1 tablet by mouth Daily.             busPIRone (BUSPAR) 15 MG tablet  Take 15 mg by mouth Every Night.             carvedilol (COREG) 3.125 MG tablet  Take 1 tablet by mouth 2 (Two) Times a Day.             clopidogrel (PLAVIX) 75 MG tablet  Take 75 mg by mouth Daily.             escitalopram (LEXAPRO) 20 MG tablet  Take 1 tablet by mouth Daily.             FINGERSTIX LANCETS misc  To check sugar tid dx code " E11.65             gabapentin (NEURONTIN) 400 MG capsule  Take 400 mg by mouth 3 (Three) Times a Day. Pt takes 400 mg + 800 mg three times daily.             gabapentin (NEURONTIN) 800 MG tablet  Take 600 mg by mouth 3 (Three) Times a Day. Pt takes 400 mg + 800 mg three times daily.             glipiZIDE (GLUCOTROL) 5 MG tablet  Take 1 tablet by mouth 2 (Two) Times a Day Before Meals.             HYDROcodone-acetaminophen (NORCO) 7.5-325 MG per tablet  Take 1 tablet by mouth Every 6 (Six) Hours As Needed for Moderate Pain (4-6).             isosorbide mononitrate (IMDUR) 30 MG 24 hr tablet  Take 1 tablet by mouth Daily.             lisinopril (ZESTRIL) 5 MG tablet  Take 1 tablet by mouth Daily.             ranolazine (RANEXA) 500 MG 12 hr tablet  Take 1 tablet by mouth 2 (Two) Times a Day.                 Discharge Diet:   Diet Instructions     Diet: Consistent Carbohydrate, Cardiac; Thin Liquids, No Restrictions       Discharge Diet:   Consistent Carbohydrate  Cardiac      Fluid Consistency:  Thin Liquids, No Restrictions                 Activity at Discharge:   Activity Instructions     Activity as Tolerated                     Discharge Care Plan/Instructions:     Follow-up Appointments: Lexiscan Cardiolite stress test revealed a small-sized, mildly severe area of ischemia located in the apex.  Continue current medical management per Dr. Martinez.  The patient will follow up with Dr. Martinez in 2-3 weeks.  Follow up with PCP in 1 week.    Future Appointments  Date Time Provider Department Center   8/15/2017 9:50 AM Yan Melendez MD [facility] None       Test Results Pending at Discharge:       This document has been electronically signed by JUANA Sky on August 5, 2017 3:23 PM        Time: Greater than 30 minutes.

## 2017-08-05 NOTE — PLAN OF CARE
Problem: Patient Care Overview (Adult)  Goal: Plan of Care Review  Outcome: Ongoing (interventions implemented as appropriate)    08/05/17 0631   Coping/Psychosocial Response Interventions   Plan Of Care Reviewed With patient   Patient Care Overview   Progress improving       Goal: Adult Individualization and Mutuality  Outcome: Ongoing (interventions implemented as appropriate)  Goal: Discharge Needs Assessment  Outcome: Ongoing (interventions implemented as appropriate)    Problem: Acute Coronary Syndrome (ACS) (Adult)  Goal: Signs and Symptoms of Listed Potential Problems Will be Absent or Manageable (Acute Coronary Syndrome)  Outcome: Ongoing (interventions implemented as appropriate)

## 2017-08-05 NOTE — PROGRESS NOTES
Cardiology Progress Note:     LOS: 0 days   Patient Care Team:  JUANA Mckeon as PCP - General (Emergency Medicine)      Subjective:    Chart reviewed , patient seen and examined. Patient denies any chest pain, shortness of breath palpitation.:  Reviewed with the patient the the Lexiscan Cardiolite stress test finding.  Patient does have area of reversible defect in the apex.  As the patient Lexiscan Cardiolite stress tests did not show off a large reversible defect and to be compliant with pharmacological therapy.  Patient has been recommended to be compliant with the Ranexa thousand milligrams twice a day.          Objective:     Objective:  Vitals:    08/05/17 0100   BP:    Pulse: 60   Resp:    Temp:    SpO2:        Intake/Output Summary (Last 24 hours) at 08/05/17 0156  Last data filed at 08/04/17 2016   Gross per 24 hour   Intake             1140 ml   Output                0 ml   Net             1140 ml             Physical Exam:   General Appearance:    Alert, oriented, cooperative, in no acute distress   Head:    Normocephalic, atraumatic, without obvious abnormality   Eyes:           BASIL  Lids and lashes normal, conjunctivae and sclerae normal, no icterus, no pallor   Ears:    Ears appear intact with no abnormalities noted   Throat:   Mucous membranes pink and moist   Neck:   Supple, trachea midline, no carotid bruit, no organomegaly or JVD   Lungs:     Clear to auscultation and percussion, respirations regular, even and Unlabored. No wheezes, rales, rhonchi    Heart:    Regular rhythm and normal rate, normal S1 and S2, no            murmur, no gallop, no rub, no click   Abdomen:     Soft, non-tender, non-distended, no guarding, no rebound tenderness, Normal bowel sounds in all four quadrant, no masses, liver and spleen nonpalpable,    Genitalia:    Deferred   Extremities:   Moves all extremities well, no edema, no cyanosis, no              Redness, no clubbing   Pulses:   Pulses palpable and equal  bilaterally   Skin:   Moist and warm. No bleeding, bruising or rash   Neurologic/Psychiatric:   Alert and oriented to person, place, and time.  Motor, power and tone in upper and lower extremity is grossly intact.  No focal neurological deficits. Normal cognitive function. No psychomotor reaction or tangential thought. No depression, homicidal ideations and suicidal ideations            Results Review:    Lab Results (last 24 hours)     Procedure Component Value Units Date/Time    POC Glucose Fingerstick [[id number]]  (Abnormal) Collected:  08/04/17 0633    Specimen:  Blood Updated:  08/04/17 0645     Glucose 156 (H) mg/dL       RN NotifiedMeter: [id number]: [id number] KIMBERLY RAE       POC Glucose Fingerstick [[id number]]  (Normal) Collected:  08/04/17 1035    Specimen:  Blood Updated:  08/04/17 1055     Glucose 129 mg/dL       Meter: [id number]: [id number] SAAB MEI       POC Glucose Fingerstick [[id number]]  (Abnormal) Collected:  08/04/17 1627    Specimen:  Blood Updated:  08/04/17 1650     Glucose 176 (H) mg/dL       RN NotifiedMeter: [id number]: [id number] SAAB MEI       POC Glucose Fingerstick [[id number]]  (Abnormal) Collected:  08/04/17 2011    Specimen:  Blood Updated:  08/04/17 2059     Glucose 135 (H) mg/dL       Sliding Scale AdminMeter: [id number]: [id number] JEY CALIXTO              Medication Review:   Current Facility-Administered Medications   Medication Dose Route Frequency Provider Last Rate Last Dose   • acetaminophen (TYLENOL) tablet 650 mg  650 mg Oral Q4H PRN Tonio Carver MD   650 mg at 08/04/17 1027   • aspirin EC tablet 81 mg  81 mg Oral Daily Tonio Carver MD   81 mg at 08/04/17 0823   • busPIRone (BUSPAR) tablet 15 mg  15 mg Oral Nightly Tonio Carver MD   15 mg at 08/04/17 2012   • carvedilol (COREG) tablet 3.125 mg  3.125 mg Oral BID Tonio Carver MD   3.125 mg at 08/04/17 1802   • clopidogrel (PLAVIX) tablet 75 mg  75 mg Oral  Daily Tonio Carver MD   75 mg at 08/04/17 0824   • dextrose (D50W) solution 25 g  25 g Intravenous Q15 Min PRN Tonio Carver MD       • dextrose (GLUTOSE) oral gel 15 g  15 g Oral Q15 Min PRN Tonio Carver MD       • escitalopram (LEXAPRO) tablet 20 mg  20 mg Oral Nightly Tonio Carver MD   20 mg at 08/04/17 2012   • gabapentin (NEURONTIN) capsule 1,200 mg  1,200 mg Oral TID Tonio Carver MD   1,200 mg at 08/04/17 2012   • glucagon (human recombinant) (GLUCAGEN DIAGNOSTIC) injection 1 mg  1 mg Subcutaneous Q15 Min PRN Tonio Carver MD       • hydrALAZINE (APRESOLINE) injection 10 mg  10 mg Intravenous Q6H PRN Tonio Carver MD       • HYDROcodone-acetaminophen (NORCO) 7.5-325 MG per tablet 1 tablet  1 tablet Oral Q6H PRN LEXIS Dumont   1 tablet at 08/04/17 2057   • insulin aspart (novoLOG) injection 0-14 Units  0-14 Units Subcutaneous 4x Daily AC & at Bedtime Tonio Carver MD   2 Units at 08/04/17 1802   • isosorbide mononitrate (IMDUR) 24 hr tablet 30 mg  30 mg Oral Daily Tonio Carver MD   30 mg at 08/04/17 0824   • lisinopril (PRINIVIL,ZESTRIL) tablet 5 mg  5 mg Oral Nightly Tonio Carver MD   5 mg at 08/04/17 2012   • nitroglycerin (NITROSTAT) ointment 1 inch  1 inch Topical Q6H PRN Tonio Carver MD       • ondansetron (ZOFRAN) injection 4 mg  4 mg Intravenous Q6H PRN Tonio Carver MD       • ranolazine (RANEXA) 12 hr tablet 1,000 mg  1,000 mg Oral BID Raheel Martinez MD   1,000 mg at 08/04/17 1802   • sodium chloride 0.9 % flush 1-10 mL  1-10 mL Intravenous PRN Tonio Carver MD   3 mL at 08/04/17 1706   • sodium chloride 0.9 % flush 10 mL  10 mL Intravenous PRN Jonathon Early MD   10 mL at 08/02/17 1256       Assessment and Plan:    Active Problems:    Chest pain  1.  Chest pain.  Patient has symptoms of chest discomfort which is suggestive of angina.  Patient has history of aborted anterior wall myocardial infarction on 09/15/2016 with a Pronto thrombectomy and PTCA and stenting of the  100% occluded left anterior descending artery, with deployment of a 2.5 mm x 8 mm, a 2.5 mm x 15 mm, and a 2.75 mm x 18 mm Alpine drug-eluting stent, with reduction of stenosis from 100% to less than 0% stenosis, and PTCA and stenting of the 100% occluded 1st diagonal branch with deployment of a 2 mm x 28 mm Mini Vision stent, and luminal irregularity in the circumflex artery, and no evidence of any obstructive disease noted in the right coronary artery, with with the repeat percutaneous intervention to the diagonal branch done a week ago with deployment of a 2.5 mm x 15 mm and a 2.5 mm x 18 mm Alpine drug-eluting stent.. Patient had repeat percutaneous intervention to the diagonal branch on 4/5/2017 with deployment  of a 2.0 mm x 15 mm mini vision stent.  Patient at the present time underwent a Lexiscan Cardiolite stress tests that revealeda small-sized, mildly severe area of ischemia located in the apex.  Patient at the present time would be treated medically.    2.  Arterial hypertension.  Patient blood pressure has been labile.  Patient has been recommended to continue with the present dose of the Coreg and the losartan.    3.  Hypertensive heart disease with mitral and tricuspid regurgitation.  Clinically at the present time patient is not in congestive heart failure.    4.  History of peripheral vascular disease.  Patient does have history of peripheral vascular disease   which is noted.    5.  Chronic obstructive lung disease with history of tobacco abuse.  Patient has continued to smoke and has been counseled to quit smoking.    6.  History of transient ischemic attack.  Patient did not have any residual deficits.    7.  Ischemic cardiomyopathy.  Patient has had previous history of left ventricular systolic dysfunction with apical thrombus.  Patient had been on anticoagulation.  Patient anticoagulation was stopped after the last echocardiogram which had not revealed off any evidence of any apical thrombus.   At the present time patient is at a higher risk for cerebrovascular accident and transient ischemic attack due to the patient previous history of cerebrovascular accident.    8.  Non-insulin-dependent diabetes.  Patient has been counseled on [employer] diet.    Patient would ambulate in the hallway and the patient did not have any further recurrent symptoms of chest discomfort patient would be stable to be discharged home.      Raheel Martinez MD  08/05/17  1:56 AM  EMR Dragon/Transcription disclaimer:   Some of this note may be an electronic transcription/translation of spoken language to printed text. The electronic translation of spoken language may permit erroneous, or at times, nonsensical words or phrases to be inadvertently transcribed; Although I have reviewed the note for such errors, some may still exist.       Time: Time spent in face-to-face interaction 25 minutes    EMR Dragon/Transcription disclaimer:   Some of this note may be an electronic transcription/translation of spoken language to printed text. The electronic translation of spoken language may permit erroneous, or at times, nonsensical words or phrases to be inadvertently transcribed; Although I have reviewed the note for such errors, some may still exist.

## 2017-08-10 LAB
BH CV STRESS COMMENTS STAGE 1: NORMAL
BH CV STRESS DOSE REGADENOSON STAGE 1: 0.4
BH CV STRESS DURATION MIN STAGE 1: 0
BH CV STRESS DURATION SEC STAGE 1: 15
BH CV STRESS PROTOCOL 1: NORMAL
BH CV STRESS RECOVERY BP: NORMAL MMHG
BH CV STRESS RECOVERY HR: 90 BPM
BH CV STRESS RECOVERY O2: 96 %
BH CV STRESS STAGE 1: 1
LV EF NUC BP: 68 %
MAXIMAL PREDICTED HEART RATE: 152 BPM
PERCENT MAX PREDICTED HR: 61.84 %
STRESS BASELINE BP: NORMAL MMHG
STRESS BASELINE HR: 59 BPM
STRESS O2 SAT REST: 96 %
STRESS PERCENT HR: 73 %
STRESS POST O2 SAT PEAK: 98 %
STRESS POST PEAK BP: NORMAL MMHG
STRESS POST PEAK HR: 94 BPM
STRESS TARGET HR: 129 BPM

## 2017-08-15 ENCOUNTER — OFFICE VISIT (OUTPATIENT)
Dept: PAIN MEDICINE | Facility: CLINIC | Age: 67
End: 2017-08-15

## 2017-08-15 VITALS
DIASTOLIC BLOOD PRESSURE: 84 MMHG | SYSTOLIC BLOOD PRESSURE: 140 MMHG | WEIGHT: 134 LBS | HEIGHT: 61 IN | BODY MASS INDEX: 25.3 KG/M2

## 2017-08-15 DIAGNOSIS — M46.1 SACROILIITIS (HCC): ICD-10-CM

## 2017-08-15 DIAGNOSIS — M47.817 LUMBOSACRAL SPONDYLOSIS WITHOUT MYELOPATHY: Primary | ICD-10-CM

## 2017-08-15 DIAGNOSIS — Z79.899 HIGH RISK MEDICATIONS (NOT ANTICOAGULANTS) LONG-TERM USE: ICD-10-CM

## 2017-08-15 PROCEDURE — 99214 OFFICE O/P EST MOD 30 MIN: CPT | Performed by: PAIN MEDICINE

## 2017-08-15 RX ORDER — ISOSORBIDE DINITRATE AND HYDRALAZINE HYDROCHLORIDE 37.5; 2 MG/1; MG/1
1 TABLET ORAL 3 TIMES DAILY
COMMUNITY
End: 2017-11-03 | Stop reason: HOSPADM

## 2017-08-15 RX ORDER — HYDROCODONE BITARTRATE AND ACETAMINOPHEN 5; 325 MG/1; MG/1
TABLET ORAL
Qty: 38 TABLET | Refills: 0 | Status: SHIPPED | OUTPATIENT
Start: 2017-08-15 | End: 2017-09-23

## 2017-08-15 NOTE — PROGRESS NOTES
Nikki Felder is a 66 y.o. female.   1950    HPI:   Location: right hip  Quality: shooting, aching and sharp  Severity: 4/10  Timing: constant  Alleviating: pain medication and lying down  Aggravating: ambulating  and increased activity     Patient reports that the opioid medication still provides her good relief and allows increased activity than she would have without the opioid medication.  She denies side effects.      The following portions of the patient's history were reviewed by me and updated as appropriate: allergies, current medications, past family history, past medical history, past social history, past surgical history and problem list.    Past Medical History:   Diagnosis Date   • Acute bronchitis    • Allergic    • Allergic rhinitis    • Anxiety state    • Anxiety state     Anxiety state, unspecified      • Arthritis    • Bacterial pneumonia    • Chronic pain    • Chronic pain     Other chronic pain      • Contact dermatitis    • COPD (chronic obstructive pulmonary disease)    • Coronary arteriosclerosis    • Depressive disorder    • Depressive disorder    • Diabetes mellitus due to underlying condition with diabetic autonomic neuropathy     Diabetes mellitus due to underlying condition with diabetic autonomic (poly)neuropathy      • Drug therapy     Long-term drug therapy      • Dysuria    • Encounter for immunization    • Encounter for screening for malignant neoplasm of colon    • Essential hypertension    • Folliculitis    • Headache    • Heart murmur    • Herpes zoster    • Herpes zoster with nervous system complication    • History of screening mammography    • Hyperlipidemia    • Insomnia    • Insomnia    • Kidney stone    • Lumbar radiculopathy    • Lung nodule    • Migraine    • Migraine     Migraine, unspecified, without mention of intractable migraine, without mention of status migrainosus      • Migraine    • Myocardial infarction    • Nausea and vomiting    • Nausea with  vomiting    • Neck pain    • Need for immunization against influenza    • Need for prophylactic vaccination and inoculation against diphtheria alone    • Need for prophylactic vaccination and inoculation against unspecified single disease     Need for prophylactic vaccination and inoculation against Streptococcus pneumoniae [pneumococcus] and influenza      • Non-alcoholic fatty liver disease    • Rectus sheath hematoma    • Senile osteoporosis    • Shoulder pain    • Solitary pulmonary nodule present on computed tomography of lung    • Spasm of back muscles    • Spinal stenosis of lumbar region    • Systolic congestive heart failure    • TIA (transient ischemic attack)    • Tobacco dependence syndrome    • Type 2 diabetes mellitus    • Urinary tract infectious disease    • Viral gastroenteritis    • Vitamin D deficiency     Unspecified vitamin D deficiency      • Vitamin D deficiency        Social History     Social History   • Marital status:      Spouse name: N/A   • Number of children: N/A   • Years of education: N/A     Occupational History   • Not on file.     Social History Main Topics   • Smoking status: Current Every Day Smoker     Packs/day: 0.50     Years: 45.00     Types: Cigarettes   • Smokeless tobacco: Never Used   • Alcohol use No   • Drug use: No   • Sexual activity: Defer     Other Topics Concern   • Not on file     Social History Narrative    Previously worked in [employer] at [facility] [employer].  Lives with daughter.        Family History   Problem Relation Age of Onset   • Alzheimer's disease Mother    • Diabetes Brother    • Alzheimer's disease Other    • Diabetes Other    • Lung cancer Other    • Pulmonary embolism Other    • Hypertension Father    • Depression Daughter    • Hypertension Daughter    • Anxiety disorder Daughter    • Lung cancer Brother          Current Outpatient Prescriptions:   •  aspirin 81 MG tablet, Take 1 tablet by mouth Daily., Disp: 30 tablet, Rfl: 5  •  busPIRone  (BUSPAR) 15 MG tablet, Take 15 mg by mouth Every Night., Disp: , Rfl:   •  carvedilol (COREG) 3.125 MG tablet, Take 1 tablet by mouth 2 (Two) Times a Day., Disp: 180 tablet, Rfl: 3  •  clopidogrel (PLAVIX) 75 MG tablet, Take 75 mg by mouth Daily., Disp: , Rfl:   •  escitalopram (LEXAPRO) 20 MG tablet, Take 1 tablet by mouth Daily. (Patient taking differently: Take 20 mg by mouth Every Night.), Disp: 90 tablet, Rfl: 3  •  FINGERSTIX LANCETS misc, To check sugar tid dx code E11.65, Disp: 200 each, Rfl: 5  •  gabapentin (NEURONTIN) 400 MG capsule, Take 400 mg by mouth 3 (Three) Times a Day. Pt takes 400 mg + 800 mg three times daily., Disp: , Rfl:   •  gabapentin (NEURONTIN) 800 MG tablet, Take 600 mg by mouth 3 (Three) Times a Day. Pt takes 400 mg + 800 mg three times daily., Disp: , Rfl:   •  glipiZIDE (GLUCOTROL) 5 MG tablet, Take 1 tablet by mouth 2 (Two) Times a Day Before Meals. (Patient taking differently: Take 5 mg by mouth Daily.), Disp: 180 tablet, Rfl: 3  •  HYDROcodone-acetaminophen (NORCO) 7.5-325 MG per tablet, Take 1 tablet by mouth Every 6 (Six) Hours As Needed for Moderate Pain (4-6)., Disp: , Rfl:   •  isosorbide mononitrate (IMDUR) 30 MG 24 hr tablet, Take 1 tablet by mouth Daily., Disp: 30 tablet, Rfl: 0  •  lisinopril (ZESTRIL) 5 MG tablet, Take 1 tablet by mouth Daily. (Patient taking differently: Take 5 mg by mouth Every Night.), Disp: 30 tablet, Rfl: 2  •  HYDROcodone-acetaminophen (NORCO) 5-325 MG per tablet, 1 po qid for 4 days then, 1 po tid for 4 days then, 1 po 1-2x per day until out., Disp: 38 tablet, Rfl: 0  •  Isometheptene-Dichloral-APAP (ISO-ACETAZONE PO), Take  by mouth., Disp: , Rfl:   •  isosorbide-hydrALAZINE (BIDIL) 20-37.5 MG per tablet, Take 1 tablet by mouth 3 (Three) Times a Day., Disp: , Rfl:   •  ranolazine (RANEXA) 500 MG 12 hr tablet, Take 1 tablet by mouth 2 (Two) Times a Day., Disp: 60 tablet, Rfl: 0    No Known Allergies    Review of Systems   Musculoskeletal:         R.hip pain     All other systems reviewed and are negative.    All systems reviewed and negative except for above.    Physical Exam   Constitutional: She appears well-developed and well-nourished. No distress.   Musculoskeletal:        Lumbar back: She exhibits decreased range of motion (r lumbar facet loading with ext limited to approx 10 deg due to pain).   Neurological: She is alert.   Psychiatric: She has a normal mood and affect. Her behavior is normal. Judgment normal.       Nikki was seen today for pain.    Diagnoses and all orders for this visit:    Lumbosacral spondylosis without myelopathy    Sacroiliitis    High risk medications (not anticoagulants) long-term use    Other orders  -     HYDROcodone-acetaminophen (NORCO) 5-325 MG per tablet; 1 po qid for 4 days then,  1 po tid for 4 days then,  1 po 1-2x per day until out.        Medication: Patient reports no negative side effects, Patient's opioid provides enough reflief to be more active and perform activities of daily living with less discomfort. and Refill opioid medication as above.  This is a taper.      Interventional: none at this time.  Still on plavix.     Rehab: none at this time    Behavioral:  Aberrant behavior noted. [facility] Report #[id number]  was reviewed and is consistent with stated history.    Urine drug screen Reviewed from last visit and is inappropriately pos for meth.  States there was a meth lab next door that was recently busted.  She thinks it may have gotten in her system this way.  Also had overtaken her medication.  I explained that I could not continue to provide opioids any longer.  Declines information for detox.            This document has been electronically signed by Yan Melendez MD on August 15, 2017 10:17 AM

## 2017-10-09 ENCOUNTER — APPOINTMENT (OUTPATIENT)
Dept: CT IMAGING | Facility: HOSPITAL | Age: 67
End: 2017-10-09

## 2017-10-09 ENCOUNTER — APPOINTMENT (OUTPATIENT)
Dept: GENERAL RADIOLOGY | Facility: HOSPITAL | Age: 67
End: 2017-10-09

## 2017-10-09 ENCOUNTER — HOSPITAL ENCOUNTER (EMERGENCY)
Facility: HOSPITAL | Age: 67
Discharge: ANOTHER HOSPITAL | End: 2017-10-09
Attending: EMERGENCY MEDICINE | Admitting: EMERGENCY MEDICINE

## 2017-10-09 VITALS
RESPIRATION RATE: 15 BRPM | HEART RATE: 96 BPM | SYSTOLIC BLOOD PRESSURE: 195 MMHG | DIASTOLIC BLOOD PRESSURE: 83 MMHG | OXYGEN SATURATION: 100 % | WEIGHT: 127 LBS | TEMPERATURE: 100.3 F | HEIGHT: 61 IN | BODY MASS INDEX: 23.98 KG/M2

## 2017-10-09 DIAGNOSIS — S06.5X1A TRAUMATIC SUBDURAL HEMORRHAGE WITH LOSS OF CONSCIOUSNESS OF 30 MINUTES OR LESS, INITIAL ENCOUNTER (HCC): Primary | ICD-10-CM
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
## 2017-10-09 DIAGNOSIS — S22.21XA CLOSED FRACTURE OF MANUBRIUM, INITIAL ENCOUNTER: ICD-10-CM

## 2017-10-09 DIAGNOSIS — S89.92XA INJURY OF LEFT KNEE, INITIAL ENCOUNTER: ICD-10-CM

## 2017-10-09 DIAGNOSIS — S89.91XA INJURY OF RIGHT KNEE, INITIAL ENCOUNTER: ICD-10-CM

## 2017-10-09 LAB
ABO GROUP BLD: NORMAL
ALBUMIN SERPL-MCNC: 4.2 G/DL (ref 3.4–4.8)
ALBUMIN/GLOB SERPL: 1.5 G/DL (ref 1.1–1.8)
ALP SERPL-CCNC: 108 U/L (ref 38–126)
ALT SERPL W P-5'-P-CCNC: 38 U/L (ref 9–52)
AMPHET+METHAMPHET UR QL: NEGATIVE
AMYLASE SERPL-CCNC: 74 U/L (ref 50–130)
ANION GAP SERPL CALCULATED.3IONS-SCNC: 11 MMOL/L (ref 5–15)
ARTERIAL PATENCY WRIST A: ABNORMAL
AST SERPL-CCNC: 44 U/L (ref 14–36)
ATMOSPHERIC PRESS: ABNORMAL MMHG
BARBITURATES UR QL SCN: NEGATIVE
BASE EXCESS BLDA CALC-SCNC: -2.9 MMOL/L (ref -2.4–2.4)
BASOPHILS # BLD AUTO: 0.03 10*3/MM3 (ref 0–0.2)
BASOPHILS NFR BLD AUTO: 0.3 % (ref 0–2)
BDY SITE: ABNORMAL
BENZODIAZ UR QL SCN: NEGATIVE
BILIRUB SERPL-MCNC: 0.6 MG/DL (ref 0.2–1.3)
BILIRUB UR QL STRIP: NEGATIVE
BLD GP AB SCN SERPL QL: NEGATIVE
BUN BLD-MCNC: 10 MG/DL (ref 7–21)
BUN/CREAT SERPL: 14.5 (ref 7–25)
CA-I BLD-MCNC: 4.4 MG/DL (ref 4.5–4.9)
CALCIUM SPEC-SCNC: 8.9 MG/DL (ref 8.4–10.2)
CANNABINOIDS SERPL QL: NEGATIVE
CHLORIDE SERPL-SCNC: 105 MMOL/L (ref 95–110)
CLARITY UR: CLEAR
CO2 BLDA-SCNC: 20.4 MMOL/L (ref 23–27)
CO2 SERPL-SCNC: 23 MMOL/L (ref 22–31)
COCAINE UR QL: NEGATIVE
COLOR UR: YELLOW
CREAT BLD-MCNC: 0.69 MG/DL (ref 0.5–1)
DEPRECATED RDW RBC AUTO: 44.2 FL (ref 36.4–46.3)
EOSINOPHIL # BLD AUTO: 0.3 10*3/MM3 (ref 0–0.7)
EOSINOPHIL NFR BLD AUTO: 3.3 % (ref 0–7)
ERYTHROCYTE [DISTWIDTH] IN BLOOD BY AUTOMATED COUNT: 13.4 % (ref 11.5–14.5)
ETHANOL BLD-MCNC: <10 MG/DL (ref 0–10)
ETHANOL UR QL: <0.01 %
GFR SERPL CREATININE-BSD FRML MDRD: 84 ML/MIN/1.73 (ref 45–104)
GLOBULIN UR ELPH-MCNC: 2.8 GM/DL (ref 2.3–3.5)
GLUCOSE BLD-MCNC: 159 MG/DL (ref 60–100)
GLUCOSE BLDA-MCNC: 250 MMOL/L
GLUCOSE UR STRIP-MCNC: ABNORMAL MG/DL
HCO3 BLDA-SCNC: 19.5 MMOL/L (ref 22–26)
HCT VFR BLD AUTO: 42.5 % (ref 35–45)
HCT VFR BLD CALC: 41 % (ref 38–47)
HGB BLD-MCNC: 14.5 G/DL (ref 12–15.5)
HGB BLDA-MCNC: 14 G/DL (ref 12–16)
HGB UR QL STRIP.AUTO: NEGATIVE
HOLD SPECIMEN: NORMAL
IMM GRANULOCYTES # BLD: 0.03 10*3/MM3 (ref 0–0.02)
IMM GRANULOCYTES NFR BLD: 0.3 % (ref 0–0.5)
INR PPP: 0.97 (ref 0.8–1.2)
KETONES UR QL STRIP: ABNORMAL
LEUKOCYTE ESTERASE UR QL STRIP.AUTO: NEGATIVE
LIPASE SERPL-CCNC: 107 U/L (ref 23–300)
LYMPHOCYTES # BLD AUTO: 1.48 10*3/MM3 (ref 0.6–4.2)
LYMPHOCYTES NFR BLD AUTO: 16.4 % (ref 10–50)
Lab: NORMAL
MCH RBC QN AUTO: 31.3 PG (ref 26.5–34)
MCHC RBC AUTO-ENTMCNC: 34.1 G/DL (ref 31.4–36)
MCV RBC AUTO: 91.8 FL (ref 80–98)
METHADONE UR QL SCN: NEGATIVE
MODALITY: ABNORMAL
MONOCYTES # BLD AUTO: 0.43 10*3/MM3 (ref 0–0.9)
MONOCYTES NFR BLD AUTO: 4.8 % (ref 0–12)
NEUTROPHILS # BLD AUTO: 6.78 10*3/MM3 (ref 2–8.6)
NEUTROPHILS NFR BLD AUTO: 74.9 % (ref 37–80)
NITRITE UR QL STRIP: NEGATIVE
OPIATES UR QL: POSITIVE
OXYCODONE UR QL SCN: NEGATIVE
PCO2 BLDA: 28 MM HG (ref 35–45)
PH BLDA: 7.46 PH UNITS (ref 7.35–7.45)
PH UR STRIP.AUTO: 7 [PH] (ref 5–9)
PLATELET # BLD AUTO: 259 10*3/MM3 (ref 150–450)
PMV BLD AUTO: 8.9 FL (ref 8–12)
PO2 BLDA: 105.5 MM HG (ref 80–105)
POTASSIUM BLD-SCNC: 3.9 MMOL/L (ref 3.5–5.1)
POTASSIUM BLDA-SCNC: 4.21 MMOL/L (ref 3.6–4.9)
PROT SERPL-MCNC: 7 G/DL (ref 6.3–8.6)
PROT UR QL STRIP: NEGATIVE
PROTHROMBIN TIME: 12.8 SECONDS (ref 11.1–15.3)
RBC # BLD AUTO: 4.63 10*6/MM3 (ref 3.77–5.16)
RH BLD: POSITIVE
SAO2 % BLDCOA: 97.9 %
SODIUM BLD-SCNC: 139 MMOL/L (ref 137–145)
SODIUM BLDA-SCNC: 134.8 MMOL/L (ref 138–146)
SP GR UR STRIP: 1.02 (ref 1–1.03)
UROBILINOGEN UR QL STRIP: ABNORMAL
WBC NRBC COR # BLD: 9.05 10*3/MM3 (ref 3.2–9.8)
WHOLE BLOOD HOLD SPECIMEN: NORMAL
WHOLE BLOOD HOLD SPECIMEN: NORMAL

## 2017-10-09 PROCEDURE — 70450 CT HEAD/BRAIN W/O DYE: CPT

## 2017-10-09 PROCEDURE — 25010000002 ONDANSETRON PER 1 MG: Performed by: EMERGENCY MEDICINE

## 2017-10-09 PROCEDURE — 86850 RBC ANTIBODY SCREEN: CPT | Performed by: EMERGENCY MEDICINE

## 2017-10-09 PROCEDURE — 85025 COMPLETE CBC W/AUTO DIFF WBC: CPT | Performed by: EMERGENCY MEDICINE

## 2017-10-09 PROCEDURE — 85610 PROTHROMBIN TIME: CPT | Performed by: EMERGENCY MEDICINE

## 2017-10-09 PROCEDURE — 99291 CRITICAL CARE FIRST HOUR: CPT

## 2017-10-09 PROCEDURE — 80053 COMPREHEN METABOLIC PANEL: CPT | Performed by: EMERGENCY MEDICINE

## 2017-10-09 PROCEDURE — 72125 CT NECK SPINE W/O DYE: CPT

## 2017-10-09 PROCEDURE — 86900 BLOOD TYPING SEROLOGIC ABO: CPT | Performed by: EMERGENCY MEDICINE

## 2017-10-09 PROCEDURE — 73552 X-RAY EXAM OF FEMUR 2/>: CPT

## 2017-10-09 PROCEDURE — 73564 X-RAY EXAM KNEE 4 OR MORE: CPT

## 2017-10-09 PROCEDURE — 25010000002 PROPOFOL 1000 MG/ML EMULSION: Performed by: EMERGENCY MEDICINE

## 2017-10-09 PROCEDURE — 74177 CT ABD & PELVIS W/CONTRAST: CPT

## 2017-10-09 PROCEDURE — 86901 BLOOD TYPING SEROLOGIC RH(D): CPT | Performed by: EMERGENCY MEDICINE

## 2017-10-09 PROCEDURE — 80307 DRUG TEST PRSMV CHEM ANLYZR: CPT | Performed by: EMERGENCY MEDICINE

## 2017-10-09 PROCEDURE — 81003 URINALYSIS AUTO W/O SCOPE: CPT | Performed by: EMERGENCY MEDICINE

## 2017-10-09 PROCEDURE — 25010000002 HYDROMORPHONE PER 4 MG: Performed by: EMERGENCY MEDICINE

## 2017-10-09 PROCEDURE — 25010000002 PROMETHAZINE PER 50 MG: Performed by: EMERGENCY MEDICINE

## 2017-10-09 PROCEDURE — 82150 ASSAY OF AMYLASE: CPT | Performed by: EMERGENCY MEDICINE

## 2017-10-09 PROCEDURE — 71010 HC CHEST PA OR AP: CPT

## 2017-10-09 PROCEDURE — 0 IOPAMIDOL 61 % SOLUTION: Performed by: EMERGENCY MEDICINE

## 2017-10-09 PROCEDURE — 96365 THER/PROPH/DIAG IV INF INIT: CPT

## 2017-10-09 PROCEDURE — 83690 ASSAY OF LIPASE: CPT | Performed by: EMERGENCY MEDICINE

## 2017-10-09 PROCEDURE — 31500 INSERT EMERGENCY AIRWAY: CPT | Performed by: EMERGENCY MEDICINE

## 2017-10-09 PROCEDURE — 96375 TX/PRO/DX INJ NEW DRUG ADDON: CPT

## 2017-10-09 PROCEDURE — 82803 BLOOD GASES ANY COMBINATION: CPT | Performed by: EMERGENCY MEDICINE

## 2017-10-09 PROCEDURE — 25010000002 SUCCINYLCHOLINE PER 20 MG: Performed by: EMERGENCY MEDICINE

## 2017-10-09 PROCEDURE — 71260 CT THORAX DX C+: CPT

## 2017-10-09 PROCEDURE — 96376 TX/PRO/DX INJ SAME DRUG ADON: CPT

## 2017-10-09 RX ORDER — SODIUM CHLORIDE 0.9 % (FLUSH) 0.9 %
10 SYRINGE (ML) INJECTION AS NEEDED
Status: DISCONTINUED | OUTPATIENT
Start: 2017-10-09 | End: 2017-10-10 | Stop reason: HOSPADM

## 2017-10-09 RX ORDER — ONDANSETRON 2 MG/ML
4 INJECTION INTRAMUSCULAR; INTRAVENOUS ONCE
Status: COMPLETED | OUTPATIENT
Start: 2017-10-09 | End: 2017-10-09

## 2017-10-09 RX ORDER — PROMETHAZINE HYDROCHLORIDE 25 MG/ML
25 INJECTION, SOLUTION INTRAMUSCULAR; INTRAVENOUS ONCE
Status: COMPLETED | OUTPATIENT
Start: 2017-10-09 | End: 2017-10-09

## 2017-10-09 RX ORDER — ETOMIDATE 2 MG/ML
0.3 INJECTION INTRAVENOUS ONCE
Status: COMPLETED | OUTPATIENT
Start: 2017-10-09 | End: 2017-10-09

## 2017-10-09 RX ORDER — SUCCINYLCHOLINE CHLORIDE 20 MG/ML
1.5 INJECTION INTRAMUSCULAR; INTRAVENOUS ONCE
Status: COMPLETED | OUTPATIENT
Start: 2017-10-09 | End: 2017-10-09

## 2017-10-09 RX ADMIN — Medication 10 ML: at 18:00

## 2017-10-09 RX ADMIN — ONDANSETRON 4 MG: 2 INJECTION INTRAMUSCULAR; INTRAVENOUS at 18:47

## 2017-10-09 RX ADMIN — PROPOFOL 5 MCG/KG/MIN: 10 INJECTION, EMULSION INTRAVENOUS at 21:36

## 2017-10-09 RX ADMIN — ONDANSETRON 4 MG: 2 INJECTION INTRAMUSCULAR; INTRAVENOUS at 20:37

## 2017-10-09 RX ADMIN — PROMETHAZINE HYDROCHLORIDE 25 MG: 25 INJECTION INTRAMUSCULAR; INTRAVENOUS at 21:22

## 2017-10-09 RX ADMIN — ETOMIDATE 17.28 MG: 2 INJECTION, SOLUTION INTRAVENOUS at 21:31

## 2017-10-09 RX ADMIN — IOPAMIDOL 46 ML: 612 INJECTION, SOLUTION INTRAVENOUS at 21:06

## 2017-10-09 RX ADMIN — HYDROMORPHONE HYDROCHLORIDE 0.5 MG: 1 INJECTION, SOLUTION INTRAMUSCULAR; INTRAVENOUS; SUBCUTANEOUS at 18:01

## 2017-10-09 RX ADMIN — SUCCINYLCHOLINE CHLORIDE 86.4 MG: 20 INJECTION, SOLUTION INTRAMUSCULAR; INTRAVENOUS at 21:31

## 2017-10-09 RX ADMIN — HYDROMORPHONE HYDROCHLORIDE 0.5 MG: 1 INJECTION, SOLUTION INTRAMUSCULAR; INTRAVENOUS; SUBCUTANEOUS at 19:46

## 2017-10-10 PROCEDURE — 31500 INSERT EMERGENCY AIRWAY: CPT | Performed by: EMERGENCY MEDICINE

## 2017-10-10 NOTE — ED NOTES
Security stated that the patient's family was escorted to the Conference Room     Mary Alice Alonso  10/09/17 3243

## 2017-10-10 NOTE — ED NOTES
Contacted Security and the House Supervisor that the [employer] will be here in 25 minutes.      Mary Alice Alonso  10/09/17 2120       Mary Alice Alonso  10/09/17 2121
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
## 2017-10-10 NOTE — ED NOTES
Contacted xray via [facility] system stating that pt needed stat chest xray     Mary Alice Alonso  10/09/17 5556

## 2017-10-18 ENCOUNTER — HOSPITAL ENCOUNTER (INPATIENT)
Facility: HOSPITAL | Age: 67
LOS: 16 days | Discharge: HOME HEALTH | End: 2017-11-03
Attending: FAMILY MEDICINE | Admitting: FAMILY MEDICINE

## 2017-10-18 DIAGNOSIS — Z78.9 IMPAIRED MOBILITY AND ACTIVITIES OF DAILY LIVING: ICD-10-CM
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
## 2017-10-18 DIAGNOSIS — M62.81 MUSCLE WEAKNESS (GENERALIZED): ICD-10-CM

## 2017-10-18 DIAGNOSIS — S06.5X1D TRAUMATIC SUBDURAL HEMATOMA WITH LOSS OF CONSCIOUSNESS OF 30 MINUTES OR LESS, SUBSEQUENT ENCOUNTER: ICD-10-CM

## 2017-10-18 DIAGNOSIS — R26.9 ABNORMALITY OF GAIT AND MOBILITY: ICD-10-CM

## 2017-10-18 DIAGNOSIS — Z74.09 IMPAIRED MOBILITY AND ACTIVITIES OF DAILY LIVING: ICD-10-CM

## 2017-10-18 DIAGNOSIS — G81.14 SPASTIC HEMIPLEGIA OF LEFT NONDOMINANT SIDE DUE TO NONCEREBROVASCULAR ETIOLOGY (HCC): Primary | ICD-10-CM

## 2017-10-18 DIAGNOSIS — E11.65 TYPE 2 DIABETES MELLITUS WITH HYPERGLYCEMIA, WITHOUT LONG-TERM CURRENT USE OF INSULIN (HCC): ICD-10-CM

## 2017-10-18 DIAGNOSIS — R48.9 SYMBOLIC DYSFUNCTION: ICD-10-CM

## 2017-10-18 PROBLEM — G81.90 HEMIPLEGIA DUE TO CEREBROVASCULAR DISEASE (HCC): Status: ACTIVE | Noted: 2017-10-18

## 2017-10-18 PROBLEM — I67.9 HEMIPLEGIA DUE TO CEREBROVASCULAR DISEASE (HCC): Status: ACTIVE | Noted: 2017-10-18

## 2017-10-18 PROBLEM — S06.5XAA SUBDURAL HEMATOMA (HCC): Status: ACTIVE | Noted: 2017-10-18

## 2017-10-18 PROBLEM — S06.5X1A TRAUMATIC SUBDURAL HEMATOMA WITH LOSS OF CONSCIOUSNESS OF 30 MINUTES OR LESS (HCC): Status: ACTIVE | Noted: 2017-10-18

## 2017-10-18 LAB
BILIRUB UR QL STRIP: NEGATIVE
CLARITY UR: CLEAR
COLOR UR: YELLOW
GLUCOSE BLDC GLUCOMTR-MCNC: 126 MG/DL (ref 70–130)
GLUCOSE BLDC GLUCOMTR-MCNC: 170 MG/DL (ref 70–130)
GLUCOSE UR STRIP-MCNC: NEGATIVE MG/DL
HGB UR QL STRIP.AUTO: NEGATIVE
KETONES UR QL STRIP: NEGATIVE
LEUKOCYTE ESTERASE UR QL STRIP.AUTO: NEGATIVE
NITRITE UR QL STRIP: NEGATIVE
PH UR STRIP.AUTO: 7.5 [PH] (ref 5–9)
PROT UR QL STRIP: NEGATIVE
SP GR UR STRIP: 1.02 (ref 1–1.03)
UROBILINOGEN UR QL STRIP: NORMAL

## 2017-10-18 PROCEDURE — 99222 1ST HOSP IP/OBS MODERATE 55: CPT | Performed by: FAMILY MEDICINE

## 2017-10-18 PROCEDURE — P9612 CATHETERIZE FOR URINE SPEC: HCPCS

## 2017-10-18 PROCEDURE — 81003 URINALYSIS AUTO W/O SCOPE: CPT | Performed by: FAMILY MEDICINE

## 2017-10-18 PROCEDURE — 82962 GLUCOSE BLOOD TEST: CPT

## 2017-10-18 RX ORDER — DEXTROSE MONOHYDRATE 25 G/50ML
25 INJECTION, SOLUTION INTRAVENOUS
Status: DISCONTINUED | OUTPATIENT
Start: 2017-10-18 | End: 2017-10-23

## 2017-10-18 RX ORDER — ALBUTEROL SULFATE 2.5 MG/3ML
2.5 SOLUTION RESPIRATORY (INHALATION) EVERY 6 HOURS PRN
Status: DISCONTINUED | OUTPATIENT
Start: 2017-10-18 | End: 2017-11-03 | Stop reason: HOSPADM

## 2017-10-18 RX ORDER — TRAZODONE HYDROCHLORIDE 50 MG/1
50 TABLET ORAL NIGHTLY
Status: DISCONTINUED | OUTPATIENT
Start: 2017-10-18 | End: 2017-10-20

## 2017-10-18 RX ORDER — ONDANSETRON 4 MG/1
4 TABLET, ORALLY DISINTEGRATING ORAL EVERY 6 HOURS PRN
Status: DISCONTINUED | OUTPATIENT
Start: 2017-10-18 | End: 2017-11-03 | Stop reason: HOSPADM

## 2017-10-18 RX ORDER — ISOSORBIDE MONONITRATE 30 MG/1
30 TABLET, EXTENDED RELEASE ORAL DAILY
Status: DISCONTINUED | OUTPATIENT
Start: 2017-10-18 | End: 2017-11-03 | Stop reason: HOSPADM

## 2017-10-18 RX ORDER — ACETAMINOPHEN 325 MG/1
650 TABLET ORAL EVERY 4 HOURS PRN
Status: DISCONTINUED | OUTPATIENT
Start: 2017-10-18 | End: 2017-11-03 | Stop reason: HOSPADM

## 2017-10-18 RX ORDER — HYDROCODONE BITARTRATE AND ACETAMINOPHEN 10; 325 MG/1; MG/1
1 TABLET ORAL EVERY 4 HOURS PRN
Status: DISCONTINUED | OUTPATIENT
Start: 2017-10-18 | End: 2017-11-03 | Stop reason: HOSPADM

## 2017-10-18 RX ORDER — METFORMIN HYDROCHLORIDE 500 MG/1
1000 TABLET, EXTENDED RELEASE ORAL 2 TIMES DAILY WITH MEALS
Status: DISCONTINUED | OUTPATIENT
Start: 2017-10-18 | End: 2017-11-03 | Stop reason: HOSPADM

## 2017-10-18 RX ORDER — ATORVASTATIN CALCIUM 20 MG/1
20 TABLET, FILM COATED ORAL NIGHTLY
Status: DISCONTINUED | OUTPATIENT
Start: 2017-10-18 | End: 2017-11-03 | Stop reason: HOSPADM

## 2017-10-18 RX ORDER — GABAPENTIN 400 MG/1
400 CAPSULE ORAL 3 TIMES DAILY
Status: DISCONTINUED | OUTPATIENT
Start: 2017-10-18 | End: 2017-10-19

## 2017-10-18 RX ORDER — HYDROCODONE BITARTRATE AND ACETAMINOPHEN 7.5; 325 MG/1; MG/1
1 TABLET ORAL EVERY 4 HOURS PRN
Status: DISCONTINUED | OUTPATIENT
Start: 2017-10-18 | End: 2017-10-18

## 2017-10-18 RX ORDER — CALCIUM CARBONATE 200(500)MG
2 TABLET,CHEWABLE ORAL 2 TIMES DAILY PRN
Status: DISCONTINUED | OUTPATIENT
Start: 2017-10-18 | End: 2017-11-03 | Stop reason: HOSPADM

## 2017-10-18 RX ORDER — NICOTINE POLACRILEX 4 MG
15 LOZENGE BUCCAL
Status: DISCONTINUED | OUTPATIENT
Start: 2017-10-18 | End: 2017-11-03 | Stop reason: HOSPADM

## 2017-10-18 RX ORDER — LISINOPRIL 5 MG/1
5 TABLET ORAL DAILY
Status: DISCONTINUED | OUTPATIENT
Start: 2017-10-18 | End: 2017-10-20

## 2017-10-18 RX ORDER — ALPRAZOLAM 0.25 MG/1
0.25 TABLET ORAL EVERY 6 HOURS PRN
Status: DISCONTINUED | OUTPATIENT
Start: 2017-10-18 | End: 2017-10-20

## 2017-10-18 RX ORDER — CARVEDILOL 3.12 MG/1
3.12 TABLET ORAL 2 TIMES DAILY
Status: DISCONTINUED | OUTPATIENT
Start: 2017-10-18 | End: 2017-10-20

## 2017-10-18 RX ADMIN — TRAZODONE HYDROCHLORIDE 50 MG: 50 TABLET ORAL at 20:02

## 2017-10-18 RX ADMIN — ATORVASTATIN CALCIUM 20 MG: 20 TABLET, FILM COATED ORAL at 20:02

## 2017-10-18 RX ADMIN — GABAPENTIN 400 MG: 400 CAPSULE ORAL at 15:36

## 2017-10-18 RX ADMIN — METFORMIN HYDROCHLORIDE 1000 MG: 500 TABLET, EXTENDED RELEASE ORAL at 17:26

## 2017-10-18 RX ADMIN — HYDROCODONE BITARTRATE AND ACETAMINOPHEN 1 TABLET: 10; 325 TABLET ORAL at 21:26

## 2017-10-18 RX ADMIN — ONDANSETRON 4 MG: 4 TABLET, ORALLY DISINTEGRATING ORAL at 14:02

## 2017-10-18 RX ADMIN — ALPRAZOLAM 0.25 MG: 0.25 TABLET ORAL at 21:26

## 2017-10-18 RX ADMIN — GABAPENTIN 400 MG: 400 CAPSULE ORAL at 20:01

## 2017-10-18 RX ADMIN — ALPRAZOLAM 0.25 MG: 0.25 TABLET ORAL at 15:36

## 2017-10-18 RX ADMIN — CARVEDILOL 3.12 MG: 3.12 TABLET, FILM COATED ORAL at 17:26

## 2017-10-18 RX ADMIN — HYDROCODONE BITARTRATE AND ACETAMINOPHEN 1 TABLET: 7.5; 325 TABLET ORAL at 13:49

## 2017-10-18 RX ADMIN — HYDROCODONE BITARTRATE AND ACETAMINOPHEN 1 TABLET: 10; 325 TABLET ORAL at 17:27

## 2017-10-18 RX ADMIN — ONDANSETRON 4 MG: 4 TABLET, ORALLY DISINTEGRATING ORAL at 21:26

## 2017-10-19 PROBLEM — S06.9XAA TBI (TRAUMATIC BRAIN INJURY) (HCC): Status: ACTIVE | Noted: 2017-10-19

## 2017-10-19 LAB
ALBUMIN SERPL-MCNC: 3 G/DL (ref 3.4–4.8)
ALBUMIN/GLOB SERPL: 1 G/DL (ref 1.1–1.8)
ALP SERPL-CCNC: 87 U/L (ref 38–126)
ALT SERPL W P-5'-P-CCNC: 32 U/L (ref 9–52)
ANION GAP SERPL CALCULATED.3IONS-SCNC: 12 MMOL/L (ref 5–15)
AST SERPL-CCNC: 27 U/L (ref 14–36)
BASOPHILS # BLD AUTO: 0.03 10*3/MM3 (ref 0–0.2)
BASOPHILS NFR BLD AUTO: 0.3 % (ref 0–2)
BILIRUB SERPL-MCNC: 0.6 MG/DL (ref 0.2–1.3)
BUN BLD-MCNC: 8 MG/DL (ref 7–21)
BUN/CREAT SERPL: 15.1 (ref 7–25)
CALCIUM SPEC-SCNC: 8.4 MG/DL (ref 8.4–10.2)
CHLORIDE SERPL-SCNC: 101 MMOL/L (ref 95–110)
CO2 SERPL-SCNC: 26 MMOL/L (ref 22–31)
CREAT BLD-MCNC: 0.53 MG/DL (ref 0.5–1)
DEPRECATED RDW RBC AUTO: 45.1 FL (ref 36.4–46.3)
EOSINOPHIL # BLD AUTO: 0.24 10*3/MM3 (ref 0–0.7)
EOSINOPHIL NFR BLD AUTO: 2.8 % (ref 0–7)
ERYTHROCYTE [DISTWIDTH] IN BLOOD BY AUTOMATED COUNT: 13.8 % (ref 11.5–14.5)
GFR SERPL CREATININE-BSD FRML MDRD: 114 ML/MIN/1.73 (ref 60–104)
GLOBULIN UR ELPH-MCNC: 2.9 GM/DL (ref 2.3–3.5)
GLUCOSE BLD-MCNC: 127 MG/DL (ref 60–100)
GLUCOSE BLDC GLUCOMTR-MCNC: 138 MG/DL (ref 70–130)
GLUCOSE BLDC GLUCOMTR-MCNC: 139 MG/DL (ref 70–130)
GLUCOSE BLDC GLUCOMTR-MCNC: 151 MG/DL (ref 70–130)
GLUCOSE BLDC GLUCOMTR-MCNC: 184 MG/DL (ref 70–130)
HBA1C MFR BLD: 7.1 % (ref 4–5.6)
HCT VFR BLD AUTO: 29.6 % (ref 35–45)
HGB BLD-MCNC: 10.2 G/DL (ref 12–15.5)
IMM GRANULOCYTES # BLD: 0.09 10*3/MM3 (ref 0–0.02)
IMM GRANULOCYTES NFR BLD: 1 % (ref 0–0.5)
IRON 24H UR-MRATE: <10 MCG/DL (ref 37–170)
IRON SATN MFR SERPL: ABNORMAL % (ref 15–50)
LYMPHOCYTES # BLD AUTO: 1.48 10*3/MM3 (ref 0.6–4.2)
LYMPHOCYTES NFR BLD AUTO: 17.1 % (ref 10–50)
MAGNESIUM SERPL-MCNC: 2 MG/DL (ref 1.6–2.3)
MCH RBC QN AUTO: 31.1 PG (ref 26.5–34)
MCHC RBC AUTO-ENTMCNC: 34.5 G/DL (ref 31.4–36)
MCV RBC AUTO: 90.2 FL (ref 80–98)
MONOCYTES # BLD AUTO: 0.74 10*3/MM3 (ref 0–0.9)
MONOCYTES NFR BLD AUTO: 8.6 % (ref 0–12)
NEUTROPHILS # BLD AUTO: 6.05 10*3/MM3 (ref 2–8.6)
NEUTROPHILS NFR BLD AUTO: 70.2 % (ref 37–80)
NRBC BLD MANUAL-RTO: 0 /100 WBC (ref 0–0)
PLATELET # BLD AUTO: 467 10*3/MM3 (ref 150–450)
PMV BLD AUTO: 8.1 FL (ref 8–12)
POTASSIUM BLD-SCNC: 3.6 MMOL/L (ref 3.5–5.1)
PROT SERPL-MCNC: 5.9 G/DL (ref 6.3–8.6)
RBC # BLD AUTO: 3.28 10*6/MM3 (ref 3.77–5.16)
SODIUM BLD-SCNC: 139 MMOL/L (ref 137–145)
TIBC SERPL-MCNC: 234 MCG/DL (ref 265–497)
TSH SERPL DL<=0.05 MIU/L-ACNC: 0.5 MIU/ML (ref 0.46–4.68)
WBC NRBC COR # BLD: 8.63 10*3/MM3 (ref 3.2–9.8)

## 2017-10-19 PROCEDURE — G8979 MOBILITY GOAL STATUS: HCPCS | Performed by: PHYSICAL THERAPIST

## 2017-10-19 PROCEDURE — 82962 GLUCOSE BLOOD TEST: CPT

## 2017-10-19 PROCEDURE — 99232 SBSQ HOSP IP/OBS MODERATE 35: CPT | Performed by: FAMILY MEDICINE

## 2017-10-19 PROCEDURE — 83036 HEMOGLOBIN GLYCOSYLATED A1C: CPT | Performed by: FAMILY MEDICINE

## 2017-10-19 PROCEDURE — 85025 COMPLETE CBC W/AUTO DIFF WBC: CPT | Performed by: FAMILY MEDICINE

## 2017-10-19 PROCEDURE — 80053 COMPREHEN METABOLIC PANEL: CPT | Performed by: FAMILY MEDICINE

## 2017-10-19 PROCEDURE — 84443 ASSAY THYROID STIM HORMONE: CPT | Performed by: FAMILY MEDICINE

## 2017-10-19 PROCEDURE — 97110 THERAPEUTIC EXERCISES: CPT

## 2017-10-19 PROCEDURE — 83550 IRON BINDING TEST: CPT | Performed by: FAMILY MEDICINE

## 2017-10-19 PROCEDURE — G8987 SELF CARE CURRENT STATUS: HCPCS

## 2017-10-19 PROCEDURE — 97163 PT EVAL HIGH COMPLEX 45 MIN: CPT | Performed by: PHYSICAL THERAPIST

## 2017-10-19 PROCEDURE — 97530 THERAPEUTIC ACTIVITIES: CPT | Performed by: PHYSICAL THERAPIST

## 2017-10-19 PROCEDURE — G8988 SELF CARE GOAL STATUS: HCPCS

## 2017-10-19 PROCEDURE — 83540 ASSAY OF IRON: CPT | Performed by: FAMILY MEDICINE

## 2017-10-19 PROCEDURE — 97167 OT EVAL HIGH COMPLEX 60 MIN: CPT

## 2017-10-19 PROCEDURE — 92523 SPEECH SOUND LANG COMPREHEN: CPT | Performed by: SPEECH-LANGUAGE PATHOLOGIST

## 2017-10-19 PROCEDURE — 97542 WHEELCHAIR MNGMENT TRAINING: CPT | Performed by: PHYSICAL THERAPIST

## 2017-10-19 PROCEDURE — 97535 SELF CARE MNGMENT TRAINING: CPT

## 2017-10-19 PROCEDURE — G8978 MOBILITY CURRENT STATUS: HCPCS | Performed by: PHYSICAL THERAPIST

## 2017-10-19 PROCEDURE — 83735 ASSAY OF MAGNESIUM: CPT | Performed by: FAMILY MEDICINE

## 2017-10-19 RX ORDER — PRENATAL VIT/IRON FUM/FOLIC AC 27MG-0.8MG
1 TABLET ORAL DAILY
Status: DISCONTINUED | OUTPATIENT
Start: 2017-10-19 | End: 2017-11-03 | Stop reason: HOSPADM

## 2017-10-19 RX ORDER — BISACODYL 10 MG
10 SUPPOSITORY, RECTAL RECTAL DAILY PRN
Status: DISCONTINUED | OUTPATIENT
Start: 2017-10-19 | End: 2017-11-03 | Stop reason: HOSPADM

## 2017-10-19 RX ORDER — GABAPENTIN 300 MG/1
600 CAPSULE ORAL 3 TIMES DAILY
Status: DISCONTINUED | OUTPATIENT
Start: 2017-10-19 | End: 2017-11-03 | Stop reason: HOSPADM

## 2017-10-19 RX ORDER — POLYETHYLENE GLYCOL 3350 17 G/17G
17 POWDER, FOR SOLUTION ORAL DAILY
Status: DISCONTINUED | OUTPATIENT
Start: 2017-10-19 | End: 2017-11-03 | Stop reason: HOSPADM

## 2017-10-19 RX ADMIN — ONDANSETRON 4 MG: 4 TABLET, ORALLY DISINTEGRATING ORAL at 12:37

## 2017-10-19 RX ADMIN — ONDANSETRON 4 MG: 4 TABLET, ORALLY DISINTEGRATING ORAL at 07:02

## 2017-10-19 RX ADMIN — HYDROCODONE BITARTRATE AND ACETAMINOPHEN 1 TABLET: 10; 325 TABLET ORAL at 18:33

## 2017-10-19 RX ADMIN — GABAPENTIN 400 MG: 400 CAPSULE ORAL at 08:46

## 2017-10-19 RX ADMIN — METFORMIN HYDROCHLORIDE 1000 MG: 500 TABLET, EXTENDED RELEASE ORAL at 08:46

## 2017-10-19 RX ADMIN — HYDROCODONE BITARTRATE AND ACETAMINOPHEN 1 TABLET: 10; 325 TABLET ORAL at 22:37

## 2017-10-19 RX ADMIN — TRAZODONE HYDROCHLORIDE 50 MG: 50 TABLET ORAL at 20:17

## 2017-10-19 RX ADMIN — ACETAMINOPHEN 650 MG: 325 TABLET ORAL at 16:03

## 2017-10-19 RX ADMIN — GABAPENTIN 600 MG: 300 CAPSULE ORAL at 16:01

## 2017-10-19 RX ADMIN — LISINOPRIL 5 MG: 5 TABLET ORAL at 08:46

## 2017-10-19 RX ADMIN — METFORMIN HYDROCHLORIDE 1000 MG: 500 TABLET, EXTENDED RELEASE ORAL at 17:32

## 2017-10-19 RX ADMIN — ALPRAZOLAM 0.25 MG: 0.25 TABLET ORAL at 21:53

## 2017-10-19 RX ADMIN — PRENATAL VIT W/ FE FUMARATE-FA TAB 27-0.8 MG 1 TABLET: 27-0.8 TAB at 09:00

## 2017-10-19 RX ADMIN — CARVEDILOL 3.12 MG: 3.12 TABLET, FILM COATED ORAL at 08:46

## 2017-10-19 RX ADMIN — HYDROCODONE BITARTRATE AND ACETAMINOPHEN 1 TABLET: 10; 325 TABLET ORAL at 07:02

## 2017-10-19 RX ADMIN — CARVEDILOL 3.12 MG: 3.12 TABLET, FILM COATED ORAL at 17:32

## 2017-10-19 RX ADMIN — HYDROCODONE BITARTRATE AND ACETAMINOPHEN 1 TABLET: 10; 325 TABLET ORAL at 15:07

## 2017-10-19 RX ADMIN — ATORVASTATIN CALCIUM 20 MG: 20 TABLET, FILM COATED ORAL at 20:17

## 2017-10-19 RX ADMIN — GABAPENTIN 600 MG: 300 CAPSULE ORAL at 20:16

## 2017-10-19 RX ADMIN — ALPRAZOLAM 0.25 MG: 0.25 TABLET ORAL at 04:30

## 2017-10-19 RX ADMIN — POLYETHYLENE GLYCOL 3350 17 G: 17 POWDER, FOR SOLUTION ORAL at 08:59

## 2017-10-19 RX ADMIN — HYDROCODONE BITARTRATE AND ACETAMINOPHEN 1 TABLET: 10; 325 TABLET ORAL at 11:19

## 2017-10-19 RX ADMIN — HYDROCODONE BITARTRATE AND ACETAMINOPHEN 1 TABLET: 10; 325 TABLET ORAL at 01:34

## 2017-10-19 RX ADMIN — ALPRAZOLAM 0.25 MG: 0.25 TABLET ORAL at 09:43

## 2017-10-19 RX ADMIN — ISOSORBIDE MONONITRATE 30 MG: 30 TABLET, EXTENDED RELEASE ORAL at 08:46

## 2017-10-19 RX ADMIN — ALPRAZOLAM 0.25 MG: 0.25 TABLET ORAL at 16:00

## 2017-10-20 LAB
GLUCOSE BLDC GLUCOMTR-MCNC: 139 MG/DL (ref 70–130)
GLUCOSE BLDC GLUCOMTR-MCNC: 142 MG/DL (ref 70–130)
GLUCOSE BLDC GLUCOMTR-MCNC: 179 MG/DL (ref 70–130)

## 2017-10-20 PROCEDURE — 97535 SELF CARE MNGMENT TRAINING: CPT

## 2017-10-20 PROCEDURE — 97530 THERAPEUTIC ACTIVITIES: CPT

## 2017-10-20 PROCEDURE — 99232 SBSQ HOSP IP/OBS MODERATE 35: CPT | Performed by: FAMILY MEDICINE

## 2017-10-20 PROCEDURE — 82962 GLUCOSE BLOOD TEST: CPT

## 2017-10-20 PROCEDURE — 97110 THERAPEUTIC EXERCISES: CPT

## 2017-10-20 PROCEDURE — 92507 TX SP LANG VOICE COMM INDIV: CPT | Performed by: SPEECH-LANGUAGE PATHOLOGIST

## 2017-10-20 RX ORDER — TRAZODONE HYDROCHLORIDE 100 MG/1
100 TABLET ORAL NIGHTLY
Status: DISCONTINUED | OUTPATIENT
Start: 2017-10-20 | End: 2017-11-03 | Stop reason: HOSPADM

## 2017-10-20 RX ORDER — ALPRAZOLAM 0.25 MG/1
0.25 TABLET ORAL 4 TIMES DAILY
Status: DISCONTINUED | OUTPATIENT
Start: 2017-10-20 | End: 2017-10-24

## 2017-10-20 RX ORDER — LISINOPRIL 10 MG/1
10 TABLET ORAL DAILY
Status: DISCONTINUED | OUTPATIENT
Start: 2017-10-21 | End: 2017-10-23

## 2017-10-20 RX ORDER — CARVEDILOL 6.25 MG/1
6.25 TABLET ORAL 2 TIMES DAILY
Status: DISCONTINUED | OUTPATIENT
Start: 2017-10-20 | End: 2017-10-24

## 2017-10-20 RX ADMIN — TRAZODONE HYDROCHLORIDE 100 MG: 100 TABLET ORAL at 21:38

## 2017-10-20 RX ADMIN — ALPRAZOLAM 0.25 MG: 0.25 TABLET ORAL at 21:38

## 2017-10-20 RX ADMIN — ISOSORBIDE MONONITRATE 30 MG: 30 TABLET, EXTENDED RELEASE ORAL at 08:11

## 2017-10-20 RX ADMIN — ONDANSETRON 4 MG: 4 TABLET, ORALLY DISINTEGRATING ORAL at 16:03

## 2017-10-20 RX ADMIN — GABAPENTIN 600 MG: 300 CAPSULE ORAL at 21:38

## 2017-10-20 RX ADMIN — ATORVASTATIN CALCIUM 20 MG: 20 TABLET, FILM COATED ORAL at 21:38

## 2017-10-20 RX ADMIN — HYDROCODONE BITARTRATE AND ACETAMINOPHEN 1 TABLET: 10; 325 TABLET ORAL at 18:14

## 2017-10-20 RX ADMIN — METFORMIN HYDROCHLORIDE 1000 MG: 500 TABLET, EXTENDED RELEASE ORAL at 17:00

## 2017-10-20 RX ADMIN — ALPRAZOLAM 0.25 MG: 0.25 TABLET ORAL at 11:25

## 2017-10-20 RX ADMIN — HYDROCODONE BITARTRATE AND ACETAMINOPHEN 1 TABLET: 10; 325 TABLET ORAL at 09:55

## 2017-10-20 RX ADMIN — LISINOPRIL 5 MG: 5 TABLET ORAL at 08:11

## 2017-10-20 RX ADMIN — HYDROCODONE BITARTRATE AND ACETAMINOPHEN 1 TABLET: 10; 325 TABLET ORAL at 13:55

## 2017-10-20 RX ADMIN — CARVEDILOL 6.25 MG: 6.25 TABLET, FILM COATED ORAL at 17:00

## 2017-10-20 RX ADMIN — HYDROCODONE BITARTRATE AND ACETAMINOPHEN 1 TABLET: 10; 325 TABLET ORAL at 22:23

## 2017-10-20 RX ADMIN — ALPRAZOLAM 0.25 MG: 0.25 TABLET ORAL at 17:00

## 2017-10-20 RX ADMIN — PRENATAL VIT W/ FE FUMARATE-FA TAB 27-0.8 MG 1 TABLET: 27-0.8 TAB at 08:11

## 2017-10-20 RX ADMIN — ALPRAZOLAM 0.25 MG: 0.25 TABLET ORAL at 05:46

## 2017-10-20 RX ADMIN — GABAPENTIN 600 MG: 300 CAPSULE ORAL at 16:03

## 2017-10-20 RX ADMIN — ACETAMINOPHEN 650 MG: 325 TABLET ORAL at 19:39

## 2017-10-20 RX ADMIN — GABAPENTIN 600 MG: 300 CAPSULE ORAL at 08:13

## 2017-10-20 RX ADMIN — ONDANSETRON 4 MG: 4 TABLET, ORALLY DISINTEGRATING ORAL at 09:55

## 2017-10-20 RX ADMIN — ONDANSETRON 4 MG: 4 TABLET, ORALLY DISINTEGRATING ORAL at 02:47

## 2017-10-20 RX ADMIN — ACETAMINOPHEN 650 MG: 325 TABLET ORAL at 15:15

## 2017-10-20 RX ADMIN — HYDROCODONE BITARTRATE AND ACETAMINOPHEN 1 TABLET: 10; 325 TABLET ORAL at 02:47

## 2017-10-20 RX ADMIN — HYDROCODONE BITARTRATE AND ACETAMINOPHEN 1 TABLET: 10; 325 TABLET ORAL at 06:38

## 2017-10-20 RX ADMIN — CARVEDILOL 3.12 MG: 3.12 TABLET, FILM COATED ORAL at 08:11

## 2017-10-20 RX ADMIN — METFORMIN HYDROCHLORIDE 1000 MG: 500 TABLET, EXTENDED RELEASE ORAL at 08:11

## 2017-10-21 LAB
ALBUMIN SERPL-MCNC: 3.1 G/DL (ref 3.4–4.8)
ANION GAP SERPL CALCULATED.3IONS-SCNC: 10 MMOL/L (ref 5–15)
BASOPHILS # BLD AUTO: 0.02 10*3/MM3 (ref 0–0.2)
BASOPHILS NFR BLD AUTO: 0.2 % (ref 0–2)
BUN BLD-MCNC: 9 MG/DL (ref 7–21)
BUN/CREAT SERPL: 17.6 (ref 7–25)
CALCIUM SPEC-SCNC: 8.3 MG/DL (ref 8.4–10.2)
CHLORIDE SERPL-SCNC: 100 MMOL/L (ref 95–110)
CO2 SERPL-SCNC: 26 MMOL/L (ref 22–31)
CREAT BLD-MCNC: 0.51 MG/DL (ref 0.5–1)
DEPRECATED RDW RBC AUTO: 44 FL (ref 36.4–46.3)
EOSINOPHIL # BLD AUTO: 0.3 10*3/MM3 (ref 0–0.7)
EOSINOPHIL NFR BLD AUTO: 3.2 % (ref 0–7)
ERYTHROCYTE [DISTWIDTH] IN BLOOD BY AUTOMATED COUNT: 13.6 % (ref 11.5–14.5)
GFR SERPL CREATININE-BSD FRML MDRD: 120 ML/MIN/1.73 (ref 60–104)
GLUCOSE BLD-MCNC: 157 MG/DL (ref 60–100)
GLUCOSE BLDC GLUCOMTR-MCNC: 153 MG/DL (ref 70–130)
GLUCOSE BLDC GLUCOMTR-MCNC: 155 MG/DL (ref 70–130)
GLUCOSE BLDC GLUCOMTR-MCNC: 172 MG/DL (ref 70–130)
HCT VFR BLD AUTO: 30.5 % (ref 35–45)
HGB BLD-MCNC: 10.2 G/DL (ref 12–15.5)
IMM GRANULOCYTES # BLD: 0.08 10*3/MM3 (ref 0–0.02)
IMM GRANULOCYTES NFR BLD: 0.8 % (ref 0–0.5)
LYMPHOCYTES # BLD AUTO: 1.32 10*3/MM3 (ref 0.6–4.2)
LYMPHOCYTES NFR BLD AUTO: 13.9 % (ref 10–50)
MCH RBC QN AUTO: 30.4 PG (ref 26.5–34)
MCHC RBC AUTO-ENTMCNC: 33.4 G/DL (ref 31.4–36)
MCV RBC AUTO: 91 FL (ref 80–98)
MONOCYTES # BLD AUTO: 0.71 10*3/MM3 (ref 0–0.9)
MONOCYTES NFR BLD AUTO: 7.5 % (ref 0–12)
NEUTROPHILS # BLD AUTO: 7.06 10*3/MM3 (ref 2–8.6)
NEUTROPHILS NFR BLD AUTO: 74.4 % (ref 37–80)
PHOSPHATE SERPL-MCNC: 3.6 MG/DL (ref 2.4–4.4)
PLATELET # BLD AUTO: 486 10*3/MM3 (ref 150–450)
PMV BLD AUTO: 8.6 FL (ref 8–12)
POTASSIUM BLD-SCNC: 3.7 MMOL/L (ref 3.5–5.1)
RBC # BLD AUTO: 3.35 10*6/MM3 (ref 3.77–5.16)
SODIUM BLD-SCNC: 136 MMOL/L (ref 137–145)
WBC NRBC COR # BLD: 9.49 10*3/MM3 (ref 3.2–9.8)

## 2017-10-21 PROCEDURE — 80069 RENAL FUNCTION PANEL: CPT | Performed by: FAMILY MEDICINE

## 2017-10-21 PROCEDURE — 85025 COMPLETE CBC W/AUTO DIFF WBC: CPT | Performed by: FAMILY MEDICINE

## 2017-10-21 PROCEDURE — 82962 GLUCOSE BLOOD TEST: CPT

## 2017-10-21 PROCEDURE — 97535 SELF CARE MNGMENT TRAINING: CPT

## 2017-10-21 PROCEDURE — 92507 TX SP LANG VOICE COMM INDIV: CPT | Performed by: SPEECH-LANGUAGE PATHOLOGIST

## 2017-10-21 PROCEDURE — 97110 THERAPEUTIC EXERCISES: CPT

## 2017-10-21 PROCEDURE — 97530 THERAPEUTIC ACTIVITIES: CPT

## 2017-10-21 RX ADMIN — HYDROCODONE BITARTRATE AND ACETAMINOPHEN 1 TABLET: 10; 325 TABLET ORAL at 19:31

## 2017-10-21 RX ADMIN — HYDROCODONE BITARTRATE AND ACETAMINOPHEN 1 TABLET: 10; 325 TABLET ORAL at 04:13

## 2017-10-21 RX ADMIN — LISINOPRIL 10 MG: 10 TABLET ORAL at 08:06

## 2017-10-21 RX ADMIN — ISOSORBIDE MONONITRATE 30 MG: 30 TABLET, EXTENDED RELEASE ORAL at 08:06

## 2017-10-21 RX ADMIN — TRAZODONE HYDROCHLORIDE 100 MG: 100 TABLET ORAL at 20:14

## 2017-10-21 RX ADMIN — ACETAMINOPHEN 650 MG: 325 TABLET ORAL at 00:27

## 2017-10-21 RX ADMIN — ALPRAZOLAM 0.25 MG: 0.25 TABLET ORAL at 17:06

## 2017-10-21 RX ADMIN — CARVEDILOL 6.25 MG: 6.25 TABLET, FILM COATED ORAL at 17:06

## 2017-10-21 RX ADMIN — ALPRAZOLAM 0.25 MG: 0.25 TABLET ORAL at 20:14

## 2017-10-21 RX ADMIN — METFORMIN HYDROCHLORIDE 1000 MG: 500 TABLET, EXTENDED RELEASE ORAL at 17:06

## 2017-10-21 RX ADMIN — ONDANSETRON 4 MG: 4 TABLET, ORALLY DISINTEGRATING ORAL at 06:41

## 2017-10-21 RX ADMIN — ACETAMINOPHEN 650 MG: 325 TABLET ORAL at 21:35

## 2017-10-21 RX ADMIN — METFORMIN HYDROCHLORIDE 1000 MG: 500 TABLET, EXTENDED RELEASE ORAL at 08:06

## 2017-10-21 RX ADMIN — GABAPENTIN 600 MG: 300 CAPSULE ORAL at 08:06

## 2017-10-21 RX ADMIN — HYDROCODONE BITARTRATE AND ACETAMINOPHEN 1 TABLET: 10; 325 TABLET ORAL at 23:24

## 2017-10-21 RX ADMIN — HYDROCODONE BITARTRATE AND ACETAMINOPHEN 1 TABLET: 10; 325 TABLET ORAL at 15:41

## 2017-10-21 RX ADMIN — ONDANSETRON 4 MG: 4 TABLET, ORALLY DISINTEGRATING ORAL at 00:28

## 2017-10-21 RX ADMIN — ALPRAZOLAM 0.25 MG: 0.25 TABLET ORAL at 08:06

## 2017-10-21 RX ADMIN — ONDANSETRON 4 MG: 4 TABLET, ORALLY DISINTEGRATING ORAL at 20:15

## 2017-10-21 RX ADMIN — GABAPENTIN 600 MG: 300 CAPSULE ORAL at 20:15

## 2017-10-21 RX ADMIN — ONDANSETRON 4 MG: 4 TABLET, ORALLY DISINTEGRATING ORAL at 12:01

## 2017-10-21 RX ADMIN — HYDROCODONE BITARTRATE AND ACETAMINOPHEN 1 TABLET: 10; 325 TABLET ORAL at 08:10

## 2017-10-21 RX ADMIN — GABAPENTIN 600 MG: 300 CAPSULE ORAL at 15:06

## 2017-10-21 RX ADMIN — ACETAMINOPHEN 650 MG: 325 TABLET ORAL at 14:09

## 2017-10-21 RX ADMIN — CARVEDILOL 6.25 MG: 6.25 TABLET, FILM COATED ORAL at 08:06

## 2017-10-21 RX ADMIN — ALPRAZOLAM 0.25 MG: 0.25 TABLET ORAL at 11:20

## 2017-10-21 RX ADMIN — HYDROCODONE BITARTRATE AND ACETAMINOPHEN 1 TABLET: 10; 325 TABLET ORAL at 12:01

## 2017-10-21 RX ADMIN — ATORVASTATIN CALCIUM 20 MG: 20 TABLET, FILM COATED ORAL at 20:14

## 2017-10-21 RX ADMIN — PRENATAL VIT W/ FE FUMARATE-FA TAB 27-0.8 MG 1 TABLET: 27-0.8 TAB at 08:06

## 2017-10-22 LAB
GLUCOSE BLDC GLUCOMTR-MCNC: 164 MG/DL (ref 70–130)
GLUCOSE BLDC GLUCOMTR-MCNC: 168 MG/DL (ref 70–130)
GLUCOSE BLDC GLUCOMTR-MCNC: 190 MG/DL (ref 70–130)
GLUCOSE BLDC GLUCOMTR-MCNC: 208 MG/DL (ref 70–130)
GLUCOSE BLDC GLUCOMTR-MCNC: 216 MG/DL (ref 70–130)
GLUCOSE BLDC GLUCOMTR-MCNC: 237 MG/DL (ref 70–130)

## 2017-10-22 PROCEDURE — 97110 THERAPEUTIC EXERCISES: CPT

## 2017-10-22 PROCEDURE — 82962 GLUCOSE BLOOD TEST: CPT

## 2017-10-22 RX ORDER — METOCLOPRAMIDE 10 MG/1
10 TABLET ORAL
Status: DISCONTINUED | OUTPATIENT
Start: 2017-10-22 | End: 2017-11-03 | Stop reason: HOSPADM

## 2017-10-22 RX ADMIN — HYDROCODONE BITARTRATE AND ACETAMINOPHEN 1 TABLET: 10; 325 TABLET ORAL at 11:05

## 2017-10-22 RX ADMIN — GABAPENTIN 600 MG: 300 CAPSULE ORAL at 15:13

## 2017-10-22 RX ADMIN — HYDROCODONE BITARTRATE AND ACETAMINOPHEN 1 TABLET: 10; 325 TABLET ORAL at 03:27

## 2017-10-22 RX ADMIN — PRENATAL VIT W/ FE FUMARATE-FA TAB 27-0.8 MG 1 TABLET: 27-0.8 TAB at 08:09

## 2017-10-22 RX ADMIN — ALPRAZOLAM 0.25 MG: 0.25 TABLET ORAL at 08:09

## 2017-10-22 RX ADMIN — HYDROCODONE BITARTRATE AND ACETAMINOPHEN 1 TABLET: 10; 325 TABLET ORAL at 07:32

## 2017-10-22 RX ADMIN — CARVEDILOL 6.25 MG: 6.25 TABLET, FILM COATED ORAL at 17:00

## 2017-10-22 RX ADMIN — GABAPENTIN 600 MG: 300 CAPSULE ORAL at 21:19

## 2017-10-22 RX ADMIN — ALPRAZOLAM 0.25 MG: 0.25 TABLET ORAL at 11:05

## 2017-10-22 RX ADMIN — ATORVASTATIN CALCIUM 20 MG: 20 TABLET, FILM COATED ORAL at 21:19

## 2017-10-22 RX ADMIN — METOCLOPRAMIDE 10 MG: 10 TABLET ORAL at 17:00

## 2017-10-22 RX ADMIN — ONDANSETRON 4 MG: 4 TABLET, ORALLY DISINTEGRATING ORAL at 05:33

## 2017-10-22 RX ADMIN — ALPRAZOLAM 0.25 MG: 0.25 TABLET ORAL at 17:00

## 2017-10-22 RX ADMIN — METFORMIN HYDROCHLORIDE 1000 MG: 500 TABLET, EXTENDED RELEASE ORAL at 08:09

## 2017-10-22 RX ADMIN — CARVEDILOL 6.25 MG: 6.25 TABLET, FILM COATED ORAL at 08:09

## 2017-10-22 RX ADMIN — ONDANSETRON 4 MG: 4 TABLET, ORALLY DISINTEGRATING ORAL at 17:00

## 2017-10-22 RX ADMIN — LISINOPRIL 10 MG: 10 TABLET ORAL at 08:09

## 2017-10-22 RX ADMIN — HYDROCODONE BITARTRATE AND ACETAMINOPHEN 1 TABLET: 10; 325 TABLET ORAL at 21:19

## 2017-10-22 RX ADMIN — ONDANSETRON 4 MG: 4 TABLET, ORALLY DISINTEGRATING ORAL at 11:05

## 2017-10-22 RX ADMIN — ACETAMINOPHEN 650 MG: 325 TABLET ORAL at 14:27

## 2017-10-22 RX ADMIN — HYDROCODONE BITARTRATE AND ACETAMINOPHEN 1 TABLET: 10; 325 TABLET ORAL at 15:57

## 2017-10-22 RX ADMIN — ISOSORBIDE MONONITRATE 30 MG: 30 TABLET, EXTENDED RELEASE ORAL at 08:09

## 2017-10-22 RX ADMIN — METFORMIN HYDROCHLORIDE 1000 MG: 500 TABLET, EXTENDED RELEASE ORAL at 17:00

## 2017-10-22 RX ADMIN — TRAZODONE HYDROCHLORIDE 100 MG: 100 TABLET ORAL at 21:19

## 2017-10-22 RX ADMIN — ALPRAZOLAM 0.25 MG: 0.25 TABLET ORAL at 21:19

## 2017-10-22 RX ADMIN — METOCLOPRAMIDE 10 MG: 10 TABLET ORAL at 10:41

## 2017-10-22 RX ADMIN — GABAPENTIN 600 MG: 300 CAPSULE ORAL at 08:09

## 2017-10-23 LAB
CK MB SERPL-CCNC: 0.7 NG/ML (ref 0–5)
CK SERPL-CCNC: <20 U/L (ref 30–135)
GLUCOSE BLDC GLUCOMTR-MCNC: 138 MG/DL (ref 70–130)
GLUCOSE BLDC GLUCOMTR-MCNC: 140 MG/DL (ref 70–130)
GLUCOSE BLDC GLUCOMTR-MCNC: 235 MG/DL (ref 70–130)
GLUCOSE BLDC GLUCOMTR-MCNC: 318 MG/DL (ref 70–130)
TROPONIN I SERPL-MCNC: <0.012 NG/ML

## 2017-10-23 PROCEDURE — 82962 GLUCOSE BLOOD TEST: CPT

## 2017-10-23 PROCEDURE — 93010 ELECTROCARDIOGRAM REPORT: CPT | Performed by: INTERNAL MEDICINE

## 2017-10-23 PROCEDURE — 84484 ASSAY OF TROPONIN QUANT: CPT | Performed by: FAMILY MEDICINE

## 2017-10-23 PROCEDURE — 97530 THERAPEUTIC ACTIVITIES: CPT

## 2017-10-23 PROCEDURE — 92507 TX SP LANG VOICE COMM INDIV: CPT | Performed by: SPEECH-LANGUAGE PATHOLOGIST

## 2017-10-23 PROCEDURE — 97112 NEUROMUSCULAR REEDUCATION: CPT

## 2017-10-23 PROCEDURE — 82553 CREATINE MB FRACTION: CPT | Performed by: FAMILY MEDICINE

## 2017-10-23 PROCEDURE — 97110 THERAPEUTIC EXERCISES: CPT

## 2017-10-23 PROCEDURE — 93005 ELECTROCARDIOGRAM TRACING: CPT | Performed by: FAMILY MEDICINE

## 2017-10-23 PROCEDURE — 99232 SBSQ HOSP IP/OBS MODERATE 35: CPT | Performed by: FAMILY MEDICINE

## 2017-10-23 PROCEDURE — 82550 ASSAY OF CK (CPK): CPT | Performed by: FAMILY MEDICINE

## 2017-10-23 PROCEDURE — 63710000001 INSULIN ASPART PER 5 UNITS: Performed by: FAMILY MEDICINE

## 2017-10-23 RX ORDER — GLIPIZIDE 5 MG/1
5 TABLET ORAL
Status: DISCONTINUED | OUTPATIENT
Start: 2017-10-23 | End: 2017-10-23

## 2017-10-23 RX ORDER — LISINOPRIL 20 MG/1
20 TABLET ORAL DAILY
Status: DISCONTINUED | OUTPATIENT
Start: 2017-10-24 | End: 2017-11-03 | Stop reason: HOSPADM

## 2017-10-23 RX ORDER — GLIPIZIDE 5 MG/1
2.5 TABLET ORAL
Status: DISCONTINUED | OUTPATIENT
Start: 2017-10-23 | End: 2017-11-03 | Stop reason: HOSPADM

## 2017-10-23 RX ORDER — NITROGLYCERIN 0.4 MG/1
0.4 TABLET SUBLINGUAL
Status: DISCONTINUED | OUTPATIENT
Start: 2017-10-23 | End: 2017-11-03 | Stop reason: HOSPADM

## 2017-10-23 RX ORDER — NICOTINE POLACRILEX 4 MG
15 LOZENGE BUCCAL
Status: DISCONTINUED | OUTPATIENT
Start: 2017-10-23 | End: 2017-11-03 | Stop reason: HOSPADM

## 2017-10-23 RX ADMIN — Medication 2.5 MG: at 17:04

## 2017-10-23 RX ADMIN — CARVEDILOL 6.25 MG: 6.25 TABLET, FILM COATED ORAL at 17:03

## 2017-10-23 RX ADMIN — NITROGLYCERIN 0.4 MG: 0.4 TABLET SUBLINGUAL at 17:50

## 2017-10-23 RX ADMIN — ALPRAZOLAM 0.25 MG: 0.25 TABLET ORAL at 21:01

## 2017-10-23 RX ADMIN — HYDROCODONE BITARTRATE AND ACETAMINOPHEN 1 TABLET: 10; 325 TABLET ORAL at 04:59

## 2017-10-23 RX ADMIN — GABAPENTIN 600 MG: 300 CAPSULE ORAL at 21:01

## 2017-10-23 RX ADMIN — CARVEDILOL 6.25 MG: 6.25 TABLET, FILM COATED ORAL at 08:01

## 2017-10-23 RX ADMIN — METFORMIN HYDROCHLORIDE 1000 MG: 500 TABLET, EXTENDED RELEASE ORAL at 17:03

## 2017-10-23 RX ADMIN — ACETAMINOPHEN 650 MG: 325 TABLET ORAL at 00:42

## 2017-10-23 RX ADMIN — ALPRAZOLAM 0.25 MG: 0.25 TABLET ORAL at 07:59

## 2017-10-23 RX ADMIN — METOCLOPRAMIDE 10 MG: 10 TABLET ORAL at 07:59

## 2017-10-23 RX ADMIN — PRENATAL VIT W/ FE FUMARATE-FA TAB 27-0.8 MG 1 TABLET: 27-0.8 TAB at 08:01

## 2017-10-23 RX ADMIN — METOCLOPRAMIDE 10 MG: 10 TABLET ORAL at 17:03

## 2017-10-23 RX ADMIN — METOCLOPRAMIDE 10 MG: 10 TABLET ORAL at 11:25

## 2017-10-23 RX ADMIN — INSULIN ASPART 7 UNITS: 100 INJECTION, SOLUTION INTRAVENOUS; SUBCUTANEOUS at 11:16

## 2017-10-23 RX ADMIN — LISINOPRIL 10 MG: 10 TABLET ORAL at 08:00

## 2017-10-23 RX ADMIN — HYDROCODONE BITARTRATE AND ACETAMINOPHEN 1 TABLET: 10; 325 TABLET ORAL at 17:01

## 2017-10-23 RX ADMIN — ONDANSETRON 4 MG: 4 TABLET, ORALLY DISINTEGRATING ORAL at 21:08

## 2017-10-23 RX ADMIN — HYDROCODONE BITARTRATE AND ACETAMINOPHEN 1 TABLET: 10; 325 TABLET ORAL at 01:29

## 2017-10-23 RX ADMIN — ISOSORBIDE MONONITRATE 30 MG: 30 TABLET, EXTENDED RELEASE ORAL at 08:01

## 2017-10-23 RX ADMIN — TRAZODONE HYDROCHLORIDE 100 MG: 100 TABLET ORAL at 21:01

## 2017-10-23 RX ADMIN — METFORMIN HYDROCHLORIDE 1000 MG: 500 TABLET, EXTENDED RELEASE ORAL at 08:00

## 2017-10-23 RX ADMIN — ALPRAZOLAM 0.25 MG: 0.25 TABLET ORAL at 12:02

## 2017-10-23 RX ADMIN — ONDANSETRON 4 MG: 4 TABLET, ORALLY DISINTEGRATING ORAL at 07:59

## 2017-10-23 RX ADMIN — ATORVASTATIN CALCIUM 20 MG: 20 TABLET, FILM COATED ORAL at 21:01

## 2017-10-23 RX ADMIN — HYDROCODONE BITARTRATE AND ACETAMINOPHEN 1 TABLET: 10; 325 TABLET ORAL at 12:59

## 2017-10-23 RX ADMIN — POLYETHYLENE GLYCOL 3350 17 G: 17 POWDER, FOR SOLUTION ORAL at 09:09

## 2017-10-23 RX ADMIN — ALPRAZOLAM 0.25 MG: 0.25 TABLET ORAL at 17:00

## 2017-10-23 RX ADMIN — HYDROCODONE BITARTRATE AND ACETAMINOPHEN 1 TABLET: 10; 325 TABLET ORAL at 09:04

## 2017-10-23 RX ADMIN — ACETAMINOPHEN 650 MG: 325 TABLET ORAL at 12:06

## 2017-10-23 RX ADMIN — ONDANSETRON 4 MG: 4 TABLET, ORALLY DISINTEGRATING ORAL at 15:16

## 2017-10-23 RX ADMIN — HYDROCODONE BITARTRATE AND ACETAMINOPHEN 1 TABLET: 10; 325 TABLET ORAL at 21:01

## 2017-10-23 RX ADMIN — GABAPENTIN 600 MG: 300 CAPSULE ORAL at 15:16

## 2017-10-23 RX ADMIN — GABAPENTIN 600 MG: 300 CAPSULE ORAL at 08:01

## 2017-10-24 LAB
ALBUMIN SERPL-MCNC: 3.3 G/DL (ref 3.4–4.8)
ALBUMIN/GLOB SERPL: 1.2 G/DL (ref 1.1–1.8)
ALP SERPL-CCNC: 95 U/L (ref 38–126)
ALT SERPL W P-5'-P-CCNC: 22 U/L (ref 9–52)
ANION GAP SERPL CALCULATED.3IONS-SCNC: 13 MMOL/L (ref 5–15)
AST SERPL-CCNC: 18 U/L (ref 14–36)
BACTERIA UR QL AUTO: ABNORMAL /HPF
BASOPHILS # BLD AUTO: 0.02 10*3/MM3 (ref 0–0.2)
BASOPHILS NFR BLD AUTO: 0.2 % (ref 0–2)
BILIRUB SERPL-MCNC: 0.4 MG/DL (ref 0.2–1.3)
BILIRUB UR QL STRIP: NEGATIVE
BUN BLD-MCNC: 9 MG/DL (ref 7–21)
BUN/CREAT SERPL: 18.8 (ref 7–25)
CALCIUM SPEC-SCNC: 8.7 MG/DL (ref 8.4–10.2)
CHLORIDE SERPL-SCNC: 99 MMOL/L (ref 95–110)
CLARITY UR: CLEAR
CO2 SERPL-SCNC: 24 MMOL/L (ref 22–31)
COLOR UR: YELLOW
CREAT BLD-MCNC: 0.48 MG/DL (ref 0.5–1)
DEPRECATED RDW RBC AUTO: 46.7 FL (ref 36.4–46.3)
EOSINOPHIL # BLD AUTO: 0.25 10*3/MM3 (ref 0–0.7)
EOSINOPHIL NFR BLD AUTO: 2.7 % (ref 0–7)
ERYTHROCYTE [DISTWIDTH] IN BLOOD BY AUTOMATED COUNT: 14.3 % (ref 11.5–14.5)
GFR SERPL CREATININE-BSD FRML MDRD: 128 ML/MIN/1.73 (ref 45–104)
GLOBULIN UR ELPH-MCNC: 2.7 GM/DL (ref 2.3–3.5)
GLUCOSE BLD-MCNC: 191 MG/DL (ref 60–100)
GLUCOSE BLDC GLUCOMTR-MCNC: 113 MG/DL (ref 70–130)
GLUCOSE BLDC GLUCOMTR-MCNC: 157 MG/DL (ref 70–130)
GLUCOSE BLDC GLUCOMTR-MCNC: 276 MG/DL (ref 70–130)
GLUCOSE UR STRIP-MCNC: ABNORMAL MG/DL
HCT VFR BLD AUTO: 31.9 % (ref 35–45)
HGB BLD-MCNC: 10.9 G/DL (ref 12–15.5)
HGB UR QL STRIP.AUTO: ABNORMAL
IMM GRANULOCYTES # BLD: 0.08 10*3/MM3 (ref 0–0.02)
IMM GRANULOCYTES NFR BLD: 0.9 % (ref 0–0.5)
KETONES UR QL STRIP: NEGATIVE
LEUKOCYTE ESTERASE UR QL STRIP.AUTO: ABNORMAL
LYMPHOCYTES # BLD AUTO: 1.45 10*3/MM3 (ref 0.6–4.2)
LYMPHOCYTES NFR BLD AUTO: 15.4 % (ref 10–50)
MCH RBC QN AUTO: 31.1 PG (ref 26.5–34)
MCHC RBC AUTO-ENTMCNC: 34.2 G/DL (ref 31.4–36)
MCV RBC AUTO: 91.1 FL (ref 80–98)
MONOCYTES # BLD AUTO: 0.75 10*3/MM3 (ref 0–0.9)
MONOCYTES NFR BLD AUTO: 8 % (ref 0–12)
NEUTROPHILS # BLD AUTO: 6.84 10*3/MM3 (ref 2–8.6)
NEUTROPHILS NFR BLD AUTO: 72.8 % (ref 37–80)
NITRITE UR QL STRIP: NEGATIVE
NRBC BLD MANUAL-RTO: 0 /100 WBC (ref 0–0)
PH UR STRIP.AUTO: 7 [PH] (ref 5–9)
PLATELET # BLD AUTO: 633 10*3/MM3 (ref 150–450)
PMV BLD AUTO: 8 FL (ref 8–12)
POTASSIUM BLD-SCNC: 4 MMOL/L (ref 3.5–5.1)
PROT SERPL-MCNC: 6 G/DL (ref 6.3–8.6)
PROT UR QL STRIP: NEGATIVE
RBC # BLD AUTO: 3.5 10*6/MM3 (ref 3.77–5.16)
RBC # UR: ABNORMAL /HPF
REF LAB TEST METHOD: ABNORMAL
SODIUM BLD-SCNC: 136 MMOL/L (ref 137–145)
SP GR UR STRIP: 1.01 (ref 1–1.03)
SQUAMOUS #/AREA URNS HPF: ABNORMAL /HPF
TROPONIN I SERPL-MCNC: <0.012 NG/ML
TROPONIN I SERPL-MCNC: <0.012 NG/ML
UROBILINOGEN UR QL STRIP: ABNORMAL
WBC NRBC COR # BLD: 9.39 10*3/MM3 (ref 3.2–9.8)
WBC UR QL AUTO: ABNORMAL /HPF

## 2017-10-24 PROCEDURE — 97530 THERAPEUTIC ACTIVITIES: CPT

## 2017-10-24 PROCEDURE — 85025 COMPLETE CBC W/AUTO DIFF WBC: CPT | Performed by: FAMILY MEDICINE

## 2017-10-24 PROCEDURE — 97530 THERAPEUTIC ACTIVITIES: CPT | Performed by: PHYSICAL THERAPIST

## 2017-10-24 PROCEDURE — 87086 URINE CULTURE/COLONY COUNT: CPT | Performed by: FAMILY MEDICINE

## 2017-10-24 PROCEDURE — P9612 CATHETERIZE FOR URINE SPEC: HCPCS

## 2017-10-24 PROCEDURE — 97112 NEUROMUSCULAR REEDUCATION: CPT

## 2017-10-24 PROCEDURE — 82962 GLUCOSE BLOOD TEST: CPT

## 2017-10-24 PROCEDURE — 99232 SBSQ HOSP IP/OBS MODERATE 35: CPT | Performed by: FAMILY MEDICINE

## 2017-10-24 PROCEDURE — 84484 ASSAY OF TROPONIN QUANT: CPT | Performed by: FAMILY MEDICINE

## 2017-10-24 PROCEDURE — 97542 WHEELCHAIR MNGMENT TRAINING: CPT | Performed by: PHYSICAL THERAPIST

## 2017-10-24 PROCEDURE — 80053 COMPREHEN METABOLIC PANEL: CPT | Performed by: FAMILY MEDICINE

## 2017-10-24 PROCEDURE — 81001 URINALYSIS AUTO W/SCOPE: CPT | Performed by: FAMILY MEDICINE

## 2017-10-24 PROCEDURE — 92507 TX SP LANG VOICE COMM INDIV: CPT | Performed by: SPEECH-LANGUAGE PATHOLOGIST

## 2017-10-24 PROCEDURE — 97110 THERAPEUTIC EXERCISES: CPT | Performed by: PHYSICAL THERAPIST

## 2017-10-24 PROCEDURE — 63710000001 INSULIN ASPART PER 5 UNITS: Performed by: FAMILY MEDICINE

## 2017-10-24 PROCEDURE — 97110 THERAPEUTIC EXERCISES: CPT

## 2017-10-24 RX ORDER — ALPRAZOLAM 0.25 MG/1
0.5 TABLET ORAL 4 TIMES DAILY
Status: DISCONTINUED | OUTPATIENT
Start: 2017-10-24 | End: 2017-10-24

## 2017-10-24 RX ORDER — CARVEDILOL 12.5 MG/1
12.5 TABLET ORAL 2 TIMES DAILY
Status: DISCONTINUED | OUTPATIENT
Start: 2017-10-24 | End: 2017-11-03 | Stop reason: HOSPADM

## 2017-10-24 RX ORDER — ALPRAZOLAM 0.25 MG/1
0.5 TABLET ORAL 3 TIMES DAILY
Status: DISCONTINUED | OUTPATIENT
Start: 2017-10-24 | End: 2017-11-01

## 2017-10-24 RX ADMIN — LISINOPRIL 20 MG: 20 TABLET ORAL at 08:59

## 2017-10-24 RX ADMIN — Medication 2.5 MG: at 18:02

## 2017-10-24 RX ADMIN — CARVEDILOL 12.5 MG: 12.5 TABLET, FILM COATED ORAL at 18:02

## 2017-10-24 RX ADMIN — ALPRAZOLAM 0.25 MG: 0.25 TABLET ORAL at 08:59

## 2017-10-24 RX ADMIN — GABAPENTIN 600 MG: 300 CAPSULE ORAL at 08:59

## 2017-10-24 RX ADMIN — ONDANSETRON 4 MG: 4 TABLET, ORALLY DISINTEGRATING ORAL at 12:37

## 2017-10-24 RX ADMIN — HYDROCODONE BITARTRATE AND ACETAMINOPHEN 1 TABLET: 10; 325 TABLET ORAL at 09:54

## 2017-10-24 RX ADMIN — HYDROCODONE BITARTRATE AND ACETAMINOPHEN 1 TABLET: 10; 325 TABLET ORAL at 23:28

## 2017-10-24 RX ADMIN — HYDROCODONE BITARTRATE AND ACETAMINOPHEN 1 TABLET: 10; 325 TABLET ORAL at 13:59

## 2017-10-24 RX ADMIN — METOCLOPRAMIDE 10 MG: 10 TABLET ORAL at 18:02

## 2017-10-24 RX ADMIN — ALPRAZOLAM 0.5 MG: 0.25 TABLET ORAL at 15:47

## 2017-10-24 RX ADMIN — METOCLOPRAMIDE 10 MG: 10 TABLET ORAL at 11:03

## 2017-10-24 RX ADMIN — PRENATAL VIT W/ FE FUMARATE-FA TAB 27-0.8 MG 1 TABLET: 27-0.8 TAB at 08:59

## 2017-10-24 RX ADMIN — ACETAMINOPHEN 650 MG: 325 TABLET ORAL at 12:37

## 2017-10-24 RX ADMIN — TRAZODONE HYDROCHLORIDE 100 MG: 100 TABLET ORAL at 20:13

## 2017-10-24 RX ADMIN — METFORMIN HYDROCHLORIDE 1000 MG: 500 TABLET, EXTENDED RELEASE ORAL at 08:59

## 2017-10-24 RX ADMIN — ALPRAZOLAM 0.25 MG: 0.25 TABLET ORAL at 11:03

## 2017-10-24 RX ADMIN — CARVEDILOL 6.25 MG: 6.25 TABLET, FILM COATED ORAL at 08:58

## 2017-10-24 RX ADMIN — INSULIN ASPART 6 UNITS: 100 INJECTION, SOLUTION INTRAVENOUS; SUBCUTANEOUS at 11:11

## 2017-10-24 RX ADMIN — ISOSORBIDE MONONITRATE 30 MG: 30 TABLET, EXTENDED RELEASE ORAL at 08:59

## 2017-10-24 RX ADMIN — Medication 2.5 MG: at 08:58

## 2017-10-24 RX ADMIN — POLYETHYLENE GLYCOL 3350 17 G: 17 POWDER, FOR SOLUTION ORAL at 08:58

## 2017-10-24 RX ADMIN — GABAPENTIN 600 MG: 300 CAPSULE ORAL at 15:47

## 2017-10-24 RX ADMIN — INSULIN ASPART 2 UNITS: 100 INJECTION, SOLUTION INTRAVENOUS; SUBCUTANEOUS at 06:35

## 2017-10-24 RX ADMIN — ALPRAZOLAM 0.5 MG: 0.25 TABLET ORAL at 20:13

## 2017-10-24 RX ADMIN — GABAPENTIN 600 MG: 300 CAPSULE ORAL at 20:13

## 2017-10-24 RX ADMIN — METOCLOPRAMIDE 10 MG: 10 TABLET ORAL at 08:59

## 2017-10-24 RX ADMIN — METFORMIN HYDROCHLORIDE 1000 MG: 500 TABLET, EXTENDED RELEASE ORAL at 18:02

## 2017-10-24 RX ADMIN — INSULIN ASPART 2 UNITS: 100 INJECTION, SOLUTION INTRAVENOUS; SUBCUTANEOUS at 20:18

## 2017-10-24 RX ADMIN — HYDROCODONE BITARTRATE AND ACETAMINOPHEN 1 TABLET: 10; 325 TABLET ORAL at 01:33

## 2017-10-24 RX ADMIN — ATORVASTATIN CALCIUM 20 MG: 20 TABLET, FILM COATED ORAL at 20:13

## 2017-10-24 RX ADMIN — ONDANSETRON 4 MG: 4 TABLET, ORALLY DISINTEGRATING ORAL at 20:13

## 2017-10-24 RX ADMIN — HYDROCODONE BITARTRATE AND ACETAMINOPHEN 1 TABLET: 10; 325 TABLET ORAL at 05:46

## 2017-10-24 RX ADMIN — HYDROCODONE BITARTRATE AND ACETAMINOPHEN 1 TABLET: 10; 325 TABLET ORAL at 18:02

## 2017-10-25 LAB
BACTERIA SPEC AEROBE CULT: NORMAL
GLUCOSE BLDC GLUCOMTR-MCNC: 101 MG/DL (ref 70–130)
GLUCOSE BLDC GLUCOMTR-MCNC: 142 MG/DL (ref 70–130)
GLUCOSE BLDC GLUCOMTR-MCNC: 185 MG/DL (ref 70–130)
GLUCOSE BLDC GLUCOMTR-MCNC: 201 MG/DL (ref 70–130)

## 2017-10-25 PROCEDURE — 82962 GLUCOSE BLOOD TEST: CPT

## 2017-10-25 PROCEDURE — 97530 THERAPEUTIC ACTIVITIES: CPT

## 2017-10-25 PROCEDURE — 63710000001 INSULIN ASPART PER 5 UNITS: Performed by: FAMILY MEDICINE

## 2017-10-25 PROCEDURE — 97535 SELF CARE MNGMENT TRAINING: CPT

## 2017-10-25 PROCEDURE — 99232 SBSQ HOSP IP/OBS MODERATE 35: CPT | Performed by: FAMILY MEDICINE

## 2017-10-25 PROCEDURE — 92507 TX SP LANG VOICE COMM INDIV: CPT | Performed by: SPEECH-LANGUAGE PATHOLOGIST

## 2017-10-25 PROCEDURE — 97112 NEUROMUSCULAR REEDUCATION: CPT

## 2017-10-25 PROCEDURE — 97110 THERAPEUTIC EXERCISES: CPT

## 2017-10-25 RX ADMIN — ATORVASTATIN CALCIUM 20 MG: 20 TABLET, FILM COATED ORAL at 20:10

## 2017-10-25 RX ADMIN — LISINOPRIL 20 MG: 20 TABLET ORAL at 08:02

## 2017-10-25 RX ADMIN — METOCLOPRAMIDE 10 MG: 10 TABLET ORAL at 08:02

## 2017-10-25 RX ADMIN — HYDROCODONE BITARTRATE AND ACETAMINOPHEN 1 TABLET: 10; 325 TABLET ORAL at 15:59

## 2017-10-25 RX ADMIN — ONDANSETRON 4 MG: 4 TABLET, ORALLY DISINTEGRATING ORAL at 14:56

## 2017-10-25 RX ADMIN — Medication 2.5 MG: at 17:26

## 2017-10-25 RX ADMIN — METOCLOPRAMIDE 10 MG: 10 TABLET ORAL at 12:01

## 2017-10-25 RX ADMIN — METFORMIN HYDROCHLORIDE 1000 MG: 500 TABLET, EXTENDED RELEASE ORAL at 17:26

## 2017-10-25 RX ADMIN — HYDROCODONE BITARTRATE AND ACETAMINOPHEN 1 TABLET: 10; 325 TABLET ORAL at 20:04

## 2017-10-25 RX ADMIN — GABAPENTIN 600 MG: 300 CAPSULE ORAL at 15:59

## 2017-10-25 RX ADMIN — HYDROCODONE BITARTRATE AND ACETAMINOPHEN 1 TABLET: 10; 325 TABLET ORAL at 04:19

## 2017-10-25 RX ADMIN — PRENATAL VIT W/ FE FUMARATE-FA TAB 27-0.8 MG 1 TABLET: 27-0.8 TAB at 08:01

## 2017-10-25 RX ADMIN — TRAZODONE HYDROCHLORIDE 100 MG: 100 TABLET ORAL at 20:10

## 2017-10-25 RX ADMIN — HYDROCODONE BITARTRATE AND ACETAMINOPHEN 1 TABLET: 10; 325 TABLET ORAL at 07:00

## 2017-10-25 RX ADMIN — ONDANSETRON 4 MG: 4 TABLET, ORALLY DISINTEGRATING ORAL at 08:01

## 2017-10-25 RX ADMIN — GABAPENTIN 600 MG: 300 CAPSULE ORAL at 08:01

## 2017-10-25 RX ADMIN — HYDROCODONE BITARTRATE AND ACETAMINOPHEN 1 TABLET: 10; 325 TABLET ORAL at 12:01

## 2017-10-25 RX ADMIN — Medication 2.5 MG: at 08:02

## 2017-10-25 RX ADMIN — METOCLOPRAMIDE 10 MG: 10 TABLET ORAL at 17:26

## 2017-10-25 RX ADMIN — ALPRAZOLAM 0.5 MG: 0.25 TABLET ORAL at 20:10

## 2017-10-25 RX ADMIN — CARVEDILOL 12.5 MG: 12.5 TABLET, FILM COATED ORAL at 08:02

## 2017-10-25 RX ADMIN — INSULIN ASPART 4 UNITS: 100 INJECTION, SOLUTION INTRAVENOUS; SUBCUTANEOUS at 12:01

## 2017-10-25 RX ADMIN — HYDROCODONE BITARTRATE AND ACETAMINOPHEN 1 TABLET: 10; 325 TABLET ORAL at 08:01

## 2017-10-25 RX ADMIN — GABAPENTIN 600 MG: 300 CAPSULE ORAL at 20:10

## 2017-10-25 RX ADMIN — CARVEDILOL 12.5 MG: 12.5 TABLET, FILM COATED ORAL at 17:26

## 2017-10-25 RX ADMIN — ALPRAZOLAM 0.5 MG: 0.25 TABLET ORAL at 15:59

## 2017-10-25 RX ADMIN — ISOSORBIDE MONONITRATE 30 MG: 30 TABLET, EXTENDED RELEASE ORAL at 08:02

## 2017-10-25 RX ADMIN — ALPRAZOLAM 0.5 MG: 0.25 TABLET ORAL at 08:02

## 2017-10-25 RX ADMIN — METFORMIN HYDROCHLORIDE 1000 MG: 500 TABLET, EXTENDED RELEASE ORAL at 08:02

## 2017-10-26 LAB
GLUCOSE BLDC GLUCOMTR-MCNC: 141 MG/DL (ref 70–130)
GLUCOSE BLDC GLUCOMTR-MCNC: 146 MG/DL (ref 70–130)
GLUCOSE BLDC GLUCOMTR-MCNC: 149 MG/DL (ref 70–130)
GLUCOSE BLDC GLUCOMTR-MCNC: 192 MG/DL (ref 70–130)

## 2017-10-26 PROCEDURE — 63710000001 INSULIN ASPART PER 5 UNITS: Performed by: FAMILY MEDICINE

## 2017-10-26 PROCEDURE — 97535 SELF CARE MNGMENT TRAINING: CPT

## 2017-10-26 PROCEDURE — 92507 TX SP LANG VOICE COMM INDIV: CPT | Performed by: SPEECH-LANGUAGE PATHOLOGIST

## 2017-10-26 PROCEDURE — 82962 GLUCOSE BLOOD TEST: CPT

## 2017-10-26 PROCEDURE — 97112 NEUROMUSCULAR REEDUCATION: CPT

## 2017-10-26 PROCEDURE — 97530 THERAPEUTIC ACTIVITIES: CPT

## 2017-10-26 PROCEDURE — 97110 THERAPEUTIC EXERCISES: CPT

## 2017-10-26 PROCEDURE — 99232 SBSQ HOSP IP/OBS MODERATE 35: CPT | Performed by: FAMILY MEDICINE

## 2017-10-26 RX ORDER — PHENAZOPYRIDINE HYDROCHLORIDE 100 MG/1
100 TABLET, FILM COATED ORAL
Status: COMPLETED | OUTPATIENT
Start: 2017-10-26 | End: 2017-10-29

## 2017-10-26 RX ADMIN — ATORVASTATIN CALCIUM 20 MG: 20 TABLET, FILM COATED ORAL at 20:11

## 2017-10-26 RX ADMIN — GABAPENTIN 600 MG: 300 CAPSULE ORAL at 16:08

## 2017-10-26 RX ADMIN — GABAPENTIN 600 MG: 300 CAPSULE ORAL at 20:11

## 2017-10-26 RX ADMIN — ONDANSETRON 4 MG: 4 TABLET, ORALLY DISINTEGRATING ORAL at 17:32

## 2017-10-26 RX ADMIN — METOCLOPRAMIDE 10 MG: 10 TABLET ORAL at 17:07

## 2017-10-26 RX ADMIN — ISOSORBIDE MONONITRATE 30 MG: 30 TABLET, EXTENDED RELEASE ORAL at 08:09

## 2017-10-26 RX ADMIN — INSULIN ASPART 2 UNITS: 100 INJECTION, SOLUTION INTRAVENOUS; SUBCUTANEOUS at 11:17

## 2017-10-26 RX ADMIN — HYDROCODONE BITARTRATE AND ACETAMINOPHEN 1 TABLET: 10; 325 TABLET ORAL at 04:54

## 2017-10-26 RX ADMIN — PHENAZOPYRIDINE HYDROCHLORIDE 100 MG: 100 TABLET ORAL at 17:27

## 2017-10-26 RX ADMIN — ALPRAZOLAM 0.5 MG: 0.25 TABLET ORAL at 20:11

## 2017-10-26 RX ADMIN — HYDROCODONE BITARTRATE AND ACETAMINOPHEN 1 TABLET: 10; 325 TABLET ORAL at 09:13

## 2017-10-26 RX ADMIN — Medication 2.5 MG: at 17:10

## 2017-10-26 RX ADMIN — HYDROCODONE BITARTRATE AND ACETAMINOPHEN 1 TABLET: 10; 325 TABLET ORAL at 17:01

## 2017-10-26 RX ADMIN — GABAPENTIN 600 MG: 300 CAPSULE ORAL at 08:08

## 2017-10-26 RX ADMIN — Medication 2.5 MG: at 08:09

## 2017-10-26 RX ADMIN — ALPRAZOLAM 0.5 MG: 0.25 TABLET ORAL at 08:08

## 2017-10-26 RX ADMIN — HYDROCODONE BITARTRATE AND ACETAMINOPHEN 1 TABLET: 10; 325 TABLET ORAL at 13:02

## 2017-10-26 RX ADMIN — PRENATAL VIT W/ FE FUMARATE-FA TAB 27-0.8 MG 1 TABLET: 27-0.8 TAB at 08:08

## 2017-10-26 RX ADMIN — CARVEDILOL 12.5 MG: 12.5 TABLET, FILM COATED ORAL at 17:07

## 2017-10-26 RX ADMIN — HYDROCODONE BITARTRATE AND ACETAMINOPHEN 1 TABLET: 10; 325 TABLET ORAL at 21:25

## 2017-10-26 RX ADMIN — HYDROCODONE BITARTRATE AND ACETAMINOPHEN 1 TABLET: 10; 325 TABLET ORAL at 00:42

## 2017-10-26 RX ADMIN — ONDANSETRON 4 MG: 4 TABLET, ORALLY DISINTEGRATING ORAL at 11:17

## 2017-10-26 RX ADMIN — ALPRAZOLAM 0.5 MG: 0.25 TABLET ORAL at 16:08

## 2017-10-26 RX ADMIN — TRAZODONE HYDROCHLORIDE 100 MG: 100 TABLET ORAL at 20:11

## 2017-10-26 RX ADMIN — METFORMIN HYDROCHLORIDE 1000 MG: 500 TABLET, EXTENDED RELEASE ORAL at 08:09

## 2017-10-26 RX ADMIN — METFORMIN HYDROCHLORIDE 1000 MG: 500 TABLET, EXTENDED RELEASE ORAL at 17:07

## 2017-10-26 RX ADMIN — LISINOPRIL 20 MG: 20 TABLET ORAL at 08:08

## 2017-10-26 RX ADMIN — METOCLOPRAMIDE 10 MG: 10 TABLET ORAL at 08:08

## 2017-10-26 RX ADMIN — CARVEDILOL 12.5 MG: 12.5 TABLET, FILM COATED ORAL at 08:08

## 2017-10-26 RX ADMIN — METOCLOPRAMIDE 10 MG: 10 TABLET ORAL at 11:17

## 2017-10-27 LAB
GLUCOSE BLDC GLUCOMTR-MCNC: 143 MG/DL (ref 70–130)
GLUCOSE BLDC GLUCOMTR-MCNC: 144 MG/DL (ref 70–130)
GLUCOSE BLDC GLUCOMTR-MCNC: 167 MG/DL (ref 70–130)
GLUCOSE BLDC GLUCOMTR-MCNC: 181 MG/DL (ref 70–130)
GLUCOSE BLDC GLUCOMTR-MCNC: 95 MG/DL (ref 70–130)

## 2017-10-27 PROCEDURE — 97535 SELF CARE MNGMENT TRAINING: CPT

## 2017-10-27 PROCEDURE — 97112 NEUROMUSCULAR REEDUCATION: CPT

## 2017-10-27 PROCEDURE — 97110 THERAPEUTIC EXERCISES: CPT

## 2017-10-27 PROCEDURE — 82962 GLUCOSE BLOOD TEST: CPT

## 2017-10-27 PROCEDURE — 92507 TX SP LANG VOICE COMM INDIV: CPT | Performed by: SPEECH-LANGUAGE PATHOLOGIST

## 2017-10-27 PROCEDURE — 99232 SBSQ HOSP IP/OBS MODERATE 35: CPT | Performed by: FAMILY MEDICINE

## 2017-10-27 PROCEDURE — 63710000001 INSULIN ASPART PER 5 UNITS: Performed by: FAMILY MEDICINE

## 2017-10-27 PROCEDURE — 97530 THERAPEUTIC ACTIVITIES: CPT

## 2017-10-27 RX ADMIN — HYDROCODONE BITARTRATE AND ACETAMINOPHEN 1 TABLET: 10; 325 TABLET ORAL at 21:06

## 2017-10-27 RX ADMIN — METFORMIN HYDROCHLORIDE 1000 MG: 500 TABLET, EXTENDED RELEASE ORAL at 17:17

## 2017-10-27 RX ADMIN — HYDROCODONE BITARTRATE AND ACETAMINOPHEN 1 TABLET: 10; 325 TABLET ORAL at 01:54

## 2017-10-27 RX ADMIN — INSULIN ASPART 2 UNITS: 100 INJECTION, SOLUTION INTRAVENOUS; SUBCUTANEOUS at 11:10

## 2017-10-27 RX ADMIN — PHENAZOPYRIDINE HYDROCHLORIDE 100 MG: 100 TABLET ORAL at 08:02

## 2017-10-27 RX ADMIN — ALPRAZOLAM 0.5 MG: 0.25 TABLET ORAL at 15:15

## 2017-10-27 RX ADMIN — METOCLOPRAMIDE 10 MG: 10 TABLET ORAL at 08:01

## 2017-10-27 RX ADMIN — ATORVASTATIN CALCIUM 20 MG: 20 TABLET, FILM COATED ORAL at 21:06

## 2017-10-27 RX ADMIN — ONDANSETRON 4 MG: 4 TABLET, ORALLY DISINTEGRATING ORAL at 13:34

## 2017-10-27 RX ADMIN — INSULIN ASPART 2 UNITS: 100 INJECTION, SOLUTION INTRAVENOUS; SUBCUTANEOUS at 06:30

## 2017-10-27 RX ADMIN — PRENATAL VIT W/ FE FUMARATE-FA TAB 27-0.8 MG 1 TABLET: 27-0.8 TAB at 08:02

## 2017-10-27 RX ADMIN — METFORMIN HYDROCHLORIDE 1000 MG: 500 TABLET, EXTENDED RELEASE ORAL at 08:02

## 2017-10-27 RX ADMIN — ALPRAZOLAM 0.5 MG: 0.25 TABLET ORAL at 08:02

## 2017-10-27 RX ADMIN — GABAPENTIN 600 MG: 300 CAPSULE ORAL at 15:15

## 2017-10-27 RX ADMIN — METOCLOPRAMIDE 10 MG: 10 TABLET ORAL at 11:06

## 2017-10-27 RX ADMIN — Medication 2.5 MG: at 16:42

## 2017-10-27 RX ADMIN — ALPRAZOLAM 0.5 MG: 0.25 TABLET ORAL at 21:06

## 2017-10-27 RX ADMIN — METOCLOPRAMIDE 10 MG: 10 TABLET ORAL at 16:42

## 2017-10-27 RX ADMIN — ONDANSETRON 4 MG: 4 TABLET, ORALLY DISINTEGRATING ORAL at 08:01

## 2017-10-27 RX ADMIN — Medication 2.5 MG: at 08:02

## 2017-10-27 RX ADMIN — ISOSORBIDE MONONITRATE 30 MG: 30 TABLET, EXTENDED RELEASE ORAL at 08:02

## 2017-10-27 RX ADMIN — GABAPENTIN 600 MG: 300 CAPSULE ORAL at 08:02

## 2017-10-27 RX ADMIN — GABAPENTIN 600 MG: 300 CAPSULE ORAL at 21:06

## 2017-10-27 RX ADMIN — HYDROCODONE BITARTRATE AND ACETAMINOPHEN 1 TABLET: 10; 325 TABLET ORAL at 10:40

## 2017-10-27 RX ADMIN — CARVEDILOL 12.5 MG: 12.5 TABLET, FILM COATED ORAL at 08:02

## 2017-10-27 RX ADMIN — PHENAZOPYRIDINE HYDROCHLORIDE 100 MG: 100 TABLET ORAL at 17:17

## 2017-10-27 RX ADMIN — HYDROCODONE BITARTRATE AND ACETAMINOPHEN 1 TABLET: 10; 325 TABLET ORAL at 06:33

## 2017-10-27 RX ADMIN — TRAZODONE HYDROCHLORIDE 100 MG: 100 TABLET ORAL at 21:25

## 2017-10-27 RX ADMIN — PHENAZOPYRIDINE HYDROCHLORIDE 100 MG: 100 TABLET ORAL at 11:06

## 2017-10-27 RX ADMIN — HYDROCODONE BITARTRATE AND ACETAMINOPHEN 1 TABLET: 10; 325 TABLET ORAL at 13:34

## 2017-10-27 RX ADMIN — HYDROCODONE BITARTRATE AND ACETAMINOPHEN 1 TABLET: 10; 325 TABLET ORAL at 17:17

## 2017-10-27 RX ADMIN — LISINOPRIL 20 MG: 20 TABLET ORAL at 08:02

## 2017-10-27 RX ADMIN — CARVEDILOL 12.5 MG: 12.5 TABLET, FILM COATED ORAL at 17:17

## 2017-10-28 LAB
ALBUMIN SERPL-MCNC: 3.2 G/DL (ref 3.4–4.8)
ALBUMIN/GLOB SERPL: 1.1 G/DL (ref 1.1–1.8)
ALP SERPL-CCNC: 77 U/L (ref 38–126)
ALT SERPL W P-5'-P-CCNC: 17 U/L (ref 9–52)
ANION GAP SERPL CALCULATED.3IONS-SCNC: 11 MMOL/L (ref 5–15)
AST SERPL-CCNC: 32 U/L (ref 14–36)
BASOPHILS # BLD AUTO: 0.05 10*3/MM3 (ref 0–0.2)
BASOPHILS NFR BLD AUTO: 0.7 % (ref 0–2)
BILIRUB SERPL-MCNC: 0.3 MG/DL (ref 0.2–1.3)
BUN BLD-MCNC: 12 MG/DL (ref 7–21)
BUN/CREAT SERPL: 22.6 (ref 7–25)
CALCIUM SPEC-SCNC: 8.8 MG/DL (ref 8.4–10.2)
CHLORIDE SERPL-SCNC: 101 MMOL/L (ref 95–110)
CO2 SERPL-SCNC: 25 MMOL/L (ref 22–31)
CREAT BLD-MCNC: 0.53 MG/DL (ref 0.5–1)
DEPRECATED RDW RBC AUTO: 48.4 FL (ref 36.4–46.3)
EOSINOPHIL # BLD AUTO: 0.27 10*3/MM3 (ref 0–0.7)
EOSINOPHIL NFR BLD AUTO: 3.5 % (ref 0–7)
ERYTHROCYTE [DISTWIDTH] IN BLOOD BY AUTOMATED COUNT: 14.2 % (ref 11.5–14.5)
GFR SERPL CREATININE-BSD FRML MDRD: 114 ML/MIN/1.73 (ref 60–104)
GLOBULIN UR ELPH-MCNC: 2.8 GM/DL (ref 2.3–3.5)
GLUCOSE BLD-MCNC: 138 MG/DL (ref 60–100)
GLUCOSE BLDC GLUCOMTR-MCNC: 132 MG/DL (ref 70–130)
GLUCOSE BLDC GLUCOMTR-MCNC: 133 MG/DL (ref 70–130)
GLUCOSE BLDC GLUCOMTR-MCNC: 151 MG/DL (ref 70–130)
GLUCOSE BLDC GLUCOMTR-MCNC: 187 MG/DL (ref 70–130)
HCT VFR BLD AUTO: 32.9 % (ref 35–45)
HGB BLD-MCNC: 10.7 G/DL (ref 12–15.5)
IMM GRANULOCYTES # BLD: 0.06 10*3/MM3 (ref 0–0.02)
IMM GRANULOCYTES NFR BLD: 0.8 % (ref 0–0.5)
LYMPHOCYTES # BLD AUTO: 1.62 10*3/MM3 (ref 0.6–4.2)
LYMPHOCYTES NFR BLD AUTO: 21.1 % (ref 10–50)
MCH RBC QN AUTO: 30.5 PG (ref 26.5–34)
MCHC RBC AUTO-ENTMCNC: 32.5 G/DL (ref 31.4–36)
MCV RBC AUTO: 93.7 FL (ref 80–98)
MONOCYTES # BLD AUTO: 0.64 10*3/MM3 (ref 0–0.9)
MONOCYTES NFR BLD AUTO: 8.3 % (ref 0–12)
NEUTROPHILS # BLD AUTO: 5.04 10*3/MM3 (ref 2–8.6)
NEUTROPHILS NFR BLD AUTO: 65.6 % (ref 37–80)
NRBC BLD MANUAL-RTO: 0 /100 WBC (ref 0–0)
PLATELET # BLD AUTO: 452 10*3/MM3 (ref 150–450)
PMV BLD AUTO: 8.8 FL (ref 8–12)
POTASSIUM BLD-SCNC: 4.4 MMOL/L (ref 3.5–5.1)
PROT SERPL-MCNC: 6 G/DL (ref 6.3–8.6)
RBC # BLD AUTO: 3.51 10*6/MM3 (ref 3.77–5.16)
SODIUM BLD-SCNC: 137 MMOL/L (ref 137–145)
WBC NRBC COR # BLD: 7.68 10*3/MM3 (ref 3.2–9.8)

## 2017-10-28 PROCEDURE — 85025 COMPLETE CBC W/AUTO DIFF WBC: CPT | Performed by: FAMILY MEDICINE

## 2017-10-28 PROCEDURE — 82962 GLUCOSE BLOOD TEST: CPT

## 2017-10-28 PROCEDURE — 63710000001 INSULIN ASPART PER 5 UNITS: Performed by: FAMILY MEDICINE

## 2017-10-28 PROCEDURE — 80053 COMPREHEN METABOLIC PANEL: CPT | Performed by: FAMILY MEDICINE

## 2017-10-28 RX ORDER — NICOTINE 21 MG/24HR
1 PATCH, TRANSDERMAL 24 HOURS TRANSDERMAL EVERY 24 HOURS
Status: DISCONTINUED | OUTPATIENT
Start: 2017-10-28 | End: 2017-11-03 | Stop reason: HOSPADM

## 2017-10-28 RX ADMIN — Medication 2.5 MG: at 17:05

## 2017-10-28 RX ADMIN — PRENATAL VIT W/ FE FUMARATE-FA TAB 27-0.8 MG 1 TABLET: 27-0.8 TAB at 08:05

## 2017-10-28 RX ADMIN — ALPRAZOLAM 0.5 MG: 0.25 TABLET ORAL at 08:06

## 2017-10-28 RX ADMIN — HYDROCODONE BITARTRATE AND ACETAMINOPHEN 1 TABLET: 10; 325 TABLET ORAL at 08:06

## 2017-10-28 RX ADMIN — PHENAZOPYRIDINE HYDROCHLORIDE 100 MG: 100 TABLET ORAL at 08:05

## 2017-10-28 RX ADMIN — METOCLOPRAMIDE 10 MG: 10 TABLET ORAL at 17:05

## 2017-10-28 RX ADMIN — METFORMIN HYDROCHLORIDE 1000 MG: 500 TABLET, EXTENDED RELEASE ORAL at 17:05

## 2017-10-28 RX ADMIN — ONDANSETRON 4 MG: 4 TABLET, ORALLY DISINTEGRATING ORAL at 11:31

## 2017-10-28 RX ADMIN — CARVEDILOL 12.5 MG: 12.5 TABLET, FILM COATED ORAL at 17:05

## 2017-10-28 RX ADMIN — GABAPENTIN 600 MG: 300 CAPSULE ORAL at 15:33

## 2017-10-28 RX ADMIN — PHENAZOPYRIDINE HYDROCHLORIDE 100 MG: 100 TABLET ORAL at 11:00

## 2017-10-28 RX ADMIN — PHENAZOPYRIDINE HYDROCHLORIDE 100 MG: 100 TABLET ORAL at 17:05

## 2017-10-28 RX ADMIN — ALPRAZOLAM 0.5 MG: 0.25 TABLET ORAL at 15:34

## 2017-10-28 RX ADMIN — HYDROCODONE BITARTRATE AND ACETAMINOPHEN 1 TABLET: 10; 325 TABLET ORAL at 11:32

## 2017-10-28 RX ADMIN — NICOTINE 1 PATCH: 14 PATCH, EXTENDED RELEASE TRANSDERMAL at 15:33

## 2017-10-28 RX ADMIN — GABAPENTIN 600 MG: 300 CAPSULE ORAL at 20:13

## 2017-10-28 RX ADMIN — TRAZODONE HYDROCHLORIDE 100 MG: 100 TABLET ORAL at 20:13

## 2017-10-28 RX ADMIN — Medication 2.5 MG: at 06:30

## 2017-10-28 RX ADMIN — METFORMIN HYDROCHLORIDE 1000 MG: 500 TABLET, EXTENDED RELEASE ORAL at 08:05

## 2017-10-28 RX ADMIN — HYDROCODONE BITARTRATE AND ACETAMINOPHEN 1 TABLET: 10; 325 TABLET ORAL at 20:12

## 2017-10-28 RX ADMIN — INSULIN ASPART 2 UNITS: 100 INJECTION, SOLUTION INTRAVENOUS; SUBCUTANEOUS at 06:30

## 2017-10-28 RX ADMIN — GABAPENTIN 600 MG: 300 CAPSULE ORAL at 08:06

## 2017-10-28 RX ADMIN — CARVEDILOL 12.5 MG: 12.5 TABLET, FILM COATED ORAL at 08:06

## 2017-10-28 RX ADMIN — INSULIN ASPART 2 UNITS: 100 INJECTION, SOLUTION INTRAVENOUS; SUBCUTANEOUS at 16:33

## 2017-10-28 RX ADMIN — HYDROCODONE BITARTRATE AND ACETAMINOPHEN 1 TABLET: 10; 325 TABLET ORAL at 03:34

## 2017-10-28 RX ADMIN — METOCLOPRAMIDE 10 MG: 10 TABLET ORAL at 06:30

## 2017-10-28 RX ADMIN — ALPRAZOLAM 0.5 MG: 0.25 TABLET ORAL at 20:12

## 2017-10-28 RX ADMIN — HYDROCODONE BITARTRATE AND ACETAMINOPHEN 1 TABLET: 10; 325 TABLET ORAL at 15:33

## 2017-10-28 RX ADMIN — METOCLOPRAMIDE 10 MG: 10 TABLET ORAL at 11:00

## 2017-10-28 RX ADMIN — ISOSORBIDE MONONITRATE 30 MG: 30 TABLET, EXTENDED RELEASE ORAL at 08:05

## 2017-10-28 RX ADMIN — ATORVASTATIN CALCIUM 20 MG: 20 TABLET, FILM COATED ORAL at 20:12

## 2017-10-28 RX ADMIN — LISINOPRIL 20 MG: 20 TABLET ORAL at 08:05

## 2017-10-29 LAB
GLUCOSE BLDC GLUCOMTR-MCNC: 101 MG/DL (ref 70–130)
GLUCOSE BLDC GLUCOMTR-MCNC: 125 MG/DL (ref 70–130)
GLUCOSE BLDC GLUCOMTR-MCNC: 144 MG/DL (ref 70–130)
GLUCOSE BLDC GLUCOMTR-MCNC: 159 MG/DL (ref 70–130)

## 2017-10-29 PROCEDURE — 87086 URINE CULTURE/COLONY COUNT: CPT | Performed by: NURSE PRACTITIONER

## 2017-10-29 PROCEDURE — 63710000001 INSULIN ASPART PER 5 UNITS: Performed by: FAMILY MEDICINE

## 2017-10-29 PROCEDURE — 82962 GLUCOSE BLOOD TEST: CPT

## 2017-10-29 RX ORDER — SULFAMETHOXAZOLE AND TRIMETHOPRIM 200; 40 MG/5ML; MG/5ML
160 SUSPENSION ORAL EVERY 12 HOURS SCHEDULED
Status: DISCONTINUED | OUTPATIENT
Start: 2017-10-29 | End: 2017-11-02

## 2017-10-29 RX ADMIN — CARVEDILOL 12.5 MG: 12.5 TABLET, FILM COATED ORAL at 08:02

## 2017-10-29 RX ADMIN — ACETAMINOPHEN 650 MG: 325 TABLET ORAL at 02:57

## 2017-10-29 RX ADMIN — METFORMIN HYDROCHLORIDE 1000 MG: 500 TABLET, EXTENDED RELEASE ORAL at 17:01

## 2017-10-29 RX ADMIN — CARVEDILOL 12.5 MG: 12.5 TABLET, FILM COATED ORAL at 17:01

## 2017-10-29 RX ADMIN — INSULIN ASPART 2 UNITS: 100 INJECTION, SOLUTION INTRAVENOUS; SUBCUTANEOUS at 07:09

## 2017-10-29 RX ADMIN — SULFAMETHOXAZOLE AND TRIMETHOPRIM 160 MG: 200; 40 SUSPENSION ORAL at 15:04

## 2017-10-29 RX ADMIN — HYDROCODONE BITARTRATE AND ACETAMINOPHEN 1 TABLET: 10; 325 TABLET ORAL at 00:19

## 2017-10-29 RX ADMIN — METOCLOPRAMIDE 10 MG: 10 TABLET ORAL at 07:11

## 2017-10-29 RX ADMIN — GABAPENTIN 600 MG: 300 CAPSULE ORAL at 15:04

## 2017-10-29 RX ADMIN — METOCLOPRAMIDE 10 MG: 10 TABLET ORAL at 12:06

## 2017-10-29 RX ADMIN — TRAZODONE HYDROCHLORIDE 100 MG: 100 TABLET ORAL at 20:53

## 2017-10-29 RX ADMIN — HYDROCODONE BITARTRATE AND ACETAMINOPHEN 1 TABLET: 10; 325 TABLET ORAL at 17:01

## 2017-10-29 RX ADMIN — HYDROCODONE BITARTRATE AND ACETAMINOPHEN 1 TABLET: 10; 325 TABLET ORAL at 04:48

## 2017-10-29 RX ADMIN — HYDROCODONE BITARTRATE AND ACETAMINOPHEN 1 TABLET: 10; 325 TABLET ORAL at 08:28

## 2017-10-29 RX ADMIN — METFORMIN HYDROCHLORIDE 1000 MG: 500 TABLET, EXTENDED RELEASE ORAL at 08:02

## 2017-10-29 RX ADMIN — HYDROCODONE BITARTRATE AND ACETAMINOPHEN 1 TABLET: 10; 325 TABLET ORAL at 13:05

## 2017-10-29 RX ADMIN — Medication 2.5 MG: at 16:32

## 2017-10-29 RX ADMIN — ALPRAZOLAM 0.5 MG: 0.25 TABLET ORAL at 08:01

## 2017-10-29 RX ADMIN — GABAPENTIN 600 MG: 300 CAPSULE ORAL at 20:53

## 2017-10-29 RX ADMIN — Medication 2.5 MG: at 07:11

## 2017-10-29 RX ADMIN — METOCLOPRAMIDE 10 MG: 10 TABLET ORAL at 16:32

## 2017-10-29 RX ADMIN — ALPRAZOLAM 0.5 MG: 0.25 TABLET ORAL at 15:04

## 2017-10-29 RX ADMIN — PHENAZOPYRIDINE HYDROCHLORIDE 100 MG: 100 TABLET ORAL at 08:02

## 2017-10-29 RX ADMIN — HYDROCODONE BITARTRATE AND ACETAMINOPHEN 1 TABLET: 10; 325 TABLET ORAL at 20:53

## 2017-10-29 RX ADMIN — ALPRAZOLAM 0.5 MG: 0.25 TABLET ORAL at 20:52

## 2017-10-29 RX ADMIN — ISOSORBIDE MONONITRATE 30 MG: 30 TABLET, EXTENDED RELEASE ORAL at 08:02

## 2017-10-29 RX ADMIN — GABAPENTIN 600 MG: 300 CAPSULE ORAL at 08:01

## 2017-10-29 RX ADMIN — NICOTINE 1 PATCH: 14 PATCH, EXTENDED RELEASE TRANSDERMAL at 13:52

## 2017-10-29 RX ADMIN — SULFAMETHOXAZOLE AND TRIMETHOPRIM 160 MG: 200; 40 SUSPENSION ORAL at 20:56

## 2017-10-29 RX ADMIN — POLYETHYLENE GLYCOL 3350 17 G: 17 POWDER, FOR SOLUTION ORAL at 08:05

## 2017-10-29 RX ADMIN — PRENATAL VIT W/ FE FUMARATE-FA TAB 27-0.8 MG 1 TABLET: 27-0.8 TAB at 08:02

## 2017-10-29 RX ADMIN — PHENAZOPYRIDINE HYDROCHLORIDE 100 MG: 100 TABLET ORAL at 12:06

## 2017-10-29 RX ADMIN — LISINOPRIL 20 MG: 20 TABLET ORAL at 08:02

## 2017-10-29 RX ADMIN — ATORVASTATIN CALCIUM 20 MG: 20 TABLET, FILM COATED ORAL at 20:53

## 2017-10-30 PROBLEM — G81.14 SPASTIC HEMIPLEGIA OF LEFT NONDOMINANT SIDE DUE TO NONCEREBROVASCULAR ETIOLOGY (HCC): Status: ACTIVE | Noted: 2017-10-30

## 2017-10-30 LAB
BACTERIA SPEC AEROBE CULT: NORMAL
BACTERIA SPEC AEROBE CULT: NORMAL
GLUCOSE BLDC GLUCOMTR-MCNC: 102 MG/DL (ref 70–130)
GLUCOSE BLDC GLUCOMTR-MCNC: 111 MG/DL (ref 70–130)
GLUCOSE BLDC GLUCOMTR-MCNC: 139 MG/DL (ref 70–130)
GLUCOSE BLDC GLUCOMTR-MCNC: 96 MG/DL (ref 70–130)

## 2017-10-30 PROCEDURE — 97530 THERAPEUTIC ACTIVITIES: CPT

## 2017-10-30 PROCEDURE — 92507 TX SP LANG VOICE COMM INDIV: CPT | Performed by: SPEECH-LANGUAGE PATHOLOGIST

## 2017-10-30 PROCEDURE — 97110 THERAPEUTIC EXERCISES: CPT

## 2017-10-30 PROCEDURE — 97112 NEUROMUSCULAR REEDUCATION: CPT

## 2017-10-30 PROCEDURE — 82962 GLUCOSE BLOOD TEST: CPT

## 2017-10-30 PROCEDURE — 99232 SBSQ HOSP IP/OBS MODERATE 35: CPT | Performed by: FAMILY MEDICINE

## 2017-10-30 RX ADMIN — GABAPENTIN 600 MG: 300 CAPSULE ORAL at 08:22

## 2017-10-30 RX ADMIN — POLYETHYLENE GLYCOL 3350 17 G: 17 POWDER, FOR SOLUTION ORAL at 09:00

## 2017-10-30 RX ADMIN — HYDROCODONE BITARTRATE AND ACETAMINOPHEN 1 TABLET: 10; 325 TABLET ORAL at 08:22

## 2017-10-30 RX ADMIN — METFORMIN HYDROCHLORIDE 1000 MG: 500 TABLET, EXTENDED RELEASE ORAL at 08:21

## 2017-10-30 RX ADMIN — SULFAMETHOXAZOLE AND TRIMETHOPRIM 160 MG: 200; 40 SUSPENSION ORAL at 08:23

## 2017-10-30 RX ADMIN — TRAZODONE HYDROCHLORIDE 100 MG: 100 TABLET ORAL at 20:17

## 2017-10-30 RX ADMIN — ATORVASTATIN CALCIUM 20 MG: 20 TABLET, FILM COATED ORAL at 20:18

## 2017-10-30 RX ADMIN — HYDROCODONE BITARTRATE AND ACETAMINOPHEN 1 TABLET: 10; 325 TABLET ORAL at 01:06

## 2017-10-30 RX ADMIN — ONDANSETRON 4 MG: 4 TABLET, ORALLY DISINTEGRATING ORAL at 11:56

## 2017-10-30 RX ADMIN — Medication 2.5 MG: at 08:22

## 2017-10-30 RX ADMIN — ALPRAZOLAM 0.5 MG: 0.25 TABLET ORAL at 15:38

## 2017-10-30 RX ADMIN — NICOTINE 1 PATCH: 14 PATCH, EXTENDED RELEASE TRANSDERMAL at 15:38

## 2017-10-30 RX ADMIN — Medication 2.5 MG: at 17:44

## 2017-10-30 RX ADMIN — LISINOPRIL 20 MG: 20 TABLET ORAL at 08:21

## 2017-10-30 RX ADMIN — METFORMIN HYDROCHLORIDE 1000 MG: 500 TABLET, EXTENDED RELEASE ORAL at 17:44

## 2017-10-30 RX ADMIN — PRENATAL VIT W/ FE FUMARATE-FA TAB 27-0.8 MG 1 TABLET: 27-0.8 TAB at 08:21

## 2017-10-30 RX ADMIN — METOCLOPRAMIDE 10 MG: 10 TABLET ORAL at 08:21

## 2017-10-30 RX ADMIN — HYDROCODONE BITARTRATE AND ACETAMINOPHEN 1 TABLET: 10; 325 TABLET ORAL at 15:38

## 2017-10-30 RX ADMIN — CARVEDILOL 12.5 MG: 12.5 TABLET, FILM COATED ORAL at 08:21

## 2017-10-30 RX ADMIN — ACETAMINOPHEN 650 MG: 325 TABLET ORAL at 06:25

## 2017-10-30 RX ADMIN — GABAPENTIN 600 MG: 300 CAPSULE ORAL at 15:39

## 2017-10-30 RX ADMIN — HYDROCODONE BITARTRATE AND ACETAMINOPHEN 1 TABLET: 10; 325 TABLET ORAL at 20:18

## 2017-10-30 RX ADMIN — METOCLOPRAMIDE 10 MG: 10 TABLET ORAL at 17:44

## 2017-10-30 RX ADMIN — CARVEDILOL 12.5 MG: 12.5 TABLET, FILM COATED ORAL at 17:44

## 2017-10-30 RX ADMIN — ALPRAZOLAM 0.5 MG: 0.25 TABLET ORAL at 20:17

## 2017-10-30 RX ADMIN — ISOSORBIDE MONONITRATE 30 MG: 30 TABLET, EXTENDED RELEASE ORAL at 08:21

## 2017-10-30 RX ADMIN — GABAPENTIN 600 MG: 300 CAPSULE ORAL at 20:18

## 2017-10-30 RX ADMIN — ONDANSETRON 4 MG: 4 TABLET, ORALLY DISINTEGRATING ORAL at 05:10

## 2017-10-30 RX ADMIN — SULFAMETHOXAZOLE AND TRIMETHOPRIM 160 MG: 200; 40 SUSPENSION ORAL at 20:18

## 2017-10-30 RX ADMIN — HYDROCODONE BITARTRATE AND ACETAMINOPHEN 1 TABLET: 10; 325 TABLET ORAL at 05:06

## 2017-10-30 RX ADMIN — ONDANSETRON 4 MG: 4 TABLET, ORALLY DISINTEGRATING ORAL at 20:18

## 2017-10-30 RX ADMIN — HYDROCODONE BITARTRATE AND ACETAMINOPHEN 1 TABLET: 10; 325 TABLET ORAL at 11:56

## 2017-10-30 RX ADMIN — ALPRAZOLAM 0.5 MG: 0.25 TABLET ORAL at 08:22

## 2017-10-30 RX ADMIN — METOCLOPRAMIDE 10 MG: 10 TABLET ORAL at 11:56

## 2017-10-31 LAB
GLUCOSE BLDC GLUCOMTR-MCNC: 117 MG/DL (ref 70–130)
GLUCOSE BLDC GLUCOMTR-MCNC: 176 MG/DL (ref 70–130)
GLUCOSE BLDC GLUCOMTR-MCNC: 217 MG/DL (ref 70–130)
GLUCOSE BLDC GLUCOMTR-MCNC: 99 MG/DL (ref 70–130)

## 2017-10-31 PROCEDURE — 97535 SELF CARE MNGMENT TRAINING: CPT

## 2017-10-31 PROCEDURE — 63710000001 INSULIN ASPART PER 5 UNITS: Performed by: FAMILY MEDICINE

## 2017-10-31 PROCEDURE — 92507 TX SP LANG VOICE COMM INDIV: CPT | Performed by: SPEECH-LANGUAGE PATHOLOGIST

## 2017-10-31 PROCEDURE — 82962 GLUCOSE BLOOD TEST: CPT

## 2017-10-31 PROCEDURE — 97112 NEUROMUSCULAR REEDUCATION: CPT

## 2017-10-31 PROCEDURE — 97530 THERAPEUTIC ACTIVITIES: CPT

## 2017-10-31 PROCEDURE — 99232 SBSQ HOSP IP/OBS MODERATE 35: CPT | Performed by: FAMILY MEDICINE

## 2017-10-31 PROCEDURE — G8987 SELF CARE CURRENT STATUS: HCPCS

## 2017-10-31 PROCEDURE — G8988 SELF CARE GOAL STATUS: HCPCS

## 2017-10-31 PROCEDURE — 97110 THERAPEUTIC EXERCISES: CPT

## 2017-10-31 RX ADMIN — TRAZODONE HYDROCHLORIDE 100 MG: 100 TABLET ORAL at 20:47

## 2017-10-31 RX ADMIN — METOCLOPRAMIDE 10 MG: 10 TABLET ORAL at 08:05

## 2017-10-31 RX ADMIN — LISINOPRIL 20 MG: 20 TABLET ORAL at 08:05

## 2017-10-31 RX ADMIN — GABAPENTIN 600 MG: 300 CAPSULE ORAL at 20:45

## 2017-10-31 RX ADMIN — ISOSORBIDE MONONITRATE 30 MG: 30 TABLET, EXTENDED RELEASE ORAL at 08:04

## 2017-10-31 RX ADMIN — ONDANSETRON 4 MG: 4 TABLET, ORALLY DISINTEGRATING ORAL at 20:47

## 2017-10-31 RX ADMIN — METOCLOPRAMIDE 10 MG: 10 TABLET ORAL at 11:04

## 2017-10-31 RX ADMIN — CARVEDILOL 12.5 MG: 12.5 TABLET, FILM COATED ORAL at 17:12

## 2017-10-31 RX ADMIN — HYDROCODONE BITARTRATE AND ACETAMINOPHEN 1 TABLET: 10; 325 TABLET ORAL at 00:15

## 2017-10-31 RX ADMIN — INSULIN ASPART 2 UNITS: 100 INJECTION, SOLUTION INTRAVENOUS; SUBCUTANEOUS at 20:45

## 2017-10-31 RX ADMIN — METFORMIN HYDROCHLORIDE 1000 MG: 500 TABLET, EXTENDED RELEASE ORAL at 17:12

## 2017-10-31 RX ADMIN — METOCLOPRAMIDE 10 MG: 10 TABLET ORAL at 16:36

## 2017-10-31 RX ADMIN — SULFAMETHOXAZOLE AND TRIMETHOPRIM 160 MG: 200; 40 SUSPENSION ORAL at 20:45

## 2017-10-31 RX ADMIN — ALPRAZOLAM 0.5 MG: 0.25 TABLET ORAL at 08:04

## 2017-10-31 RX ADMIN — SULFAMETHOXAZOLE AND TRIMETHOPRIM 160 MG: 200; 40 SUSPENSION ORAL at 08:05

## 2017-10-31 RX ADMIN — HYDROCODONE BITARTRATE AND ACETAMINOPHEN 1 TABLET: 10; 325 TABLET ORAL at 16:36

## 2017-10-31 RX ADMIN — HYDROCODONE BITARTRATE AND ACETAMINOPHEN 1 TABLET: 10; 325 TABLET ORAL at 12:26

## 2017-10-31 RX ADMIN — NICOTINE 1 PATCH: 14 PATCH, EXTENDED RELEASE TRANSDERMAL at 14:48

## 2017-10-31 RX ADMIN — ATORVASTATIN CALCIUM 20 MG: 20 TABLET, FILM COATED ORAL at 20:46

## 2017-10-31 RX ADMIN — GABAPENTIN 600 MG: 300 CAPSULE ORAL at 08:04

## 2017-10-31 RX ADMIN — ALPRAZOLAM 0.5 MG: 0.25 TABLET ORAL at 16:11

## 2017-10-31 RX ADMIN — PRENATAL VIT W/ FE FUMARATE-FA TAB 27-0.8 MG 1 TABLET: 27-0.8 TAB at 08:05

## 2017-10-31 RX ADMIN — GABAPENTIN 600 MG: 300 CAPSULE ORAL at 16:12

## 2017-10-31 RX ADMIN — HYDROCODONE BITARTRATE AND ACETAMINOPHEN 1 TABLET: 10; 325 TABLET ORAL at 04:10

## 2017-10-31 RX ADMIN — METFORMIN HYDROCHLORIDE 1000 MG: 500 TABLET, EXTENDED RELEASE ORAL at 08:05

## 2017-10-31 RX ADMIN — INSULIN ASPART 4 UNITS: 100 INJECTION, SOLUTION INTRAVENOUS; SUBCUTANEOUS at 11:04

## 2017-10-31 RX ADMIN — CARVEDILOL 12.5 MG: 12.5 TABLET, FILM COATED ORAL at 08:04

## 2017-10-31 RX ADMIN — ALPRAZOLAM 0.5 MG: 0.25 TABLET ORAL at 20:45

## 2017-10-31 RX ADMIN — Medication 2.5 MG: at 08:05

## 2017-10-31 RX ADMIN — ONDANSETRON 4 MG: 4 TABLET, ORALLY DISINTEGRATING ORAL at 09:44

## 2017-10-31 RX ADMIN — HYDROCODONE BITARTRATE AND ACETAMINOPHEN 1 TABLET: 10; 325 TABLET ORAL at 20:45

## 2017-10-31 RX ADMIN — Medication 2.5 MG: at 16:36

## 2017-10-31 RX ADMIN — HYDROCODONE BITARTRATE AND ACETAMINOPHEN 1 TABLET: 10; 325 TABLET ORAL at 08:05

## 2017-11-01 LAB
ALBUMIN SERPL-MCNC: 3.4 G/DL (ref 3.4–4.8)
ALBUMIN/GLOB SERPL: 1.3 G/DL (ref 1.1–1.8)
ALP SERPL-CCNC: 80 U/L (ref 38–126)
ALT SERPL W P-5'-P-CCNC: 19 U/L (ref 9–52)
ANION GAP SERPL CALCULATED.3IONS-SCNC: 11 MMOL/L (ref 5–15)
AST SERPL-CCNC: 17 U/L (ref 14–36)
BASOPHILS # BLD AUTO: 0.04 10*3/MM3 (ref 0–0.2)
BASOPHILS NFR BLD AUTO: 0.6 % (ref 0–2)
BILIRUB SERPL-MCNC: 0.2 MG/DL (ref 0.2–1.3)
BUN BLD-MCNC: 16 MG/DL (ref 7–21)
BUN/CREAT SERPL: 21.6 (ref 7–25)
CALCIUM SPEC-SCNC: 9.1 MG/DL (ref 8.4–10.2)
CHLORIDE SERPL-SCNC: 102 MMOL/L (ref 95–110)
CO2 SERPL-SCNC: 23 MMOL/L (ref 22–31)
CREAT BLD-MCNC: 0.74 MG/DL (ref 0.5–1)
DEPRECATED RDW RBC AUTO: 48.2 FL (ref 36.4–46.3)
EOSINOPHIL # BLD AUTO: 0.21 10*3/MM3 (ref 0–0.7)
EOSINOPHIL NFR BLD AUTO: 3 % (ref 0–7)
ERYTHROCYTE [DISTWIDTH] IN BLOOD BY AUTOMATED COUNT: 14.2 % (ref 11.5–14.5)
GFR SERPL CREATININE-BSD FRML MDRD: 78 ML/MIN/1.73 (ref 60–104)
GLOBULIN UR ELPH-MCNC: 2.7 GM/DL (ref 2.3–3.5)
GLUCOSE BLD-MCNC: 90 MG/DL (ref 60–100)
GLUCOSE BLDC GLUCOMTR-MCNC: 104 MG/DL (ref 70–130)
GLUCOSE BLDC GLUCOMTR-MCNC: 121 MG/DL (ref 70–130)
GLUCOSE BLDC GLUCOMTR-MCNC: 129 MG/DL (ref 70–130)
GLUCOSE BLDC GLUCOMTR-MCNC: 94 MG/DL (ref 70–130)
HCT VFR BLD AUTO: 33.8 % (ref 35–45)
HGB BLD-MCNC: 10.9 G/DL (ref 12–15.5)
IMM GRANULOCYTES # BLD: 0.03 10*3/MM3 (ref 0–0.02)
IMM GRANULOCYTES NFR BLD: 0.4 % (ref 0–0.5)
LYMPHOCYTES # BLD AUTO: 1.91 10*3/MM3 (ref 0.6–4.2)
LYMPHOCYTES NFR BLD AUTO: 27.1 % (ref 10–50)
MCH RBC QN AUTO: 30.2 PG (ref 26.5–34)
MCHC RBC AUTO-ENTMCNC: 32.2 G/DL (ref 31.4–36)
MCV RBC AUTO: 93.6 FL (ref 80–98)
MONOCYTES # BLD AUTO: 0.52 10*3/MM3 (ref 0–0.9)
MONOCYTES NFR BLD AUTO: 7.4 % (ref 0–12)
NEUTROPHILS # BLD AUTO: 4.35 10*3/MM3 (ref 2–8.6)
NEUTROPHILS NFR BLD AUTO: 61.5 % (ref 37–80)
PLATELET # BLD AUTO: 437 10*3/MM3 (ref 150–450)
PMV BLD AUTO: 8.7 FL (ref 8–12)
POTASSIUM BLD-SCNC: 4.9 MMOL/L (ref 3.5–5.1)
PROT SERPL-MCNC: 6.1 G/DL (ref 6.3–8.6)
RBC # BLD AUTO: 3.61 10*6/MM3 (ref 3.77–5.16)
SODIUM BLD-SCNC: 136 MMOL/L (ref 137–145)
WBC NRBC COR # BLD: 7.06 10*3/MM3 (ref 3.2–9.8)

## 2017-11-01 PROCEDURE — 82962 GLUCOSE BLOOD TEST: CPT

## 2017-11-01 PROCEDURE — 97535 SELF CARE MNGMENT TRAINING: CPT

## 2017-11-01 PROCEDURE — 97110 THERAPEUTIC EXERCISES: CPT | Performed by: PHYSICAL THERAPIST

## 2017-11-01 PROCEDURE — 97530 THERAPEUTIC ACTIVITIES: CPT | Performed by: PHYSICAL THERAPIST

## 2017-11-01 PROCEDURE — 92507 TX SP LANG VOICE COMM INDIV: CPT | Performed by: SPEECH-LANGUAGE PATHOLOGIST

## 2017-11-01 PROCEDURE — 99232 SBSQ HOSP IP/OBS MODERATE 35: CPT | Performed by: FAMILY MEDICINE

## 2017-11-01 PROCEDURE — 80053 COMPREHEN METABOLIC PANEL: CPT | Performed by: FAMILY MEDICINE

## 2017-11-01 PROCEDURE — 85025 COMPLETE CBC W/AUTO DIFF WBC: CPT | Performed by: FAMILY MEDICINE

## 2017-11-01 RX ORDER — ALPRAZOLAM 0.25 MG/1
0.25 TABLET ORAL 4 TIMES DAILY
Status: DISCONTINUED | OUTPATIENT
Start: 2017-11-01 | End: 2017-11-03 | Stop reason: HOSPADM

## 2017-11-01 RX ORDER — ESCITALOPRAM OXALATE 10 MG/1
10 TABLET ORAL NIGHTLY
Status: DISCONTINUED | OUTPATIENT
Start: 2017-11-01 | End: 2017-11-03 | Stop reason: HOSPADM

## 2017-11-01 RX ADMIN — TRAZODONE HYDROCHLORIDE 100 MG: 100 TABLET ORAL at 21:02

## 2017-11-01 RX ADMIN — GABAPENTIN 600 MG: 300 CAPSULE ORAL at 16:01

## 2017-11-01 RX ADMIN — PRENATAL VIT W/ FE FUMARATE-FA TAB 27-0.8 MG 1 TABLET: 27-0.8 TAB at 08:04

## 2017-11-01 RX ADMIN — HYDROCODONE BITARTRATE AND ACETAMINOPHEN 1 TABLET: 10; 325 TABLET ORAL at 01:18

## 2017-11-01 RX ADMIN — HYDROCODONE BITARTRATE AND ACETAMINOPHEN 1 TABLET: 10; 325 TABLET ORAL at 21:01

## 2017-11-01 RX ADMIN — GABAPENTIN 600 MG: 300 CAPSULE ORAL at 21:00

## 2017-11-01 RX ADMIN — NICOTINE 1 PATCH: 14 PATCH, EXTENDED RELEASE TRANSDERMAL at 16:01

## 2017-11-01 RX ADMIN — SULFAMETHOXAZOLE AND TRIMETHOPRIM 160 MG: 200; 40 SUSPENSION ORAL at 08:04

## 2017-11-01 RX ADMIN — GABAPENTIN 600 MG: 300 CAPSULE ORAL at 08:04

## 2017-11-01 RX ADMIN — ATORVASTATIN CALCIUM 20 MG: 20 TABLET, FILM COATED ORAL at 21:02

## 2017-11-01 RX ADMIN — SULFAMETHOXAZOLE AND TRIMETHOPRIM 160 MG: 200; 40 SUSPENSION ORAL at 21:00

## 2017-11-01 RX ADMIN — ESCITALOPRAM OXALATE 10 MG: 10 TABLET ORAL at 21:02

## 2017-11-01 RX ADMIN — ALPRAZOLAM 0.25 MG: 0.25 TABLET ORAL at 21:01

## 2017-11-01 RX ADMIN — Medication 2.5 MG: at 16:38

## 2017-11-01 RX ADMIN — HYDROCODONE BITARTRATE AND ACETAMINOPHEN 1 TABLET: 10; 325 TABLET ORAL at 13:03

## 2017-11-01 RX ADMIN — ISOSORBIDE MONONITRATE 30 MG: 30 TABLET, EXTENDED RELEASE ORAL at 08:04

## 2017-11-01 RX ADMIN — ALPRAZOLAM 0.25 MG: 0.25 TABLET ORAL at 16:37

## 2017-11-01 RX ADMIN — ALPRAZOLAM 0.5 MG: 0.25 TABLET ORAL at 08:04

## 2017-11-01 RX ADMIN — ONDANSETRON 4 MG: 4 TABLET, ORALLY DISINTEGRATING ORAL at 08:04

## 2017-11-01 RX ADMIN — LISINOPRIL 20 MG: 20 TABLET ORAL at 08:04

## 2017-11-01 RX ADMIN — METOCLOPRAMIDE 10 MG: 10 TABLET ORAL at 08:04

## 2017-11-01 RX ADMIN — METOCLOPRAMIDE 10 MG: 10 TABLET ORAL at 16:38

## 2017-11-01 RX ADMIN — METOCLOPRAMIDE 10 MG: 10 TABLET ORAL at 12:19

## 2017-11-01 RX ADMIN — Medication 2.5 MG: at 08:04

## 2017-11-01 RX ADMIN — ONDANSETRON 4 MG: 4 TABLET, ORALLY DISINTEGRATING ORAL at 13:12

## 2017-11-01 RX ADMIN — CARVEDILOL 12.5 MG: 12.5 TABLET, FILM COATED ORAL at 08:04

## 2017-11-01 RX ADMIN — METFORMIN HYDROCHLORIDE 1000 MG: 500 TABLET, EXTENDED RELEASE ORAL at 08:04

## 2017-11-01 RX ADMIN — CARVEDILOL 12.5 MG: 12.5 TABLET, FILM COATED ORAL at 17:21

## 2017-11-01 RX ADMIN — METFORMIN HYDROCHLORIDE 1000 MG: 500 TABLET, EXTENDED RELEASE ORAL at 17:21

## 2017-11-01 RX ADMIN — HYDROCODONE BITARTRATE AND ACETAMINOPHEN 1 TABLET: 10; 325 TABLET ORAL at 08:59

## 2017-11-01 RX ADMIN — HYDROCODONE BITARTRATE AND ACETAMINOPHEN 1 TABLET: 10; 325 TABLET ORAL at 05:23

## 2017-11-01 RX ADMIN — HYDROCODONE BITARTRATE AND ACETAMINOPHEN 1 TABLET: 10; 325 TABLET ORAL at 16:37

## 2017-11-01 NOTE — PLAN OF CARE
Problem: Patient Care Overview (Adult)  Goal: Plan of Care Review  Outcome: Ongoing (interventions implemented as appropriate)    11/01/17 0125   Coping/Psychosocial Response Interventions   Plan Of Care Reviewed With patient   Patient Care Overview   Progress no change   Outcome Evaluation   Outcome Summary/Follow up Plan pt rested during night vss pain controlled by prn pain med wants order for glucometer before dc and would like flu vaccine upon discharge         Problem: Fall Risk (Adult)  Goal: Absence of Falls  Outcome: Ongoing (interventions implemented as appropriate)

## 2017-11-01 NOTE — CONSULTS
Adult Nutrition  Assessment    Patient Name:  Nikki Felder  YOB: 1950  MRN: [id number]  Admit Date:  10/18/2017    Assessment Date:  11/1/2017    Comments:  Pt c/o of nauseated. Made nursing aware of nausea and that the patient wants to go to bed. Obtained crackers for pt. intake is poor -fair.h/h and sodium are below normal limits. Glucose is variable. Will continue with glucerna bid with meals. RDN staff to continue to monitor.          Reason for Assessment       11/01/17 1354    Reason for Assessment    Reason For Assessment/Visit follow up protocol    Identified At Risk By Screening Criteria reduced oral intake over the last month    Diagnosis Diagnosis    Neurological CVA    Reported GI Symptoms Other   nausea                Nutrition/Diet History       11/01/17 1355    Nutrition/Diet History    Patient Reported Diet/Restrictions/Preferences other (see comments);carbohydrate counting   ground meat     Typical Food/Fluid Intake pt isn't going to eat her lunch since she is nausated. nursing is aware. asked for crackers and RDN obtained for her. pt wants to go to bed but nursing said she needs to stay up longer.              Labs/Tests/Procedures/Meds       11/01/17 1357    Labs/Tests/Procedures/Meds    Labs/Tests Review Reviewed;Glucose;Na+;Hgb Hct    Medication Review Reviewed, pertinent    Significant Vitals reviewed                Nutrition Prescription Ordered       11/01/17 1358    Nutrition Prescription PO    Current PO Diet Soft Texture    Texture Ground    Supplement Glucerna Shake    Supplement Frequency 2 times a day    Common Modifiers Consistent Carbohydrate            Evaluation of Received Nutrient/Fluid Intake       11/01/17 1400    PO Evaluation    Number of Meals 4    % PO Intake 56              Malnutrition Severity Assessment       11/01/17 1402    Malnutrition Severity Assessment    Malnutrition Type --        Electronically signed by:  Jaky Cespedes RD  11/01/17  2:07 PM

## 2017-11-01 NOTE — PLAN OF CARE
Problem: Patient Care Overview (Adult)  Goal: Plan of Care Review  Outcome: Ongoing (interventions implemented as appropriate)    11/01/17 1611   Coping/Psychosocial Response Interventions   Plan Of Care Reviewed With patient   Patient Care Overview   Progress no change   Outcome Evaluation   Outcome Summary/Follow up Plan pt with nausea. refusing lunch today. nursing is aware. pt to make requests if desired at any time.

## 2017-11-01 NOTE — PLAN OF CARE
Problem: Patient Care Overview (Adult)  Goal: Plan of Care Review  Outcome: Ongoing (interventions implemented as appropriate)    11/01/17 1315   Coping/Psychosocial Response Interventions   Plan Of Care Reviewed With patient   Outcome Evaluation   Outcome Summary/Follow up Plan PT 14 day progress note completed. Pt has demonstrated limited progress and had not yet met any goals. Goals downgraded. Pt is transferring from bed<-->wc using sliding board with ModAx2. Pt will continue to benefit from skilled PT to improve mobility and safety prior to d/c. Pt to d/c home with dtr on Friday.         Problem: Inpatient Physical Therapy  Goal: Bed Mobility Goal LTG- PT  Outcome: Revised    11/01/17 1315   Bed Mobility PT LTG   Bed Mobility PT LTG, Date Established 11/01/17   Bed Mobility PT LTG, Time to Achieve by discharge   Bed Mobility PT LTG, Activity Type supine to sit/sit to supine   Bed Mobility PT LTG, [place] Level minimum assist (75% patient effort)   Bed Mobility PT Goal LTG, Assist Device bed rails   Bed Mobility PT LTG, Additional Goal HOB elevated as needed   Bed Mobility PT LTG, Date Goal Reviewed 11/01/17   Bed Mobility PT LTG, Outcome goal revised       Goal: Transfer Training Goal 1 STG- PT  Outcome: Unable to achieve outcome(s) by discharge Date Met:  11/01/17    10/19/17 0947 11/01/17 1315   Transfer Training PT STG   Transfer Training PT STG, Date Established 10/19/17 --    Transfer Training PT STG, Time to Achieve 5 days --    Transfer Training PT STG, Activity Type sit to stand/stand to sit --    Transfer Training PT STG, [place] Level moderate assist (50% patient effort) --    Transfer Training PT STG, Assist Device (AAD or // bars) --    Transfer Training PT STG, Date Goal Reviewed --  11/01/17   Transfer Training PT STG, Outcome --  goal no longer appropriate   Transfer Training PT STG, Reason Goal Not Met --  unable to make needed progress       Goal: Transfer Training Goal 1 LTG-  PT  Outcome: Revised    11/01/17 1315   Transfer Training PT LTG   Transfer Training PT LTG, Date Established 11/01/17   Transfer Training PT LTG, Time to Achieve by discharge   Transfer Training PT LTG, Activity Type bed to chair /chair to bed   Transfer Training PT LTG, [place] Level minimum assist (75% patient effort)   Transfer Training PT LTG, Assist Device (AAD)   Transfer Training PT LTG, Date Goal Reviewed 11/01/17   Transfer Training PT LTG, Outcome goal revised       Goal: Gait Training Goal LTG- PT  Outcome: Unable to achieve outcome(s) by discharge Date Met:  11/01/17    10/19/17 0947 11/01/17 1412   Gait Training PT LTG   Gait Training Goal PT LTG, Date Established 10/19/17 --    Gait Training Goal PT LTG, Time to Achieve by discharge --    Gait Training Goal PT LTG, [place] Level minimum assist (75% patient effort) --    Gait Training Goal PT LTG, Assist Device (// bars) --    Gait Training Goal PT LTG, Distance to Achieve 6 feet --    Gait Training Goal PT LTG, Date Goal Reviewed --  11/01/17   Gait Training Goal PT LTG, Outcome --  goal no longer appropriate   Gait Training Goal PT LTG, Reason Goal Not Met --  unable to make needed progress       Goal: Static Sitting Balance Goal LTG- PT  Outcome: Revised    11/01/17 1315   Static Sitting Balance PT LTG   Static Sitting Balance PT LTG, Date Established 11/01/17   Static Sitting Balance PT LTG, Time to Achieve by discharge   Static Sitting Balance PT LTG, [place] Level minimum assist (75% patient effort)   Static Sitting Balance PT LTG, Assist Device UE Support   Static Sitting Balance PT LTG, Additional Goal Pt able to sit EOB/EOM in midline for 10 minutes.   Static Sitting Balance PT LTG, Date Goal Reviewed 11/01/17   Static Sitting Balance PT LTG, Outcome goal revised
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
## 2017-11-01 NOTE — PLAN OF CARE
Problem: Patient Care Overview (Adult)  Goal: Plan of Care Review  Outcome: Ongoing (interventions implemented as appropriate)    11/01/17 0942   Coping/Psychosocial Response Interventions   Plan Of Care Reviewed With patient   Patient Care Overview   Progress improving   Outcome Evaluation   Outcome Summary/Follow up Plan Pt met 2/6 goals this date. Pt met sequencing goal and sustained attention goal. Pt's overall performance has progressed in all goal areas.         Problem: Inpatient SLP  Goal: Cognitive-linguistic-Patient will improve Cognitive-linguistic skills to allow optimal participation in care  Outcome: Ongoing (interventions implemented as appropriate)    10/19/17 1350 11/01/17 0942   Cognitive Linguistic- Optimal Participation in Care   Cognitive Linguistic Optimal Participation in Care- SLP, Date Established 10/19/17 --    Cognitive Linguistic Optimal Participation in Care- SLP, Time to Achieve by discharge --    Cognitive Linguistic Optimal Participation in Care- SLP, Activity Level Patient will improve Executive functioning skills for increased safety in environment --    Cognitive Linguistic Optimal Participation in Care- SLP, Date Goal Reviewed --  11/01/17   Cognitive Linguistic Optimal Participation in Care- SLP, Outcome --  goal partially met

## 2017-11-01 NOTE — THERAPY TREATMENT NOTE
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  --   sliding board  -BH (r) SP (t) BH (c)     Transfer Training OT LTG, Date Goal Reviewed 11/01/17  -  10/31/17  -    Transfer Training OT LTG, Outcome goal ongoing  - goal revised  -BH (r) SP (t) BH (c)     Strength OT LTG    Strength Goal OT LTG, Date Goal Reviewed   10/31/17  -    Range of Motion OT LTG    Range of Motion Goal OT LTG, Date Goal Reviewed   10/31/17  -    Range of Motion Goal OT LTG, Outcome  goal not met   Goal d/c'd 2* slow progress. Not appropriate at this time.   -BH (r) SP (t) BH (c)     Dynamic Sitting Balance OT LTG    Dynamic Sitting Balance OT LTG, [place] Level  moderate assist (50% patient effort)  -BH (r) SP (t) BH (c)     Dynamic Sitting Balance OT LTG, Date Goal Reviewed 11/01/17  -MW 10/31/17  -BH (r) SP (t) BH (c) 10/31/17   -RC    Dynamic Sitting Balance OT LTG, Outcome goal ongoing  -MW goal revised  -BH (r) SP (t) BH (c) goal ongoing  -RC    ADL OT LTG    ADL OT LTG, Date Goal Reviewed 11/01/17  -MW      ADL OT LTG, Outcome goal ongoing  -MW        10/30/17 1307 10/27/17 1515 10/26/17 0620    Bed Mobility OT LTG    Bed Mobility OT LTG, Date Goal Reviewed 10/30/17  -RC 10/27/17  -RC 10/26/17  -RC    Transfer Training OT LTG    Transfer Training OT LTG, Date Goal Reviewed 10/30/17  -RC 10/27/17  -RC 10/26/17  -RC    Strength OT LTG    Strength Goal OT LTG, Date Goal Reviewed 10/30/17  -RC 10/27/17  -RC     Range of Motion OT LTG    Range of Motion Goal OT LTG, Date Goal Reviewed 10/30/17  -RC 10/27/17  -RC 10/26/17  -RC    Dynamic Sitting Balance OT LTG    Dynamic Sitting Balance OT LTG, Date Goal Reviewed 10/30/17  -RC 10/27/17  -RC 10/26/17  -RC    ADL OT LTG    ADL OT LTG, Date Goal Reviewed 10/30/17  -RC 10/27/17  -RC 10/26/17  -RC      10/25/17 1303 10/24/17 1300 10/23/17 1450    Bed Mobility OT LTG    Bed Mobility OT LTG, Date Goal Reviewed 10/25/17  -RC 10/24/17  -RC 10/23/17  -RC    Transfer Training OT LTG    Transfer Training OT LTG, Date Goal Reviewed 10/25/17  -RC 10/24/17  -RC 10/23/17  -RC    Strength OT LTG    Strength Goal OT LTG, Date Goal Reviewed 10/25/17  -RC 10/24/17  -RC 10/23/17  -RC    Range of Motion OT LTG    Range of Motion Goal OT LTG, Date Goal Reviewed 10/25/17  -RC 10/24/17  -RC 10/23/17  -RC    Dynamic Sitting Balance OT LTG    Dynamic Sitting Balance OT LTG, Date Goal Reviewed 10/25/17  -RC 10/24/17  -RC 10/23/17  -RC    ADL OT LTG    ADL OT LTG, Date Goal Reviewed  10/24/17  -RC 10/23/17  -RC    ADL OT LTG, Outcome  goal ongoing  -       10/21/17 1050 10/19/17 1412 10/19/17 1354    Bed Mobility OT LTG    Bed Mobility OT LTG, Date Established   10/19/17  -RB    Bed Mobility OT LTG, Time to Achieve   by discharge  -RB    Bed Mobility OT LTG, Activity Type   roll left/roll right;supine to sit/sit to  supine  -RB    Bed Mobility OT LTG, [place] Level   contact guard assist;minimum assist (75% patient effort)  -RB    Bed Mobility OT LTG, Assist Device   bed rails  -RB    Bed Mobility OT LTG, Date Goal Reviewed 10/21/17  - 10/19/17  -     Bed Mobility OT LTG, Outcome  goal ongoing  -     Transfer Training OT LTG    Transfer Training OT LTG, Date Established   10/19/17  -RB    Transfer Training OT LTG, Time to Achieve   by discharge  -RB    Transfer Training OT LTG, Activity Type   all transfers  -RB    Transfer Training OT LTG, [place] Level   contact guard assist;minimum assist (75% patient effort)  -RB    Transfer Training OT LTG, Assist Device   walker, tram   if needed  -RB    Transfer Training OT LTG, Date Goal Reviewed 10/21/17  - 10/19/17  -     Transfer Training OT LTG, Outcome  goal ongoing  -     Strength OT LTG    Strength Goal OT LTG, Date Established   10/19/17  -RB    Strength Goal OT LTG, Time to Achieve   by discharge  -RB    Strength Goal OT LTG, Measure to Achieve   1-2 sets of 10 reps all planes with 1-2 lb wrist wts or dumbell to increase R UE strength for increased independence with ADLs and functional mobility.  -RB    Strength Goal OT LTG, Date Goal Reviewed 10/21/17  - 10/19/17  -     Strength Goal OT LTG, Outcome  goal ongoing  -     Range of Motion OT LTG    Range of Motion Goal OT LTG, Date Established   10/12/17  -RB    Range of Motion Goal OT LTG, Time to Achieve   by discharge  -RB    Range of Motion Goal OT LTG, AROM Measure   90% of full AROM for L UE all planes to assist  with ADL independence.  -RB    Range of Motion Goal OT LTG, Date Goal Reviewed 10/21/17  - 10/19/17  -     Range of Motion Goal OT LTG, Outcome  goal ongoing  -     Dynamic Sitting Balance OT LTG    Dynamic Sitting Balance OT LTG, Date Established   10/19/17  -RB    Dynamic Sitting Balance OT LTG, Time to Achieve   by discharge  -RB    Dynamic Sitting Balance OT LTG, [place]  Level   supervision required;contact guard assist   5 minutes with functional activity.  -RB    Dynamic Sitting Balance OT LTG, Assist Device   bed rails  -RB    Dynamic Sitting Balance OT LTG, Date Goal Reviewed 10/21/17  - 10/19/17  -     Dynamic Sitting Balance OT LTG, Outcome  goal ongoing  -     ADL OT LTG    ADL OT LTG, Date Established   10/19/17  -RB    ADL OT LTG, Time to Achieve   by discharge  -RB    ADL OT LTG, Activity Type   ADL skills   Upper body sponge bath and dress.  -RB    ADL OT LTG, [place] Level   min assist  -RB    ADL OT LTG, Date Goal Reviewed 10/21/17  - 10/19/17  -     ADL OT LTG, Outcome  goal ongoing  -       User Key  (r) = Recorded By, (t) = Taken By, (c) = Cosigned By    Initials Name Provider Type    RB Dionte Love, OT Occupational Therapist     Micaela Engel, OTR/L Occupational Therapist     Fany Romano, GALARZA/L Occupational Therapy Assistant     Rossana Sharma, GALARZA/L Occupational Therapy Assistant    TRIPP Garcia, OT Student OT Student     Zuleika Palacios, OT Occupational Therapist          Occupational Therapy Education     Title: PT OT SLP Therapies (Active)     Topic: Occupational Therapy (Done)     Point: ADL training (Done)    Description: Instruct learner(s) on proper safety adaptation and remediation techniques during self care or transfers.   Instruct in proper use of assistive devices.    Learning Progress Summary    Learner Readiness Method Response Comment Documented by Status   Patient Acceptance E VU,NR Pt educated on OT POC. Pt educated on utilizing call light and non-skid socks. Pt educated on the importance of getting out of bed and engaging in therapy.  11/01/17 1342 Done    Acceptance E NR   10/30/17 1437 Active    Acceptance E NR,VU   10/27/17 1512 Done    Acceptance E NR   10/26/17 1132 Active    Acceptance E VU,NR Pt. educated on role of OT, importance of participating in therapy, and benefit of activity engagement.  ND 10/20/17 1532 Done    Acceptance E NR  RC 10/19/17 1557 Active   Family Acceptance E NR,VU  RC 10/27/17 1512 Done               Point: Home exercise program (Done)    Description: Instruct learner(s) on appropriate technique for monitoring, assisting and/or progressing therapeutic exercises/activities.    Learning Progress Summary    Learner Readiness Method Response Comment Documented by Status   Patient Acceptance E NR,VU  RC 10/27/17 1512 Done    Acceptance E VU,NR Pt. educated on role of OT, importance of participating in therapy, and benefit of activity engagement. ND 10/20/17 1532 Done   Family Acceptance E NR,VU  RC 10/27/17 1512 Done               Point: Precautions (Done)    Description: Instruct learner(s) on prescribed precautions during self-care and functional transfers.    Learning Progress Summary    Learner Readiness Method Response Comment Documented by Status   Patient Acceptance E VU,NR Pt educated on OT POC. Pt educated on utilizing call light and non-skid socks. Pt educated on the importance of getting out of bed and engaging in therapy. MW 11/01/17 1342 Done    Acceptance E NR   10/30/17 1437 Active    Acceptance E NR,VU   10/27/17 1512 Done    Acceptance E NR   10/26/17 1132 Active    Acceptance E VU,NR Pt. educated on role of OT, importance of participating in therapy, and benefit of activity engagement. ND 10/20/17 1532 Done    Acceptance E VU,NR Edu pt on use of gait belt and non skid socks when OOB and no OOB without assist. RB 10/19/17 1350 Done   Family Acceptance E NR,VU  RC 10/27/17 1512 Done               Point: Body mechanics (Done)    Description: Instruct learner(s) on proper positioning and spine alignment during self-care, functional mobility activities and/or exercises.    Learning Progress Summary    Learner Readiness Method Response Comment Documented by Status   Patient Acceptance E VU,NR Pt educated on OT POC. Pt educated on utilizing call light and non-skid socks.  Pt educated on the importance of getting out of bed and engaging in therapy.  11/01/17 1342 Done    Acceptance E NR  RC 10/26/17 1132 Active    Acceptance E VU,NR Pt. educated on role of OT, importance of participating in therapy, and benefit of activity engagement. ND 10/20/17 1532 Done                      User Key     Initials Effective Dates Name Provider Type Discipline    RB 06/15/16 -  Dionte Love, OT Occupational Therapist OT     10/17/16 -  Fany Romano, GALARZA/L Occupational Therapy Assistant OT    ND 10/21/16 -  Madison Powell OTTOM/L Occupational Therapist OT     10/03/17 -  Zuleika Palacios, OT Occupational Therapist OT                  OT Recommendation and Plan  Anticipated Equipment Needs At Discharge: wheelchair, tram walker  Anticipated Discharge Disposition: home with 24/7 care, home with home health, skilled nursing facility (depending on progress in ARU)  Planned Therapy Interventions: activity intolerance, adaptive equipment training, ADL retraining, IADL retraining, balance training, bed mobility training, energy conservation, fine motor coordination training, home exercise program, joint mobilization, motor coordination training, neuromuscular re-education, ROM (Range of Motion), strengthening, transfer training, wheelchair fit and training  Therapy Frequency: other (see comments) (3-14x/wk)  Plan of Care Review  Plan Of Care Reviewed With: patient  Progress: progress toward functional goals as expected  Outcome Summary/Follow up Plan: Pt completed bathing and dressing task this date. Pt required Jessica for UB bathing, mod-max A for LB bathing. Pt required mod - max assist for UB/LB dressing. Pt completed sliding board transfer from bed to w/c with mod A x 2. Pt completed bed mobility including rolling left with min a and rolling right with mod-max A. Pt is continuing to improve and show effort during treatment.         Outcome Measures       11/01/17 1027 10/31/17 1103 10/30/17 1307     How much help from another is currently needed...    Putting on and taking off regular lower body clothing? 2  -MW 2  -BH (r) SP (t) BH (c) 2  -RC    Bathing (including washing, rinsing, and drying) 2  -MW 2  -BH (r) SP (t) BH (c) 2  -RC    Toileting (which includes using toilet bed pan or urinal) 2  -MW 2  -BH (r) SP (t) BH (c) 2  -RC    Putting on and taking off regular upper body clothing 2  -MW 2  -BH (r) SP (t) BH (c) 2  -RC    Taking care of personal grooming (such as brushing teeth) 2  -MW 2  -BH (r) SP (t) BH (c) 2  -RC    Eating meals 3  -MW 3  -BH (r) SP (t) BH (c) 3  -RC    Score 13  -MW 13  -BH (r) SP (t) 13  -RC      10/30/17 1100          How much help from another person do you currently need...    Turning from your back to your side while in flat bed without using bedrails? 3  -RW      Moving from lying on back to sitting on the side of a flat bed without bedrails? 2  -RW      Moving to and from a bed to a chair (including a wheelchair)? 2  -RW      Standing up from a chair using your arms (e.g., wheelchair, bedside chair)? 1  -RW      Climbing 3-5 steps with a railing? 1  -RW      To walk in hospital room? 1  -RW      AM-PAC 6 Clicks Score 10  -RW        User Key  (r) = Recorded By, (t) = Taken By, (c) = Cosigned By    Initials Name Provider Type     Micaela Engel, OTR/L Occupational Therapist    STEVE Jacobs, RONNY Physical Therapy Assistant    GEOVANNI Stevens/L Occupational Therapy Assistant    TRIPP Garcia, OT Student OT Student    JAX Palacios, BRIDGETT Occupational Therapist           Time Calculation:         Time Calculation- OT       11/01/17 1351          Time Calculation- OT    OT Start Time 1027  -MW      OT Stop Time 1210  -MW      OT Time Calculation (min) 103 min  -MW      Total Timed Code Minutes-  minute(s)  -MW      OT Received On 11/01/17  -MW        User Key  (r) = Recorded By, (t) = Taken By, (c) = Cosigned By    Initials Name Provider Type    JAX To  IBETH Palacios OT Occupational Therapist           Therapy Charges for Today     Code Description Service Date Service Provider Modifiers Qty    [id number] HC OT SELF CARE/MGMT/TRAIN EA 15 MIN 11/1/2017 Zuleika Palacios OT GO 7          OT G-codes  OT Professional Judgement Used?: Yes  OT Functional Scales Options: AM-PAC 6 Clicks Daily Activity (OT)  Score: 13  Functional Limitation: Self care  Self Care Current Status (): At least 60 percent but less than 80 percent impaired, limited or restricted  Self Care Goal Status (): At least 40 percent but less than 60 percent impaired, limited or restricted    Zuleika Palacios OT  11/1/2017

## 2017-11-01 NOTE — THERAPY PROGRESS REPORT/RE-CERT
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 Treatment Position bed  -RC bed  -RC    In Bed encouraged to call for assist;call light within reach  -RC encouraged to call for assist;call light within reach  -RC      10/30/17 1016 10/30/17 0836    Rehab Evaluation    Document Type therapy note (daily note)  -RW therapy note (daily note)  -CK    Subjective Information agree to therapy  -RW agree to therapy  -CK    Patient Effort, Rehab Treatment adequate  -RW adequate  -CK    General Information    Precautions/Limitations fall precautions  -RW     Vital Signs    Post Systolic BP Rehab 120  -RW     Post Treatment Diastolic BP 62  -RW     Pain Assessment    Pain Assessment --   none reported  -RW 0-10  -CK    Pain Score  5  -CK    Post Pain Score  3  -CK    Pain Type  Acute pain  -CK    Pain Location  Shoulder  -CK    Pain Orientation  Left  -CK    Cognitive Assessment/Intervention    Current Cognitive/Communication Assessment functional  -RW     Orientation Status oriented to;person;place;situation  -RW     Bed Mobility, Assessment/Treatment    Bed Mobility, Assistive Device bed rails;head of bed elevated  -RW      Bed Mobility, Roll Left, [place] supervision required  -RW     Bed Mobility, Roll Right, [place] moderate assist (50% patient effort)  -RW     Bed Mobility, Scoot/Bridge, [place] --   hob elevated to max position  -RW     Bed Mob, Supine to Sit, [place] --  -RW     Bed Mob, Sit to Supine, [place] maximum assist (25% patient effort)  -RW     Transfer Assessment/Treatment    Transfers, Bed-Chair [place] moderate assist (50% patient effort);maximum assist (25% patient effort);1 person + 1 person to manage equipment  -RW     Transfers, Bed-Chair-Bed, Assist Device sliding board  -RW     Transfers, Sit-Stand [place] unable to perform  -RW     Wheelchair Training/Management    Wheelchair, Distance Propelled 100 max / mod assist  -RW     Balance Skills Training    Sitting-Level of Assistance Maximum assistance;Moderate assistance  -RW     Sitting-Balance Support Feet supported;Right upper extremity supported  -RW     Sitting-Balance Activities --   attempting midline  -RW     Therapy Exercises    Right Lower Extremity AAROM:;AROM:;15 reps;ankle pumps/circles;hip IR;sitting;hip flexion;hip extension;LAQ  -RW     Left Lower Extremity PROM:;15 reps   approximation into le. rom a joints  -RW     Positioning and Restraints    Pre-Treatment Position in bed  -RW     Post Treatment Position wheelchair  -RW     In Wheelchair with OT  -RW       10/30/17 0742 10/30/17 0700    Muscle Tone Assessment    Muscle Tone Assessment LUE  -MB LUE  -MB    LUE Muscle Tone Assessment severely decreased tone  -MB severely decreased tone  -MB    LLE Muscle Tone Assessment flaccid  -MB flaccid  -MB      10/29/17 2038       Muscle Tone Assessment    Muscle Tone Assessment LUE  -PC     LUE Muscle Tone Assessment severely decreased tone  -PC     LLE Muscle Tone Assessment flaccid  -PC       User Key  (r) = Recorded By, (t) = Taken By, (c) = Cosigned By    Initials Name Provider Type    PAU Dumas, PT Physical  Therapist     Micaela Engel, OTR/L Occupational Therapist    OSIEL Cox, MS CCC-SLP Speech and Language Pathologist    LC Kasey Maxwell, RN Registered Nurse    SIMONE Gupta, RN Registered Nurse    SURI Owens, RN Registered Nurse    VIDAL Zeng, RN Registered Nurse    BOBBY Cristina, RN Registered Nurse    RW Alvin Jacobs, RONNY Physical Therapy Assistant    SHON Romano, GALARZA/L Occupational Therapy Assistant    SP Liset Garcia, OT Student OT Student    JAX Palacios, OT Occupational Therapist          Physical Therapy Education     Title: PT OT SLP Therapies (Active)     Topic: Physical Therapy (Active)     Point: Mobility training (Done)    Learning Progress Summary    Learner Readiness Method Response Comment Documented by Status   Patient Acceptance E VU,NR Importance of upright tolerance & sitting EOB  10/20/17 1121 Done    Acceptance E NR Reviewed safety with sliding board transfers and importance of working with PT everyday.  10/19/17 1334 Active               Point: Precautions (Done)    Learning Progress Summary    Learner Readiness Method Response Comment Documented by Status   Patient Acceptance E VU,NR Importance of upright tolerance & sitting EOB  10/20/17 1121 Done    Acceptance E NR Reviewed safety with sliding board transfers and importance of working with PT everyday.  10/19/17 1334 Active                      User Key     Initials Effective Dates Name Provider Type Discipline     06/15/16 -  Jaky Dumas, PT Physical Therapist PT     10/17/16 -  Micheal Gonzáles PTA Physical Therapy Assistant PT                PT Recommendation and Plan  Anticipated Discharge Disposition: skilled nursing facility  Planned Therapy Interventions: balance training, bed mobility training, gait training, home exercise program, motor coordination training, neuromuscular re-education, patient/family education, postural re-education, ROM (Range of  Motion), stair training, strengthening, stretching, transfer training, wheelchair management/propulsion training  PT Frequency: other (see comments) (5-14 times/wk)  Plan of Care Review  Plan Of Care Reviewed With: patient  Outcome Summary/Follow up Plan: PT 14 day progress note completed.  Pt has demonstrated limited progress and had not yet met any goals.  Goals downgraded.  Pt is transferring from bed<-->wc using sliding board with ModAx2.  Pt will continue to benefit from skilled PT to improve mobility and safety prior to d/c.  Pt to d/c home with dtr on Friday.          IP PT Goals       11/01/17 1412 11/01/17 1315 10/31/17 1610    Bed Mobility PT LTG    Bed Mobility PT LTG, Date Established  11/01/17  -LM     Bed Mobility PT LTG, Time to Achieve  by discharge  -LM     Bed Mobility PT LTG, Activity Type  supine to sit/sit to supine  -LM     Bed Mobility PT LTG, [place] Level  minimum assist (75% patient effort)  -LM     Bed Mobility PT Goal  LTG, Assist Device  bed rails  -LM     Bed Mobility PT LTG, Additional Goal  HOB elevated as needed  -LM     Bed Mobility PT LTG, Date Goal Reviewed  11/01/17  -LM 10/31/17  -RW    Bed Mobility PT LTG, Outcome  goal revised  -LM goal ongoing  -RW    Transfer Training PT STG    Transfer Training PT STG, Date Goal Reviewed  11/01/17  -LM 10/31/17  -RW    Transfer Training PT STG, Outcome  goal no longer appropriate  -LM goal ongoing  -RW    Transfer Training PT STG, Reason Goal Not Met  unable to make needed progress  -LM     Transfer Training PT LTG    Transfer Training PT LTG, Date Established  11/01/17  -LM     Transfer Training PT LTG, Time to Achieve  by discharge  -LM     Transfer Training PT LTG, Activity Type  bed to chair /chair to bed  -LM     Transfer Training PT LTG, [place] Level  minimum assist (75% patient effort)  -LM     Transfer Training PT LTG, Assist Device  --   AAD  -LM     Transfer Training PT  LTG, Date Goal Reviewed  11/01/17  -LM 10/31/17   -RW    Transfer Training PT LTG, Outcome  goal revised  -LM goal ongoing  -RW    Gait Training PT LTG    Gait Training Goal PT LTG, Date Goal Reviewed 11/01/17  -LM  10/31/17  -RW    Gait Training Goal PT LTG, Outcome goal no longer appropriate  -LM  goal ongoing  -RW    Gait Training Goal PT LTG, Reason Goal Not Met unable to make needed progress  -LM      Static Sitting Balance PT LTG    Static Sitting Balance PT LTG, Date Established  11/01/17  -LM     Static Sitting Balance PT LTG, Time to Achieve  by discharge  -LM     Static Sitting Balance PT LTG, [place] Level  minimum assist (75% patient effort)  -LM     Static Sitting Balance PT LTG, Assist Device  UE Support  -LM     Static Sitting Balance PT LTG, Additional Goal  Pt able to sit EOB/EOM in midline for 10 minutes.  -LM     Static Sitting Balance PT LTG, Date Goal Reviewed  11/01/17  -LM 10/31/17  -RW    Static Sitting Balance PT LTG, Outcome  goal revised  -LM goal ongoing  -RW      10/30/17 1450 10/27/17 1517 10/26/17 1455    Bed Mobility PT LTG    Bed Mobility PT LTG, Date Goal Reviewed 10/30/17  -RW  10/26/17  -RW    Bed Mobility PT LTG, Outcome goal ongoing  -RW goal ongoing  -RW goal ongoing  -RW    Transfer Training PT STG    Transfer Training PT STG, Date Goal Reviewed 10/30/17  -RW 10/27/17  -RW 10/26/17  -RW    Transfer Training PT STG, Outcome goal ongoing  -RW goal ongoing  -RW goal ongoing  -RW    Transfer Training PT LTG    Transfer Training PT  LTG, Date Goal Reviewed 10/30/17  -RW 10/27/17  -RW 10/26/17  -RW    Transfer Training PT LTG, Outcome goal ongoing  -RW goal ongoing  -RW goal ongoing  -RW    Gait Training PT LTG    Gait Training Goal PT LTG, Date Goal Reviewed 10/30/17  -RW 10/27/17  -RW 10/26/17  -RW    Gait Training Goal PT LTG, Outcome goal ongoing  -RW goal ongoing  -RW goal ongoing  -RW    Static Sitting Balance PT LTG    Static Sitting Balance PT LTG, Date Goal Reviewed 10/30/17  -RW 10/27/17  -RW 10/26/17  -RW    Static  Sitting Balance PT LTG, Outcome goal ongoing  -RW goal ongoing  -RW goal ongoing  -RW      10/25/17 1555 10/24/17 1425 10/23/17 1452    Bed Mobility PT LTG    Bed Mobility PT LTG, Date Goal Reviewed 10/25/17  -RW 10/24/17  -LM 10/23/17  -RW    Bed Mobility PT LTG, Outcome goal ongoing  -RW goal ongoing  -LM goal ongoing  -RW    Transfer Training PT STG    Transfer Training PT STG, Date Goal Reviewed 10/25/17  -RW 10/24/17  -LM 10/23/17  -RW    Transfer Training PT STG, Outcome goal ongoing  -RW goal ongoing  -LM goal ongoing  -RW    Transfer Training PT LTG    Transfer Training PT  LTG, Date Goal Reviewed 10/25/17  -RW 10/24/17  -LM 10/23/17  -RW    Transfer Training PT LTG, Outcome goal ongoing  -RW goal ongoing  -LM goal ongoing  -RW    Gait Training PT LTG    Gait Training Goal PT LTG, Date Goal Reviewed 10/25/17  -RW 10/24/17  -LM 10/23/17  -RW    Gait Training Goal PT LTG, Outcome goal ongoing  -RW goal ongoing  -LM goal ongoing  -RW    Static Sitting Balance PT LTG    Static Sitting Balance PT LTG, Date Goal Reviewed 10/25/17  -RW 10/24/17  -LM 10/23/17  -RW    Static Sitting Balance PT LTG, Outcome goal ongoing  -RW goal ongoing  -LM goal ongoing  -RW      10/22/17 1005 10/21/17 1616 10/20/17 1030    Bed Mobility PT LTG    Bed Mobility PT LTG, Date Goal Reviewed 10/22/17  -RW 10/21/17  -RW 10/20/17  -JA    Bed Mobility PT LTG, Outcome goal ongoing  -RW goal ongoing  -RW goal ongoing  -JA    Transfer Training PT STG    Transfer Training PT STG, Date Goal Reviewed 10/22/17  -RW 10/21/17  -RW 10/20/17  -JA    Transfer Training PT STG, Outcome goal ongoing  -RW goal ongoing  -RW goal ongoing  -JA    Transfer Training PT LTG    Transfer Training PT  LTG, Date Goal Reviewed 10/22/17  -RW 10/21/17  -RW 10/20/17  -JA    Transfer Training PT LTG, Outcome goal ongoing  -RW goal ongoing  -RW goal ongoing  -JA    Gait Training PT LTG    Gait Training Goal PT LTG, Date Goal Reviewed 10/22/17  -RW 10/21/17  -RW 10/20/17   -JA    Gait Training Goal PT LTG, Outcome goal ongoing  -RW goal ongoing  -RW goal ongoing  -JA    Static Sitting Balance PT LTG    Static Sitting Balance PT LTG, Date Goal Reviewed 10/22/17  -RW 10/21/17  -RW 10/20/17  -JA    Static Sitting Balance PT LTG, Outcome goal ongoing  -RW goal ongoing  -RW goal ongoing  -JA      10/19/17 1515 10/19/17 0947       Bed Mobility PT LTG    Bed Mobility PT LTG, Date Established  10/19/17  -LM     Bed Mobility PT LTG, Time to Achieve  by discharge  -LM     Bed Mobility PT LTG, Activity Type  supine to sit/sit to supine  -LM     Bed Mobility PT LTG, [place] Level  contact guard assist  -LM     Bed Mobility PT Goal  LTG, Assist Device  bed rails  -LM     Bed Mobility PT LTG, Additional Goal  HOB elevated as needed  -LM     Bed Mobility PT LTG, Date Goal Reviewed 10/19/17  -LM      Bed Mobility PT LTG, Outcome goal ongoing  -LM goal ongoing  -LM     Transfer Training PT STG    Transfer Training PT STG, Date Established  10/19/17  -LM     Transfer Training PT STG, Time to Achieve  5 days  -LM     Transfer Training PT STG, Activity Type  sit to stand/stand to sit  -LM     Transfer Training PT STG, [place] Level  moderate assist (50% patient effort)  -LM     Transfer Training PT STG, Assist Device  --   AAD or // bars  -LM     Transfer Training PT STG, Date Goal Reviewed 10/19/17  -LM      Transfer Training PT STG, Outcome goal ongoing  -LM goal ongoing  -LM     Transfer Training PT LTG    Transfer Training PT LTG, Date Established  10/19/17  -LM     Transfer Training PT LTG, Time to Achieve  by discharge  -LM     Transfer Training PT LTG, Activity Type  bed to chair /chair to bed  -LM     Transfer Training PT LTG, [place] Level  contact guard assist  -LM     Transfer Training PT LTG, Assist Device  --   AAD or sliding board  -LM     Transfer Training PT  LTG, Date Goal Reviewed 10/19/17  -LM      Transfer Training PT LTG, Outcome goal ongoing  -LM goal ongoing  -LM      Gait Training PT LTG    Gait Training Goal PT LTG, Date Established  10/19/17  -LM     Gait Training Goal PT LTG, Time to Achieve  by discharge  -LM     Gait Training Goal PT LTG, [place] Level  minimum assist (75% patient effort)  -LM     Gait Training Goal PT LTG, Assist Device  --   // bars  -LM     Gait Training Goal PT LTG, Distance to Achieve  6 feet  -LM     Gait Training Goal PT LTG, Date Goal Reviewed 10/19/17  -LM      Gait Training Goal PT LTG, Outcome goal ongoing  -LM goal ongoing  -LM     Static Sitting Balance PT LTG    Static Sitting Balance PT LTG, Date Established  10/19/17  -LM     Static Sitting Balance PT LTG, Time to Achieve  by discharge  -LM     Static Sitting Balance PT LTG, [place] Level  supervision required  -LM     Static Sitting Balance PT LTG, Assist Device  UE Support  -LM     Static Sitting Balance PT LTG, Additional Goal  Pt able to sit EOB/EOM in midline for 5 minutes.  -LM     Static Sitting Balance PT LTG, Date Goal Reviewed 10/19/17  -LM      Static Sitting Balance PT LTG, Outcome goal ongoing  -LM goal ongoing  -LM       User Key  (r) = Recorded By, (t) = Taken By, (c) = Cosigned By    Initials Name Provider Type    LM Jaky Dumas, PT Physical Therapist    SUNDEEP Gonzáles, PTA Physical Therapy Assistant    STEVE Jacobs, PTA Physical Therapy Assistant                Outcome Measures       11/01/17 1315 11/01/17 1027 10/31/17 1103    How much help from another person do you currently need...    Turning from your back to your side while in flat bed without using bedrails? 3  -LM      Moving from lying on back to sitting on the side of a flat bed without bedrails? 2  -LM      Moving to and from a bed to a chair (including a wheelchair)? 2  -LM      Standing up from a chair using your arms (e.g., wheelchair, bedside chair)? 1  -LM      Climbing 3-5 steps with a railing? 1  -LM      To walk in hospital room? 1  -LM      AM-PAC 6 Clicks Score 10  -LM      How  much help from another is currently needed...    Putting on and taking off regular lower body clothing?  2  -MW 2  -BH (r) SP (t) BH (c)    Bathing (including washing, rinsing, and drying)  2  -MW 2  -BH (r) SP (t) BH (c)    Toileting (which includes using toilet bed pan or urinal)  2  -MW 2  -BH (r) SP (t) BH (c)    Putting on and taking off regular upper body clothing  2  -MW 2  -BH (r) SP (t) BH (c)    Taking care of personal grooming (such as brushing teeth)  2  -MW 2  -BH (r) SP (t) BH (c)    Eating meals  3  -MW 3  -BH (r) SP (t) BH (c)    Score  13  -MW 13  -BH (r) SP (t)    Functional Assessment    Outcome Measure Options AM-PAC 6 Clicks Basic Mobility (PT)  -LM        10/30/17 1307 10/30/17 1100       How much help from another person do you currently need...    Turning from your back to your side while in flat bed without using bedrails?  3  -RW     Moving from lying on back to sitting on the side of a flat bed without bedrails?  2  -RW     Moving to and from a bed to a chair (including a wheelchair)?  2  -RW     Standing up from a chair using your arms (e.g., wheelchair, bedside chair)?  1  -RW     Climbing 3-5 steps with a railing?  1  -RW     To walk in hospital room?  1  -RW     AM-PAC 6 Clicks Score  10  -RW     How much help from another is currently needed...    Putting on and taking off regular lower body clothing? 2  -RC      Bathing (including washing, rinsing, and drying) 2  -RC      Toileting (which includes using toilet bed pan or urinal) 2  -RC      Putting on and taking off regular upper body clothing 2  -RC      Taking care of personal grooming (such as brushing teeth) 2  -RC      Eating meals 3  -RC      Score 13  -RC        User Key  (r) = Recorded By, (t) = Taken By, (c) = Cosigned By    Initials Name Provider Type    LM Jaky Dumas, PT Physical Therapist    LYNDON Engel, OTR/L Occupational Therapist    RW Alvin Jacobs, PTA Physical Therapy Assistant    GEOVANNI Stevens/EMI  Occupational Therapy Assistant    TRIPP Garcia, OT Student OT Student    JAX Palacios, OT Occupational Therapist           Time Calculation:         PT Charges       11/01/17 1315          Time Calculation    Start Time 1315  -LM      Stop Time 1353  -LM      Time Calculation (min) 38 min  -LM      PT Received On 11/01/17  -LM      PT Goal Re-Cert Due Date 11/14/17  -LM      Time Calculation- PT    Total Timed Code Minutes- PT 38 minute(s)  -LM        User Key  (r) = Recorded By, (t) = Taken By, (c) = Cosigned By    Initials Name Provider Type    LM Jaky Quintero PT Physical Therapist          Therapy Charges for Today     Code Description Service Date Service Provider Modifiers Qty    43529497594 HC PT THERAPEUTIC ACT EA 15 MIN 11/1/2017 Jaky Quintero PT GP 2    11212897012 HC PT THER PROC EA 15 MIN 11/1/2017 Jaky Quintero PT GP 1          PT G-Codes  PT Professional Judgement Used?: Yes  Outcome Measure Options: AM-PAC 6 Clicks Basic Mobility (PT)  Score: 9  Functional Limitation: Mobility: Walking and moving around  Mobility: Walking and Moving Around Current Status (): At least 60 percent but less than 80 percent impaired, limited or restricted  Mobility: Walking and Moving Around Goal Status (): At least 40 percent but less than 60 percent impaired, limited or restricted      Jaky Quintero PT  11/1/2017
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
## 2017-11-01 NOTE — PLAN OF CARE
Problem: Patient Care Overview (Adult)  Goal: Plan of Care Review  Outcome: Ongoing (interventions implemented as appropriate)    11/01/17 1343   Coping/Psychosocial Response Interventions   Plan Of Care Reviewed With patient   Patient Care Overview   Progress progress toward functional goals as expected   Outcome Evaluation   Outcome Summary/Follow up Plan Pt completed bathing and dressing task this date. Pt required Jessica for UB bathing, mod-max A for LB bathing. Pt required mod - max assist for UB/LB dressing. Pt completed sliding board transfer from bed to w/c with mod A x 2. Pt completed bed mobility including rolling left with min a and rolling right with mod-max A. Pt is continuing to improve and show effort during treatment. Cont skilled OT.          Problem: Inpatient Occupational Therapy  Goal: Bed Mobility Goal LTG- OT  Outcome: Ongoing (interventions implemented as appropriate)    10/19/17 1354 10/31/17 1103 11/01/17 1343   Bed Mobility OT LTG   Bed Mobility OT LTG, Date Established 10/19/17 --  --    Bed Mobility OT LTG, Time to Achieve by discharge --  --    Bed Mobility OT LTG, Activity Type roll left/roll right;supine to sit/sit to supine --  --    Bed Mobility OT LTG, [place] Level --  moderate assist (50% patient effort) --    Bed Mobility OT LTG, Assist Device bed rails --  --    Bed Mobility OT LTG, Date Goal Reviewed --  --  11/01/17   Bed Mobility OT LTG, Outcome --  --  goal ongoing       Goal: Transfer Training Goal 1 LTG- OT  Outcome: Ongoing (interventions implemented as appropriate)    10/19/17 1354 10/31/17 1103 11/01/17 1343   Transfer Training OT LTG   Transfer Training OT LTG, Date Established 10/19/17 --  --    Transfer Training OT LTG, Time to Achieve by discharge --  --    Transfer Training OT LTG, Activity Type --  (to w/c and to BSC) --    Transfer Training OT LTG, [place] Level --  moderate assist (50% patient effort);maximum assist (25% patient effort) --    Transfer  Training OT LTG, Assist Device --  (sliding board) --    Transfer Training OT LTG, Date Goal Reviewed --  --  11/01/17   Transfer Training OT LTG, Outcome --  --  goal ongoing       Goal: Strength Goal LTG- OT  Outcome: Ongoing (interventions implemented as appropriate)    10/19/17 1354 10/19/17 1412 10/31/17 0735   Strength OT LTG   Strength Goal OT LTG, Date Established 10/19/17 --  --    Strength Goal OT LTG, Time to Achieve by discharge --  --    Strength Goal OT LTG, Measure to Achieve 1-2 sets of 10 reps all planes with 1-2 lb wrist wts or dumbell to increase R UE strength for increased independence with ADLs and functional mobility. --  --    Strength Goal OT LTG, Date Goal Reviewed --  --  11/01/17   Strength Goal OT LTG, Outcome --  goal ongoing --        Goal: Dynamic Sitting Balance Goal LTG- OT  Outcome: Ongoing (interventions implemented as appropriate)    10/19/17 1354 10/31/17 1103 11/01/17 1343   Dynamic Sitting Balance OT LTG   Dynamic Sitting Balance OT LTG, Date Established 10/19/17 --  --    Dynamic Sitting Balance OT LTG, Time to Achieve by discharge --  --    Dynamic Sitting Balance OT LTG, [place] Level --  moderate assist (50% patient effort) --    Dynamic Sitting Balance OT LTG, Assist Device bed rails --  --    Dynamic Sitting Balance OT LTG, Date Goal Reviewed --  --  11/01/17   Dynamic Sitting Balance OT LTG, Outcome --  --  goal ongoing       Goal: ADL Goal LTG- OT  Outcome: Ongoing (interventions implemented as appropriate)    10/19/17 1354 11/01/17 1343   ADL OT LTG   ADL OT LTG, Date Established 10/19/17 --    ADL OT LTG, Time to Achieve by discharge --    ADL OT LTG, Activity Type ADL skills  (Upper body sponge bath and dress.) --    ADL OT LTG, [place] Level min assist --    ADL OT LTG, Date Goal Reviewed --  11/01/17   ADL OT LTG, Outcome --  goal ongoing

## 2017-11-01 NOTE — CONSULTS
Adult Nutrition  Assessment    Patient Name:  Nikki Felder  YOB: 1950  MRN: [id number]  Admit Date:  10/18/2017    Assessment Date:  11/1/2017    Comments:  Pt c/o of nausea. Not eating her lunch. Made nursing aware she is nauseated and wants to go back to bed.obtained crackers for pt. H/h and sodium are below normal limits , while glucose is variable. Intake is only fair. Pt has been drinking her glucerna to supplement diet. RDN staff to continue to monitor.          Reason for Assessment       11/01/17 1354    Reason for Assessment    Reason For Assessment/Visit follow up protocol    Identified At Risk By Screening Criteria reduced oral intake over the last month    Diagnosis Diagnosis    Neurological CVA    Reported GI Symptoms Other   nausea                Nutrition/Diet History       11/01/17 1355    Nutrition/Diet History    Patient Reported Diet/Restrictions/Preferences other (see comments);carbohydrate counting   ground meat     Typical Food/Fluid Intake pt isn't going to eat her lunch since she is nausated. nursing is aware. asked for crackers and RDN obtained for her. pt wants to go to bed but nursing said she needs to stay up longer.              Labs/Tests/Procedures/Meds       11/01/17 1357    Labs/Tests/Procedures/Meds    Labs/Tests Review Reviewed;Glucose;Na+;Hgb Hct    Medication Review Reviewed, pertinent    Significant Vitals reviewed                Nutrition Prescription Ordered       11/01/17 1358    Nutrition Prescription PO    Current PO Diet Soft Texture    Texture Ground    Supplement Glucerna Shake    Supplement Frequency 2 times a day    Common Modifiers Consistent Carbohydrate            Evaluation of Received Nutrient/Fluid Intake       11/01/17 1400    PO Evaluation    Number of Meals 4    % PO Intake 56              Malnutrition Severity Assessment       11/01/17 1402    Malnutrition Severity Assessment    Malnutrition Type Social/Environmental Circumstance  Malnutrition        Electronically signed by:  Jaky Cespedes RD  11/01/17 2:03 PM

## 2017-11-02 LAB
GLUCOSE BLDC GLUCOMTR-MCNC: 110 MG/DL (ref 70–130)
GLUCOSE BLDC GLUCOMTR-MCNC: 171 MG/DL (ref 70–130)
GLUCOSE BLDC GLUCOMTR-MCNC: 77 MG/DL (ref 70–130)
GLUCOSE BLDC GLUCOMTR-MCNC: 80 MG/DL (ref 70–130)

## 2017-11-02 PROCEDURE — 97112 NEUROMUSCULAR REEDUCATION: CPT

## 2017-11-02 PROCEDURE — 97535 SELF CARE MNGMENT TRAINING: CPT

## 2017-11-02 PROCEDURE — 97110 THERAPEUTIC EXERCISES: CPT

## 2017-11-02 PROCEDURE — 92507 TX SP LANG VOICE COMM INDIV: CPT | Performed by: SPEECH-LANGUAGE PATHOLOGIST

## 2017-11-02 PROCEDURE — 99232 SBSQ HOSP IP/OBS MODERATE 35: CPT | Performed by: FAMILY MEDICINE

## 2017-11-02 PROCEDURE — 97530 THERAPEUTIC ACTIVITIES: CPT

## 2017-11-02 PROCEDURE — 82962 GLUCOSE BLOOD TEST: CPT

## 2017-11-02 RX ADMIN — GABAPENTIN 600 MG: 300 CAPSULE ORAL at 20:29

## 2017-11-02 RX ADMIN — ALPRAZOLAM 0.25 MG: 0.25 TABLET ORAL at 12:13

## 2017-11-02 RX ADMIN — HYDROCODONE BITARTRATE AND ACETAMINOPHEN 1 TABLET: 10; 325 TABLET ORAL at 01:00

## 2017-11-02 RX ADMIN — HYDROCODONE BITARTRATE AND ACETAMINOPHEN 1 TABLET: 10; 325 TABLET ORAL at 15:44

## 2017-11-02 RX ADMIN — ISOSORBIDE MONONITRATE 30 MG: 30 TABLET, EXTENDED RELEASE ORAL at 08:07

## 2017-11-02 RX ADMIN — HYDROCODONE BITARTRATE AND ACETAMINOPHEN 1 TABLET: 10; 325 TABLET ORAL at 09:10

## 2017-11-02 RX ADMIN — CARVEDILOL 12.5 MG: 12.5 TABLET, FILM COATED ORAL at 17:17

## 2017-11-02 RX ADMIN — METFORMIN HYDROCHLORIDE 1000 MG: 500 TABLET, EXTENDED RELEASE ORAL at 17:17

## 2017-11-02 RX ADMIN — GABAPENTIN 600 MG: 300 CAPSULE ORAL at 15:45

## 2017-11-02 RX ADMIN — ONDANSETRON 4 MG: 4 TABLET, ORALLY DISINTEGRATING ORAL at 08:07

## 2017-11-02 RX ADMIN — ESCITALOPRAM OXALATE 10 MG: 10 TABLET ORAL at 20:29

## 2017-11-02 RX ADMIN — PRENATAL VIT W/ FE FUMARATE-FA TAB 27-0.8 MG 1 TABLET: 27-0.8 TAB at 08:07

## 2017-11-02 RX ADMIN — ALPRAZOLAM 0.25 MG: 0.25 TABLET ORAL at 20:29

## 2017-11-02 RX ADMIN — NICOTINE 1 PATCH: 14 PATCH, EXTENDED RELEASE TRANSDERMAL at 15:43

## 2017-11-02 RX ADMIN — ALPRAZOLAM 0.25 MG: 0.25 TABLET ORAL at 17:17

## 2017-11-02 RX ADMIN — LISINOPRIL 20 MG: 20 TABLET ORAL at 08:07

## 2017-11-02 RX ADMIN — METOCLOPRAMIDE 10 MG: 10 TABLET ORAL at 17:18

## 2017-11-02 RX ADMIN — Medication 2.5 MG: at 08:07

## 2017-11-02 RX ADMIN — Medication 2.5 MG: at 17:17

## 2017-11-02 RX ADMIN — ATORVASTATIN CALCIUM 20 MG: 20 TABLET, FILM COATED ORAL at 20:29

## 2017-11-02 RX ADMIN — POLYETHYLENE GLYCOL 3350 17 G: 17 POWDER, FOR SOLUTION ORAL at 08:07

## 2017-11-02 RX ADMIN — CARVEDILOL 12.5 MG: 12.5 TABLET, FILM COATED ORAL at 08:07

## 2017-11-02 RX ADMIN — METFORMIN HYDROCHLORIDE 1000 MG: 500 TABLET, EXTENDED RELEASE ORAL at 08:07

## 2017-11-02 RX ADMIN — HYDROCODONE BITARTRATE AND ACETAMINOPHEN 1 TABLET: 10; 325 TABLET ORAL at 12:13

## 2017-11-02 RX ADMIN — HYDROCODONE BITARTRATE AND ACETAMINOPHEN 1 TABLET: 10; 325 TABLET ORAL at 21:37

## 2017-11-02 RX ADMIN — GABAPENTIN 600 MG: 300 CAPSULE ORAL at 09:10

## 2017-11-02 RX ADMIN — ONDANSETRON 4 MG: 4 TABLET, ORALLY DISINTEGRATING ORAL at 15:46

## 2017-11-02 RX ADMIN — ONDANSETRON 4 MG: 4 TABLET, ORALLY DISINTEGRATING ORAL at 00:03

## 2017-11-02 RX ADMIN — METOCLOPRAMIDE 10 MG: 10 TABLET ORAL at 08:07

## 2017-11-02 RX ADMIN — METOCLOPRAMIDE 10 MG: 10 TABLET ORAL at 11:55

## 2017-11-02 RX ADMIN — TRAZODONE HYDROCHLORIDE 100 MG: 100 TABLET ORAL at 20:29

## 2017-11-02 RX ADMIN — ALPRAZOLAM 0.25 MG: 0.25 TABLET ORAL at 08:07

## 2017-11-02 RX ADMIN — SULFAMETHOXAZOLE AND TRIMETHOPRIM 160 MG: 200; 40 SUSPENSION ORAL at 08:07

## 2017-11-02 RX ADMIN — HYDROCODONE BITARTRATE AND ACETAMINOPHEN 1 TABLET: 10; 325 TABLET ORAL at 05:19

## 2017-11-02 NOTE — THERAPY TREATMENT NOTE
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 Participation in Care- SLP, Date Goal Reviewed 10/30/17  -CK 10/27/17  -HR 10/26/17  -HR    Cognitive Linguistic Optimal Participation in Care- SLP, Outcome goal ongoing  -CK goal ongoing  -HR goal ongoing  -HR      10/25/17 0947 10/24/17 1330 10/23/17 1409    Cognitive Linguistic- Optimal Participation in Care    Cognitive Linguistic Optimal Participation in Care- SLP, Date Goal Reviewed 10/25/17  -EC 10/24/17  -HR 10/23/17  -CK    Cognitive Linguistic Optimal Participation in Care- SLP, Outcome goal not met  -EC goal ongoing  -HR goal ongoing  -CK      10/23/17 1328 10/21/17 1028 10/20/17 1610    Cognitive Linguistic- Optimal Participation in Care    Cognitive Linguistic Optimal  Participation in Care- SLP, Date Goal Reviewed (P)  10/23/17  -CK 10/21/17  -HR 10/20/17  -EC    Cognitive Linguistic Optimal Participation in Care- SLP, Outcome (P)  goal ongoing  -CK goal ongoing  -HR goal not met  -EC      User Key  (r) = Recorded By, (t) = Taken By, (c) = Cosigned By    Initials Name Provider Type    CHRISTIAN Chen CCC-SLP Speech and Language Pathologist    HR Haylie Frank, MS CCC-SLP Speech and Language Pathologist    CK Marietta Cox, MS CCC-SLP Speech and Language Pathologist          EDUCATION  The patient has been educated in the following areas:   Cognitive Impairment.    SLP Recommendation and Plan                               Plan of Care Review  Plan Of Care Reviewed With: patient  Progress: progress toward functional goals as expected  Outcome Summary/Follow up Plan: St therapy: pt has made prorgress since admission incllucing meeting goals for attention.  plans to return home with daughter at d/c tomorrow           Time Calculation:         Time Calculation- SLP       11/02/17 0937          Time Calculation- SLP    SLP Start Time 0907  -EC      SLP Stop Time 0945  -EC      SLP Time Calculation (min) 38 min  -EC      Total Timed Code Minutes- SLP 38 minute(s)  -EC      SLP Received On 11/02/17  -EC        User Key  (r) = Recorded By, (t) = Taken By, (c) = Cosigned By    Initials Name Provider Type    EC Amarilis Chen CCC-SLP Speech and Language Pathologist          Therapy Charges for Today     Code Description Service Date Service Provider Modifiers Qty    [id number]  ST TREATMENT SPEECH 3 11/2/2017 TAMIA GamboaSLP GN 1               NATANAEL Ortiz  11/2/2017
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
## 2017-11-02 NOTE — PLAN OF CARE
Problem: Patient Care Overview (Adult)  Goal: Plan of Care Review  Outcome: Ongoing (interventions implemented as appropriate)    11/02/17 0720   Coping/Psychosocial Response Interventions   Plan Of Care Reviewed With patient   Patient Care Overview   Progress progress toward functional goals is gradual   Outcome Evaluation   Outcome Summary/Follow up Plan pt has made progress, cont poc         Problem: Inpatient Occupational Therapy  Goal: Bed Mobility Goal LTG- OT  Outcome: Ongoing (interventions implemented as appropriate)    10/19/17 1354 10/31/17 1103 11/01/17 1343   Bed Mobility OT LTG   Bed Mobility OT LTG, Date Established 10/19/17 --  --    Bed Mobility OT LTG, Time to Achieve by discharge --  --    Bed Mobility OT LTG, Activity Type roll left/roll right;supine to sit/sit to supine --  --    Bed Mobility OT LTG, [place] Level --  moderate assist (50% patient effort) --    Bed Mobility OT LTG, Assist Device bed rails --  --    Bed Mobility OT LTG, Date Goal Reviewed --  --  --    Bed Mobility OT LTG, Outcome --  --  goal ongoing     11/02/17 0720   Bed Mobility OT LTG   Bed Mobility OT LTG, Date Established --    Bed Mobility OT LTG, Time to Achieve --    Bed Mobility OT LTG, Activity Type --    Bed Mobility OT LTG, [place] Level --    Bed Mobility OT LTG, Assist Device --    Bed Mobility OT LTG, Date Goal Reviewed 11/02/17   Bed Mobility OT LTG, Outcome --        Goal: Transfer Training Goal 1 LTG- OT  Outcome: Ongoing (interventions implemented as appropriate)    10/19/17 1354 10/31/17 1103 11/01/17 1343   Transfer Training OT LTG   Transfer Training OT LTG, Date Established 10/19/17 --  --    Transfer Training OT LTG, Time to Achieve by discharge --  --    Transfer Training OT LTG, Activity Type --  (to w/c and to BSC) --    Transfer Training OT LTG, [place] Level --  moderate assist (50% patient effort);maximum assist (25% patient effort) --    Transfer Training OT LTG, Assist Device --   (sliding board) --    Transfer Training OT LTG, Date Goal Reviewed --  --  --    Transfer Training OT LTG, Outcome --  --  goal ongoing     11/02/17 0720   Transfer Training OT LTG   Transfer Training OT LTG, Date Established --    Transfer Training OT LTG, Time to Achieve --    Transfer Training OT LTG, Activity Type --    Transfer Training OT LTG, [place] Level --    Transfer Training OT LTG, Assist Device --    Transfer Training OT LTG, Date Goal Reviewed 11/02/17   Transfer Training OT LTG, Outcome --        Goal: Strength Goal LTG- OT  Outcome: Ongoing (interventions implemented as appropriate)    10/19/17 1354 11/02/17 0720   Strength OT LTG   Strength Goal OT LTG, Date Established 10/19/17 --    Strength Goal OT LTG, Time to Achieve by discharge --    Strength Goal OT LTG, Measure to Achieve 1-2 sets of 10 reps all planes with 1-2 lb wrist wts or dumbell to increase R UE strength for increased independence with ADLs and functional mobility. --    Strength Goal OT LTG, Date Goal Reviewed --  11/02/17   Strength Goal OT LTG, Outcome --  goal ongoing       Goal: Range of Motion Goal LTG- OT  Outcome: Unable to achieve outcome(s) by discharge Date Met:  11/02/17    10/19/17 1354 10/31/17 0735 10/31/17 1103   Range of Motion OT LTG   Range of Motion Goal OT LTG, Date Established 10/12/17 --  --    Range of Motion Goal OT LTG, Time to Achieve by discharge --  --    Range of Motion Goal OT LTG, AROM Measure 90% of full AROM for L UE all planes to assist with ADL independence. --  --    Range of Motion Goal OT LTG, Date Goal Reviewed --  10/31/17 --    Range of Motion Goal OT LTG, Outcome --  --  goal not met  (Goal d/c'd 2* slow progress. Not appropriate at this time. )       Goal: Dynamic Sitting Balance Goal LTG- OT  Outcome: Ongoing (interventions implemented as appropriate)    10/19/17 1354 10/31/17 1103 11/01/17 1343   Dynamic Sitting Balance OT LTG   Dynamic Sitting Balance OT LTG, Date Established  10/19/17 --  --    Dynamic Sitting Balance OT LTG, Time to Achieve by discharge --  --    Dynamic Sitting Balance OT LTG, [place] Level --  moderate assist (50% patient effort) --    Dynamic Sitting Balance OT LTG, Assist Device bed rails --  --    Dynamic Sitting Balance OT LTG, Date Goal Reviewed --  --  --    Dynamic Sitting Balance OT LTG, Outcome --  --  goal ongoing     11/02/17 0720   Dynamic Sitting Balance OT LTG   Dynamic Sitting Balance OT LTG, Date Established --    Dynamic Sitting Balance OT LTG, Time to Achieve --    Dynamic Sitting Balance OT LTG, [place] Level --    Dynamic Sitting Balance OT LTG, Assist Device --    Dynamic Sitting Balance OT LTG, Date Goal Reviewed 11/02/17   Dynamic Sitting Balance OT LTG, Outcome --        Goal: ADL Goal LTG- OT  Outcome: Ongoing (interventions implemented as appropriate)    10/19/17 1354 11/01/17 1343 11/02/17 0720   ADL OT LTG   ADL OT LTG, Date Established 10/19/17 --  --    ADL OT LTG, Time to Achieve by discharge --  --    ADL OT LTG, Activity Type ADL skills  (Upper body sponge bath and dress.) --  --    ADL OT LTG, [place] Level min assist --  --    ADL OT LTG, Date Goal Reviewed --  --  11/02/17   ADL OT LTG, Outcome --  goal ongoing --

## 2017-11-02 NOTE — THERAPY TREATMENT NOTE
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 Balance PT LTG, Assist Device   UE Support  -LM    Static Sitting Balance PT LTG, Additional Goal   Pt able to sit EOB/EOM in midline for 10 minutes.  -LM    Static Sitting Balance PT LTG, Date Goal Reviewed 11/02/17  -RW  11/01/17  -LM    Static Sitting Balance PT LTG, Outcome goal met  -RW  goal revised  -LM      10/31/17 1610 10/30/17 1450 10/27/17 1517    Bed Mobility PT LTG    Bed Mobility PT LTG, Date Goal Reviewed 10/31/17  -RW 10/30/17  -RW     Bed Mobility PT LTG, Outcome goal ongoing  -RW goal ongoing  -RW goal ongoing  -RW    Transfer Training PT STG    Transfer Training PT STG, Date Goal Reviewed 10/31/17  -RW 10/30/17  -RW 10/27/17  -RW    Transfer Training PT STG, Outcome goal ongoing   -RW goal ongoing  -RW goal ongoing  -RW    Transfer Training PT LTG    Transfer Training PT  LTG, Date Goal Reviewed 10/31/17  -RW 10/30/17  -RW 10/27/17  -RW    Transfer Training PT LTG, Outcome goal ongoing  -RW goal ongoing  -RW goal ongoing  -RW    Gait Training PT LTG    Gait Training Goal PT LTG, Date Goal Reviewed 10/31/17  -RW 10/30/17  -RW 10/27/17  -RW    Gait Training Goal PT LTG, Outcome goal ongoing  -RW goal ongoing  -RW goal ongoing  -RW    Static Sitting Balance PT LTG    Static Sitting Balance PT LTG, Date Goal Reviewed 10/31/17  -RW 10/30/17  -RW 10/27/17  -RW    Static Sitting Balance PT LTG, Outcome goal ongoing  -RW goal ongoing  -RW goal ongoing  -RW      10/26/17 1455 10/25/17 1555 10/24/17 1425    Bed Mobility PT LTG    Bed Mobility PT LTG, Date Goal Reviewed 10/26/17  -RW 10/25/17  -RW 10/24/17  -LM    Bed Mobility PT LTG, Outcome goal ongoing  -RW goal ongoing  -RW goal ongoing  -LM    Transfer Training PT STG    Transfer Training PT STG, Date Goal Reviewed 10/26/17  -RW 10/25/17  -RW 10/24/17  -LM    Transfer Training PT STG, Outcome goal ongoing  -RW goal ongoing  -RW goal ongoing  -LM    Transfer Training PT LTG    Transfer Training PT  LTG, Date Goal Reviewed 10/26/17  -RW 10/25/17  -RW 10/24/17  -LM    Transfer Training PT LTG, Outcome goal ongoing  -RW goal ongoing  -RW goal ongoing  -LM    Gait Training PT LTG    Gait Training Goal PT LTG, Date Goal Reviewed 10/26/17  -RW 10/25/17  -RW 10/24/17  -LM    Gait Training Goal PT LTG, Outcome goal ongoing  -RW goal ongoing  -RW goal ongoing  -LM    Static Sitting Balance PT LTG    Static Sitting Balance PT LTG, Date Goal Reviewed 10/26/17  -RW 10/25/17  -RW 10/24/17  -LM    Static Sitting Balance PT LTG, Outcome goal ongoing  -RW goal ongoing  -RW goal ongoing  -LM      10/23/17 1452 10/22/17 1005 10/21/17 1616    Bed Mobility PT LTG    Bed Mobility PT LTG, Date Goal Reviewed 10/23/17  -RW 10/22/17  -RW 10/21/17  -RW    Bed Mobility PT  LTG, Outcome goal ongoing  -RW goal ongoing  -RW goal ongoing  -RW    Transfer Training PT STG    Transfer Training PT STG, Date Goal Reviewed 10/23/17  -RW 10/22/17  -RW 10/21/17  -RW    Transfer Training PT STG, Outcome goal ongoing  -RW goal ongoing  -RW goal ongoing  -RW    Transfer Training PT LTG    Transfer Training PT  LTG, Date Goal Reviewed 10/23/17  -RW 10/22/17  -RW 10/21/17  -RW    Transfer Training PT LTG, Outcome goal ongoing  -RW goal ongoing  -RW goal ongoing  -RW    Gait Training PT LTG    Gait Training Goal PT LTG, Date Goal Reviewed 10/23/17  -RW 10/22/17  -RW 10/21/17  -RW    Gait Training Goal PT LTG, Outcome goal ongoing  -RW goal ongoing  -RW goal ongoing  -RW    Static Sitting Balance PT LTG    Static Sitting Balance PT LTG, Date Goal Reviewed 10/23/17  -RW 10/22/17  -RW 10/21/17  -RW    Static Sitting Balance PT LTG, Outcome goal ongoing  -RW goal ongoing  -RW goal ongoing  -RW      10/20/17 1030          Bed Mobility PT LTG    Bed Mobility PT LTG, Date Goal Reviewed 10/20/17  -JA      Bed Mobility PT LTG, Outcome goal ongoing  -JA      Transfer Training PT STG    Transfer Training PT STG, Date Goal Reviewed 10/20/17  -JA      Transfer Training PT STG, Outcome goal ongoing  -JA      Transfer Training PT LTG    Transfer Training PT  LTG, Date Goal Reviewed 10/20/17  -JA      Transfer Training PT LTG, Outcome goal ongoing  -JA      Gait Training PT LTG    Gait Training Goal PT LTG, Date Goal Reviewed 10/20/17  -JA      Gait Training Goal PT LTG, Outcome goal ongoing  -JA      Static Sitting Balance PT LTG    Static Sitting Balance PT LTG, Date Goal Reviewed 10/20/17  -JA      Static Sitting Balance PT LTG, Outcome goal ongoing  -JA        User Key  (r) = Recorded By, (t) = Taken By, (c) = Cosigned By    Initials Name Provider Type    PAU Dumas, PT Physical Therapist    SUNDEEP Gonzáles, PTA Physical Therapy Assistant    STEVE Jacobs, PTA Physical Therapy Assistant           Physical Therapy Education     Title: PT OT SLP Therapies (Active)     Topic: Physical Therapy (Active)     Point: Mobility training (Done)    Learning Progress Summary    Learner Readiness Method Response Comment Documented by Status   Patient Acceptance E VU,NR Importance of upright tolerance & sitting EOB  10/20/17 1121 Done    Acceptance E NR Reviewed safety with sliding board transfers and importance of working with PT everyday.  10/19/17 1334 Active               Point: Precautions (Done)    Learning Progress Summary    Learner Readiness Method Response Comment Documented by Status   Patient Acceptance E VU,NR Importance of upright tolerance & sitting EOB  10/20/17 1121 Done    Acceptance E NR Reviewed safety with sliding board transfers and importance of working with PT everyday.  10/19/17 1334 Active                      User Key     Initials Effective Dates Name Provider Type Discipline     06/15/16 -  Jaky Dumas, PT Physical Therapist PT     10/17/16 -  Micheal Gonzáles, PTA Physical Therapy Assistant PT                    PT Recommendation and Plan  Anticipated Discharge Disposition: skilled nursing facility  Planned Therapy Interventions: balance training, bed mobility training, gait training, home exercise program, motor coordination training, neuromuscular re-education, patient/family education, postural re-education, ROM (Range of Motion), stair training, strengthening, stretching, transfer training, wheelchair management/propulsion training  PT Frequency: other (see comments) (5-14 times/wk)  Plan of Care Review  Plan Of Care Reviewed With: patient  Progress: unable to show any progress toward functional goals  Outcome Summary/Follow up Plan: pt able to sit eob with min assist x 10 min and noted some arom in left elbow flexion and increased  strength n left.          Outcome Measures       11/02/17 0720 11/01/17 1315 11/01/17 1027    How much help from another person do you  currently need...    Turning from your back to your side while in flat bed without using bedrails?  3  -LM     Moving from lying on back to sitting on the side of a flat bed without bedrails?  2  -LM     Moving to and from a bed to a chair (including a wheelchair)?  2  -LM     Standing up from a chair using your arms (e.g., wheelchair, bedside chair)?  1  -LM     Climbing 3-5 steps with a railing?  1  -LM     To walk in hospital room?  1  -LM     AM-PAC 6 Clicks Score  10  -LM     How much help from another is currently needed...    Putting on and taking off regular lower body clothing? 2  -RC  2  -MW    Bathing (including washing, rinsing, and drying) 2  -RC  2  -MW    Toileting (which includes using toilet bed pan or urinal) 1  -RC  2  -MW    Putting on and taking off regular upper body clothing 2  -RC  2  -MW    Taking care of personal grooming (such as brushing teeth) 3  -RC  2  -MW    Eating meals 3  -RC  3  -MW    Score 13  -RC  13  -MW    Functional Assessment    Outcome Measure Options  AM-PAC 6 Clicks Basic Mobility (PT)  -LM       10/31/17 1103          How much help from another is currently needed...    Putting on and taking off regular lower body clothing? 2  -BH (r) SP (t) BH (c)      Bathing (including washing, rinsing, and drying) 2  -BH (r) SP (t) BH (c)      Toileting (which includes using toilet bed pan or urinal) 2  -BH (r) SP (t) BH (c)      Putting on and taking off regular upper body clothing 2  -BH (r) SP (t) BH (c)      Taking care of personal grooming (such as brushing teeth) 2  -BH (r) SP (t) BH (c)      Eating meals 3  -BH (r) SP (t) BH (c)      Score 13  -BH (r) SP (t)        User Key  (r) = Recorded By, (t) = Taken By, (c) = Cosigned By    Initials Name Provider Type    LM Jaky Dumas, PT Physical Therapist    LYNDON Engel, OTR/L Occupational Therapist    SHON Romano GALARZA/L Occupational Therapy Assistant    TRIPP Garcia, OT Student OT Student    MW Zuleika Palacios,  OT Occupational Therapist           Time Calculation:         PT Charges       11/02/17 1658 11/02/17 1201       Time Calculation    Start Time 1531  -RW 1106  -RW     Stop Time 1615  -RW 1148  -RW     Time Calculation (min) 44 min  -RW 42 min  -RW     Time Calculation- PT    Total Timed Code Minutes- PT 44 minute(s)  -RW 42 minute(s)  -RW       User Key  (r) = Recorded By, (t) = Taken By, (c) = Cosigned By    Initials Name Provider Type     Alvin Jacobs PTA Physical Therapy Assistant          Therapy Charges for Today     Code Description Service Date Service Provider Modifiers Qty    43294091895 HC PT NEUROMUSC RE EDUCATION EA 15 MIN 11/2/2017 Alvin Jacobs, RONNY GP 1    85792922516 HC PT THER PROC EA 15 MIN 11/2/2017 Alvin Jacobs, RONNY GP 1    25470056146 HC PT THERAPEUTIC ACT EA 15 MIN 11/2/2017 Alvin Jacobs, RONNY GP 1    70910636953 HC PT NEUROMUSC RE EDUCATION EA 15 MIN 11/2/2017 Alvin Jacobs PTA GP 1    45388482610 HC PT THER PROC EA 15 MIN 11/2/2017 Alvin Jacobs PTA GP 1    05203799454 HC PT THERAPEUTIC ACT EA 15 MIN 11/2/2017 Alvin Jacobs, RONNY GP 1          PT G-Codes  PT Professional Judgement Used?: Yes  Outcome Measure Options: AM-PAC 6 Clicks Basic Mobility (PT)  Score: 9  Functional Limitation: Mobility: Walking and moving around  Mobility: Walking and Moving Around Current Status (): At least 60 percent but less than 80 percent impaired, limited or restricted  Mobility: Walking and Moving Around Goal Status (): At least 40 percent but less than 60 percent impaired, limited or restricted    Alvin Jacobs PTA  11/2/2017

## 2017-11-02 NOTE — PLAN OF CARE
Problem: Patient Care Overview (Adult)  Goal: Plan of Care Review  Outcome: Ongoing (interventions implemented as appropriate)    11/02/17 1412   Coping/Psychosocial Response Interventions   Plan Of Care Reviewed With patient   Patient Care Overview   Progress no change   Outcome Evaluation   Outcome Summary/Follow up Plan pt d/c tomorrow-pt condition hasnt changed but is ready for discharge         Problem: Functional Deficit (Adult,Obstetrics,Pediatric)  Goal: Signs and Symptoms of Listed Potential Problems Will be Absent or Manageable (Functional Deficit)  Outcome: Ongoing (interventions implemented as appropriate)    Problem: Fall Risk (Adult)  Goal: Absence of Falls  Outcome: Ongoing (interventions implemented as appropriate)

## 2017-11-02 NOTE — THERAPY TREATMENT NOTE
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    Bed Mobility OT LTG, Date Goal Reviewed 10/31/17  -RC 10/30/17  -RC 10/27/17  -RC    Bed Mobility OT LTG, Outcome goal ongoing  -RC      Transfer Training OT LTG    Transfer Training OT LTG, Date Goal Reviewed 10/31/17  -RC 10/30/17  -RC 10/27/17  -RC    Strength OT LTG    Strength Goal OT LTG, Date Goal Reviewed 10/31/17  -RC 10/30/17  -RC 10/27/17  -RC    Range of Motion OT LTG    Range of Motion Goal OT LTG, Date Goal Reviewed 10/31/17  -RC 10/30/17  -RC 10/27/17  -RC    Dynamic Sitting Balance OT LTG    Dynamic Sitting Balance OT LTG, Date Goal Reviewed 10/31/17  -RC 10/30/17  -RC 10/27/17  -RC    Dynamic Sitting Balance OT LTG, Outcome goal ongoing  -RC      ADL OT LTG    ADL OT LTG, Date Goal Reviewed  10/30/17  -RC 10/27/17  -RC      10/26/17 0620 10/25/17 1303 10/24/17 1300    Bed Mobility OT LTG    Bed Mobility OT LTG, Date Goal Reviewed 10/26/17  -RC 10/25/17   - 10/24/17  -    Transfer Training OT LTG    Transfer Training OT LTG, Date Goal Reviewed 10/26/17  -RC 10/25/17  -RC 10/24/17  -    Strength OT LTG    Strength Goal OT LTG, Date Goal Reviewed  10/25/17  - 10/24/17  -    Range of Motion OT LTG    Range of Motion Goal OT LTG, Date Goal Reviewed 10/26/17  - 10/25/17  -RC 10/24/17  -    Dynamic Sitting Balance OT LTG    Dynamic Sitting Balance OT LTG, Date Goal Reviewed 10/26/17  - 10/25/17  - 10/24/17  -    ADL OT LTG    ADL OT LTG, Date Goal Reviewed 10/26/17  -  10/24/17  -    ADL OT LTG, Outcome   goal [facility]  -      10/23/17 1450 10/21/17 1050       Bed Mobility OT LTG    Bed Mobility OT LTG, Date Goal Reviewed 10/23/17  - 10/21/17  -     Transfer Training OT LTG    Transfer Training OT LTG, Date Goal Reviewed 10/23/17  - 10/21/17  -     Strength OT LTG    Strength Goal OT LTG, Date Goal Reviewed 10/23/17  - 10/21/17  -     Range of Motion OT LTG    Range of Motion Goal OT LTG, Date Goal Reviewed 10/23/17  - 10/21/17  -     Dynamic Sitting Balance OT LTG    Dynamic Sitting Balance OT LTG, Date Goal Reviewed 10/23/17  - 10/21/17  -     ADL OT LTG    ADL OT LTG, Date Goal Reviewed 10/23/17  - 10/21/17  -       User Key  (r) = Recorded By, (t) = Taken By, (c) = Cosigned By    Initials Name Provider Type     Micaela Engel, OTR/L Occupational Therapist    SHON Romano GALARZA/L Occupational Therapy Assistant    GEOVANNI De Jesus/L Occupational Therapy Assistant    TRIPP Garcia, OT Student OT Student    JAX Palacios, OT Occupational Therapist          Occupational Therapy Education     Title: PT OT SLP Therapies (Active)     Topic: Occupational Therapy (Active)     Point: ADL training (Active)    Description: Instruct learner(s) on proper safety adaptation and remediation techniques during self care or transfers.   Instruct in proper use of assistive devices.    Learning Progress Summary     Learner Readiness Method Response Comment Documented by Status   Patient Acceptance E NR  RC 11/02/17 1258 Active    Acceptance E VU,NR Pt educated on OT POC. Pt educated on utilizing call light and non-skid socks. Pt educated on the importance of getting out of bed and engaging in therapy.  11/01/17 1342 Done    Acceptance E NR   10/30/17 1437 Active    Acceptance E NR,VU  RC 10/27/17 1512 Done    Acceptance E NR  RC 10/26/17 1132 Active    Acceptance E VU,NR Pt. educated on role of OT, importance of participating in therapy, and benefit of activity engagement. ND 10/20/17 1532 Done    Acceptance E NR   10/19/17 1557 Active   Family Acceptance E NR,VU  RC 10/27/17 1512 Done               Point: Home exercise program (Done)    Description: Instruct learner(s) on appropriate technique for monitoring, assisting and/or progressing therapeutic exercises/activities.    Learning Progress Summary    Learner Readiness Method Response Comment Documented by Status   Patient Acceptance E NR,VU  RC 10/27/17 1512 Done    Acceptance E VU,NR Pt. educated on role of OT, importance of participating in therapy, and benefit of activity engagement. ND 10/20/17 1532 Done   Family Acceptance E NR,VU   10/27/17 1512 Done               Point: Precautions (Active)    Description: Instruct learner(s) on prescribed precautions during self-care and functional transfers.    Learning Progress Summary    Learner Readiness Method Response Comment Documented by Status   Patient Acceptance E NR  RC 11/02/17 1258 Active    Acceptance E VU,NR Pt educated on OT POC. Pt educated on utilizing call light and non-skid socks. Pt educated on the importance of getting out of bed and engaging in therapy.  11/01/17 1342 Done    Acceptance E NR   10/30/17 1437 Active    Acceptance E NR,VU   10/27/17 1512 Done    Acceptance E NR   10/26/17 1132 Active    Acceptance E VU,NR Pt. educated on role of OT, importance of participating in therapy, and  benefit of activity engagement. ND 10/20/17 1532 Done    Acceptance E VU,NR Edu pt on use of gait belt and non skid socks when OOB and no OOB without assist. RB 10/19/17 1350 Done   Family Acceptance E NR,VU   10/27/17 1512 Done               Point: Body mechanics (Active)    Description: Instruct learner(s) on proper positioning and spine alignment during self-care, functional mobility activities and/or exercises.    Learning Progress Summary    Learner Readiness Method Response Comment Documented by Status   Patient Acceptance E NR  RC 11/02/17 1258 Active    Acceptance E VU,NR Pt educated on OT POC. Pt educated on utilizing call light and non-skid socks. Pt educated on the importance of getting out of bed and engaging in therapy.  11/01/17 1342 Done    Acceptance E NR   10/26/17 1132 Active    Acceptance E VU,NR Pt. educated on role of OT, importance of participating in therapy, and benefit of activity engagement. ND 10/20/17 1532 Done                      User Key     Initials Effective Dates Name Provider Type Discipline    RB 06/15/16 -  Dionte Love, OT Occupational Therapist OT     10/17/16 -  GEOVANNI Rust/L Occupational Therapy Assistant OT    ND 10/21/16 -  AMIRAH Mireles/L Occupational Therapist OT     10/03/17 -  Zuleika Palacios, OT Occupational Therapist OT                  OT Recommendation and Plan  Anticipated Equipment Needs At Discharge: wheelchair, tram walker  Anticipated Discharge Disposition: home with 24/7 care, home with home health, skilled nursing facility (depending on progress in ARU)  Planned Therapy Interventions: activity intolerance, adaptive equipment training, ADL retraining, IADL retraining, balance training, bed mobility training, energy conservation, fine motor coordination training, home exercise program, joint mobilization, motor coordination training, neuromuscular re-education, ROM (Range of Motion), strengthening, transfer training, wheelchair fit  and training  Therapy Frequency: other (see comments) (3-14x/wk)  Plan of Care Review  Plan Of Care Reviewed With: patient  Progress: progress toward functional goals is gradual  Outcome Summary/Follow up Plan: pt has made progress, cont poc        Outcome Measures       11/02/17 0720 11/01/17 1315 11/01/17 1027    How much help from another person do you currently need...    Turning from your back to your side while in flat bed without using bedrails?  3  -LM     Moving from lying on back to sitting on the side of a flat bed without bedrails?  2  -LM     Moving to and from a bed to a chair (including a wheelchair)?  2  -LM     Standing up from a chair using your arms (e.g., wheelchair, bedside chair)?  1  -LM     Climbing 3-5 steps with a railing?  1  -LM     To walk in hospital room?  1  -LM     AM-PAC 6 Clicks Score  10  -LM     How much help from another is currently needed...    Putting on and taking off regular lower body clothing? 2  -RC  2  -MW    Bathing (including washing, rinsing, and drying) 2  -RC  2  -MW    Toileting (which includes using toilet bed pan or urinal) 1  -RC  2  -MW    Putting on and taking off regular upper body clothing 2  -RC  2  -MW    Taking care of personal grooming (such as brushing teeth) 3  -RC  2  -MW    Eating meals 3  -RC  3  -MW    Score 13  -RC  13  -MW    Functional Assessment    Outcome Measure Options  AM-PAC 6 Clicks Basic Mobility (PT)  -LM       10/31/17 1103          How much help from another is currently needed...    Putting on and taking off regular lower body clothing? 2  -BH (r) SP (t) BH (c)      Bathing (including washing, rinsing, and drying) 2  -BH (r) SP (t) BH (c)      Toileting (which includes using toilet bed pan or urinal) 2  -BH (r) SP (t) BH (c)      Putting on and taking off regular upper body clothing 2  -BH (r) SP (t) BH (c)      Taking care of personal grooming (such as brushing teeth) 2  -BH (r) SP (t) BH (c)      Eating meals 3  -BH (r) SP (t) BH  (c)      Score 13  - (r) SP (t)        User Key  (r) = Recorded By, (t) = Taken By, (c) = Cosigned By    Initials Name Provider Type     Jaky Dumas, PT Physical Therapist     Micaela Engel, OTR/L Occupational Therapist     Fany G Juan Antonio, GALARZA/L Occupational Therapy Assistant    SP Liset Garcia, OT Student OT Student     Zuleika Palacios, OT Occupational Therapist           Time Calculation:         Time Calculation- OT       11/02/17 1404          Time Calculation- OT    OT Start Time 0720  -RC      OT Stop Time 0900  -      OT Time Calculation (min) 100 min  -      Total Timed Code Minutes-  minute(s)  -      OT Received On 11/02/17  -        User Key  (r) = Recorded By, (t) = Taken By, (c) = Cosigned By    Initials Name Provider Type     Fany G Juan Antonio, GALARZA/L Occupational Therapy Assistant           Therapy Charges for Today     Code Description Service Date Service Provider Modifiers Qty    26666091080 HC OT SELF CARE/MGMT/TRAIN EA 15 MIN 11/2/2017 Fany G Juan Antonio, GALARZA/L GO 1    95428837205 HC OT THER PROC EA 15 MIN 11/2/2017 Fany G Juan Antonio, GALARZA/L GO 1    02211130074 HC OT NEUROMUSC RE EDUCATION EA 15 MIN 11/2/2017 Fany G Juan Antonio, GALARZA/L GO 2    31430739544 HC OT THERAPEUTIC ACT EA 15 MIN 11/2/2017 Fany G Juan Antonio, GALARZA/L GO 3          OT G-codes  OT Professional Judgement Used?: Yes  OT Functional Scales Options: AM-PAC 6 Clicks Daily Activity (OT)  Score: 13  Functional Limitation: Self care  Self Care Current Status (): At least 60 percent but less than 80 percent impaired, limited or restricted  Self Care Goal Status (): At least 40 percent but less than 60 percent impaired, limited or restricted    Fany G Juan Antonio, GALARZA/L  11/2/2017

## 2017-11-02 NOTE — PLAN OF CARE
Problem: Patient Care Overview (Adult)  Goal: Plan of Care Review  Outcome: Ongoing (interventions implemented as appropriate)  Goal: Adult Individualization and Mutuality  Outcome: Ongoing (interventions implemented as appropriate)  Goal: Discharge Needs Assessment  Outcome: Ongoing (interventions implemented as appropriate)    Problem: Functional Deficit (Adult,Obstetrics,Pediatric)  Goal: Signs and Symptoms of Listed Potential Problems Will be Absent or Manageable (Functional Deficit)  Outcome: Ongoing (interventions implemented as appropriate)    Problem: Fall Risk (Adult)  Goal: Absence of Falls  Outcome: Ongoing (interventions implemented as appropriate)    11/02/17 0436   Fall Risk (Adult)   Absence of Falls achieves outcome

## 2017-11-02 NOTE — NURSING NOTE
Pt during therapy this evenng was able to move that left arm a bit and was able to grasp a cup of ice cream with it..good improvement,

## 2017-11-02 NOTE — PLAN OF CARE
Problem: Patient Care Overview (Adult)  Goal: Plan of Care Review  Outcome: Ongoing (interventions implemented as appropriate)    11/02/17 0936   Coping/Psychosocial Response Interventions   Plan Of Care Reviewed With patient   Patient Care Overview   Progress progress toward functional goals as expected   Outcome Evaluation   Outcome Summary/Follow up Plan St therapy: pt has made prorgress since admission incllucing meeting goals for attention. plans to return home with daughter at d/c tomorrow          Problem: Inpatient SLP  Goal: Cognitive-linguistic-Patient will improve Cognitive-linguistic skills to allow optimal participation in care  Outcome: Ongoing (interventions implemented as appropriate)    10/19/17 1350 11/02/17 0936   Cognitive Linguistic- Optimal Participation in Care   Cognitive Linguistic Optimal Participation in Care- SLP, Date Established 10/19/17 --    Cognitive Linguistic Optimal Participation in Care- SLP, Time to Achieve by discharge --    Cognitive Linguistic Optimal Participation in Care- SLP, Activity Level Patient will improve Executive functioning skills for increased safety in environment --    Cognitive Linguistic Optimal Participation in Care- SLP, Date Goal Reviewed --  11/02/17   Cognitive Linguistic Optimal Participation in Care- SLP, Outcome --  goal partially met

## 2017-11-02 NOTE — PLAN OF CARE
Problem: Patient Care Overview (Adult)  Goal: Plan of Care Review  Outcome: Ongoing (interventions implemented as appropriate)    11/02/17 1656   Coping/Psychosocial Response Interventions   Plan Of Care Reviewed With patient   Outcome Evaluation   Outcome Summary/Follow up Plan pt able to sit eob with min assist x 10 min and noted some arom in left elbow flexion and increased  strength  on left.         Problem: Inpatient Physical Therapy  Goal: Bed Mobility Goal LTG- PT  Outcome: Ongoing (interventions implemented as appropriate)    11/01/17 1315 11/02/17 1656   Bed Mobility PT LTG   Bed Mobility PT LTG, Date Established 11/01/17 --    Bed Mobility PT LTG, Time to Achieve by discharge --    Bed Mobility PT LTG, Activity Type supine to sit/sit to supine --    Bed Mobility PT LTG, [place] Level minimum assist (75% patient effort) --    Bed Mobility PT Goal LTG, Assist Device bed rails --    Bed Mobility PT LTG, Additional Goal HOB elevated as needed --    Bed Mobility PT LTG, Date Goal Reviewed --  11/02/17   Bed Mobility PT LTG, Outcome --  goal ongoing       Goal: Transfer Training Goal 1 LTG- PT  Outcome: Ongoing (interventions implemented as appropriate)    11/01/17 1315 11/02/17 1656   Transfer Training PT LTG   Transfer Training PT LTG, Date Established 11/01/17 --    Transfer Training PT LTG, Time to Achieve by discharge --    Transfer Training PT LTG, Activity Type bed to chair /chair to bed --    Transfer Training PT LTG, [place] Level minimum assist (75% patient effort) --    Transfer Training PT LTG, Assist Device (AAD) --    Transfer Training PT LTG, Date Goal Reviewed --  11/02/17   Transfer Training PT LTG, Outcome --  goal ongoing       Goal: Static Sitting Balance Goal LTG- PT  Outcome: Outcome(s) achieved Date Met:  11/02/17 11/01/17 1315 11/02/17 1656   Static Sitting Balance PT LTG   Static Sitting Balance PT LTG, Date Established 11/01/17 --    Static Sitting Balance PT LTG, Time  to Achieve by discharge --    Static Sitting Balance PT LTG, [place] Level minimum assist (75% patient effort) --    Static Sitting Balance PT LTG, Assist Device UE Support --    Static Sitting Balance PT LTG, Additional Goal Pt able to sit EOB/EOM in midline for 10 minutes. --    Static Sitting Balance PT LTG, Date Goal Reviewed --  11/02/17   Static Sitting Balance PT LTG, Outcome --  goal met

## 2017-11-03 VITALS
TEMPERATURE: 96.6 F | OXYGEN SATURATION: 94 % | DIASTOLIC BLOOD PRESSURE: 56 MMHG | SYSTOLIC BLOOD PRESSURE: 117 MMHG | BODY MASS INDEX: 25.69 KG/M2 | HEART RATE: 83 BPM | HEIGHT: 61 IN | RESPIRATION RATE: 18 BRPM | WEIGHT: 136.1 LBS

## 2017-11-03 LAB
GLUCOSE BLDC GLUCOMTR-MCNC: 135 MG/DL (ref 70–130)
GLUCOSE BLDC GLUCOMTR-MCNC: 94 MG/DL (ref 70–130)

## 2017-11-03 PROCEDURE — 97535 SELF CARE MNGMENT TRAINING: CPT

## 2017-11-03 PROCEDURE — 92507 TX SP LANG VOICE COMM INDIV: CPT | Performed by: SPEECH-LANGUAGE PATHOLOGIST

## 2017-11-03 PROCEDURE — 99238 HOSP IP/OBS DSCHRG MGMT 30/<: CPT | Performed by: FAMILY MEDICINE

## 2017-11-03 PROCEDURE — 97110 THERAPEUTIC EXERCISES: CPT

## 2017-11-03 PROCEDURE — 82962 GLUCOSE BLOOD TEST: CPT

## 2017-11-03 PROCEDURE — 97530 THERAPEUTIC ACTIVITIES: CPT

## 2017-11-03 PROCEDURE — 97112 NEUROMUSCULAR REEDUCATION: CPT

## 2017-11-03 RX ORDER — METOCLOPRAMIDE 10 MG/1
10 TABLET ORAL
Qty: 90 TABLET | Refills: 0 | Status: ON HOLD | OUTPATIENT
Start: 2017-11-03 | End: 2018-02-21

## 2017-11-03 RX ORDER — ATORVASTATIN CALCIUM 20 MG/1
20 TABLET, FILM COATED ORAL NIGHTLY
Qty: 30 TABLET | Refills: 0 | Status: SHIPPED | OUTPATIENT
Start: 2017-11-03

## 2017-11-03 RX ORDER — ONDANSETRON 4 MG/1
4 TABLET, ORALLY DISINTEGRATING ORAL EVERY 6 HOURS PRN
Qty: 30 TABLET | Refills: 0 | Status: SHIPPED | OUTPATIENT
Start: 2017-11-03 | End: 2018-04-01

## 2017-11-03 RX ORDER — POLYETHYLENE GLYCOL 3350 17 G/17G
17 POWDER, FOR SOLUTION ORAL DAILY
Qty: 500 G | Refills: 1 | Status: ON HOLD | OUTPATIENT
Start: 2017-11-04 | End: 2018-02-21

## 2017-11-03 RX ORDER — NITROGLYCERIN 0.4 MG/1
0.4 TABLET SUBLINGUAL
Qty: 25 TABLET | Refills: 1 | Status: SHIPPED | OUTPATIENT
Start: 2017-11-03

## 2017-11-03 RX ORDER — METFORMIN HYDROCHLORIDE EXTENDED-RELEASE TABLETS 1000 MG/1
1000 TABLET, FILM COATED, EXTENDED RELEASE ORAL 2 TIMES DAILY WITH MEALS
Qty: 60 TABLET | Refills: 0 | Status: SHIPPED | OUTPATIENT
Start: 2017-11-03

## 2017-11-03 RX ORDER — CARVEDILOL 12.5 MG/1
12.5 TABLET ORAL 2 TIMES DAILY
Qty: 60 TABLET | Refills: 0 | Status: SHIPPED | OUTPATIENT
Start: 2017-11-03 | End: 2018-04-03 | Stop reason: HOSPADM

## 2017-11-03 RX ORDER — NICOTINE 21 MG/24HR
1 PATCH, TRANSDERMAL 24 HOURS TRANSDERMAL EVERY 24 HOURS
Qty: 21 PATCH | Refills: 0 | Status: SHIPPED | OUTPATIENT
Start: 2017-11-03 | End: 2018-04-01

## 2017-11-03 RX ORDER — LISINOPRIL 20 MG/1
20 TABLET ORAL DAILY
Qty: 30 TABLET | Refills: 0 | Status: SHIPPED | OUTPATIENT
Start: 2017-11-04

## 2017-11-03 RX ORDER — TRAZODONE HYDROCHLORIDE 100 MG/1
100 TABLET ORAL NIGHTLY
Qty: 30 TABLET | Refills: 0 | Status: ON HOLD | OUTPATIENT
Start: 2017-11-03 | End: 2018-02-21

## 2017-11-03 RX ORDER — ESCITALOPRAM OXALATE 10 MG/1
10 TABLET ORAL NIGHTLY
Qty: 30 TABLET | Refills: 0 | Status: SHIPPED | OUTPATIENT
Start: 2017-11-03

## 2017-11-03 RX ORDER — ALPRAZOLAM 0.25 MG/1
0.25 TABLET ORAL 4 TIMES DAILY
Qty: 120 TABLET | Refills: 0 | Status: SHIPPED | OUTPATIENT
Start: 2017-11-03

## 2017-11-03 RX ORDER — HYDROCODONE BITARTRATE AND ACETAMINOPHEN 10; 325 MG/1; MG/1
1 TABLET ORAL EVERY 4 HOURS PRN
Qty: 90 TABLET | Refills: 0 | Status: SHIPPED | OUTPATIENT
Start: 2017-11-03 | End: 2018-04-01

## 2017-11-03 RX ORDER — GABAPENTIN 600 MG/1
600 TABLET ORAL 3 TIMES DAILY
Qty: 1 TABLET | Refills: 0
Start: 2017-11-03

## 2017-11-03 RX ORDER — GLIPIZIDE 5 MG/1
2.5 TABLET ORAL
Qty: 60 TABLET | Refills: 1 | Status: ON HOLD | OUTPATIENT
Start: 2017-11-03 | End: 2018-02-21

## 2017-11-03 RX ORDER — PRENATAL VIT/IRON FUM/FOLIC AC 27MG-0.8MG
1 TABLET ORAL DAILY
Qty: 30 TABLET | Refills: 0 | Status: ON HOLD | OUTPATIENT
Start: 2017-11-04 | End: 2018-02-21

## 2017-11-03 RX ADMIN — HYDROCODONE BITARTRATE AND ACETAMINOPHEN 1 TABLET: 10; 325 TABLET ORAL at 09:19

## 2017-11-03 RX ADMIN — ISOSORBIDE MONONITRATE 30 MG: 30 TABLET, EXTENDED RELEASE ORAL at 08:03

## 2017-11-03 RX ADMIN — LISINOPRIL 20 MG: 20 TABLET ORAL at 08:03

## 2017-11-03 RX ADMIN — METFORMIN HYDROCHLORIDE 1000 MG: 500 TABLET, EXTENDED RELEASE ORAL at 08:03

## 2017-11-03 RX ADMIN — Medication 2.5 MG: at 07:35

## 2017-11-03 RX ADMIN — ALPRAZOLAM 0.25 MG: 0.25 TABLET ORAL at 07:34

## 2017-11-03 RX ADMIN — CARVEDILOL 12.5 MG: 12.5 TABLET, FILM COATED ORAL at 08:03

## 2017-11-03 RX ADMIN — METOCLOPRAMIDE 10 MG: 10 TABLET ORAL at 07:34

## 2017-11-03 RX ADMIN — HYDROCODONE BITARTRATE AND ACETAMINOPHEN 1 TABLET: 10; 325 TABLET ORAL at 05:23

## 2017-11-03 RX ADMIN — HYDROCODONE BITARTRATE AND ACETAMINOPHEN 1 TABLET: 10; 325 TABLET ORAL at 01:39

## 2017-11-03 RX ADMIN — PRENATAL VIT W/ FE FUMARATE-FA TAB 27-0.8 MG 1 TABLET: 27-0.8 TAB at 08:03

## 2017-11-03 RX ADMIN — GABAPENTIN 600 MG: 300 CAPSULE ORAL at 08:05

## 2017-11-03 RX ADMIN — METOCLOPRAMIDE 10 MG: 10 TABLET ORAL at 11:10

## 2017-11-03 RX ADMIN — ONDANSETRON 4 MG: 4 TABLET, ORALLY DISINTEGRATING ORAL at 04:52

## 2017-11-03 RX ADMIN — ALPRAZOLAM 0.25 MG: 0.25 TABLET ORAL at 11:09

## 2017-11-03 NOTE — PLAN OF CARE
Problem: Patient Care Overview (Adult)  Goal: Plan of Care Review  Outcome: Ongoing (interventions implemented as appropriate)  Goal: Adult Individualization and Mutuality  Outcome: Ongoing (interventions implemented as appropriate)  Goal: Discharge Needs Assessment  Outcome: Ongoing (interventions implemented as appropriate)    Problem: Functional Deficit (Adult,Obstetrics,Pediatric)  Goal: Signs and Symptoms of Listed Potential Problems Will be Absent or Manageable (Functional Deficit)  Outcome: Ongoing (interventions implemented as appropriate)    Problem: Fall Risk (Adult)  Goal: Absence of Falls  Outcome: Ongoing (interventions implemented as appropriate)    11/03/17 0322   Fall Risk (Adult)   Absence of Falls achieves outcome

## 2017-11-03 NOTE — DISCHARGE INSTR - APPOINTMENTS
Nov 9,2017 10:30 AM                                                                                                                                       [address] [facility]  Follow up with Lester Vargas MD                                                                                                                   [facility] [id number] [phone]
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
## 2017-11-03 NOTE — PLAN OF CARE
Problem: Patient Care Overview (Adult)  Goal: Plan of Care Review  Outcome: Ongoing (interventions implemented as appropriate)    11/03/17 1054   Coping/Psychosocial Response Interventions   Plan Of Care Reviewed With patient   Outcome Evaluation   Outcome Summary/Follow up Plan pt met 2/4 goals after recert. pt demonstrating some return in left ue and trace in le. dc home with family and HH to follow.         Problem: Inpatient Physical Therapy  Goal: Bed Mobility Goal LTG- PT  Outcome: Ongoing (interventions implemented as appropriate)    11/01/17 1315 11/03/17 1054   Bed Mobility PT LTG   Bed Mobility PT LTG, Date Established 11/01/17 --    Bed Mobility PT LTG, Time to Achieve by discharge --    Bed Mobility PT LTG, Activity Type supine to sit/sit to supine --    Bed Mobility PT LTG, [place] Level minimum assist (75% patient effort) --    Bed Mobility PT Goal LTG, Assist Device bed rails --    Bed Mobility PT LTG, Additional Goal HOB elevated as needed --    Bed Mobility PT LTG, Date Goal Reviewed --  11/03/17   Bed Mobility PT LTG, Outcome --  goal not met       Goal: Transfer Training Goal 1 LTG- PT  Outcome: Ongoing (interventions implemented as appropriate)    11/01/17 1315 11/03/17 1054   Transfer Training PT LTG   Transfer Training PT LTG, Date Established 11/01/17 --    Transfer Training PT LTG, Time to Achieve by discharge --    Transfer Training PT LTG, Activity Type bed to chair /chair to bed --    Transfer Training PT LTG, [place] Level minimum assist (75% patient effort) --    Transfer Training PT LTG, Assist Device (AAD) --    Transfer Training PT LTG, Date Goal Reviewed --  11/03/17   Transfer Training PT LTG, Outcome --  goal not met

## 2017-11-03 NOTE — THERAPY DISCHARGE NOTE
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 Mraietta Cox MS CCC-SLP      Bed Mobility, Assessment/Treatment    Bed Mobility, Assistive Device  bed rails;head of bed elevated  -RW     Bed Mob, Supine to Sit, [place]  moderate assist (50% patient effort)   with hob elevated to max position  -RW     Recorded by  [RW] Alvin Jacobs PTA     Transfer Assessment/Treatment    Transfers, Bed-Chair [place]  maximum assist (25% patient effort);1 person + 1 person to manage equipment  -RW     Transfers, Bed-Chair-Bed, Assist Device  sliding board  -RW     Transfers, Sit-Stand [place]  not appropriate to assess  -RW     Recorded by  [RW] Alvin Jacobs PTA     Gait Assessment/Treatment    Gait, [place] Level  not appropriate to assess  -RW     Recorded by  [RW] Alvin Jacobs PTA     Wheelchair Training/Management    Wheelchair, Distance Propelled  100 with min assist , more assist needed for turns and tight areas  -RW     Recorded by   [RW] Alvin Jacobs PTA     Lower Body Dressing Assessment/Training    LB Dressing Assess/Train, Clothing Type  donning:;shorts  -RW     LB Dressing Assess/Train, [place]  dependent (less than 25% patient effort)  -RW     Recorded by  [RW] Alvin Jacobs PTA     Balance Skills Training    Sitting-Level of Assistance  Moderate assistance  -RW     Sitting-Balance Support  --   seated in wc  -RW     Sitting-Balance Activities  Reaching for objects;Reaching across midline  -RW     Recorded by  [RW] Alvin Jacobs PTA     Therapy Exercises    Right Lower Extremity  AROM:;20 reps;sitting;LAQ;knee flexion  -RW     Left Lower Extremity  PROM:;15 reps   approximation into le. rom a joints  -RW     Recorded by  [RW] Alvin Jacobs PTA     Positioning and Restraints    Pre-Treatment Position  in bed  -RW     Post Treatment Position  wheelchair  -RW     In Wheelchair  with nsg;patient within staff view  -RW     Recorded by  [RW] Alvin Jacobs PTA       User Key  (r) = Recorded By, (t) = Taken By, (c) = Cosigned By    Initials Name Effective Dates    EC Amarilis Chen, CCC-SLP 12/30/16 -     OSIEL Cox, MS CCC-SLP 10/17/16 -     STEVE Jacobs PTA 10/17/16 -     GEOVANNI Stevens/L 10/17/16 -     JAX Palacios, OT 10/03/17 -           PT Recommendation and Plan  Anticipated Discharge Disposition: home with home health  Planned Therapy Interventions: balance training, bed mobility training, gait training, home exercise program, motor coordination training, neuromuscular re-education, patient/family education, postural re-education, ROM (Range of Motion), stair training, strengthening, stretching, transfer training, wheelchair management/propulsion training  PT Frequency: other (see comments) (5-14 times/wk)  Plan of Care Review  Plan Of Care Reviewed With: patient  Progress: unable to show any progress toward functional goals  Outcome Summary/Follow up Plan: pt met 2/4 goals after recert.  pt demonstrating some return in left ue and trace in le. dc home with family and HH to follow.          Outcome Measures       11/03/17 1000 11/02/17 0720 11/01/17 1315    How much help from another person do you currently need...    Turning from your back to your side while in flat bed without using bedrails? 3  -RW  3  -LM    Moving from lying on back to sitting on the side of a flat bed without bedrails? 2  -RW  2  -LM    Moving to and from a bed to a chair (including a wheelchair)? 2  -RW  2  -LM    Standing up from a chair using your arms (e.g., wheelchair, bedside chair)? 1  -RW  1  -LM    Climbing 3-5 steps with a railing? 1  -RW  1  -LM    To walk in hospital room? 1  -RW  1  -LM    AM-PAC 6 Clicks Score 10  -RW  10  -LM    How much help from another is currently needed...    Putting on and taking off regular lower body clothing?  2  -RC     Bathing (including washing, rinsing, and drying)  2  -RC     Toileting (which includes using toilet bed pan or urinal)  1  -RC     Putting on and taking off regular upper body clothing  2  -RC     Taking care of personal grooming (such as brushing teeth)  3  -RC     Eating meals  3  -RC     Score  13  -RC     Functional Assessment    Outcome Measure Options   AM-PAC 6 Clicks Basic Mobility (PT)  -LM      11/01/17 1027 10/31/17 1103       How much help from another is currently needed...    Putting on and taking off regular lower body clothing? 2  -MW 2  -BH (r) SP (t) BH (c)     Bathing (including washing, rinsing, and drying) 2  -MW 2  -BH (r) SP (t) BH (c)     Toileting (which includes using toilet bed pan or urinal) 2  -MW 2  -BH (r) SP (t) BH (c)     Putting on and taking off regular upper body clothing 2  -MW 2  -BH (r) SP (t) BH (c)     Taking care of personal grooming (such as brushing teeth) 2  -MW 2  -BH (r) SP (t) BH (c)     Eating meals 3  -MW 3  -BH (r) SP (t) BH (c)     Score 13  -MW 13  -BH (r) SP (t)       User Key  (r) = Recorded By, (t) = Taken By, (c) =  Cosigned By    Initials Name Provider Type    LM Jaky Dumas, PT Physical Therapist     Micaela Engel, OTR/L Occupational Therapist    RW Alvin Jacobs, RONNY Physical Therapy Assistant    SHON Romano, GALARZA/L Occupational Therapy Assistant    SP Liset Garcia, OT Student OT Student    MW Zuleika Palacios, OT Occupational Therapist           Time Calculation:         PT Charges       11/03/17 1057          Time Calculation    Start Time 1001  -RW      Stop Time 1040  -RW      Time Calculation (min) 39 min  -RW      Time Calculation- PT    Total Timed Code Minutes- PT 39 minute(s)  -RW        User Key  (r) = Recorded By, (t) = Taken By, (c) = Cosigned By    Initials Name Provider Type    RW Alvin Jacobs PTA Physical Therapy Assistant          Therapy Charges for Today     Code Description Service Date Service Provider Modifiers Qty    18011343355 HC PT NEUROMUSC RE EDUCATION EA 15 MIN 11/2/2017 Alvin Jacobs, PTA GP 1    84506009316 HC PT THER PROC EA 15 MIN 11/2/2017 Alvin Jacobs, PTA GP 1    36833762310 HC PT THERAPEUTIC ACT EA 15 MIN 11/2/2017 Alvin Jacobs, PTA GP 1    39242074439 HC PT NEUROMUSC RE EDUCATION EA 15 MIN 11/2/2017 Alvin Jacobs, PTA GP 1    41840236381 HC PT THER PROC EA 15 MIN 11/2/2017 Alvin Jacobs, PTA GP 1    17061593720 HC PT THERAPEUTIC ACT EA 15 MIN 11/2/2017 Alvin Jacobs, PTA GP 1    20832462120 HC PT NEUROMUSC RE EDUCATION EA 15 MIN 11/3/2017 Alvin Jacobs, PTA GP 1    20030177920 HC PT SELF CARE/MGMT/TRAIN EA 15 MIN 11/3/2017 Alvin Jacobs, PTA GP 1    96155412711 HC PT THERAPEUTIC ACT EA 15 MIN 11/3/2017 Alvin Jacobs, PTA GP 1          PT G-Codes  PT Professional Judgement Used?: Yes  Outcome Measure Options: AM-PAC 6 Clicks Basic Mobility (PT)  Score: 9  Functional Limitation: Mobility: Walking and moving around  Mobility: Walking and Moving Around Current Status (): At least 60 percent but less than 80 percent impaired, limited or restricted  Mobility:  Walking and Moving Around Goal Status (): At least 40 percent but less than 60 percent impaired, limited or restricted    PT Discharge Summary  Anticipated Discharge Disposition: home with home health  Reason for Discharge: Maximum functional potential achieved  Outcomes Achieved: Patient able to partially acheive established goals  Discharge Destination: Home with home health    Alvin Jacobs, PTA  11/3/2017
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
## 2017-11-03 NOTE — THERAPY DISCHARGE NOTE
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    Cognitive Linguistic- Optimal Participation in Care    Cognitive Linguistic Optimal Participation in Care- SLP, Date Goal Reviewed 10/26/17  -HR 10/25/17  -EC 10/24/17  -HR    Cognitive Linguistic Optimal Participation in Care- SLP, Outcome goal ongoing  -HR goal not met  -EC goal ongoing  -HR      10/23/17 1409 10/23/17 1328 10/21/17 1028    Cognitive Linguistic- Optimal Participation in Care    Cognitive Linguistic Optimal Participation in Care- SLP, Date Goal Reviewed 10/23/17  -CK (P)  10/23/17  -CK 10/21/17  -HR    Cognitive Linguistic Optimal Participation in Care- SLP, Outcome goal ongoing  -CK (P)  goal ongoing  -CK goal ongoing  -HR      User Key  (r) = Recorded By, (t) = Taken By, (c) = Cosigned By    Initials Name Provider Type    EC Amarilis Chen, CCC-SLP  Speech and Language Pathologist    HR Haylie Frank, MS CCC-SLP Speech and Language Pathologist    CK Marietta Cox, MS CCC-SLP Speech and Language Pathologist          EDUCATION  The patient has been educated in the following areas:   Cognitive Impairment.    SLP Recommendation and Plan              Anticipated Discharge Disposition: home with assist     Therapy Frequency: 3-5 times/wk  Predicted Duration of Therapy Intervention (days/wks): until discharge       Plan of Care Review  Plan Of Care Reviewed With: patient  Progress: progress toward functional goals as expected  Outcome Summary/Follow up Plan: ST therapy: pt has met goals for attentionbut continues to have some cognitive deficits.  tends to have more pain in afternoons and more impulsiveness.  recommend family adminsiter all meds accorning to scrips and encourage out of bed every day.              Time Calculation:         Time Calculation- SLP       11/03/17 1218          Time Calculation- SLP    SLP Start Time 1102  -EC      SLP Stop Time 1120  -EC      SLP Time Calculation (min) 18 min  -EC      Total Timed Code Minutes- SLP 18 minute(s)  -EC      SLP Received On 11/03/17  -EC        User Key  (r) = Recorded By, (t) = Taken By, (c) = Cosigned By    Initials Name Provider Type    EC Amarilis Chen CCC-SLP Speech and Language Pathologist          Therapy Charges for Today     Code Description Service Date Service Provider Modifiers Qty    [id number]  ST TREATMENT SPEECH 3 11/2/2017 TAMIA GamboaSLP GN 1    [id number]  ST TREATMENT SPEECH 1 11/3/2017 NATANAEL Gamboa GN 1               SLP Discharge Summary  Anticipated Discharge Disposition: home with assist  Reason for Discharge: Discharge from facility  Discharge Destination: Home with assist    NATANAEL Ortiz  11/3/2017

## 2017-11-03 NOTE — PLAN OF CARE
Problem: Inpatient Occupational Therapy  Goal: Bed Mobility Goal LTG- OT  Outcome: Unable to achieve outcome(s) by discharge Date Met:  11/03/17    10/19/17 1354 10/31/17 1103 11/03/17 0740   Bed Mobility OT LTG   Bed Mobility OT LTG, Date Established 10/19/17 --  --    Bed Mobility OT LTG, Time to Achieve by discharge --  --    Bed Mobility OT LTG, Activity Type roll left/roll right;supine to sit/sit to supine --  --    Bed Mobility OT LTG, [place] Level --  moderate assist (50% patient effort) --    Bed Mobility OT LTG, Assist Device bed rails --  --    Bed Mobility OT LTG, Date Goal Reviewed --  --  11/03/17   Bed Mobility OT LTG, Outcome --  --  goal partially met   Bed Mobility OT LTG, Reason Goal Not Met --  --  --      11/03/17 1315   Bed Mobility OT LTG   Bed Mobility OT LTG, Date Established --    Bed Mobility OT LTG, Time to Achieve --    Bed Mobility OT LTG, Activity Type --    Bed Mobility OT LTG, [place] Level --    Bed Mobility OT LTG, Assist Device --    Bed Mobility OT LTG, Date Goal Reviewed --    Bed Mobility OT LTG, Outcome --    Bed Mobility OT LTG, Reason Goal Not Met discharged from facility

## 2017-11-03 NOTE — PLAN OF CARE
Problem: Patient Care Overview (Adult)  Goal: Plan of Care Review  Outcome: Ongoing (interventions implemented as appropriate)    11/03/17 1216   Coping/Psychosocial Response Interventions   Plan Of Care Reviewed With patient   Patient Care Overview   Progress progress toward functional goals as expected   Outcome Evaluation   Outcome Summary/Follow up Plan ST therapy: pt has met goals for attentionbut continues to have some cognitive deficits. tends to have more pain in afternoons and more impulsiveness. recommend family adminsiter all meds accorning to scrips and encourage out of bed every day.          Problem: Inpatient SLP  Goal: Cognitive-linguistic-Patient will improve Cognitive-linguistic skills to allow optimal participation in care  Outcome: Unable to achieve outcome(s) by discharge Date Met:  11/03/17    10/19/17 1350 11/03/17 1216   Cognitive Linguistic- Optimal Participation in Care   Cognitive Linguistic Optimal Participation in Care- SLP, Date Established 10/19/17 --    Cognitive Linguistic Optimal Participation in Care- SLP, Time to Achieve by discharge --    Cognitive Linguistic Optimal Participation in Care- SLP, Activity Level Patient will improve Executive functioning skills for increased safety in environment --    Cognitive Linguistic Optimal Participation in Care- SLP, Date Goal Reviewed --  11/03/17   Cognitive Linguistic Optimal Participation in Care- SLP, Outcome --  goal partially met

## 2017-11-03 NOTE — PLAN OF CARE
Problem: Patient Care Overview (Adult)  Goal: Plan of Care Review  Outcome: Outcome(s) achieved Date Met:  11/03/17 11/03/17 0953   Coping/Psychosocial Response Interventions   Plan Of Care Reviewed With patient   Patient Care Overview   Progress improving       Goal: Adult Individualization and Mutuality  Outcome: Outcome(s) achieved Date Met:  11/03/17  Goal: Discharge Needs Assessment  Outcome: Outcome(s) achieved Date Met:  11/03/17    Problem: Functional Deficit (Adult,Obstetrics,Pediatric)  Goal: Signs and Symptoms of Listed Potential Problems Will be Absent or Manageable (Functional Deficit)  Outcome: Unable to achieve outcome(s) by discharge Date Met:  11/03/17    Problem: Fall Risk (Adult)  Goal: Absence of Falls  Outcome: Unable to achieve outcome(s) by discharge Date Met:  11/03/17

## 2017-11-03 NOTE — PLAN OF CARE
Problem: Patient Care Overview (Adult)  Goal: Plan of Care Review  Outcome: Ongoing (interventions implemented as appropriate)    11/03/17 0740   Coping/Psychosocial Response Interventions   Plan Of Care Reviewed With patient   Patient Care Overview   Progress progress towards functional goals is fair   Outcome Evaluation   Outcome Summary/Follow up Plan pt was able to meet 3/5 goals, recommend 2 for transfers, mod to max @ for bath dressing. pt did demo some increase in arom of l ue at d/c , she is to d/c home w/ family and HHOT.         Problem: Inpatient Occupational Therapy  Goal: Bed Mobility Goal LTG- OT  Outcome: Ongoing (interventions implemented as appropriate)    10/19/17 1354 10/31/17 1103 11/03/17 0740   Bed Mobility OT LTG   Bed Mobility OT LTG, Date Established 10/19/17 --  --    Bed Mobility OT LTG, Time to Achieve by discharge --  --    Bed Mobility OT LTG, Activity Type roll left/roll right;supine to sit/sit to supine --  --    Bed Mobility OT LTG, [place] Level --  moderate assist (50% patient effort) --    Bed Mobility OT LTG, Assist Device bed rails --  --    Bed Mobility OT LTG, Date Goal Reviewed --  --  11/03/17   Bed Mobility OT LTG, Outcome --  --  goal partially met       Goal: Transfer Training Goal 1 LTG- OT  Outcome: Unable to achieve outcome(s) by discharge Date Met:  11/03/17    10/19/17 1354 10/31/17 1103 11/03/17 0740   Transfer Training OT LTG   Transfer Training OT LTG, Date Established 10/19/17 --  --    Transfer Training OT LTG, Time to Achieve by discharge --  --    Transfer Training OT LTG, Activity Type --  (to w/c and to BSC) --    Transfer Training OT LTG, [place] Level --  moderate assist (50% patient effort);maximum assist (25% patient effort) --    Transfer Training OT LTG, Assist Device --  (sliding board) --    Transfer Training OT LTG, Date Goal Reviewed --  --  11/03/17   Transfer Training OT LTG, Outcome --  --  goal partially met  (recommend 2 for  transfers)   Transfer Training OT LTG, Reason Goal Not Met --  --  progress slower than expected       Goal: Strength Goal LTG- OT  Outcome: Outcome(s) achieved Date Met:  11/03/17    10/19/17 1354 11/03/17 0740   Strength OT LTG   Strength Goal OT LTG, Date Established 10/19/17 --    Strength Goal OT LTG, Time to Achieve by discharge --    Strength Goal OT LTG, Measure to Achieve 1-2 sets of 10 reps all planes with 1-2 lb wrist wts or dumbell to increase R UE strength for increased independence with ADLs and functional mobility. --    Strength Goal OT LTG, Date Goal Reviewed --  11/03/17   Strength Goal OT LTG, Outcome --  goal met       Goal: Dynamic Sitting Balance Goal LTG- OT  Outcome: Outcome(s) achieved Date Met:  11/03/17    10/19/17 1354 10/31/17 1103 11/03/17 0740   Dynamic Sitting Balance OT LTG   Dynamic Sitting Balance OT LTG, Date Established 10/19/17 --  --    Dynamic Sitting Balance OT LTG, Time to Achieve by discharge --  --    Dynamic Sitting Balance OT LTG, [place] Level --  moderate assist (50% patient effort) --    Dynamic Sitting Balance OT LTG, Assist Device bed rails --  --    Dynamic Sitting Balance OT LTG, Date Goal Reviewed --  --  11/03/17   Dynamic Sitting Balance OT LTG, Outcome --  --  goal met       Goal: ADL Goal LTG- OT  Outcome: Unable to achieve outcome(s) by discharge Date Met:  11/03/17    10/19/17 1354 11/03/17 0740   ADL OT LTG   ADL OT LTG, Date Established 10/19/17 --    ADL OT LTG, Time to Achieve by discharge --    ADL OT LTG, Activity Type ADL skills  (Upper body sponge bath and dress.) --    ADL OT LTG, [place] Level min assist --    ADL OT LTG, Date Goal Reviewed --  11/03/17   ADL OT LTG, Outcome --  goal not met   ADL OT LTG, Reason Goal Not Met --  unable to make needed progress

## 2017-11-03 NOTE — THERAPY DISCHARGE NOTE
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 (c)      Transfer Training OT LTG, [place] Level moderate assist (50% patient effort);maximum assist (25% patient effort)  -BH (r) SP (t) BH (c)      Transfer Training OT LTG, Assist Device --   sliding board  -BH (r) SP (t) BH (c)      Transfer Training OT LTG, Date Goal Reviewed  10/31/17  -RC 10/30/17  -RC    Transfer Training OT LTG, Outcome goal revised  -BH (r) SP (t) BH (c)      Strength OT LTG    Strength Goal OT LTG, Date Goal Reviewed  10/31/17  -RC 10/30/17  -RC    Range of Motion OT LTG    Range of Motion Goal OT LTG, Date Goal Reviewed  10/31/17  -RC 10/30/17  -RC    Range of Motion Goal OT LTG, Outcome goal not met   Goal d/c'd 2* slow progress. Not appropriate at this time.   -BH (r) SP (t) BH (c)       Dynamic Sitting Balance OT LTG    Dynamic Sitting Balance OT LTG, [place] Level moderate assist (50% patient effort)  -BH (r) SP (t) BH (c)      Dynamic Sitting Balance OT LTG, Date Goal Reviewed 10/31/17  -BH (r) SP (t) BH (c) 10/31/17  -RC 10/30/17  -RC    Dynamic Sitting Balance OT LTG, Outcome goal revised  -BH (r) SP (t) BH (c) goal ongoing  -RC     ADL OT LTG    ADL OT LTG, Date Goal Reviewed   10/30/17  -RC      10/27/17 1515 10/26/17 0620 10/25/17 1303    Bed Mobility OT LTG    Bed Mobility OT LTG, Date Goal Reviewed 10/27/17  -RC 10/26/17  -RC 10/25/17  -RC    Transfer Training OT LTG    Transfer Training OT LTG, Date Goal Reviewed 10/27/17  -RC 10/26/17  -RC 10/25/17  -    Strength OT LTG    Strength Goal OT LTG, Date Goal Reviewed 10/27/17  -RC  10/25/17  -    Range of Motion OT LTG    Range of Motion Goal OT LTG, Date Goal Reviewed 10/27/17  -RC 10/26/17  -RC 10/25/17  -RC    Dynamic Sitting Balance OT LTG    Dynamic Sitting Balance OT LTG, Date Goal Reviewed 10/27/17  -RC 10/26/17  -RC 10/25/17  -RC    ADL OT LTG    ADL OT LTG, Date Goal Reviewed 10/27/17  -RC 10/26/17  -RC       10/24/17 1300 10/23/17 1450 10/21/17 1050    Bed Mobility OT LTG    Bed Mobility OT LTG, Date Goal Reviewed 10/24/17  -RC 10/23/17  -RC 10/21/17  -    Transfer Training OT LTG    Transfer Training OT LTG, Date Goal Reviewed 10/24/17  -RC 10/23/17  -RC 10/21/17  -    Strength OT LTG    Strength Goal OT LTG, Date Goal Reviewed 10/24/17  -RC 10/23/17  -RC 10/21/17  -    Range of Motion OT LTG    Range of Motion Goal OT LTG, Date Goal Reviewed 10/24/17  -RC 10/23/17  -RC 10/21/17  -    Dynamic Sitting Balance OT LTG    Dynamic Sitting Balance OT LTG, Date Goal Reviewed 10/24/17  -RC 10/23/17  -RC 10/21/17  -    ADL OT LTG    ADL OT LTG, Date Goal Reviewed 10/24/17  -RC 10/23/17  -RC 10/21/17  -    ADL OT LTG, Outcome goal ongoing  -        User Key  (r) = Recorded By, (t) = Taken By, (c) = Cosigned By     Initials Name Provider Type     Micaela Engel, OTR/L Occupational Therapist     Fany Romano GALARZA/L Occupational Therapy Assistant    ENMA De JesusA/L Occupational Therapy Assistant    TRIPP Garcia, OT Student OT Student     Zuleika Palacios, OT Occupational Therapist          Occupational Therapy Education     Title: PT OT SLP Therapies (Active)     Topic: Occupational Therapy (Active)     Point: ADL training (Active)    Description: Instruct learner(s) on proper safety adaptation and remediation techniques during self care or transfers.   Instruct in proper use of assistive devices.    Learning Progress Summary    Learner Readiness Method Response Comment Documented by Status   Patient Acceptance E NR   11/02/17 1258 Active    Acceptance E VU,NR Pt educated on OT POC. Pt educated on utilizing call light and non-skid socks. Pt educated on the importance of getting out of bed and engaging in therapy.  11/01/17 1342 Done    Acceptance E NR   10/30/17 1437 Active    Acceptance E NR,VU   10/27/17 1512 Done    Acceptance E NR   10/26/17 1132 Active    Acceptance E VU,NR Pt. educated on role of OT, importance of participating in therapy, and benefit of activity engagement. ND 10/20/17 1532 Done    Acceptance E NR   10/19/17 1557 Active   Family Acceptance E NR,VU   10/27/17 1512 Done               Point: Home exercise program (Done)    Description: Instruct learner(s) on appropriate technique for monitoring, assisting and/or progressing therapeutic exercises/activities.    Learning Progress Summary    Learner Readiness Method Response Comment Documented by Status   Patient Acceptance E NR,VU   10/27/17 1512 Done    Acceptance E VU,NR Pt. educated on role of OT, importance of participating in therapy, and benefit of activity engagement. ND 10/20/17 1532 Done   Family Acceptance E NR,VU   10/27/17 1512 Done               Point: Precautions (Active)    Description: Instruct  learner(s) on prescribed precautions during self-care and functional transfers.    Learning Progress Summary    Learner Readiness Method Response Comment Documented by Status   Patient Acceptance E NR  RC 11/03/17 1138 Active    Acceptance E NR  RC 11/02/17 1258 Active    Acceptance E VU,NR Pt educated on OT POC. Pt educated on utilizing call light and non-skid socks. Pt educated on the importance of getting out of bed and engaging in therapy. MW 11/01/17 1342 Done    Acceptance E NR   10/30/17 1437 Active    Acceptance E NR,VU   10/27/17 1512 Done    Acceptance E NR   10/26/17 1132 Active    Acceptance E VU,NR Pt. educated on role of OT, importance of participating in therapy, and benefit of activity engagement. ND 10/20/17 1532 Done    Acceptance E VU,NR Edu pt on use of gait belt and non skid socks when OOB and no OOB without assist. RB 10/19/17 1350 Done   Family Acceptance E NR,VU   10/27/17 1512 Done               Point: Body mechanics (Active)    Description: Instruct learner(s) on proper positioning and spine alignment during self-care, functional mobility activities and/or exercises.    Learning Progress Summary    Learner Readiness Method Response Comment Documented by Status   Patient Acceptance E NR  RC 11/03/17 1138 Active    Acceptance E NR  RC 11/02/17 1258 Active    Acceptance E VU,NR Pt educated on OT POC. Pt educated on utilizing call light and non-skid socks. Pt educated on the importance of getting out of bed and engaging in therapy.  11/01/17 1342 Done    Acceptance E NR   10/26/17 1132 Active    Acceptance E VU,NR Pt. educated on role of OT, importance of participating in therapy, and benefit of activity engagement. ND 10/20/17 1532 Done                      User Key     Initials Effective Dates Name Provider Type Discipline     06/15/16 -  Dionte Love OT Occupational Therapist OT     10/17/16 -  GEOVANNI Rust/L Occupational Therapy Assistant OT    ND 10/21/16 -   Madison Powell, OTR/L Occupational Therapist OT     10/03/17 -  Zuleika Palacios, OT Occupational Therapist OT                OT Recommendation and Plan  Anticipated Equipment Needs At Discharge: wheelchair, tram walker  Anticipated Discharge Disposition: home with 24/7 care, home with home health, skilled nursing facility (depending on progress in ARU)  Planned Therapy Interventions: activity intolerance, adaptive equipment training, ADL retraining, IADL retraining, balance training, bed mobility training, energy conservation, fine motor coordination training, home exercise program, joint mobilization, motor coordination training, neuromuscular re-education, ROM (Range of Motion), strengthening, transfer training, wheelchair fit and training  Therapy Frequency: other (see comments) (3-14x/wk)  Plan of Care Review  Plan Of Care Reviewed With: patient  Progress: progress towards functional goals is fair  Outcome Summary/Follow up Plan: pt was able to meet 3/5 goals, recommend 2 for transfers, pt did demo some increase in arom of l ue at d/c , she is to d/c home w/ family  and HHOT.          Outcome Measures       11/03/17 1000 11/03/17 0740 11/02/17 0720    How much help from another person do you currently need...    Turning from your back to your side while in flat bed without using bedrails? 3  -RW      Moving from lying on back to sitting on the side of a flat bed without bedrails? 2  -RW      Moving to and from a bed to a chair (including a wheelchair)? 2  -RW      Standing up from a chair using your arms (e.g., wheelchair, bedside chair)? 1  -RW      Climbing 3-5 steps with a railing? 1  -RW      To walk in hospital room? 1  -RW      AM-PAC 6 Clicks Score 10  -RW      How much help from another is currently needed...    Putting on and taking off regular lower body clothing?  2  -RC 2  -RC    Bathing (including washing, rinsing, and drying)  2  -RC 2  -RC    Toileting (which includes using toilet bed pan or  urinal)  1  -RC 1  -RC    Putting on and taking off regular upper body clothing  2  -RC 2  -RC    Taking care of personal grooming (such as brushing teeth)  3  -RC 3  -RC    Eating meals  3  -RC 3  -RC    Score  13  -RC 13  -RC      11/01/17 1315 11/01/17 1027       How much help from another person do you currently need...    Turning from your back to your side while in flat bed without using bedrails? 3  -LM      Moving from lying on back to sitting on the side of a flat bed without bedrails? 2  -LM      Moving to and from a bed to a chair (including a wheelchair)? 2  -LM      Standing up from a chair using your arms (e.g., wheelchair, bedside chair)? 1  -LM      Climbing 3-5 steps with a railing? 1  -LM      To walk in hospital room? 1  -LM      AM-PAC 6 Clicks Score 10  -LM      How much help from another is currently needed...    Putting on and taking off regular lower body clothing?  2  -MW     Bathing (including washing, rinsing, and drying)  2  -MW     Toileting (which includes using toilet bed pan or urinal)  2  -MW     Putting on and taking off regular upper body clothing  2  -MW     Taking care of personal grooming (such as brushing teeth)  2  -MW     Eating meals  3  -MW     Score  13  -MW     Functional Assessment    Outcome Measure Options AM-PAC 6 Clicks Basic Mobility (PT)  -LM        User Key  (r) = Recorded By, (t) = Taken By, (c) = Cosigned By    Initials Name Provider Type    LM Jaky Dumas, PT Physical Therapist    STEVE Jacobs, PTA Physical Therapy Assistant    GEOVANNI Stevens/L Occupational Therapy Assistant    JAX Palacios, OT Occupational Therapist           Time Calculation:          Time Calculation- OT       11/03/17 1308          Time Calculation- OT    OT Start Time 0740  -RC      OT Stop Time 0903  -      OT Time Calculation (min) 83 min  -RC      Total Timed Code Minutes- OT 83 minute(s)  -RC      OT Received On 11/03/17  -        User Key  (r) = Recorded By,  (t) = Taken By, (c) = Cosigned By    Initials Name Provider Type     Fany Romano, GALARZA/L Occupational Therapy Assistant          Therapy Charges for Today     Code Description Service Date Service Provider Modifiers Qty    78893347591 HC OT SELF CARE/MGMT/TRAIN EA 15 MIN 11/2/2017 Fany Romano, GALARZA/L GO 1    41635281862 HC OT THER PROC EA 15 MIN 11/2/2017 Fany PEDRO Juan Antonio, GALARZA/L GO 1    89387860792 HC OT NEUROMUSC RE EDUCATION EA 15 MIN 11/2/2017 Fany PEDRO Juan Antonio, GALARZA/L GO 2    57670676349 HC OT THERAPEUTIC ACT EA 15 MIN 11/2/2017 Fany VASQUEZ Juan Antonio, GALARZA/L GO 3    75520239464 HC OT THER PROC EA 15 MIN 11/3/2017 Fany VASQUEZ Juan Antonio, GALARZA/L GO 1    01964346776 HC OT THERAPEUTIC ACT EA 15 MIN 11/3/2017 Fany PEDRO Juan Antonio, GALARZA/L GO 2    53806805804 HC OT NEUROMUSC RE EDUCATION EA 15 MIN 11/3/2017 Fany PEDRO Juan Antonio, GALARZA/L GO 3          OT G-codes  OT Professional Judgement Used?: Yes  OT Functional Scales Options: AM-PAC 6 Clicks Daily Activity (OT)  Score: 13  Functional Limitation: Self care  Self Care Current Status (): At least 60 percent but less than 80 percent impaired, limited or restricted  Self Care Goal Status (): At least 40 percent but less than 60 percent impaired, limited or restricted         Fany Romano, GALARZA/L  11/3/2017

## 2018-02-21 ENCOUNTER — HOSPITAL ENCOUNTER (OUTPATIENT)
Facility: HOSPITAL | Age: 68
Setting detail: OBSERVATION
Discharge: HOME | End: 2018-02-22
Attending: EMERGENCY MEDICINE | Admitting: INTERNAL MEDICINE

## 2018-02-21 ENCOUNTER — APPOINTMENT (OUTPATIENT)
Dept: GENERAL RADIOLOGY | Facility: HOSPITAL | Age: 68
End: 2018-02-21

## 2018-02-21 DIAGNOSIS — R07.9 CHEST PAIN, UNSPECIFIED TYPE: Primary | ICD-10-CM

## 2018-02-21 LAB
ALBUMIN SERPL-MCNC: 3.3 G/DL (ref 3.4–4.8)
ALBUMIN/GLOB SERPL: 1.4 G/DL (ref 1.1–1.8)
ALP SERPL-CCNC: 78 U/L (ref 38–126)
ALT SERPL W P-5'-P-CCNC: 25 U/L (ref 9–52)
ANION GAP SERPL CALCULATED.3IONS-SCNC: 14 MMOL/L (ref 5–15)
APTT PPP: 29.1 SECONDS (ref 20–40.3)
AST SERPL-CCNC: 15 U/L (ref 14–36)
BASOPHILS # BLD AUTO: 0.03 10*3/MM3 (ref 0–0.2)
BASOPHILS NFR BLD AUTO: 0.6 % (ref 0–2)
BILIRUB SERPL-MCNC: 0.1 MG/DL (ref 0.2–1.3)
BUN BLD-MCNC: 10 MG/DL (ref 7–21)
BUN/CREAT SERPL: 22.2 (ref 7–25)
CALCIUM SPEC-SCNC: 9 MG/DL (ref 8.4–10.2)
CHLORIDE SERPL-SCNC: 105 MMOL/L (ref 95–110)
CK MB SERPL-CCNC: 0.5 NG/ML (ref 0–5)
CO2 SERPL-SCNC: 21 MMOL/L (ref 22–31)
CREAT BLD-MCNC: 0.45 MG/DL (ref 0.5–1)
DEPRECATED RDW RBC AUTO: 45.8 FL (ref 36.4–46.3)
EOSINOPHIL # BLD AUTO: 0.23 10*3/MM3 (ref 0–0.7)
EOSINOPHIL NFR BLD AUTO: 4.7 % (ref 0–7)
ERYTHROCYTE [DISTWIDTH] IN BLOOD BY AUTOMATED COUNT: 13.7 % (ref 11.5–14.5)
GFR SERPL CREATININE-BSD FRML MDRD: 138 ML/MIN/1.73 (ref 45–104)
GLOBULIN UR ELPH-MCNC: 2.3 GM/DL (ref 2.3–3.5)
GLUCOSE BLD-MCNC: 163 MG/DL (ref 60–100)
GLUCOSE BLDC GLUCOMTR-MCNC: 132 MG/DL (ref 70–130)
GLUCOSE BLDC GLUCOMTR-MCNC: 227 MG/DL (ref 70–130)
HBA1C MFR BLD: 6.4 % (ref 4–5.6)
HCT VFR BLD AUTO: 38 % (ref 35–45)
HGB BLD-MCNC: 12.7 G/DL (ref 12–15.5)
HOLD SPECIMEN: NORMAL
IMM GRANULOCYTES # BLD: 0.02 10*3/MM3 (ref 0–0.02)
IMM GRANULOCYTES NFR BLD: 0.4 % (ref 0–0.5)
INR PPP: 0.95 (ref 0.8–1.2)
LYMPHOCYTES # BLD AUTO: 1.52 10*3/MM3 (ref 0.6–4.2)
LYMPHOCYTES NFR BLD AUTO: 31.3 % (ref 10–50)
MCH RBC QN AUTO: 30.8 PG (ref 26.5–34)
MCHC RBC AUTO-ENTMCNC: 33.4 G/DL (ref 31.4–36)
MCV RBC AUTO: 92 FL (ref 80–98)
MONOCYTES # BLD AUTO: 0.32 10*3/MM3 (ref 0–0.9)
MONOCYTES NFR BLD AUTO: 6.6 % (ref 0–12)
NEUTROPHILS # BLD AUTO: 2.73 10*3/MM3 (ref 2–8.6)
NEUTROPHILS NFR BLD AUTO: 56.4 % (ref 37–80)
NT-PROBNP SERPL-MCNC: 186 PG/ML (ref 0–900)
PLATELET # BLD AUTO: 257 10*3/MM3 (ref 150–450)
PMV BLD AUTO: 8.8 FL (ref 8–12)
POTASSIUM BLD-SCNC: 4 MMOL/L (ref 3.5–5.1)
PROT SERPL-MCNC: 5.6 G/DL (ref 6.3–8.6)
PROTHROMBIN TIME: 12.6 SECONDS (ref 11.1–15.3)
RBC # BLD AUTO: 4.13 10*6/MM3 (ref 3.77–5.16)
SODIUM BLD-SCNC: 140 MMOL/L (ref 137–145)
TROPONIN I SERPL-MCNC: 0.02 NG/ML
TROPONIN I SERPL-MCNC: <0.012 NG/ML
WBC NRBC COR # BLD: 4.85 10*3/MM3 (ref 3.2–9.8)

## 2018-02-21 PROCEDURE — 84484 ASSAY OF TROPONIN QUANT: CPT | Performed by: PHYSICIAN ASSISTANT

## 2018-02-21 PROCEDURE — 93010 ELECTROCARDIOGRAM REPORT: CPT | Performed by: INTERNAL MEDICINE

## 2018-02-21 PROCEDURE — 84484 ASSAY OF TROPONIN QUANT: CPT | Performed by: INTERNAL MEDICINE

## 2018-02-21 PROCEDURE — G0378 HOSPITAL OBSERVATION PER HR: HCPCS

## 2018-02-21 PROCEDURE — 82553 CREATINE MB FRACTION: CPT | Performed by: PHYSICIAN ASSISTANT

## 2018-02-21 PROCEDURE — 80053 COMPREHEN METABOLIC PANEL: CPT | Performed by: PHYSICIAN ASSISTANT

## 2018-02-21 PROCEDURE — 83036 HEMOGLOBIN GLYCOSYLATED A1C: CPT | Performed by: NURSE PRACTITIONER

## 2018-02-21 PROCEDURE — 93005 ELECTROCARDIOGRAM TRACING: CPT | Performed by: EMERGENCY MEDICINE

## 2018-02-21 PROCEDURE — 96376 TX/PRO/DX INJ SAME DRUG ADON: CPT

## 2018-02-21 PROCEDURE — 71045 X-RAY EXAM CHEST 1 VIEW: CPT

## 2018-02-21 PROCEDURE — 83880 ASSAY OF NATRIURETIC PEPTIDE: CPT | Performed by: PHYSICIAN ASSISTANT

## 2018-02-21 PROCEDURE — 94799 UNLISTED PULMONARY SVC/PX: CPT

## 2018-02-21 PROCEDURE — 99284 EMERGENCY DEPT VISIT MOD MDM: CPT

## 2018-02-21 PROCEDURE — 96375 TX/PRO/DX INJ NEW DRUG ADDON: CPT

## 2018-02-21 PROCEDURE — 85025 COMPLETE CBC W/AUTO DIFF WBC: CPT | Performed by: PHYSICIAN ASSISTANT

## 2018-02-21 PROCEDURE — 93005 ELECTROCARDIOGRAM TRACING: CPT | Performed by: PHYSICIAN ASSISTANT

## 2018-02-21 PROCEDURE — 82962 GLUCOSE BLOOD TEST: CPT

## 2018-02-21 PROCEDURE — 63710000001 INSULIN ASPART PER 5 UNITS: Performed by: NURSE PRACTITIONER

## 2018-02-21 PROCEDURE — 96374 THER/PROPH/DIAG INJ IV PUSH: CPT

## 2018-02-21 PROCEDURE — 85730 THROMBOPLASTIN TIME PARTIAL: CPT | Performed by: PHYSICIAN ASSISTANT

## 2018-02-21 PROCEDURE — 94760 N-INVAS EAR/PLS OXIMETRY 1: CPT

## 2018-02-21 PROCEDURE — 25010000002 ONDANSETRON PER 1 MG: Performed by: PHYSICIAN ASSISTANT

## 2018-02-21 PROCEDURE — 84484 ASSAY OF TROPONIN QUANT: CPT | Performed by: NURSE PRACTITIONER

## 2018-02-21 PROCEDURE — 85610 PROTHROMBIN TIME: CPT | Performed by: PHYSICIAN ASSISTANT

## 2018-02-21 PROCEDURE — 25010000002 MORPHINE PER 10 MG: Performed by: EMERGENCY MEDICINE

## 2018-02-21 RX ORDER — ONDANSETRON 2 MG/ML
4 INJECTION INTRAMUSCULAR; INTRAVENOUS EVERY 6 HOURS PRN
Status: DISCONTINUED | OUTPATIENT
Start: 2018-02-21 | End: 2018-02-22 | Stop reason: HOSPADM

## 2018-02-21 RX ORDER — SODIUM CHLORIDE 0.9 % (FLUSH) 0.9 %
1-10 SYRINGE (ML) INJECTION AS NEEDED
Status: DISCONTINUED | OUTPATIENT
Start: 2018-02-21 | End: 2018-02-22 | Stop reason: HOSPADM

## 2018-02-21 RX ORDER — CARVEDILOL 3.12 MG/1
3.12 TABLET ORAL 2 TIMES DAILY WITH MEALS
Status: DISCONTINUED | OUTPATIENT
Start: 2018-02-21 | End: 2018-02-22 | Stop reason: HOSPADM

## 2018-02-21 RX ORDER — ACETAMINOPHEN 325 MG/1
650 TABLET ORAL EVERY 4 HOURS PRN
Status: DISCONTINUED | OUTPATIENT
Start: 2018-02-21 | End: 2018-02-22 | Stop reason: HOSPADM

## 2018-02-21 RX ORDER — ISOSORBIDE MONONITRATE 30 MG/1
30 TABLET, EXTENDED RELEASE ORAL DAILY
Status: DISCONTINUED | OUTPATIENT
Start: 2018-02-21 | End: 2018-02-22 | Stop reason: HOSPADM

## 2018-02-21 RX ORDER — DEXTROSE MONOHYDRATE 25 G/50ML
25 INJECTION, SOLUTION INTRAVENOUS
Status: DISCONTINUED | OUTPATIENT
Start: 2018-02-21 | End: 2018-02-22 | Stop reason: HOSPADM

## 2018-02-21 RX ORDER — NITROGLYCERIN 0.4 MG/1
0.4 TABLET SUBLINGUAL
Status: DISCONTINUED | OUTPATIENT
Start: 2018-02-21 | End: 2018-02-22 | Stop reason: HOSPADM

## 2018-02-21 RX ORDER — SODIUM CHLORIDE 0.9 % (FLUSH) 0.9 %
10 SYRINGE (ML) INJECTION AS NEEDED
Status: DISCONTINUED | OUTPATIENT
Start: 2018-02-21 | End: 2018-02-22 | Stop reason: HOSPADM

## 2018-02-21 RX ORDER — ESCITALOPRAM OXALATE 10 MG/1
10 TABLET ORAL NIGHTLY
Status: DISCONTINUED | OUTPATIENT
Start: 2018-02-21 | End: 2018-02-22 | Stop reason: HOSPADM

## 2018-02-21 RX ORDER — ALPRAZOLAM 0.5 MG/1
0.5 TABLET ORAL 2 TIMES DAILY
Status: DISCONTINUED | OUTPATIENT
Start: 2018-02-21 | End: 2018-02-22 | Stop reason: HOSPADM

## 2018-02-21 RX ORDER — MORPHINE SULFATE 2 MG/ML
1 INJECTION, SOLUTION INTRAMUSCULAR; INTRAVENOUS EVERY 4 HOURS PRN
Status: DISCONTINUED | OUTPATIENT
Start: 2018-02-21 | End: 2018-02-21

## 2018-02-21 RX ORDER — ASPIRIN 81 MG/1
324 TABLET, CHEWABLE ORAL ONCE
Status: COMPLETED | OUTPATIENT
Start: 2018-02-21 | End: 2018-02-21

## 2018-02-21 RX ORDER — HYDROCODONE BITARTRATE AND ACETAMINOPHEN 5; 325 MG/1; MG/1
1 TABLET ORAL EVERY 6 HOURS PRN
Status: DISCONTINUED | OUTPATIENT
Start: 2018-02-21 | End: 2018-02-22 | Stop reason: HOSPADM

## 2018-02-21 RX ORDER — LISINOPRIL 10 MG/1
10 TABLET ORAL 2 TIMES DAILY
Status: DISCONTINUED | OUTPATIENT
Start: 2018-02-21 | End: 2018-02-22 | Stop reason: HOSPADM

## 2018-02-21 RX ORDER — GABAPENTIN 300 MG/1
600 CAPSULE ORAL EVERY 8 HOURS SCHEDULED
Status: DISCONTINUED | OUTPATIENT
Start: 2018-02-21 | End: 2018-02-22 | Stop reason: HOSPADM

## 2018-02-21 RX ORDER — NICOTINE POLACRILEX 4 MG
15 LOZENGE BUCCAL
Status: DISCONTINUED | OUTPATIENT
Start: 2018-02-21 | End: 2018-02-22 | Stop reason: HOSPADM

## 2018-02-21 RX ORDER — ONDANSETRON 4 MG/1
4 TABLET, FILM COATED ORAL EVERY 6 HOURS PRN
Status: DISCONTINUED | OUTPATIENT
Start: 2018-02-21 | End: 2018-02-22 | Stop reason: HOSPADM

## 2018-02-21 RX ORDER — ATORVASTATIN CALCIUM 20 MG/1
20 TABLET, FILM COATED ORAL NIGHTLY
Status: DISCONTINUED | OUTPATIENT
Start: 2018-02-21 | End: 2018-02-22 | Stop reason: HOSPADM

## 2018-02-21 RX ORDER — ONDANSETRON 2 MG/ML
4 INJECTION INTRAMUSCULAR; INTRAVENOUS ONCE
Status: COMPLETED | OUTPATIENT
Start: 2018-02-21 | End: 2018-02-21

## 2018-02-21 RX ORDER — NALOXONE HCL 0.4 MG/ML
0.4 VIAL (ML) INJECTION
Status: DISCONTINUED | OUTPATIENT
Start: 2018-02-21 | End: 2018-02-21

## 2018-02-21 RX ORDER — MORPHINE SULFATE 2 MG/ML
2 INJECTION, SOLUTION INTRAMUSCULAR; INTRAVENOUS ONCE
Status: COMPLETED | OUTPATIENT
Start: 2018-02-21 | End: 2018-02-21

## 2018-02-21 RX ORDER — ONDANSETRON 4 MG/1
4 TABLET, ORALLY DISINTEGRATING ORAL EVERY 6 HOURS PRN
Status: DISCONTINUED | OUTPATIENT
Start: 2018-02-21 | End: 2018-02-22 | Stop reason: HOSPADM

## 2018-02-21 RX ORDER — PANTOPRAZOLE SODIUM 40 MG/1
40 TABLET, DELAYED RELEASE ORAL
Status: DISCONTINUED | OUTPATIENT
Start: 2018-02-22 | End: 2018-02-22 | Stop reason: HOSPADM

## 2018-02-21 RX ADMIN — Medication 2 MG: at 11:24

## 2018-02-21 RX ADMIN — SODIUM CHLORIDE 500 ML: 9 INJECTION, SOLUTION INTRAVENOUS at 12:56

## 2018-02-21 RX ADMIN — ATORVASTATIN CALCIUM 20 MG: 20 TABLET, FILM COATED ORAL at 21:19

## 2018-02-21 RX ADMIN — LISINOPRIL 10 MG: 10 TABLET ORAL at 21:19

## 2018-02-21 RX ADMIN — HYDROCODONE BITARTRATE AND ACETAMINOPHEN 1 TABLET: 5; 325 TABLET ORAL at 18:40

## 2018-02-21 RX ADMIN — ONDANSETRON 4 MG: 2 INJECTION INTRAMUSCULAR; INTRAVENOUS at 11:24

## 2018-02-21 RX ADMIN — ISOSORBIDE MONONITRATE 30 MG: 30 TABLET, EXTENDED RELEASE ORAL at 17:06

## 2018-02-21 RX ADMIN — ALPRAZOLAM 0.5 MG: 0.5 TABLET ORAL at 23:07

## 2018-02-21 RX ADMIN — MORPHINE SULFATE 4 MG: 4 INJECTION, SOLUTION INTRAMUSCULAR; INTRAVENOUS at 13:25

## 2018-02-21 RX ADMIN — CARVEDILOL 3.12 MG: 3.12 TABLET, FILM COATED ORAL at 17:07

## 2018-02-21 RX ADMIN — INSULIN ASPART 4 UNITS: 100 INJECTION, SOLUTION INTRAVENOUS; SUBCUTANEOUS at 21:19

## 2018-02-21 RX ADMIN — GABAPENTIN 600 MG: 300 CAPSULE ORAL at 17:07

## 2018-02-21 RX ADMIN — ASPIRIN 81 MG 324 MG: 81 TABLET ORAL at 11:23
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
## 2018-02-21 NOTE — PLAN OF CARE
Problem: Patient Care Overview (Adult)  Goal: Plan of Care Review  Outcome: Ongoing (interventions implemented as appropriate)   02/21/18 3183   Coping/Psychosocial Response Interventions   Plan Of Care Reviewed With patient   Patient Care Overview   Progress no change   Outcome Evaluation   Outcome Summary/Follow up Plan patient just admitted        Problem: Acute Coronary Syndrome (ACS) (Adult)  Goal: Signs and Symptoms of Listed Potential Problems Will be Absent or Manageable (Acute Coronary Syndrome)  Outcome: Ongoing (interventions implemented as appropriate)      Problem: Fall Risk (Adult)  Goal: Identify Related Risk Factors and Signs and Symptoms  Outcome: Ongoing (interventions implemented as appropriate)
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
## 2018-02-22 VITALS
BODY MASS INDEX: 23.41 KG/M2 | DIASTOLIC BLOOD PRESSURE: 68 MMHG | WEIGHT: 124 LBS | SYSTOLIC BLOOD PRESSURE: 108 MMHG | RESPIRATION RATE: 18 BRPM | OXYGEN SATURATION: 98 % | TEMPERATURE: 98.7 F | HEART RATE: 54 BPM | HEIGHT: 61 IN

## 2018-02-22 LAB
ANION GAP SERPL CALCULATED.3IONS-SCNC: 8 MMOL/L (ref 5–15)
BASOPHILS # BLD AUTO: 0.04 10*3/MM3 (ref 0–0.2)
BASOPHILS NFR BLD AUTO: 0.9 % (ref 0–2)
BUN BLD-MCNC: 11 MG/DL (ref 7–21)
BUN/CREAT SERPL: 20.4 (ref 7–25)
CALCIUM SPEC-SCNC: 8.6 MG/DL (ref 8.4–10.2)
CHLORIDE SERPL-SCNC: 108 MMOL/L (ref 95–110)
CO2 SERPL-SCNC: 24 MMOL/L (ref 22–31)
CREAT BLD-MCNC: 0.54 MG/DL (ref 0.5–1)
DEPRECATED RDW RBC AUTO: 46.1 FL (ref 36.4–46.3)
EOSINOPHIL # BLD AUTO: 0.23 10*3/MM3 (ref 0–0.7)
EOSINOPHIL NFR BLD AUTO: 5.2 % (ref 0–7)
ERYTHROCYTE [DISTWIDTH] IN BLOOD BY AUTOMATED COUNT: 13.8 % (ref 11.5–14.5)
GFR SERPL CREATININE-BSD FRML MDRD: 112 ML/MIN/1.73 (ref 45–104)
GLUCOSE BLD-MCNC: 91 MG/DL (ref 60–100)
GLUCOSE BLDC GLUCOMTR-MCNC: 112 MG/DL (ref 70–130)
GLUCOSE BLDC GLUCOMTR-MCNC: 153 MG/DL (ref 70–130)
HCT VFR BLD AUTO: 33.9 % (ref 35–45)
HGB BLD-MCNC: 11.2 G/DL (ref 12–15.5)
IMM GRANULOCYTES # BLD: 0.01 10*3/MM3 (ref 0–0.02)
IMM GRANULOCYTES NFR BLD: 0.2 % (ref 0–0.5)
LYMPHOCYTES # BLD AUTO: 1.65 10*3/MM3 (ref 0.6–4.2)
LYMPHOCYTES NFR BLD AUTO: 37.1 % (ref 10–50)
MCH RBC QN AUTO: 30.5 PG (ref 26.5–34)
MCHC RBC AUTO-ENTMCNC: 33 G/DL (ref 31.4–36)
MCV RBC AUTO: 92.4 FL (ref 80–98)
MONOCYTES # BLD AUTO: 0.34 10*3/MM3 (ref 0–0.9)
MONOCYTES NFR BLD AUTO: 7.6 % (ref 0–12)
NEUTROPHILS # BLD AUTO: 2.18 10*3/MM3 (ref 2–8.6)
NEUTROPHILS NFR BLD AUTO: 49 % (ref 37–80)
PLATELET # BLD AUTO: 223 10*3/MM3 (ref 150–450)
PMV BLD AUTO: 8.8 FL (ref 8–12)
POTASSIUM BLD-SCNC: 4.1 MMOL/L (ref 3.5–5.1)
RBC # BLD AUTO: 3.67 10*6/MM3 (ref 3.77–5.16)
SODIUM BLD-SCNC: 140 MMOL/L (ref 137–145)
TROPONIN I SERPL-MCNC: <0.012 NG/ML
WBC NRBC COR # BLD: 4.45 10*3/MM3 (ref 3.2–9.8)

## 2018-02-22 PROCEDURE — 96375 TX/PRO/DX INJ NEW DRUG ADDON: CPT

## 2018-02-22 PROCEDURE — 82962 GLUCOSE BLOOD TEST: CPT

## 2018-02-22 PROCEDURE — 84484 ASSAY OF TROPONIN QUANT: CPT | Performed by: NURSE PRACTITIONER

## 2018-02-22 PROCEDURE — 25010000002 METOCLOPRAMIDE PER 10 MG: Performed by: NURSE PRACTITIONER

## 2018-02-22 PROCEDURE — 25010000002 DIPHENHYDRAMINE PER 50 MG: Performed by: NURSE PRACTITIONER

## 2018-02-22 PROCEDURE — 85025 COMPLETE CBC W/AUTO DIFF WBC: CPT | Performed by: NURSE PRACTITIONER

## 2018-02-22 PROCEDURE — 80048 BASIC METABOLIC PNL TOTAL CA: CPT | Performed by: NURSE PRACTITIONER

## 2018-02-22 PROCEDURE — 25010000002 KETOROLAC TROMETHAMINE PER 15 MG: Performed by: NURSE PRACTITIONER

## 2018-02-22 PROCEDURE — G0378 HOSPITAL OBSERVATION PER HR: HCPCS

## 2018-02-22 RX ORDER — ISOSORBIDE MONONITRATE 30 MG/1
30 TABLET, EXTENDED RELEASE ORAL DAILY
Qty: 30 TABLET | Refills: 0 | Status: SHIPPED | OUTPATIENT
Start: 2018-02-23

## 2018-02-22 RX ORDER — DIPHENHYDRAMINE HYDROCHLORIDE 50 MG/ML
25 INJECTION INTRAMUSCULAR; INTRAVENOUS ONCE
Status: COMPLETED | OUTPATIENT
Start: 2018-02-22 | End: 2018-02-22

## 2018-02-22 RX ORDER — AMLODIPINE BESYLATE 2.5 MG/1
2.5 TABLET ORAL DAILY
Qty: 30 TABLET | Refills: 0 | Status: SHIPPED | OUTPATIENT
Start: 2018-02-22 | End: 2018-04-01

## 2018-02-22 RX ORDER — KETOROLAC TROMETHAMINE 30 MG/ML
15 INJECTION, SOLUTION INTRAMUSCULAR; INTRAVENOUS ONCE
Status: COMPLETED | OUTPATIENT
Start: 2018-02-22 | End: 2018-02-22

## 2018-02-22 RX ORDER — METOCLOPRAMIDE HYDROCHLORIDE 5 MG/ML
10 INJECTION INTRAMUSCULAR; INTRAVENOUS ONCE
Status: COMPLETED | OUTPATIENT
Start: 2018-02-22 | End: 2018-02-22

## 2018-02-22 RX ADMIN — DIPHENHYDRAMINE HYDROCHLORIDE 25 MG: 50 INJECTION INTRAMUSCULAR; INTRAVENOUS at 10:42

## 2018-02-22 RX ADMIN — ALPRAZOLAM 0.5 MG: 0.5 TABLET ORAL at 08:01

## 2018-02-22 RX ADMIN — LISINOPRIL 10 MG: 10 TABLET ORAL at 08:01

## 2018-02-22 RX ADMIN — PANTOPRAZOLE SODIUM 40 MG: 40 TABLET, DELAYED RELEASE ORAL at 05:43

## 2018-02-22 RX ADMIN — ACETAMINOPHEN 650 MG: 325 TABLET ORAL at 04:45

## 2018-02-22 RX ADMIN — CARVEDILOL 3.12 MG: 3.12 TABLET, FILM COATED ORAL at 08:01

## 2018-02-22 RX ADMIN — HYDROCODONE BITARTRATE AND ACETAMINOPHEN 1 TABLET: 5; 325 TABLET ORAL at 10:01

## 2018-02-22 RX ADMIN — ISOSORBIDE MONONITRATE 30 MG: 30 TABLET, EXTENDED RELEASE ORAL at 08:01

## 2018-02-22 RX ADMIN — METOCLOPRAMIDE 10 MG: 5 INJECTION, SOLUTION INTRAMUSCULAR; INTRAVENOUS at 10:42

## 2018-02-22 RX ADMIN — KETOROLAC TROMETHAMINE 15 MG: 30 INJECTION, SOLUTION INTRAMUSCULAR; INTRAVENOUS at 10:41
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
## 2018-02-22 NOTE — PLAN OF CARE
Problem: Patient Care Overview (Adult)  Goal: Plan of Care Review  Outcome: Ongoing (interventions implemented as appropriate)   02/22/18 0341   Coping/Psychosocial Response Interventions   Plan Of Care Reviewed With patient   Patient Care Overview   Progress no change   Outcome Evaluation   Outcome Summary/Follow up Plan pt. rested well this night. No cp reported      Goal: Adult Individualization and Mutuality  Outcome: Ongoing (interventions implemented as appropriate)    Goal: Discharge Needs Assessment  Outcome: Ongoing (interventions implemented as appropriate)   02/21/18 1542 02/22/18 0341   Discharge Needs Assessment   Concerns To Be Addressed --  no discharge needs identified   Discharge Disposition --  still a patient   Living Environment   Transportation Available car --    Self-Care   Equipment Currently Used at Home shower chair;walker, rolling --        Problem: Acute Coronary Syndrome (ACS) (Adult)  Goal: Signs and Symptoms of Listed Potential Problems Will be Absent or Manageable (Acute Coronary Syndrome)  Outcome: Ongoing (interventions implemented as appropriate)   02/22/18 0341   Acute Coronary Syndrome (ACS)   Problems Assessed (Acute Coronary Syndrome (ACS)) all   Problems Present (Acute Coronary Syndrome (ACS)) none       Problem: Fall Risk (Adult)  Goal: Identify Related Risk Factors and Signs and Symptoms  Outcome: Ongoing (interventions implemented as appropriate)   02/21/18 1559 02/22/18 0341   Fall Risk   Fall Risk: Related Risk Factors gait/mobility problems --    Fall Risk: Signs and Symptoms --  presence of risk factors     Goal: Absence of Falls  Outcome: Ongoing (interventions implemented as appropriate)   02/22/18 0341   Fall Risk (Adult)   Absence of Falls making progress toward outcome

## 2018-04-01 ENCOUNTER — HOSPITAL ENCOUNTER (OUTPATIENT)
Facility: HOSPITAL | Age: 68
Setting detail: OBSERVATION
Discharge: HOME | End: 2018-04-03
Attending: EMERGENCY MEDICINE | Admitting: FAMILY MEDICINE

## 2018-04-01 ENCOUNTER — APPOINTMENT (OUTPATIENT)
Dept: GENERAL RADIOLOGY | Facility: HOSPITAL | Age: 68
End: 2018-04-01

## 2018-04-01 DIAGNOSIS — R07.9 CHEST PAIN, RULE OUT ACUTE MYOCARDIAL INFARCTION: Primary | ICD-10-CM

## 2018-04-01 LAB
ALBUMIN SERPL-MCNC: 3.8 G/DL (ref 3.4–4.8)
ALBUMIN/GLOB SERPL: 1.4 G/DL (ref 1.1–1.8)
ALP SERPL-CCNC: 89 U/L (ref 38–126)
ALT SERPL W P-5'-P-CCNC: 19 U/L (ref 9–52)
ANION GAP SERPL CALCULATED.3IONS-SCNC: 14 MMOL/L (ref 5–15)
AST SERPL-CCNC: 33 U/L (ref 14–36)
BASOPHILS # BLD AUTO: 0.03 10*3/MM3 (ref 0–0.2)
BASOPHILS NFR BLD AUTO: 0.4 % (ref 0–2)
BILIRUB SERPL-MCNC: 0.3 MG/DL (ref 0.2–1.3)
BUN BLD-MCNC: 7 MG/DL (ref 7–21)
BUN/CREAT SERPL: 12.3 (ref 7–25)
CALCIUM SPEC-SCNC: 9.4 MG/DL (ref 8.4–10.2)
CHLORIDE SERPL-SCNC: 103 MMOL/L (ref 95–110)
CK MB SERPL-CCNC: 0.5 NG/ML (ref 0–5)
CK SERPL-CCNC: 27 U/L (ref 30–135)
CO2 SERPL-SCNC: 25 MMOL/L (ref 22–31)
CREAT BLD-MCNC: 0.57 MG/DL (ref 0.5–1)
DEPRECATED RDW RBC AUTO: 45.9 FL (ref 36.4–46.3)
EOSINOPHIL # BLD AUTO: 0.24 10*3/MM3 (ref 0–0.7)
EOSINOPHIL NFR BLD AUTO: 3.6 % (ref 0–7)
ERYTHROCYTE [DISTWIDTH] IN BLOOD BY AUTOMATED COUNT: 13.6 % (ref 11.5–14.5)
GFR SERPL CREATININE-BSD FRML MDRD: 105 ML/MIN/1.73 (ref 60–104)
GLOBULIN UR ELPH-MCNC: 2.7 GM/DL (ref 2.3–3.5)
GLUCOSE BLD-MCNC: 91 MG/DL (ref 60–100)
HCT VFR BLD AUTO: 38.4 % (ref 35–45)
HGB BLD-MCNC: 13 G/DL (ref 12–15.5)
IMM GRANULOCYTES # BLD: 0.02 10*3/MM3 (ref 0–0.02)
IMM GRANULOCYTES NFR BLD: 0.3 % (ref 0–0.5)
LYMPHOCYTES # BLD AUTO: 2.21 10*3/MM3 (ref 0.6–4.2)
LYMPHOCYTES NFR BLD AUTO: 32.7 % (ref 10–50)
MCH RBC QN AUTO: 31.4 PG (ref 26.5–34)
MCHC RBC AUTO-ENTMCNC: 33.9 G/DL (ref 31.4–36)
MCV RBC AUTO: 92.8 FL (ref 80–98)
MONOCYTES # BLD AUTO: 0.45 10*3/MM3 (ref 0–0.9)
MONOCYTES NFR BLD AUTO: 6.7 % (ref 0–12)
NEUTROPHILS # BLD AUTO: 3.8 10*3/MM3 (ref 2–8.6)
NEUTROPHILS NFR BLD AUTO: 56.3 % (ref 37–80)
NT-PROBNP SERPL-MCNC: 152 PG/ML (ref 0–900)
PLATELET # BLD AUTO: 274 10*3/MM3 (ref 150–450)
PMV BLD AUTO: 8.9 FL (ref 8–12)
POTASSIUM BLD-SCNC: 4.8 MMOL/L (ref 3.5–5.1)
PROT SERPL-MCNC: 6.5 G/DL (ref 6.3–8.6)
RBC # BLD AUTO: 4.14 10*6/MM3 (ref 3.77–5.16)
SODIUM BLD-SCNC: 142 MMOL/L (ref 137–145)
TROPONIN I SERPL-MCNC: <0.012 NG/ML
TROPONIN I SERPL-MCNC: <0.012 NG/ML
WBC NRBC COR # BLD: 6.75 10*3/MM3 (ref 3.2–9.8)

## 2018-04-01 PROCEDURE — 25010000002 MORPHINE PER 10 MG: Performed by: FAMILY MEDICINE

## 2018-04-01 PROCEDURE — 80053 COMPREHEN METABOLIC PANEL: CPT | Performed by: EMERGENCY MEDICINE

## 2018-04-01 PROCEDURE — 93005 ELECTROCARDIOGRAM TRACING: CPT | Performed by: EMERGENCY MEDICINE

## 2018-04-01 PROCEDURE — 99284 EMERGENCY DEPT VISIT MOD MDM: CPT

## 2018-04-01 PROCEDURE — 82550 ASSAY OF CK (CPK): CPT | Performed by: EMERGENCY MEDICINE

## 2018-04-01 PROCEDURE — 93010 ELECTROCARDIOGRAM REPORT: CPT | Performed by: INTERNAL MEDICINE

## 2018-04-01 PROCEDURE — 84484 ASSAY OF TROPONIN QUANT: CPT | Performed by: EMERGENCY MEDICINE

## 2018-04-01 PROCEDURE — 83880 ASSAY OF NATRIURETIC PEPTIDE: CPT | Performed by: EMERGENCY MEDICINE

## 2018-04-01 PROCEDURE — 84484 ASSAY OF TROPONIN QUANT: CPT | Performed by: NURSE PRACTITIONER

## 2018-04-01 PROCEDURE — G0378 HOSPITAL OBSERVATION PER HR: HCPCS

## 2018-04-01 PROCEDURE — 71045 X-RAY EXAM CHEST 1 VIEW: CPT

## 2018-04-01 PROCEDURE — 82553 CREATINE MB FRACTION: CPT | Performed by: EMERGENCY MEDICINE

## 2018-04-01 PROCEDURE — 93005 ELECTROCARDIOGRAM TRACING: CPT | Performed by: FAMILY MEDICINE

## 2018-04-01 PROCEDURE — 85025 COMPLETE CBC W/AUTO DIFF WBC: CPT | Performed by: EMERGENCY MEDICINE

## 2018-04-01 RX ORDER — ACETAMINOPHEN 325 MG/1
650 TABLET ORAL EVERY 4 HOURS PRN
Status: DISCONTINUED | OUTPATIENT
Start: 2018-04-01 | End: 2018-04-03 | Stop reason: HOSPADM

## 2018-04-01 RX ORDER — NITROGLYCERIN 0.4 MG/1
0.4 TABLET SUBLINGUAL
Status: DISCONTINUED | OUTPATIENT
Start: 2018-04-01 | End: 2018-04-03 | Stop reason: HOSPADM

## 2018-04-01 RX ORDER — GABAPENTIN 300 MG/1
600 CAPSULE ORAL EVERY 8 HOURS SCHEDULED
Status: DISCONTINUED | OUTPATIENT
Start: 2018-04-01 | End: 2018-04-03 | Stop reason: HOSPADM

## 2018-04-01 RX ORDER — MORPHINE SULFATE 2 MG/ML
0.5 INJECTION, SOLUTION INTRAMUSCULAR; INTRAVENOUS EVERY 4 HOURS PRN
Status: DISCONTINUED | OUTPATIENT
Start: 2018-04-01 | End: 2018-04-03 | Stop reason: HOSPADM

## 2018-04-01 RX ORDER — DEXTROSE MONOHYDRATE 25 G/50ML
25 INJECTION, SOLUTION INTRAVENOUS
Status: DISCONTINUED | OUTPATIENT
Start: 2018-04-01 | End: 2018-04-03 | Stop reason: HOSPADM

## 2018-04-01 RX ORDER — SODIUM CHLORIDE 0.9 % (FLUSH) 0.9 %
1-10 SYRINGE (ML) INJECTION AS NEEDED
Status: DISCONTINUED | OUTPATIENT
Start: 2018-04-01 | End: 2018-04-03 | Stop reason: HOSPADM

## 2018-04-01 RX ORDER — CARVEDILOL 3.12 MG/1
3.12 TABLET ORAL 2 TIMES DAILY WITH MEALS
Status: DISCONTINUED | OUTPATIENT
Start: 2018-04-01 | End: 2018-04-03 | Stop reason: HOSPADM

## 2018-04-01 RX ORDER — ATORVASTATIN CALCIUM 20 MG/1
20 TABLET, FILM COATED ORAL NIGHTLY
Status: DISCONTINUED | OUTPATIENT
Start: 2018-04-01 | End: 2018-04-03 | Stop reason: HOSPADM

## 2018-04-01 RX ORDER — ESCITALOPRAM OXALATE 10 MG/1
20 TABLET ORAL NIGHTLY
Status: DISCONTINUED | OUTPATIENT
Start: 2018-04-01 | End: 2018-04-03 | Stop reason: HOSPADM

## 2018-04-01 RX ORDER — PREDNISONE 20 MG/1
40 TABLET ORAL
Status: DISCONTINUED | OUTPATIENT
Start: 2018-04-02 | End: 2018-04-03 | Stop reason: HOSPADM

## 2018-04-01 RX ORDER — ONDANSETRON 2 MG/ML
4 INJECTION INTRAMUSCULAR; INTRAVENOUS EVERY 6 HOURS PRN
Status: DISCONTINUED | OUTPATIENT
Start: 2018-04-01 | End: 2018-04-03 | Stop reason: HOSPADM

## 2018-04-01 RX ORDER — ASPIRIN 325 MG
325 TABLET ORAL ONCE
Status: COMPLETED | OUTPATIENT
Start: 2018-04-01 | End: 2018-04-01

## 2018-04-01 RX ORDER — ALPRAZOLAM 0.5 MG/1
0.5 TABLET ORAL 2 TIMES DAILY PRN
Status: DISCONTINUED | OUTPATIENT
Start: 2018-04-01 | End: 2018-04-03 | Stop reason: HOSPADM

## 2018-04-01 RX ORDER — NICOTINE 21 MG/24HR
1 PATCH, TRANSDERMAL 24 HOURS TRANSDERMAL EVERY 24 HOURS
Status: DISCONTINUED | OUTPATIENT
Start: 2018-04-01 | End: 2018-04-03 | Stop reason: HOSPADM

## 2018-04-01 RX ORDER — ASPIRIN 81 MG/1
81 TABLET, CHEWABLE ORAL DAILY
Status: DISCONTINUED | OUTPATIENT
Start: 2018-04-02 | End: 2018-04-03 | Stop reason: HOSPADM

## 2018-04-01 RX ORDER — NICOTINE POLACRILEX 4 MG
15 LOZENGE BUCCAL
Status: DISCONTINUED | OUTPATIENT
Start: 2018-04-01 | End: 2018-04-03 | Stop reason: HOSPADM

## 2018-04-01 RX ORDER — LISINOPRIL 10 MG/1
10 TABLET ORAL 2 TIMES DAILY
Status: DISCONTINUED | OUTPATIENT
Start: 2018-04-01 | End: 2018-04-02

## 2018-04-01 RX ORDER — FAMOTIDINE 40 MG/1
40 TABLET, FILM COATED ORAL DAILY
Status: DISCONTINUED | OUTPATIENT
Start: 2018-04-02 | End: 2018-04-03 | Stop reason: HOSPADM

## 2018-04-01 RX ORDER — ISOSORBIDE MONONITRATE 30 MG/1
30 TABLET, EXTENDED RELEASE ORAL DAILY
Status: DISCONTINUED | OUTPATIENT
Start: 2018-04-02 | End: 2018-04-03 | Stop reason: HOSPADM

## 2018-04-01 RX ORDER — IPRATROPIUM BROMIDE AND ALBUTEROL SULFATE 2.5; .5 MG/3ML; MG/3ML
3 SOLUTION RESPIRATORY (INHALATION) ONCE
Status: DISCONTINUED | OUTPATIENT
Start: 2018-04-01 | End: 2018-04-03 | Stop reason: HOSPADM

## 2018-04-01 RX ORDER — IPRATROPIUM BROMIDE AND ALBUTEROL SULFATE 2.5; .5 MG/3ML; MG/3ML
3 SOLUTION RESPIRATORY (INHALATION)
Status: DISCONTINUED | OUTPATIENT
Start: 2018-04-01 | End: 2018-04-03 | Stop reason: HOSPADM

## 2018-04-01 RX ADMIN — ASPIRIN 325 MG: 325 TABLET, COATED ORAL at 21:19

## 2018-04-01 RX ADMIN — LISINOPRIL 10 MG: 10 TABLET ORAL at 22:56

## 2018-04-01 RX ADMIN — GABAPENTIN 600 MG: 300 CAPSULE ORAL at 20:11

## 2018-04-01 RX ADMIN — MORPHINE SULFATE 0.5 MG: 2 INJECTION, SOLUTION INTRAMUSCULAR; INTRAVENOUS at 20:11

## 2018-04-01 RX ADMIN — NICOTINE 1 PATCH: 21 PATCH TRANSDERMAL at 22:56

## 2018-04-01 RX ADMIN — ATORVASTATIN CALCIUM 20 MG: 20 TABLET, FILM COATED ORAL at 22:56

## 2018-04-01 RX ADMIN — ESCITALOPRAM OXALATE 20 MG: 10 TABLET ORAL at 22:56

## 2018-04-01 RX ADMIN — CARVEDILOL 3.12 MG: 3.12 TABLET, FILM COATED ORAL at 22:56

## 2018-04-01 NOTE — ED TRIAGE NOTES
Pt reports chest pain that started approx 1000 AM. Pt reports productive cough and congestion for the past 3 days.

## 2018-04-02 ENCOUNTER — APPOINTMENT (OUTPATIENT)
Dept: CARDIOLOGY | Facility: HOSPITAL | Age: 68
End: 2018-04-02

## 2018-04-02 LAB
ANION GAP SERPL CALCULATED.3IONS-SCNC: 8 MMOL/L (ref 5–15)
BASOPHILS # BLD AUTO: 0.03 10*3/MM3 (ref 0–0.2)
BASOPHILS NFR BLD AUTO: 0.5 % (ref 0–2)
BH CV ECHO MEAS - ACS: 1.7 CM
BH CV ECHO MEAS - AO MAX PG (FULL): 1.5 MMHG
BH CV ECHO MEAS - AO MAX PG: 6 MMHG
BH CV ECHO MEAS - AO MEAN PG (FULL): 2 MMHG
BH CV ECHO MEAS - AO MEAN PG: 4 MMHG
BH CV ECHO MEAS - AO ROOT AREA: 4.9 CM^2
BH CV ECHO MEAS - AO ROOT DIAM: 2.5 CM
BH CV ECHO MEAS - AO V2 MAX: 122 CM/SEC
BH CV ECHO MEAS - AO V2 MEAN: 89.9 CM/SEC
BH CV ECHO MEAS - AO V2 VTI: 24 CM
BH CV ECHO MEAS - AVA(I,A): 3.1 CM^2
BH CV ECHO MEAS - AVA(I,D): 3.1 CM^2
BH CV ECHO MEAS - AVA(V,A): 3 CM^2
BH CV ECHO MEAS - AVA(V,D): 3 CM^2
BH CV ECHO MEAS - EDV(CUBED): 54.9 ML
BH CV ECHO MEAS - EDV(TEICH): 62 ML
BH CV ECHO MEAS - EF(CUBED): 74.8 %
BH CV ECHO MEAS - EF(TEICH): 67.5 %
BH CV ECHO MEAS - ESV(CUBED): 13.8 ML
BH CV ECHO MEAS - ESV(TEICH): 20.2 ML
BH CV ECHO MEAS - FS: 36.8 %
BH CV ECHO MEAS - IVS/LVPW: 1.1
BH CV ECHO MEAS - IVSD: 1 CM
BH CV ECHO MEAS - LA DIMENSION: 3.2 CM
BH CV ECHO MEAS - LA/AO: 1.3
BH CV ECHO MEAS - LV MASS(C)D: 109 GRAMS
BH CV ECHO MEAS - LV MAX PG: 4.4 MMHG
BH CV ECHO MEAS - LV MEAN PG: 2 MMHG
BH CV ECHO MEAS - LV V1 MAX: 105 CM/SEC
BH CV ECHO MEAS - LV V1 MEAN: 65 CM/SEC
BH CV ECHO MEAS - LV V1 VTI: 21.5 CM
BH CV ECHO MEAS - LVIDD: 3.8 CM
BH CV ECHO MEAS - LVIDS: 2.4 CM
BH CV ECHO MEAS - LVOT AREA (M): 3.5 CM^2
BH CV ECHO MEAS - LVOT AREA: 3.5 CM^2
BH CV ECHO MEAS - LVOT DIAM: 2.1 CM
BH CV ECHO MEAS - LVPWD: 0.9 CM
BH CV ECHO MEAS - MV A MAX VEL: 82.7 CM/SEC
BH CV ECHO MEAS - MV DEC SLOPE: 179 CM/SEC^2
BH CV ECHO MEAS - MV E MAX VEL: 76.2 CM/SEC
BH CV ECHO MEAS - MV E/A: 0.92
BH CV ECHO MEAS - MV P1/2T MAX VEL: 69.4 CM/SEC
BH CV ECHO MEAS - MV P1/2T: 113.6 MSEC
BH CV ECHO MEAS - MVA P1/2T LCG: 3.2 CM^2
BH CV ECHO MEAS - MVA(P1/2T): 1.9 CM^2
BH CV ECHO MEAS - PA MAX PG: 1.8 MMHG
BH CV ECHO MEAS - PA V2 MAX: 66.6 CM/SEC
BH CV ECHO MEAS - RAP SYSTOLE: 5 MMHG
BH CV ECHO MEAS - RVDD: 2.5 CM
BH CV ECHO MEAS - RVSP: 25.3 MMHG
BH CV ECHO MEAS - SV(AO): 117.8 ML
BH CV ECHO MEAS - SV(CUBED): 41 ML
BH CV ECHO MEAS - SV(LVOT): 74.5 ML
BH CV ECHO MEAS - SV(TEICH): 41.8 ML
BH CV ECHO MEAS - TR MAX VEL: 225 CM/SEC
BILIRUB UR QL STRIP: NEGATIVE
BUN BLD-MCNC: 9 MG/DL (ref 7–21)
BUN/CREAT SERPL: 17.6 (ref 7–25)
CALCIUM SPEC-SCNC: 9 MG/DL (ref 8.4–10.2)
CHLORIDE SERPL-SCNC: 104 MMOL/L (ref 95–110)
CLARITY UR: CLEAR
CO2 SERPL-SCNC: 28 MMOL/L (ref 22–31)
COLOR UR: YELLOW
CREAT BLD-MCNC: 0.51 MG/DL (ref 0.5–1)
DEPRECATED RDW RBC AUTO: 45.7 FL (ref 36.4–46.3)
EOSINOPHIL # BLD AUTO: 0.24 10*3/MM3 (ref 0–0.7)
EOSINOPHIL NFR BLD AUTO: 4 % (ref 0–7)
ERYTHROCYTE [DISTWIDTH] IN BLOOD BY AUTOMATED COUNT: 13.5 % (ref 11.5–14.5)
GFR SERPL CREATININE-BSD FRML MDRD: 120 ML/MIN/1.73 (ref 60–104)
GLUCOSE BLD-MCNC: 112 MG/DL (ref 60–100)
GLUCOSE BLDC GLUCOMTR-MCNC: 134 MG/DL (ref 70–130)
GLUCOSE BLDC GLUCOMTR-MCNC: 156 MG/DL (ref 70–130)
GLUCOSE BLDC GLUCOMTR-MCNC: 256 MG/DL (ref 70–130)
GLUCOSE BLDC GLUCOMTR-MCNC: 257 MG/DL (ref 70–130)
GLUCOSE UR STRIP-MCNC: NEGATIVE MG/DL
HCT VFR BLD AUTO: 36.8 % (ref 35–45)
HGB BLD-MCNC: 12.3 G/DL (ref 12–15.5)
HGB UR QL STRIP.AUTO: NEGATIVE
IMM GRANULOCYTES # BLD: 0.02 10*3/MM3 (ref 0–0.02)
IMM GRANULOCYTES NFR BLD: 0.3 % (ref 0–0.5)
KETONES UR QL STRIP: NEGATIVE
LEUKOCYTE ESTERASE UR QL STRIP.AUTO: NEGATIVE
LYMPHOCYTES # BLD AUTO: 1.96 10*3/MM3 (ref 0.6–4.2)
LYMPHOCYTES NFR BLD AUTO: 32.8 % (ref 10–50)
MCH RBC QN AUTO: 31 PG (ref 26.5–34)
MCHC RBC AUTO-ENTMCNC: 33.4 G/DL (ref 31.4–36)
MCV RBC AUTO: 92.7 FL (ref 80–98)
MONOCYTES # BLD AUTO: 0.41 10*3/MM3 (ref 0–0.9)
MONOCYTES NFR BLD AUTO: 6.9 % (ref 0–12)
NEUTROPHILS # BLD AUTO: 3.32 10*3/MM3 (ref 2–8.6)
NEUTROPHILS NFR BLD AUTO: 55.5 % (ref 37–80)
NITRITE UR QL STRIP: NEGATIVE
PH UR STRIP.AUTO: <=5 [PH] (ref 5–9)
PLATELET # BLD AUTO: 259 10*3/MM3 (ref 150–450)
PMV BLD AUTO: 9 FL (ref 8–12)
POTASSIUM BLD-SCNC: 3.9 MMOL/L (ref 3.5–5.1)
PROT UR QL STRIP: NEGATIVE
RBC # BLD AUTO: 3.97 10*6/MM3 (ref 3.77–5.16)
SODIUM BLD-SCNC: 140 MMOL/L (ref 137–145)
SP GR UR STRIP: 1.01 (ref 1–1.03)
TROPONIN I SERPL-MCNC: <0.012 NG/ML
TROPONIN I SERPL-MCNC: <0.012 NG/ML
UROBILINOGEN UR QL STRIP: NORMAL
WBC NRBC COR # BLD: 5.98 10*3/MM3 (ref 3.2–9.8)

## 2018-04-02 PROCEDURE — 81003 URINALYSIS AUTO W/O SCOPE: CPT | Performed by: EMERGENCY MEDICINE

## 2018-04-02 PROCEDURE — 94799 UNLISTED PULMONARY SVC/PX: CPT

## 2018-04-02 PROCEDURE — 63710000001 INSULIN ASPART PER 5 UNITS: Performed by: NURSE PRACTITIONER

## 2018-04-02 PROCEDURE — 80048 BASIC METABOLIC PNL TOTAL CA: CPT | Performed by: NURSE PRACTITIONER

## 2018-04-02 PROCEDURE — 94640 AIRWAY INHALATION TREATMENT: CPT

## 2018-04-02 PROCEDURE — 96376 TX/PRO/DX INJ SAME DRUG ADON: CPT

## 2018-04-02 PROCEDURE — G0378 HOSPITAL OBSERVATION PER HR: HCPCS

## 2018-04-02 PROCEDURE — 25010000002 ONDANSETRON PER 1 MG: Performed by: NURSE PRACTITIONER

## 2018-04-02 PROCEDURE — 93306 TTE W/DOPPLER COMPLETE: CPT

## 2018-04-02 PROCEDURE — 96375 TX/PRO/DX INJ NEW DRUG ADDON: CPT

## 2018-04-02 PROCEDURE — 84484 ASSAY OF TROPONIN QUANT: CPT | Performed by: NURSE PRACTITIONER

## 2018-04-02 PROCEDURE — 25010000002 MORPHINE PER 10 MG: Performed by: FAMILY MEDICINE

## 2018-04-02 PROCEDURE — 82962 GLUCOSE BLOOD TEST: CPT

## 2018-04-02 PROCEDURE — 63710000001 PREDNISONE PER 1 MG: Performed by: NURSE PRACTITIONER

## 2018-04-02 PROCEDURE — 93005 ELECTROCARDIOGRAM TRACING: CPT | Performed by: INTERNAL MEDICINE

## 2018-04-02 PROCEDURE — 94760 N-INVAS EAR/PLS OXIMETRY 1: CPT

## 2018-04-02 PROCEDURE — 93010 ELECTROCARDIOGRAM REPORT: CPT | Performed by: INTERNAL MEDICINE

## 2018-04-02 PROCEDURE — 85025 COMPLETE CBC W/AUTO DIFF WBC: CPT | Performed by: NURSE PRACTITIONER

## 2018-04-02 RX ORDER — RANOLAZINE 500 MG/1
500 TABLET, EXTENDED RELEASE ORAL EVERY 12 HOURS SCHEDULED
Status: DISCONTINUED | OUTPATIENT
Start: 2018-04-02 | End: 2018-04-03 | Stop reason: HOSPADM

## 2018-04-02 RX ADMIN — IPRATROPIUM BROMIDE AND ALBUTEROL SULFATE 3 ML: 2.5; .5 SOLUTION RESPIRATORY (INHALATION) at 06:27

## 2018-04-02 RX ADMIN — FAMOTIDINE 40 MG: 40 TABLET ORAL at 08:32

## 2018-04-02 RX ADMIN — ATORVASTATIN CALCIUM 20 MG: 20 TABLET, FILM COATED ORAL at 21:20

## 2018-04-02 RX ADMIN — LISINOPRIL 10 MG: 10 TABLET ORAL at 08:32

## 2018-04-02 RX ADMIN — INSULIN ASPART 2 UNITS: 100 INJECTION, SOLUTION INTRAVENOUS; SUBCUTANEOUS at 08:32

## 2018-04-02 RX ADMIN — IPRATROPIUM BROMIDE AND ALBUTEROL SULFATE 3 ML: 2.5; .5 SOLUTION RESPIRATORY (INHALATION) at 14:12

## 2018-04-02 RX ADMIN — INSULIN ASPART 6 UNITS: 100 INJECTION, SOLUTION INTRAVENOUS; SUBCUTANEOUS at 21:21

## 2018-04-02 RX ADMIN — ASPIRIN 81 MG 81 MG: 81 TABLET ORAL at 08:32

## 2018-04-02 RX ADMIN — MORPHINE SULFATE 0.5 MG: 2 INJECTION, SOLUTION INTRAMUSCULAR; INTRAVENOUS at 17:01

## 2018-04-02 RX ADMIN — ONDANSETRON HYDROCHLORIDE 4 MG: 2 INJECTION, SOLUTION INTRAMUSCULAR; INTRAVENOUS at 06:55

## 2018-04-02 RX ADMIN — CARVEDILOL 3.12 MG: 3.12 TABLET, FILM COATED ORAL at 08:32

## 2018-04-02 RX ADMIN — INSULIN ASPART 6 UNITS: 100 INJECTION, SOLUTION INTRAVENOUS; SUBCUTANEOUS at 17:02

## 2018-04-02 RX ADMIN — ALPRAZOLAM 0.5 MG: 0.5 TABLET ORAL at 10:36

## 2018-04-02 RX ADMIN — IPRATROPIUM BROMIDE AND ALBUTEROL SULFATE 3 ML: 2.5; .5 SOLUTION RESPIRATORY (INHALATION) at 10:50

## 2018-04-02 RX ADMIN — GABAPENTIN 600 MG: 300 CAPSULE ORAL at 22:35

## 2018-04-02 RX ADMIN — MORPHINE SULFATE 0.5 MG: 2 INJECTION, SOLUTION INTRAMUSCULAR; INTRAVENOUS at 12:08

## 2018-04-02 RX ADMIN — PREDNISONE 40 MG: 20 TABLET ORAL at 08:32

## 2018-04-02 RX ADMIN — GABAPENTIN 600 MG: 300 CAPSULE ORAL at 06:03

## 2018-04-02 RX ADMIN — CARVEDILOL 3.12 MG: 3.12 TABLET, FILM COATED ORAL at 17:02

## 2018-04-02 RX ADMIN — MORPHINE SULFATE 0.5 MG: 2 INJECTION, SOLUTION INTRAMUSCULAR; INTRAVENOUS at 06:07

## 2018-04-02 RX ADMIN — ACETAMINOPHEN 650 MG: 325 TABLET ORAL at 18:34

## 2018-04-02 RX ADMIN — GABAPENTIN 600 MG: 300 CAPSULE ORAL at 17:02

## 2018-04-02 RX ADMIN — NICOTINE 1 PATCH: 21 PATCH TRANSDERMAL at 22:36

## 2018-04-02 RX ADMIN — ISOSORBIDE MONONITRATE 30 MG: 30 TABLET, EXTENDED RELEASE ORAL at 08:32

## 2018-04-02 RX ADMIN — ONDANSETRON HYDROCHLORIDE 4 MG: 2 INJECTION, SOLUTION INTRAMUSCULAR; INTRAVENOUS at 18:34

## 2018-04-02 RX ADMIN — IPRATROPIUM BROMIDE AND ALBUTEROL SULFATE 3 ML: 2.5; .5 SOLUTION RESPIRATORY (INHALATION) at 20:17

## 2018-04-02 RX ADMIN — ESCITALOPRAM OXALATE 20 MG: 10 TABLET ORAL at 21:20
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
## 2018-04-02 NOTE — PROGRESS NOTES
[facility] Medicine Services  INPATIENT PROGRESS NOTE    Length of Stay: 0  Date of Admission: 4/1/2018  Primary Care Physician: JUANA Rhodes    Subjective   Chief Complaint: chest pain  HPI: 67-year-old  female with past medical history of COPD, type 2 diabetes, hypertension, hyperlipidemia, coronary artery disease, tobacco abuse who presented on 4/1/2018 with complaints of midsternal chest pain radiating into the left arm.  The patient is currently under observation and has had 3 negative sets of cardiac enzymes.  She reports that her pain has subsided this morning.  She is awaiting consult with cardiology and her echocardiogram is currently pending.      Review of Systems   Constitutional: Negative for chills, fatigue and fever.   Respiratory: Positive for cough.    Cardiovascular: Negative for chest pain, palpitations and leg swelling.   Gastrointestinal: Negative for abdominal pain, constipation, diarrhea, nausea and vomiting.   Musculoskeletal: Negative for back pain.   Skin: Negative for pallor.   Neurological: Negative for dizziness and weakness.   Psychiatric/Behavioral: Negative for confusion.        All pertinent negatives and positives are as above. All other systems have been reviewed and are negative unless otherwise stated.     Objective    Temp:  [97.8 °F (36.6 °C)-98.8 °F (37.1 °C)] 98.4 °F (36.9 °C)  Heart Rate:  [62-74] 74  Resp:  [14-20] 20  BP: ()/(60-79) 100/60    Physical Exam   Constitutional: Vital signs are normal. She appears cachectic. She is cooperative.   HENT:   Head: Normocephalic.   Eyes: Conjunctivae are normal.   Neck: Neck supple.   Cardiovascular: Normal rate, normal heart sounds and intact distal pulses.    Pulmonary/Chest: Effort normal. She has decreased breath sounds in the right lower field and the left lower field.   Abdominal: Soft. Bowel sounds are normal. She exhibits no distension. There is no tenderness.    Musculoskeletal: Normal range of motion.   Neurological: She is alert.   Skin: Skin is warm and dry.   Psychiatric: She has a normal mood and affect. Her behavior is normal.   Vitals reviewed.          Results Review:  I have reviewed the labs, radiology results, and diagnostic studies.    Laboratory Data:     Results from last 7 days  Lab Units 04/02/18  0415 04/01/18  1742   SODIUM mmol/L 140 142   POTASSIUM mmol/L 3.9 4.8   CHLORIDE mmol/L 104 103   CO2 mmol/L 28.0 25.0   BUN mg/dL 9 7   CREATININE mg/dL 0.51 0.57   GLUCOSE mg/dL 112* 91   CALCIUM mg/dL 9.0 9.4   BILIRUBIN mg/dL  --  0.3   ALK PHOS U/L  --  89   ALT (SGPT) U/L  --  19   AST (SGOT) U/L  --  33   ANION GAP mmol/L 8.0 14.0     Estimated Creatinine Clearance: 57.2 mL/min (by C-G formula based on SCr of 0.51 mg/dL).            Results from last 7 days  Lab Units 04/02/18  0415 04/01/18  1742   WBC 10*3/mm3 5.98 6.75   HEMOGLOBIN g/dL 12.3 13.0   HEMATOCRIT % 36.8 38.4   PLATELETS 10*3/mm3 259 274           Culture Data:   No results found for: BLOODCX  No results found for: URINECX  No results found for: RESPCX  No results found for: WOUNDCX  No results found for: STOOLCX  No components found for: BODYFLD    Radiology Data:   Imaging Results (last 24 hours)     Procedure Component Value Units Date/Time    XR Chest 1 View [[id number]] Collected:  04/01/18 1747     Updated:  04/01/18 1839    Narrative:       [facility]    Patient Name: GINO TAVARES    ORDERING: RICHY SALAZAR     ATTENDING: RICHY SALAZAR     REFERRING: RICHY SALAZAR    -----------------------    PROCEDURE: Chest Single View    TECHNIQUE: Single AP view of the chest    COMPARISON: 2/21/2018    HISTORY: chest pain    FINDINGS:     Life-support devices: None.    Lungs/pleura: There is a background of COPD and old granulomatous  disease with redemonstration of linear/ platelike opacities  within both bases likely reflecting atelectasis and/or scarring.  No dense  parenchymal consolidation, pleural effusion, or  pneumothorax.    Heart, hilar and mediastinal structures: The heart size and  mediastinal contours are within limits of normal. The trachea is  midline.    Skeletal Structures: No acute findings. No free air beneath the  diaphragm.      Impression:       Background of COPD and old granulomatous disease with bibasilar  atelectasis and/or scarring.    Electronically signed by:  Dylan Ya MD  4/1/2018 6:38 PM CDT  Workstation: [id number]          I have reviewed the patient current medications.     Assessment/Plan     Active Hospital Problems (** Indicates Principal Problem)    Diagnosis Date Noted   • TBI (traumatic brain injury) [S06.9X9A] 10/19/2017   • Chest pain [R07.9] 04/20/2017     Will trend cardiac enzymes  GALILEO treatment   Will consult cardiology.      • Tobacco dependence syndrome [F17.200] 02/02/2017     Will provide nicotine patch      • Diabetes mellitus type 2 in nonobese [E11.9] 02/02/2017     Will continue home medication levemir of 15 units and add sliding scale insulin.      • Diabetic polyneuropathy [E11.42] 11/09/2016     Continue with gabapentin      • Essential hypertension, benign [I10] 11/09/2016     Beta blocker and ACEi well control.      • Depression [F32.9] 11/09/2016     Will continue with lexapro      • Anxiety [F41.9] 11/09/2016     Continue with lexapro.      • Atherosclerosis of native coronary artery of native heart with angina pectoris [I25.119] 10/08/2016     Beta blockers  ACEi   Statin   ASA        Resolved Hospital Problems    Diagnosis Date Noted Date Resolved   No resolved problems to display.       Plan:   1. Chest pain r/o ACS: troponin negative x3, echo pending.  Awaiting cardiology consult.  Hx of cath in 4/2017 with PCI to 1st diagonal branch.    2. CAD: ASA, statin, Coreg, Imdur  3. Type 2 DM: FSBS AC and HS with SSI  4. Hx TBI  5. HTN: Coreg, Lisinopril  6. Anxiety/Depression: Xanax PRN, Lexapro  7. Tobacco abuse:  counseled on cessation.  On Nicotine patch  8. Hx COPD        This document has been electronically signed by JUANA Hicks on April 2, 2018 3:10 PM

## 2018-04-02 NOTE — PLAN OF CARE
Problem: Patient Care Overview  Goal: Plan of Care Review   04/01/18 2202   Coping/Psychosocial   Plan of Care Reviewed With patient   Plan of Care Review   Progress no change   OTHER   Outcome Summary new admit

## 2018-04-02 NOTE — PLAN OF CARE
Problem: Patient Care Overview  Goal: Plan of Care Review  Outcome: Ongoing (interventions implemented as appropriate)    Goal: Individualization and Mutuality  Outcome: Ongoing (interventions implemented as appropriate)    Goal: Discharge Needs Assessment  Outcome: Ongoing (interventions implemented as appropriate)    Goal: Interprofessional Rounds/Family Conf  Outcome: Ongoing (interventions implemented as appropriate)      Problem: Pain, Acute (Adult)  Goal: Identify Related Risk Factors and Signs and Symptoms  Outcome: Outcome(s) achieved Date Met: 04/02/18    Goal: Acceptable Pain Control/Comfort Level  Outcome: Ongoing (interventions implemented as appropriate)      Problem: Cardiac: ACS (Acute Coronary Syndrome) (Adult)  Goal: Signs and Symptoms of Listed Potential Problems Will be Absent, Minimized or Managed (Cardiac: ACS)  Outcome: Ongoing (interventions implemented as appropriate)
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
## 2018-04-03 VITALS
WEIGHT: 120.4 LBS | HEART RATE: 77 BPM | DIASTOLIC BLOOD PRESSURE: 56 MMHG | TEMPERATURE: 98.1 F | BODY MASS INDEX: 22.73 KG/M2 | OXYGEN SATURATION: 93 % | HEIGHT: 61 IN | SYSTOLIC BLOOD PRESSURE: 119 MMHG | RESPIRATION RATE: 16 BRPM

## 2018-04-03 LAB
ANION GAP SERPL CALCULATED.3IONS-SCNC: 9 MMOL/L (ref 5–15)
BASOPHILS # BLD AUTO: 0.01 10*3/MM3 (ref 0–0.2)
BASOPHILS NFR BLD AUTO: 0.1 % (ref 0–2)
BUN BLD-MCNC: 14 MG/DL (ref 7–21)
BUN/CREAT SERPL: 22.6 (ref 7–25)
CALCIUM SPEC-SCNC: 8.9 MG/DL (ref 8.4–10.2)
CHLORIDE SERPL-SCNC: 102 MMOL/L (ref 95–110)
CO2 SERPL-SCNC: 27 MMOL/L (ref 22–31)
CREAT BLD-MCNC: 0.62 MG/DL (ref 0.5–1)
DEPRECATED RDW RBC AUTO: 44.9 FL (ref 36.4–46.3)
EOSINOPHIL # BLD AUTO: 0 10*3/MM3 (ref 0–0.7)
EOSINOPHIL NFR BLD AUTO: 0 % (ref 0–7)
ERYTHROCYTE [DISTWIDTH] IN BLOOD BY AUTOMATED COUNT: 13.4 % (ref 11.5–14.5)
GFR SERPL CREATININE-BSD FRML MDRD: 95 ML/MIN/1.73 (ref 60–104)
GLUCOSE BLD-MCNC: 100 MG/DL (ref 60–100)
GLUCOSE BLDC GLUCOMTR-MCNC: 113 MG/DL (ref 70–130)
GLUCOSE BLDC GLUCOMTR-MCNC: 265 MG/DL (ref 70–130)
GLUCOSE BLDC GLUCOMTR-MCNC: 266 MG/DL (ref 70–130)
HCT VFR BLD AUTO: 33.1 % (ref 35–45)
HGB BLD-MCNC: 11.2 G/DL (ref 12–15.5)
IMM GRANULOCYTES # BLD: 0.03 10*3/MM3 (ref 0–0.02)
IMM GRANULOCYTES NFR BLD: 0.4 % (ref 0–0.5)
LYMPHOCYTES # BLD AUTO: 1.48 10*3/MM3 (ref 0.6–4.2)
LYMPHOCYTES NFR BLD AUTO: 21.6 % (ref 10–50)
MCH RBC QN AUTO: 31.1 PG (ref 26.5–34)
MCHC RBC AUTO-ENTMCNC: 33.8 G/DL (ref 31.4–36)
MCV RBC AUTO: 91.9 FL (ref 80–98)
MONOCYTES # BLD AUTO: 0.43 10*3/MM3 (ref 0–0.9)
MONOCYTES NFR BLD AUTO: 6.3 % (ref 0–12)
NEUTROPHILS # BLD AUTO: 4.89 10*3/MM3 (ref 2–8.6)
NEUTROPHILS NFR BLD AUTO: 71.6 % (ref 37–80)
PLATELET # BLD AUTO: 257 10*3/MM3 (ref 150–450)
PMV BLD AUTO: 8.9 FL (ref 8–12)
POTASSIUM BLD-SCNC: 4.1 MMOL/L (ref 3.5–5.1)
RBC # BLD AUTO: 3.6 10*6/MM3 (ref 3.77–5.16)
SODIUM BLD-SCNC: 138 MMOL/L (ref 137–145)
WBC NRBC COR # BLD: 6.84 10*3/MM3 (ref 3.2–9.8)

## 2018-04-03 PROCEDURE — 93005 ELECTROCARDIOGRAM TRACING: CPT | Performed by: INTERNAL MEDICINE

## 2018-04-03 PROCEDURE — 80048 BASIC METABOLIC PNL TOTAL CA: CPT | Performed by: NURSE PRACTITIONER

## 2018-04-03 PROCEDURE — 94799 UNLISTED PULMONARY SVC/PX: CPT

## 2018-04-03 PROCEDURE — 25010000002 ONDANSETRON PER 1 MG: Performed by: NURSE PRACTITIONER

## 2018-04-03 PROCEDURE — 96376 TX/PRO/DX INJ SAME DRUG ADON: CPT

## 2018-04-03 PROCEDURE — 93010 ELECTROCARDIOGRAM REPORT: CPT | Performed by: INTERNAL MEDICINE

## 2018-04-03 PROCEDURE — 25010000002 DIPHENHYDRAMINE PER 50 MG: Performed by: NURSE PRACTITIONER

## 2018-04-03 PROCEDURE — 85025 COMPLETE CBC W/AUTO DIFF WBC: CPT | Performed by: NURSE PRACTITIONER

## 2018-04-03 PROCEDURE — 25010000002 MORPHINE PER 10 MG: Performed by: FAMILY MEDICINE

## 2018-04-03 PROCEDURE — G0378 HOSPITAL OBSERVATION PER HR: HCPCS

## 2018-04-03 PROCEDURE — 96375 TX/PRO/DX INJ NEW DRUG ADDON: CPT

## 2018-04-03 PROCEDURE — 63710000001 INSULIN ASPART PER 5 UNITS: Performed by: NURSE PRACTITIONER

## 2018-04-03 PROCEDURE — 25010000002 KETOROLAC TROMETHAMINE PER 15 MG: Performed by: NURSE PRACTITIONER

## 2018-04-03 PROCEDURE — 63710000001 PREDNISONE PER 1 MG: Performed by: NURSE PRACTITIONER

## 2018-04-03 PROCEDURE — 82962 GLUCOSE BLOOD TEST: CPT

## 2018-04-03 PROCEDURE — 94760 N-INVAS EAR/PLS OXIMETRY 1: CPT

## 2018-04-03 RX ORDER — CARVEDILOL 3.12 MG/1
3.12 TABLET ORAL 2 TIMES DAILY WITH MEALS
Qty: 30 TABLET | Refills: 0 | Status: SHIPPED | OUTPATIENT
Start: 2018-04-03

## 2018-04-03 RX ORDER — KETOROLAC TROMETHAMINE 30 MG/ML
15 INJECTION, SOLUTION INTRAMUSCULAR; INTRAVENOUS ONCE AS NEEDED
Status: COMPLETED | OUTPATIENT
Start: 2018-04-03 | End: 2018-04-03

## 2018-04-03 RX ORDER — METHYLPREDNISOLONE 4 MG/1
TABLET ORAL
Qty: 21 EACH | Refills: 0 | Status: SHIPPED | OUTPATIENT
Start: 2018-04-03 | End: 2021-09-22 | Stop reason: HOSPADM

## 2018-04-03 RX ORDER — DIPHENHYDRAMINE HYDROCHLORIDE 50 MG/ML
25 INJECTION INTRAMUSCULAR; INTRAVENOUS ONCE
Status: COMPLETED | OUTPATIENT
Start: 2018-04-03 | End: 2018-04-03

## 2018-04-03 RX ORDER — RANOLAZINE 500 MG/1
500 TABLET, EXTENDED RELEASE ORAL EVERY 12 HOURS SCHEDULED
Qty: 60 TABLET | Refills: 0 | Status: SHIPPED | OUTPATIENT
Start: 2018-04-03

## 2018-04-03 RX ADMIN — CARVEDILOL 3.12 MG: 3.12 TABLET, FILM COATED ORAL at 07:31

## 2018-04-03 RX ADMIN — ASPIRIN 81 MG 81 MG: 81 TABLET ORAL at 07:31

## 2018-04-03 RX ADMIN — DIPHENHYDRAMINE HYDROCHLORIDE 25 MG: 50 INJECTION INTRAMUSCULAR; INTRAVENOUS at 10:37

## 2018-04-03 RX ADMIN — IPRATROPIUM BROMIDE AND ALBUTEROL SULFATE 3 ML: 2.5; .5 SOLUTION RESPIRATORY (INHALATION) at 11:48

## 2018-04-03 RX ADMIN — ONDANSETRON HYDROCHLORIDE 4 MG: 2 INJECTION, SOLUTION INTRAMUSCULAR; INTRAVENOUS at 06:12

## 2018-04-03 RX ADMIN — KETOROLAC TROMETHAMINE 15 MG: 30 INJECTION, SOLUTION INTRAMUSCULAR; INTRAVENOUS at 10:38

## 2018-04-03 RX ADMIN — IPRATROPIUM BROMIDE AND ALBUTEROL SULFATE 3 ML: 2.5; .5 SOLUTION RESPIRATORY (INHALATION) at 06:56

## 2018-04-03 RX ADMIN — INSULIN ASPART 6 UNITS: 100 INJECTION, SOLUTION INTRAVENOUS; SUBCUTANEOUS at 10:46

## 2018-04-03 RX ADMIN — GABAPENTIN 600 MG: 300 CAPSULE ORAL at 06:12

## 2018-04-03 RX ADMIN — ALPRAZOLAM 0.5 MG: 0.5 TABLET ORAL at 05:30

## 2018-04-03 RX ADMIN — MORPHINE SULFATE 0.5 MG: 2 INJECTION, SOLUTION INTRAMUSCULAR; INTRAVENOUS at 07:30

## 2018-04-03 RX ADMIN — FAMOTIDINE 40 MG: 40 TABLET ORAL at 07:32

## 2018-04-03 RX ADMIN — PREDNISONE 40 MG: 20 TABLET ORAL at 07:31

## 2018-04-03 RX ADMIN — ISOSORBIDE MONONITRATE 30 MG: 30 TABLET, EXTENDED RELEASE ORAL at 07:31

## 2018-04-03 RX ADMIN — RANOLAZINE 500 MG: 500 TABLET, FILM COATED, EXTENDED RELEASE ORAL at 07:30

## 2018-04-03 RX ADMIN — GABAPENTIN 600 MG: 300 CAPSULE ORAL at 13:04

## 2018-04-03 RX ADMIN — ACETAMINOPHEN 650 MG: 325 TABLET ORAL at 05:30

## 2018-04-03 NOTE — PLAN OF CARE
Problem: Patient Care Overview  Goal: Plan of Care Review  Outcome: Ongoing (interventions implemented as appropriate)   04/03/18 0533   Coping/Psychosocial   Plan of Care Reviewed With patient   Plan of Care Review   Progress no change     Goal: Individualization and Mutuality  Outcome: Ongoing (interventions implemented as appropriate)    Goal: Discharge Needs Assessment  Outcome: Ongoing (interventions implemented as appropriate)      Problem: Pain, Acute (Adult)  Goal: Acceptable Pain Control/Comfort Level  Outcome: Ongoing (interventions implemented as appropriate)      Problem: Cardiac: ACS (Acute Coronary Syndrome) (Adult)  Goal: Signs and Symptoms of Listed Potential Problems Will be Absent, Minimized or Managed (Cardiac: ACS)  Outcome: Ongoing (interventions implemented as appropriate)

## 2018-04-03 NOTE — DISCHARGE SUMMARY
[facility] Medicine Services  DISCHARGE SUMMARY       Date of Admission: 4/1/2018  Date of Discharge:  4/3/2018  Primary Care Physician: JUANA Rhodes    Presenting Problem/History of Present Illness:  Chest pain, rule out acute myocardial infarction [R07.9]     Final Discharge Diagnoses:  Hospital Problem List     Atherosclerosis of native coronary artery of native heart with angina pectoris    Overview Signed 7/19/2017  6:39 PM by Kam Botello MD     Beta blockers  ACEi   Statin   ASA         Diabetic polyneuropathy    Overview Signed 7/19/2017  6:39 PM by Kam Botello MD     Continue with gabapentin          Essential hypertension, benign    Overview Signed 7/19/2017  6:39 PM by Kam Botello MD     Beta blocker and ACEi well control.          Depression    Overview Signed 7/19/2017  6:40 PM by Kam Botello MD     Will continue with lexapro          Anxiety    Overview Signed 7/19/2017  6:40 PM by Kam Botello MD     Continue with lexapro.          Tobacco dependence syndrome (Chronic)    Overview Signed 7/19/2017  6:38 PM by Kam Botello MD     Will provide nicotine patch          Diabetes mellitus type 2 in nonobese (Chronic)    Overview Signed 7/19/2017  6:39 PM by Kam Botello MD     Will continue home medication levemir of 15 units and add sliding scale insulin.          Chest pain    Overview Signed 7/19/2017  6:41 PM by Kam Botello MD     Will trend cardiac enzymes  GALILEO treatment   Will consult cardiology.          TBI (traumatic brain injury)          Consults:   Consults     Date and Time Order Name Status Description    4/1/2018 1934 Inpatient Cardiology Consult Completed           Procedures Performed:                 Pertinent Test Results:   Lab Results (last 24 hours)     Procedure Component Value Units Date/Time    POC Glucose Once [[id number]]  (Abnormal) Collected:  04/03/18 1026    Specimen:  Blood Updated:  04/03/18 1047     Glucose 266  (H) mg/dL      Comment: RN NotifiedMeter: [id number]: [id number] KASIE WALTERS       POC Glucose Once [[id number]]  (Normal) Collected:  04/03/18 0630    Specimen:  Blood Updated:  04/03/18 0709     Glucose 113 mg/dL      Comment: RN NotifiedMeter: [id number]: [id number] CHESNEL ELAN       Basic Metabolic Panel [[id number]]  (Normal) Collected:  04/03/18 0513    Specimen:  Blood Updated:  04/03/18 0557     Glucose 100 mg/dL      BUN 14 mg/dL      Creatinine 0.62 mg/dL      Sodium 138 mmol/L      Potassium 4.1 mmol/L      Chloride 102 mmol/L      CO2 27.0 mmol/L      Calcium 8.9 mg/dL      eGFR Non African Amer 95 mL/min/1.73      BUN/Creatinine Ratio 22.6     Anion Gap 9.0 mmol/L     CBC & Differential [[id number]] Collected:  04/03/18 0513    Specimen:  Blood Updated:  04/03/18 0543    Narrative:       The following orders were created for panel order CBC & Differential.  Procedure                               Abnormality         Status                     ---------                               -----------         ------                     CBC Auto Differential[[id number]]        Abnormal            Final result                 Please view results for these tests on the individual orders.    CBC Auto Differential [[id number]]  (Abnormal) Collected:  04/03/18 0513    Specimen:  Blood Updated:  04/03/18 0543     WBC 6.84 10*3/mm3      RBC 3.60 (L) 10*6/mm3      Hemoglobin 11.2 (L) g/dL      Hematocrit 33.1 (L) %      MCV 91.9 fL      MCH 31.1 pg      MCHC 33.8 g/dL      RDW 13.4 %      RDW-SD 44.9 fl      MPV 8.9 fL      Platelets 257 10*3/mm3      Neutrophil % 71.6 %      Lymphocyte % 21.6 %      Monocyte % 6.3 %      Eosinophil % 0.0 %      Basophil % 0.1 %      Immature Grans % 0.4 %      Neutrophils, Absolute 4.89 10*3/mm3      Lymphocytes, Absolute 1.48 10*3/mm3      Monocytes, Absolute 0.43 10*3/mm3      Eosinophils, Absolute 0.00 10*3/mm3      Basophils, Absolute 0.01 10*3/mm3       Immature Grans, Absolute 0.03 (H) 10*3/mm3     POC Glucose Once [[id number]]  (Abnormal) Collected:  04/02/18 2027    Specimen:  Blood Updated:  04/02/18 2059     Glucose 257 (H) mg/dL      Comment: RN NotifiedMeter: [id number]: [id number] CHEMISBAH ELAN       POC Glucose Once [[id number]]  (Abnormal) Collected:  04/02/18 1612    Specimen:  Blood Updated:  04/02/18 1625     Glucose 256 (H) mg/dL      Comment: RN NotifiedMeter: [id number]: [id number] KSAIE WALTERS           Imaging Results (all)     Procedure Component Value Units Date/Time    XR Chest 1 View [[id number]] Collected:  04/01/18 1747     Updated:  04/01/18 1839    Narrative:       [facility]    Patient Name: GINO TAVARES    ORDERING: RICHY SALAZAR     ATTENDING: RICHY SALAZAR     REFERRING: RICHY SALAZAR    -----------------------    PROCEDURE: Chest Single View    TECHNIQUE: Single AP view of the chest    COMPARISON: 2/21/2018    HISTORY: chest pain    FINDINGS:     Life-support devices: None.    Lungs/pleura: There is a background of COPD and old granulomatous  disease with redemonstration of linear/ platelike opacities  within both bases likely reflecting atelectasis and/or scarring.  No dense parenchymal consolidation, pleural effusion, or  pneumothorax.    Heart, hilar and mediastinal structures: The heart size and  mediastinal contours are within limits of normal. The trachea is  midline.    Skeletal Structures: No acute findings. No free air beneath the  diaphragm.      Impression:       Background of COPD and old granulomatous disease with bibasilar  atelectasis and/or scarring.    Electronically signed by:  Dylan Ya MD  4/1/2018 6:38 PM CDT  Workstation: [id number]            Chief Complaint on Day of Discharge: none    Hospital Course:  67-year-old  female past medical history COPD, type 2 diabetes, hypertension, hyperlipidemia, coronary artery disease, tobacco abuse who presented on  "4/1/2018 with complaints of midsternal chest pain radiating to the left arm.  The patient was kept for cardiac observation and had 3 negative sets of cardiac enzymes.  Due to her history of coronary artery disease requiring PCI and stenting, the patient underwent cardiology consult with Dr. Martinez.  Dr. Martinez has evaluated the patient and recommended medical management for the coronary artery disease and no invasive intervention at this time.  He is recommended to continue the patient current dosing of isosorbide increased her Ranexa to twice per day.  The patient has been able to ambulate the hallways without any chest pain or other complaints.  The patient will be discharged home today in stable condition with instructions to follow-up with her primary care provider within one week of discharge.  2 week follow up with Dr. Martinez will be arranged as well.  The patient is currently receiving samples of Ranexa from Dr. Martinez's office and has been instructed she will need to  further samples since dose was increased.    Condition on Discharge:  stable    Physical Exam on Discharge:  /59 (BP Location: Right arm, Patient Position: Sitting)   Pulse 76   Temp 97.9 °F (36.6 °C) (Oral)   Resp 16   Ht 154.9 cm (61\")   Wt 54.6 kg (120 lb 6.4 oz)   SpO2 92%   BMI 22.75 kg/m²   Physical Exam   Constitutional: She is oriented to person, place, and time. She appears well-developed and well-nourished.   HENT:   Head: Normocephalic.   Eyes: Conjunctivae are normal.   Neck: Neck supple.   Cardiovascular: Normal rate, regular rhythm, normal heart sounds and intact distal pulses.    Pulmonary/Chest: Effort normal and breath sounds normal.   Abdominal: Soft. Bowel sounds are normal.   Musculoskeletal: Normal range of motion.   Neurological: She is alert and oriented to person, place, and time.   Skin: Skin is warm and dry.   Psychiatric: She has a normal mood and affect. Her behavior is normal.   Vitals " reviewed.        Discharge Disposition:  Home or Self Care    Discharge Medications:   Nikki Felder   Home Medication Instructions ARUN:[id number]    Printed on:04/03/18 4208   Medication Information                      ALPRAZolam (XANAX) 0.25 MG tablet  Take 1 tablet by mouth 4 (Four) Times a Day.             aspirin 81 MG tablet  Take 1 tablet by mouth Daily.             atorvastatin (LIPITOR) 20 MG tablet  Take 1 tablet by mouth Every Night.             Blood Glucose Monitoring Suppl device  1 each 2 (Two) Times a Day Before Meals.             carvedilol (COREG) 3.125 MG tablet  Take 1 tablet by mouth 2 (Two) Times a Day With Meals.             escitalopram (LEXAPRO) 10 MG tablet  Take 1 tablet by mouth Every Night.             FINGERSTIX LANCETS misc  To check sugar tid dx code E11.65             gabapentin (NEURONTIN) 600 MG tablet  Take 1 tablet by mouth 3 (Three) Times a Day. Already has prescription at home.             isosorbide mononitrate (IMDUR) 30 MG 24 hr tablet  Take 1 tablet by mouth Daily.             lisinopril (PRINIVIL,ZESTRIL) 20 MG tablet  Take 1 tablet by mouth Daily.             metFORMIN ER (FORTAMET) 1000 MG (OSM) 24 hr tablet  Take 1 tablet by mouth 2 (Two) Times a Day With Meals.             nitroglycerin (NITROSTAT) 0.4 MG SL tablet  Place 1 tablet under the tongue Every 5 (Five) Minutes As Needed for Chest Pain. Take no more than 3 doses in 15 minutes.             ranolazine (RANEXA) 500 MG 12 hr tablet  Take 1 tablet by mouth Every 12 (Twelve) Hours.                 Discharge Diet:   Diet Instructions     Diet: Cardiac, Consistent Carbohydrate; Thin       Discharge Diet:   Cardiac  Consistent Carbohydrate       Fluid Consistency:  Thin          Activity at Discharge:   Activity Instructions     Activity as Tolerated             Discharge Care Plan/Instructions: Follow up with PCP within one week.  Follow up with Dr. Martinez in two weeks.     Follow-up Appointments:   No future  appointments.    Test Results Pending at Discharge:           This document has been electronically signed by JUANA Hicks on April 3, 2018 2:19 PM

## 2018-08-26 ENCOUNTER — APPOINTMENT (OUTPATIENT)
Dept: GENERAL RADIOLOGY | Facility: HOSPITAL | Age: 68
End: 2018-08-26

## 2018-08-26 ENCOUNTER — HOSPITAL ENCOUNTER (EMERGENCY)
Facility: HOSPITAL | Age: 68
Discharge: HOME | End: 2018-08-26
Attending: FAMILY MEDICINE | Admitting: FAMILY MEDICINE

## 2018-08-26 ENCOUNTER — APPOINTMENT (OUTPATIENT)
Dept: CT IMAGING | Facility: HOSPITAL | Age: 68
End: 2018-08-26

## 2018-08-26 VITALS
HEART RATE: 68 BPM | RESPIRATION RATE: 18 BRPM | OXYGEN SATURATION: 94 % | WEIGHT: 125 LBS | DIASTOLIC BLOOD PRESSURE: 69 MMHG | SYSTOLIC BLOOD PRESSURE: 150 MMHG | BODY MASS INDEX: 23.6 KG/M2 | HEIGHT: 61 IN | TEMPERATURE: 97.8 F

## 2018-08-26 DIAGNOSIS — R55 NEAR SYNCOPE: ICD-10-CM

## 2018-08-26 DIAGNOSIS — R53.1 WEAKNESS: Primary | ICD-10-CM

## 2018-08-26 LAB
ALBUMIN SERPL-MCNC: 3.8 G/DL (ref 3.4–4.8)
ALBUMIN/GLOB SERPL: 1.4 G/DL (ref 1.1–1.8)
ALP SERPL-CCNC: 80 U/L (ref 38–126)
ALT SERPL W P-5'-P-CCNC: 22 U/L (ref 9–52)
ANION GAP SERPL CALCULATED.3IONS-SCNC: 9 MMOL/L (ref 5–15)
AST SERPL-CCNC: 23 U/L (ref 14–36)
BACTERIA UR QL AUTO: ABNORMAL /HPF
BASOPHILS # BLD AUTO: 0.01 10*3/MM3 (ref 0–0.2)
BASOPHILS NFR BLD AUTO: 0.2 % (ref 0–2)
BILIRUB SERPL-MCNC: 0.5 MG/DL (ref 0.2–1.3)
BILIRUB UR QL STRIP: NEGATIVE
BUN BLD-MCNC: 10 MG/DL (ref 7–21)
BUN/CREAT SERPL: 13.5 (ref 7–25)
CALCIUM SPEC-SCNC: 8.8 MG/DL (ref 8.4–10.2)
CHLORIDE SERPL-SCNC: 107 MMOL/L (ref 95–110)
CLARITY UR: CLEAR
CO2 SERPL-SCNC: 22 MMOL/L (ref 22–31)
COLOR UR: YELLOW
CREAT BLD-MCNC: 0.74 MG/DL (ref 0.5–1)
DEPRECATED RDW RBC AUTO: 47.9 FL (ref 36.4–46.3)
EOSINOPHIL # BLD AUTO: 0.21 10*3/MM3 (ref 0–0.7)
EOSINOPHIL NFR BLD AUTO: 3.9 % (ref 0–7)
ERYTHROCYTE [DISTWIDTH] IN BLOOD BY AUTOMATED COUNT: 14.2 % (ref 11.5–14.5)
GFR SERPL CREATININE-BSD FRML MDRD: 78 ML/MIN/1.73 (ref 45–104)
GLOBULIN UR ELPH-MCNC: 2.8 GM/DL (ref 2.3–3.5)
GLUCOSE BLD-MCNC: 89 MG/DL (ref 60–100)
GLUCOSE UR STRIP-MCNC: NEGATIVE MG/DL
HCT VFR BLD AUTO: 35.9 % (ref 35–45)
HGB BLD-MCNC: 12.1 G/DL (ref 12–15.5)
HGB UR QL STRIP.AUTO: NEGATIVE
HOLD SPECIMEN: NORMAL
HYALINE CASTS UR QL AUTO: ABNORMAL /LPF
IMM GRANULOCYTES # BLD: 0.01 10*3/MM3 (ref 0–0.02)
IMM GRANULOCYTES NFR BLD: 0.2 % (ref 0–0.5)
KETONES UR QL STRIP: NEGATIVE
LEUKOCYTE ESTERASE UR QL STRIP.AUTO: ABNORMAL
LYMPHOCYTES # BLD AUTO: 1.92 10*3/MM3 (ref 0.6–4.2)
LYMPHOCYTES NFR BLD AUTO: 35.9 % (ref 10–50)
MCH RBC QN AUTO: 31.3 PG (ref 26.5–34)
MCHC RBC AUTO-ENTMCNC: 33.7 G/DL (ref 31.4–36)
MCV RBC AUTO: 92.8 FL (ref 80–98)
MONOCYTES # BLD AUTO: 0.52 10*3/MM3 (ref 0–0.9)
MONOCYTES NFR BLD AUTO: 9.7 % (ref 0–12)
NEUTROPHILS # BLD AUTO: 2.68 10*3/MM3 (ref 2–8.6)
NEUTROPHILS NFR BLD AUTO: 50.1 % (ref 37–80)
NITRITE UR QL STRIP: NEGATIVE
NT-PROBNP SERPL-MCNC: 106 PG/ML (ref 0–900)
PH UR STRIP.AUTO: 5.5 [PH] (ref 5–9)
PLATELET # BLD AUTO: 222 10*3/MM3 (ref 150–450)
PMV BLD AUTO: 8.5 FL (ref 8–12)
POTASSIUM BLD-SCNC: 4.3 MMOL/L (ref 3.5–5.1)
PROT SERPL-MCNC: 6.6 G/DL (ref 6.3–8.6)
PROT UR QL STRIP: NEGATIVE
RBC # BLD AUTO: 3.87 10*6/MM3 (ref 3.77–5.16)
RBC # UR: ABNORMAL /HPF
REF LAB TEST METHOD: ABNORMAL
SODIUM BLD-SCNC: 138 MMOL/L (ref 137–145)
SP GR UR STRIP: 1.01 (ref 1–1.03)
SQUAMOUS #/AREA URNS HPF: ABNORMAL /HPF
TROPONIN I SERPL-MCNC: <0.012 NG/ML
TROPONIN I SERPL-MCNC: <0.012 NG/ML
UROBILINOGEN UR QL STRIP: ABNORMAL
WBC NRBC COR # BLD: 5.35 10*3/MM3 (ref 3.2–9.8)
WBC UR QL AUTO: ABNORMAL /HPF
WHOLE BLOOD HOLD SPECIMEN: NORMAL
WHOLE BLOOD HOLD SPECIMEN: NORMAL

## 2018-08-26 PROCEDURE — 83880 ASSAY OF NATRIURETIC PEPTIDE: CPT | Performed by: FAMILY MEDICINE

## 2018-08-26 PROCEDURE — 85025 COMPLETE CBC W/AUTO DIFF WBC: CPT | Performed by: FAMILY MEDICINE

## 2018-08-26 PROCEDURE — 93005 ELECTROCARDIOGRAM TRACING: CPT

## 2018-08-26 PROCEDURE — 99285 EMERGENCY DEPT VISIT HI MDM: CPT

## 2018-08-26 PROCEDURE — 71046 X-RAY EXAM CHEST 2 VIEWS: CPT

## 2018-08-26 PROCEDURE — 81001 URINALYSIS AUTO W/SCOPE: CPT | Performed by: FAMILY MEDICINE

## 2018-08-26 PROCEDURE — 80053 COMPREHEN METABOLIC PANEL: CPT | Performed by: FAMILY MEDICINE

## 2018-08-26 PROCEDURE — 93010 ELECTROCARDIOGRAM REPORT: CPT | Performed by: INTERNAL MEDICINE

## 2018-08-26 PROCEDURE — 84484 ASSAY OF TROPONIN QUANT: CPT | Performed by: FAMILY MEDICINE

## 2018-08-26 PROCEDURE — 93005 ELECTROCARDIOGRAM TRACING: CPT | Performed by: FAMILY MEDICINE

## 2018-08-26 PROCEDURE — 70450 CT HEAD/BRAIN W/O DYE: CPT

## 2018-08-26 RX ORDER — SODIUM CHLORIDE 0.9 % (FLUSH) 0.9 %
10 SYRINGE (ML) INJECTION AS NEEDED
Status: DISCONTINUED | OUTPATIENT
Start: 2018-08-26 | End: 2018-08-26 | Stop reason: HOSPADM
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
## 2020-10-06 ENCOUNTER — HOSPITAL ENCOUNTER (OUTPATIENT)
Dept: GENERAL RADIOLOGY | Facility: HOSPITAL | Age: 70
Discharge: HOME | End: 2020-10-06
Admitting: NURSE PRACTITIONER

## 2020-10-06 DIAGNOSIS — R05.9 COUGH: ICD-10-CM

## 2020-10-06 PROCEDURE — 71046 X-RAY EXAM CHEST 2 VIEWS: CPT

## 2020-10-08 ENCOUNTER — HOSPITAL ENCOUNTER (EMERGENCY)
Facility: HOSPITAL | Age: 70
Discharge: HOME | End: 2020-10-08
Attending: EMERGENCY MEDICINE | Admitting: EMERGENCY MEDICINE

## 2020-10-08 ENCOUNTER — APPOINTMENT (OUTPATIENT)
Dept: GENERAL RADIOLOGY | Facility: HOSPITAL | Age: 70
End: 2020-10-08

## 2020-10-08 VITALS
HEART RATE: 88 BPM | OXYGEN SATURATION: 94 % | RESPIRATION RATE: 18 BRPM | SYSTOLIC BLOOD PRESSURE: 113 MMHG | TEMPERATURE: 98.2 F | WEIGHT: 148 LBS | DIASTOLIC BLOOD PRESSURE: 59 MMHG | HEIGHT: 61 IN | BODY MASS INDEX: 27.94 KG/M2

## 2020-10-08 DIAGNOSIS — E86.0 DEHYDRATION: ICD-10-CM

## 2020-10-08 DIAGNOSIS — R19.7 DIARRHEA OF PRESUMED INFECTIOUS ORIGIN: Primary | ICD-10-CM

## 2020-10-08 LAB
ALBUMIN SERPL-MCNC: 4 G/DL (ref 3.5–5.2)
ALBUMIN/GLOB SERPL: 1.5 G/DL
ALP SERPL-CCNC: 81 U/L (ref 39–117)
ALT SERPL W P-5'-P-CCNC: 15 U/L (ref 1–33)
ANION GAP SERPL CALCULATED.3IONS-SCNC: 10 MMOL/L (ref 5–15)
AST SERPL-CCNC: 16 U/L (ref 1–32)
BASOPHILS # BLD AUTO: 0.02 10*3/MM3 (ref 0–0.2)
BASOPHILS NFR BLD AUTO: 0.3 % (ref 0–1.5)
BILIRUB SERPL-MCNC: 0.2 MG/DL (ref 0–1.2)
BILIRUB UR QL STRIP: ABNORMAL
BUN SERPL-MCNC: 15 MG/DL (ref 8–23)
BUN/CREAT SERPL: 18.5 (ref 7–25)
CALCIUM SPEC-SCNC: 8.8 MG/DL (ref 8.6–10.5)
CHLORIDE SERPL-SCNC: 108 MMOL/L (ref 98–107)
CLARITY UR: CLEAR
CO2 SERPL-SCNC: 23 MMOL/L (ref 22–29)
COLOR UR: YELLOW
CREAT SERPL-MCNC: 0.81 MG/DL (ref 0.57–1)
DEPRECATED RDW RBC AUTO: 48.1 FL (ref 37–54)
EOSINOPHIL # BLD AUTO: 0.28 10*3/MM3 (ref 0–0.4)
EOSINOPHIL NFR BLD AUTO: 4.2 % (ref 0.3–6.2)
ERYTHROCYTE [DISTWIDTH] IN BLOOD BY AUTOMATED COUNT: 13.4 % (ref 12.3–15.4)
GFR SERPL CREATININE-BSD FRML MDRD: 70 ML/MIN/1.73
GLOBULIN UR ELPH-MCNC: 2.6 GM/DL
GLUCOSE SERPL-MCNC: 165 MG/DL (ref 65–99)
GLUCOSE UR STRIP-MCNC: NEGATIVE MG/DL
HCT VFR BLD AUTO: 42.9 % (ref 34–46.6)
HGB BLD-MCNC: 14 G/DL (ref 12–15.9)
HGB UR QL STRIP.AUTO: NEGATIVE
HOLD SPECIMEN: NORMAL
IMM GRANULOCYTES # BLD AUTO: 0.03 10*3/MM3 (ref 0–0.05)
IMM GRANULOCYTES NFR BLD AUTO: 0.5 % (ref 0–0.5)
KETONES UR QL STRIP: ABNORMAL
LEUKOCYTE ESTERASE UR QL STRIP.AUTO: NEGATIVE
LIPASE SERPL-CCNC: 12 U/L (ref 13–60)
LYMPHOCYTES # BLD AUTO: 1.6 10*3/MM3 (ref 0.7–3.1)
LYMPHOCYTES NFR BLD AUTO: 24.1 % (ref 19.6–45.3)
MAGNESIUM SERPL-MCNC: 2 MG/DL (ref 1.6–2.4)
MCH RBC QN AUTO: 31.7 PG (ref 26.6–33)
MCHC RBC AUTO-ENTMCNC: 32.6 G/DL (ref 31.5–35.7)
MCV RBC AUTO: 97.1 FL (ref 79–97)
MONOCYTES # BLD AUTO: 0.94 10*3/MM3 (ref 0.1–0.9)
MONOCYTES NFR BLD AUTO: 14.2 % (ref 5–12)
NEUTROPHILS NFR BLD AUTO: 3.77 10*3/MM3 (ref 1.7–7)
NEUTROPHILS NFR BLD AUTO: 56.7 % (ref 42.7–76)
NITRITE UR QL STRIP: NEGATIVE
NRBC BLD AUTO-RTO: 0 /100 WBC (ref 0–0.2)
PH UR STRIP.AUTO: 5.5 [PH] (ref 5–9)
PLATELET # BLD AUTO: 259 10*3/MM3 (ref 140–450)
PMV BLD AUTO: 9.2 FL (ref 6–12)
POTASSIUM SERPL-SCNC: 4 MMOL/L (ref 3.5–5.2)
PROT SERPL-MCNC: 6.6 G/DL (ref 6–8.5)
PROT UR QL STRIP: ABNORMAL
RBC # BLD AUTO: 4.42 10*6/MM3 (ref 3.77–5.28)
SODIUM SERPL-SCNC: 141 MMOL/L (ref 136–145)
SP GR UR STRIP: 1.03 (ref 1–1.03)
T4 FREE SERPL-MCNC: 1.21 NG/DL (ref 0.93–1.7)
TSH SERPL DL<=0.05 MIU/L-ACNC: 0.91 UIU/ML (ref 0.27–4.2)
UROBILINOGEN UR QL STRIP: ABNORMAL
WBC # BLD AUTO: 6.64 10*3/MM3 (ref 3.4–10.8)
WHOLE BLOOD HOLD SPECIMEN: NORMAL

## 2020-10-08 PROCEDURE — 83735 ASSAY OF MAGNESIUM: CPT | Performed by: EMERGENCY MEDICINE

## 2020-10-08 PROCEDURE — 93010 ELECTROCARDIOGRAM REPORT: CPT | Performed by: INTERNAL MEDICINE

## 2020-10-08 PROCEDURE — 74022 RADEX COMPL AQT ABD SERIES: CPT

## 2020-10-08 PROCEDURE — 80053 COMPREHEN METABOLIC PANEL: CPT | Performed by: EMERGENCY MEDICINE

## 2020-10-08 PROCEDURE — 93005 ELECTROCARDIOGRAM TRACING: CPT | Performed by: EMERGENCY MEDICINE

## 2020-10-08 PROCEDURE — 85025 COMPLETE CBC W/AUTO DIFF WBC: CPT | Performed by: EMERGENCY MEDICINE

## 2020-10-08 PROCEDURE — 83690 ASSAY OF LIPASE: CPT | Performed by: EMERGENCY MEDICINE

## 2020-10-08 PROCEDURE — 96361 HYDRATE IV INFUSION ADD-ON: CPT

## 2020-10-08 PROCEDURE — 84439 ASSAY OF FREE THYROXINE: CPT | Performed by: EMERGENCY MEDICINE

## 2020-10-08 PROCEDURE — 84443 ASSAY THYROID STIM HORMONE: CPT | Performed by: EMERGENCY MEDICINE

## 2020-10-08 PROCEDURE — 99284 EMERGENCY DEPT VISIT MOD MDM: CPT

## 2020-10-08 PROCEDURE — 81003 URINALYSIS AUTO W/O SCOPE: CPT | Performed by: EMERGENCY MEDICINE

## 2020-10-08 PROCEDURE — 96360 HYDRATION IV INFUSION INIT: CPT

## 2020-10-08 RX ORDER — PROMETHAZINE HYDROCHLORIDE 25 MG/1
25 TABLET ORAL EVERY 6 HOURS PRN
Qty: 10 TABLET | Refills: 0 | Status: ON HOLD | OUTPATIENT
Start: 2020-10-08 | End: 2021-09-20

## 2020-10-08 RX ORDER — SODIUM CHLORIDE 0.9 % (FLUSH) 0.9 %
10 SYRINGE (ML) INJECTION AS NEEDED
Status: DISCONTINUED | OUTPATIENT
Start: 2020-10-08 | End: 2020-10-08 | Stop reason: HOSPADM

## 2020-10-08 RX ORDER — LOPERAMIDE HYDROCHLORIDE 2 MG/1
2 CAPSULE ORAL 4 TIMES DAILY PRN
Qty: 8 CAPSULE | Refills: 0 | Status: SHIPPED | OUTPATIENT
Start: 2020-10-08 | End: 2020-10-08 | Stop reason: SDUPTHER

## 2020-10-08 RX ORDER — ONDANSETRON 4 MG/1
4 TABLET, ORALLY DISINTEGRATING ORAL EVERY 8 HOURS PRN
Qty: 10 TABLET | Refills: 0 | Status: SHIPPED | OUTPATIENT
Start: 2020-10-08 | End: 2020-10-08 | Stop reason: SDUPTHER

## 2020-10-08 RX ORDER — AZITHROMYCIN 500 MG/1
500 TABLET, FILM COATED ORAL DAILY
Qty: 3 TABLET | Refills: 0 | Status: SHIPPED | OUTPATIENT
Start: 2020-10-08 | End: 2020-10-11

## 2020-10-08 RX ORDER — AZITHROMYCIN 500 MG/1
500 TABLET, FILM COATED ORAL DAILY
Qty: 3 TABLET | Refills: 0 | Status: SHIPPED | OUTPATIENT
Start: 2020-10-08 | End: 2020-10-08 | Stop reason: SDUPTHER

## 2020-10-08 RX ORDER — SODIUM CHLORIDE 9 MG/ML
125 INJECTION, SOLUTION INTRAVENOUS CONTINUOUS
Status: DISCONTINUED | OUTPATIENT
Start: 2020-10-08 | End: 2020-10-08 | Stop reason: HOSPADM

## 2020-10-08 RX ORDER — LOPERAMIDE HYDROCHLORIDE 2 MG/1
2 CAPSULE ORAL 4 TIMES DAILY PRN
Qty: 8 CAPSULE | Refills: 0 | Status: SHIPPED | OUTPATIENT
Start: 2020-10-08

## 2020-10-08 RX ORDER — ONDANSETRON 4 MG/1
4 TABLET, ORALLY DISINTEGRATING ORAL EVERY 8 HOURS PRN
Qty: 10 TABLET | Refills: 0 | Status: SHIPPED | OUTPATIENT
Start: 2020-10-08 | End: 2020-10-08

## 2020-10-08 RX ADMIN — SODIUM CHLORIDE 500 ML: 9 INJECTION, SOLUTION INTRAVENOUS at 15:22

## 2020-10-08 RX ADMIN — SODIUM CHLORIDE 125 ML/HR: 900 INJECTION, SOLUTION INTRAVENOUS at 15:23

## 2020-10-08 NOTE — ED NOTES
Pt states she is unable to provide urine sample at the time. Bedside placed in room for whenever pt can provide sample.     Keira Gallegos  10/08/20 1542
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
## 2020-10-08 NOTE — ED NOTES
Attempted to collect urine sample again. Pt not able to give sample. RN notified.     Rosario العراقي  10/08/20 1640

## 2021-04-29 NOTE — PROGRESS NOTES
Cardiology Progress Note:     LOS: 3 days   Patient Care Team:  Yasmin Stewart MD as PCP - General      Subjective:    Chart reviewed , patient seen and examined.  Patient has been ambulating in the hallway without any symptoms of chest pain.  Patient on questioning complained of having symptoms of chest pain which is of a different quality than the one which she had prior to her hospitalization.  Patient denies any symptoms of palpitation.  Patient does complain of having symptoms of shortness of breath.  Patient denies any bleeding diastasis.  Patient telemetry monitor does not reveal off any evidence of any arrhythmia.        Objective:     Objective:  Vitals:    04/07/17 1900   BP: 118/61   Pulse: 71   Resp: 18   Temp: 98.5 °F (36.9 °C)   SpO2: 98%       Intake/Output Summary (Last 24 hours) at 04/07/17 2208  Last data filed at 04/07/17 1000   Gross per 24 hour   Intake              240 ml   Output                0 ml   Net              240 ml             Physical Exam:   General Appearance:    Alert, oriented, cooperative, in no acute distress   Head:    Normocephalic, atraumatic, without obvious abnormality   Eyes:           BASIL  Lids and lashes normal, conjunctivae and sclerae normal, no icterus, no pallor   Ears:    Ears appear intact with no abnormalities noted   Throat:   Mucous membranes pink and moist   Neck:   Supple, trachea midline, no carotid bruit, no organomegaly or JVD   Lungs:     Clear to auscultation and percussion, respirations regular, even and Unlabored. No wheezes, rales, rhonchi    Heart:    Regular rhythm and normal rate, normal S1 and S2, no            murmur, no gallop, no rub, no click   Abdomen:     Soft, non-tender, non-distended, no guarding, no rebound tenderness, Normal bowel sounds in all four quadrant, no masses, liver and spleen nonpalpable,    Genitalia:    Deferred   Extremities:   Moves all extremities well, no edema, no cyanosis, no              Redness, no clubbing    Pulses:   Pulses palpable and equal bilaterally   Skin:   Moist and warm. No bleeding, bruising or rash   Neurologic/Psychiatric:   Alert and oriented to person, place, and time.  Motor, power and tone in upper and lower extremity is grossly intact.  No focal neurological deficits. Normal cognitive function. No psychomotor reaction or tangential thought. No depression, homicidal ideations and suicidal ideations            Results Review:    Lab Results (last 24 hours)     Procedure Component Value Units Date/Time    Basic Metabolic Panel [[id number]]  (Normal) Collected:  04/07/17 0608    Specimen:  Blood Updated:  04/07/17 0709     Glucose 70 mg/dL      BUN 8 mg/dL      Creatinine 0.78 mg/dL      Sodium 140 mmol/L      Potassium 4.2 mmol/L      Chloride 107 mmol/L      CO2 23.0 mmol/L      Calcium 8.4 mg/dL      eGFR Non African Amer 73 mL/min/1.73      BUN/Creatinine Ratio 10.3     Anion Gap 10.0 mmol/L     POC Glucose Fingerstick [[id number]]  (Abnormal) Collected:  04/07/17 1045    Specimen:  Blood Updated:  04/07/17 1059     Glucose 228 (H) mg/dL       RN NotifiedMeter: [id number]: [id number] GONZALEZ PASCUAL       POC Glucose Fingerstick [[id number]]  (Abnormal) Collected:  04/07/17 1651    Specimen:  Blood Updated:  04/07/17 1703     Glucose 163 (H) mg/dL       RN NotifiedMeter: [id number]: [id number] GONZALEZ PASCUAL       POC Glucose Fingerstick [[id number]]  (Abnormal) Collected:  04/07/17 2043    Specimen:  Blood Updated:  04/07/17 2056     Glucose 157 (H) mg/dL       RN NotifiedMeter: [id number]: [id number] PARTH GAVIRIA              Medication Review:   Current Facility-Administered Medications   Medication Dose Route Frequency Provider Last Rate Last Dose   • albuterol (PROVENTIL) nebulizer solution 0.083% 2.5 mg/3mL  2.5 mg Nebulization Q4H PRN Yasmin Stewart MD       • aspirin chewable tablet 81 mg  81 mg Oral Daily Yasmin Stewart MD   81 mg at 04/07/17 0852   • atorvastatin (LIPITOR)  tablet 20 mg  20 mg Oral Nightly Yasmin Stewart MD   20 mg at 04/07/17 2049   • busPIRone (BUSPAR) tablet 10 mg  10 mg Oral Q12H Yasmin Stewart MD   10 mg at 04/07/17 2049   • carvedilol (COREG) tablet 3.125 mg  3.125 mg Oral BID Yasmin Stewart MD   3.125 mg at 04/07/17 1711   • dextrose (D50W) solution 25 g  25 g Intravenous Q15 Min PRN Yasmin Stewart MD       • dextrose (GLUTOSE) oral gel 15 g  15 g Oral Q15 Min PRN Yasmin Stewart MD       • enoxaparin (LOVENOX) syringe 40 mg  40 mg Subcutaneous Daily Yasmin Stewart MD   40 mg at 04/07/17 0852   • escitalopram (LEXAPRO) tablet 20 mg  20 mg Oral Daily Yasmin Stewart MD   20 mg at 04/07/17 0852   • gabapentin (NEURONTIN) capsule 1,200 mg  1,200 mg Oral Q8H Yasmin Stewart MD   1,200 mg at 04/07/17 2148   • glipiZIDE (GLUCOTROL) tablet 5 mg  5 mg Oral BID AC Yasmin Stewart MD   5 mg at 04/07/17 1711   • glucagon (human recombinant) (GLUCAGEN DIAGNOSTIC) injection 1 mg  1 mg Subcutaneous Q15 Min PRN Yasmin Stewart MD       • HYDROcodone-acetaminophen (NORCO) 7.5-325 MG per tablet 1 tablet  1 tablet Oral Q6H PRN Sharona Larsen MD   1 tablet at 04/07/17 2056   • insulin aspart (novoLOG) injection 0-7 Units  0-7 Units Subcutaneous 4x Daily AC & at Bedtime Yasmin Stewart MD   2 Units at 04/07/17 2056   • insulin detemir (LEVEMIR) injection 14 Units  14 Units Subcutaneous Nightly Yasmin Stewart MD   14 Units at 04/07/17 2050   • Morphine sulfate (PF) injection 2 mg  2 mg Intravenous Q4H PRN Yasmin Stewart MD   2 mg at 04/07/17 1929   • nicotine (NICODERM CQ) 21 MG/24HR patch 1 patch  1 patch Transdermal Q24H Yasmin Stewart MD   1 patch at 04/07/17 0005   • nitroglycerin (NITROSTAT) ointment 1 inch  1 inch Topical Once Yasmin Stewart MD       • nitroglycerin (NITROSTAT) SL tablet 0.4 mg  0.4 mg Sublingual Q5 Min PRN Jordon Islas DO   0.4 mg at 04/06/17 1157   • ondansetron (ZOFRAN) 8 mg in sodium chloride 0.9 % 50 mL IVPB  8 mg Intravenous Q6H PRN Jordon Drummond  DO Roshan        Or   • ondansetron (ZOFRAN) tablet 4 mg  4 mg Oral Q6H PRN Jordon Islas DO   4 mg at 04/07/17 0604    Or   • ondansetron ODT (ZOFRAN-ODT) disintegrating tablet 4 mg  4 mg Oral Q6H PRN Jordon Islas DO       • potassium chloride (MICRO-K) CR capsule 40 mEq  40 mEq Oral Daily Yasmin Stewart MD   40 mEq at 04/07/17 0852   • ranolazine (RANEXA) 12 hr tablet 500 mg  500 mg Oral Q12H Yasmin Stewart MD   500 mg at 04/07/17 2050   • sodium chloride 0.9 % flush 1-10 mL  1-10 mL Intravenous PRN Yasmin Stewart MD       • sodium chloride 0.9 % flush 10 mL  10 mL Intravenous PRN Ramirez Sanches MD   10 mL at 04/04/17 2057   • ticagrelor (BRILINTA) tablet 90 mg  90 mg Oral BID Raheel Martinez MD   90 mg at 04/07/17 1711       Assessment and Plan:    Principal Problem:    NSTEMI (non-ST elevated myocardial infarction)  Active Problems:    Essential hypertension, benign    Anxiety    Chest pain at rest    Tobacco dependence syndrome    Diabetes mellitus type 2 in nonobese    Noncompliance with medication regimen    Hypokalemia   1.  Chest pain.  Patient has history of documented atherosclerotic coronary artery disease with a non-Q myocardial infarction with restenosis of the diagonal branch which wasn't intervened on the last hospitalization.  Patient underwent successful PTCA and stenting of the first diagonal branch.  Patient continues to have dull aching chest pain.  Patient at the present time would be continued on Brilinta.  Have discussed with the patient should the patient have restenosis of the diagonal branch patient would not be subjected to any percutaneous intervention at the distal end of the diagonal branch within the stented area is not a large caliber vessel.  Patient is not a candidate for direct revascularization as the patient left anterior descending artery stenosis has sustained patency site of previous angioplasty and stenting.  Patient has been recommended to be compliant with  the pharmacological therapy and medical follow-up.  Patient has been counseled to quit smoking.  2.  Arterial hypertension.  Patient blood pressure is currently well controlled.  Patient has been counseled to decrease her salt intake.  3.  Risk factor modification.  Patient has been counseled to quit smoking.  4.  Clinically patient is not in congestive heart failure.    Patient would ambulate in the hallway and if the patient does not have recurrent symptoms of chest pain patient would be stable to be discharged for outpatient follow-up.            Raheel Martinez MD  04/07/17  10:08 PM      Time: Time spent on face-to-face interaction 24 minutes.    EMR Dragon/Transcription disclaimer:   Some of this note may be an electronic transcription/translation of spoken language to printed text. The electronic translation of spoken language may permit erroneous, or at times, nonsensical words or phrases to be inadvertently transcribed; Although I have reviewed the note for such errors, some may still exist.      Satisfactory

## 2021-09-20 ENCOUNTER — APPOINTMENT (OUTPATIENT)
Dept: CT IMAGING | Facility: HOSPITAL | Age: 71
End: 2021-09-20

## 2021-09-20 ENCOUNTER — APPOINTMENT (OUTPATIENT)
Dept: GENERAL RADIOLOGY | Facility: HOSPITAL | Age: 71
End: 2021-09-20

## 2021-09-20 ENCOUNTER — HOSPITAL ENCOUNTER (INPATIENT)
Facility: HOSPITAL | Age: 71
LOS: 2 days | Discharge: HOME HEALTH | End: 2021-09-22
Attending: STUDENT IN AN ORGANIZED HEALTH CARE EDUCATION/TRAINING PROGRAM | Admitting: INTERNAL MEDICINE

## 2021-09-20 DIAGNOSIS — R09.02 HYPOXIA: ICD-10-CM

## 2021-09-20 DIAGNOSIS — Z78.9 IMPAIRED MOBILITY AND ADLS: ICD-10-CM

## 2021-09-20 DIAGNOSIS — Z74.09 IMPAIRED MOBILITY AND ADLS: ICD-10-CM

## 2021-09-20 DIAGNOSIS — J96.01 ACUTE RESPIRATORY FAILURE WITH HYPOXIA (HCC): ICD-10-CM

## 2021-09-20 DIAGNOSIS — R13.12 OROPHARYNGEAL DYSPHAGIA: ICD-10-CM

## 2021-09-20 DIAGNOSIS — Z74.09 IMPAIRED FUNCTIONAL MOBILITY, BALANCE, GAIT, AND ENDURANCE: ICD-10-CM

## 2021-09-20 DIAGNOSIS — J44.1 COPD WITH ACUTE EXACERBATION (HCC): Primary | ICD-10-CM

## 2021-09-20 PROBLEM — J18.9 BILATERAL PNEUMONIA: Status: ACTIVE | Noted: 2021-09-20

## 2021-09-20 LAB
ANION GAP SERPL CALCULATED.3IONS-SCNC: 11 MMOL/L (ref 5–15)
BACTERIA UR QL AUTO: ABNORMAL /HPF
BASOPHILS # BLD AUTO: 0.03 10*3/MM3 (ref 0–0.2)
BASOPHILS NFR BLD AUTO: 0.5 % (ref 0–1.5)
BILIRUB UR QL STRIP: NEGATIVE
BUN SERPL-MCNC: 7 MG/DL (ref 8–23)
BUN/CREAT SERPL: 11.1 (ref 7–25)
CALCIUM SPEC-SCNC: 8.6 MG/DL (ref 8.6–10.5)
CHLORIDE SERPL-SCNC: 100 MMOL/L (ref 98–107)
CLARITY UR: ABNORMAL
CO2 SERPL-SCNC: 25 MMOL/L (ref 22–29)
COLOR UR: YELLOW
CREAT SERPL-MCNC: 0.63 MG/DL (ref 0.57–1)
D-LACTATE SERPL-SCNC: 0.7 MMOL/L (ref 0.5–2)
DEPRECATED RDW RBC AUTO: 46.1 FL (ref 37–54)
EOSINOPHIL # BLD AUTO: 0.09 10*3/MM3 (ref 0–0.4)
EOSINOPHIL NFR BLD AUTO: 1.5 % (ref 0.3–6.2)
ERYTHROCYTE [DISTWIDTH] IN BLOOD BY AUTOMATED COUNT: 13.1 % (ref 12.3–15.4)
FLUAV RNA RESP QL NAA+PROBE: NOT DETECTED
FLUBV RNA RESP QL NAA+PROBE: NOT DETECTED
GFR SERPL CREATININE-BSD FRML MDRD: 93 ML/MIN/1.73
GLUCOSE SERPL-MCNC: 167 MG/DL (ref 65–99)
GLUCOSE UR STRIP-MCNC: NEGATIVE MG/DL
HCT VFR BLD AUTO: 36.5 % (ref 34–46.6)
HGB BLD-MCNC: 12.3 G/DL (ref 12–15.9)
HGB UR QL STRIP.AUTO: NEGATIVE
HOLD SPECIMEN: NORMAL
HYALINE CASTS UR QL AUTO: ABNORMAL /LPF
IMM GRANULOCYTES # BLD AUTO: 0.02 10*3/MM3 (ref 0–0.05)
IMM GRANULOCYTES NFR BLD AUTO: 0.3 % (ref 0–0.5)
KETONES UR QL STRIP: NEGATIVE
LEUKOCYTE ESTERASE UR QL STRIP.AUTO: ABNORMAL
LIPASE SERPL-CCNC: 20 U/L (ref 13–60)
LYMPHOCYTES # BLD AUTO: 1.04 10*3/MM3 (ref 0.7–3.1)
LYMPHOCYTES NFR BLD AUTO: 17.9 % (ref 19.6–45.3)
MAGNESIUM SERPL-MCNC: 1.9 MG/DL (ref 1.6–2.4)
MCH RBC QN AUTO: 32.3 PG (ref 26.6–33)
MCHC RBC AUTO-ENTMCNC: 33.7 G/DL (ref 31.5–35.7)
MCV RBC AUTO: 95.8 FL (ref 79–97)
MONOCYTES # BLD AUTO: 0.35 10*3/MM3 (ref 0.1–0.9)
MONOCYTES NFR BLD AUTO: 6 % (ref 5–12)
NEUTROPHILS NFR BLD AUTO: 4.28 10*3/MM3 (ref 1.7–7)
NEUTROPHILS NFR BLD AUTO: 73.8 % (ref 42.7–76)
NITRITE UR QL STRIP: NEGATIVE
NRBC BLD AUTO-RTO: 0 /100 WBC (ref 0–0.2)
NT-PROBNP SERPL-MCNC: 485.6 PG/ML (ref 0–900)
PH UR STRIP.AUTO: 6 [PH] (ref 5–9)
PLATELET # BLD AUTO: 206 10*3/MM3 (ref 140–450)
PMV BLD AUTO: 8.7 FL (ref 6–12)
POTASSIUM SERPL-SCNC: 4 MMOL/L (ref 3.5–5.2)
PROT UR QL STRIP: NEGATIVE
QT INTERVAL: 408 MS
QTC INTERVAL: 473 MS
RBC # BLD AUTO: 3.81 10*6/MM3 (ref 3.77–5.28)
RBC # UR: ABNORMAL /HPF
REF LAB TEST METHOD: ABNORMAL
SARS-COV-2 RNA RESP QL NAA+PROBE: NOT DETECTED
SODIUM SERPL-SCNC: 136 MMOL/L (ref 136–145)
SP GR UR STRIP: 1.02 (ref 1–1.03)
SQUAMOUS #/AREA URNS HPF: ABNORMAL /HPF
TROPONIN T SERPL-MCNC: <0.01 NG/ML (ref 0–0.03)
UROBILINOGEN UR QL STRIP: ABNORMAL
WBC # BLD AUTO: 5.81 10*3/MM3 (ref 3.4–10.8)
WBC UR QL AUTO: ABNORMAL /HPF
WHOLE BLOOD HOLD SPECIMEN: NORMAL

## 2021-09-20 PROCEDURE — 83735 ASSAY OF MAGNESIUM: CPT | Performed by: STUDENT IN AN ORGANIZED HEALTH CARE EDUCATION/TRAINING PROGRAM

## 2021-09-20 PROCEDURE — 83605 ASSAY OF LACTIC ACID: CPT | Performed by: STUDENT IN AN ORGANIZED HEALTH CARE EDUCATION/TRAINING PROGRAM

## 2021-09-20 PROCEDURE — 85025 COMPLETE CBC W/AUTO DIFF WBC: CPT | Performed by: STUDENT IN AN ORGANIZED HEALTH CARE EDUCATION/TRAINING PROGRAM

## 2021-09-20 PROCEDURE — 93010 ELECTROCARDIOGRAM REPORT: CPT | Performed by: INTERNAL MEDICINE

## 2021-09-20 PROCEDURE — 87040 BLOOD CULTURE FOR BACTERIA: CPT | Performed by: STUDENT IN AN ORGANIZED HEALTH CARE EDUCATION/TRAINING PROGRAM

## 2021-09-20 PROCEDURE — 99285 EMERGENCY DEPT VISIT HI MDM: CPT

## 2021-09-20 PROCEDURE — 70450 CT HEAD/BRAIN W/O DYE: CPT

## 2021-09-20 PROCEDURE — 87086 URINE CULTURE/COLONY COUNT: CPT | Performed by: STUDENT IN AN ORGANIZED HEALTH CARE EDUCATION/TRAINING PROGRAM

## 2021-09-20 PROCEDURE — 81001 URINALYSIS AUTO W/SCOPE: CPT | Performed by: STUDENT IN AN ORGANIZED HEALTH CARE EDUCATION/TRAINING PROGRAM

## 2021-09-20 PROCEDURE — 87636 SARSCOV2 & INF A&B AMP PRB: CPT | Performed by: STUDENT IN AN ORGANIZED HEALTH CARE EDUCATION/TRAINING PROGRAM

## 2021-09-20 PROCEDURE — 71045 X-RAY EXAM CHEST 1 VIEW: CPT

## 2021-09-20 PROCEDURE — 93005 ELECTROCARDIOGRAM TRACING: CPT | Performed by: STUDENT IN AN ORGANIZED HEALTH CARE EDUCATION/TRAINING PROGRAM

## 2021-09-20 PROCEDURE — 25010000002 AZITHROMYCIN PER 500 MG: Performed by: FAMILY MEDICINE

## 2021-09-20 PROCEDURE — 83690 ASSAY OF LIPASE: CPT | Performed by: STUDENT IN AN ORGANIZED HEALTH CARE EDUCATION/TRAINING PROGRAM

## 2021-09-20 PROCEDURE — 83880 ASSAY OF NATRIURETIC PEPTIDE: CPT | Performed by: STUDENT IN AN ORGANIZED HEALTH CARE EDUCATION/TRAINING PROGRAM

## 2021-09-20 PROCEDURE — 80048 BASIC METABOLIC PNL TOTAL CA: CPT | Performed by: STUDENT IN AN ORGANIZED HEALTH CARE EDUCATION/TRAINING PROGRAM

## 2021-09-20 PROCEDURE — 25010000002 CEFTRIAXONE PER 250 MG: Performed by: STUDENT IN AN ORGANIZED HEALTH CARE EDUCATION/TRAINING PROGRAM

## 2021-09-20 PROCEDURE — 84484 ASSAY OF TROPONIN QUANT: CPT | Performed by: STUDENT IN AN ORGANIZED HEALTH CARE EDUCATION/TRAINING PROGRAM

## 2021-09-20 PROCEDURE — 25010000002 METHYLPREDNISOLONE PER 40 MG: Performed by: INTERNAL MEDICINE

## 2021-09-20 RX ORDER — CARVEDILOL 3.12 MG/1
3.12 TABLET ORAL 2 TIMES DAILY WITH MEALS
Status: DISCONTINUED | OUTPATIENT
Start: 2021-09-20 | End: 2021-09-22 | Stop reason: HOSPADM

## 2021-09-20 RX ORDER — IPRATROPIUM BROMIDE AND ALBUTEROL SULFATE 2.5; .5 MG/3ML; MG/3ML
3 SOLUTION RESPIRATORY (INHALATION) EVERY 6 HOURS PRN
Status: DISCONTINUED | OUTPATIENT
Start: 2021-09-20 | End: 2021-09-22 | Stop reason: HOSPADM

## 2021-09-20 RX ORDER — ESCITALOPRAM OXALATE 10 MG/1
10 TABLET ORAL NIGHTLY
Status: DISCONTINUED | OUTPATIENT
Start: 2021-09-20 | End: 2021-09-22 | Stop reason: HOSPADM

## 2021-09-20 RX ORDER — ASPIRIN 81 MG/1
81 TABLET ORAL DAILY
Status: DISCONTINUED | OUTPATIENT
Start: 2021-09-20 | End: 2021-09-22 | Stop reason: HOSPADM

## 2021-09-20 RX ORDER — METHYLPREDNISOLONE SODIUM SUCCINATE 40 MG/ML
20 INJECTION, POWDER, LYOPHILIZED, FOR SOLUTION INTRAMUSCULAR; INTRAVENOUS EVERY 8 HOURS
Status: DISCONTINUED | OUTPATIENT
Start: 2021-09-20 | End: 2021-09-22 | Stop reason: HOSPADM

## 2021-09-20 RX ORDER — ATORVASTATIN CALCIUM 20 MG/1
20 TABLET, FILM COATED ORAL NIGHTLY
Status: DISCONTINUED | OUTPATIENT
Start: 2021-09-20 | End: 2021-09-22 | Stop reason: HOSPADM

## 2021-09-20 RX ORDER — LISINOPRIL 20 MG/1
20 TABLET ORAL DAILY
Status: DISCONTINUED | OUTPATIENT
Start: 2021-09-20 | End: 2021-09-22 | Stop reason: HOSPADM

## 2021-09-20 RX ORDER — ALPRAZOLAM 0.25 MG/1
0.25 TABLET ORAL 4 TIMES DAILY
Status: DISCONTINUED | OUTPATIENT
Start: 2021-09-20 | End: 2021-09-22 | Stop reason: HOSPADM

## 2021-09-20 RX ORDER — RANOLAZINE 500 MG/1
500 TABLET, EXTENDED RELEASE ORAL EVERY 12 HOURS SCHEDULED
Status: DISCONTINUED | OUTPATIENT
Start: 2021-09-20 | End: 2021-09-22 | Stop reason: HOSPADM

## 2021-09-20 RX ADMIN — ALPRAZOLAM 0.25 MG: 0.25 TABLET ORAL at 21:05

## 2021-09-20 RX ADMIN — RANOLAZINE 500 MG: 500 TABLET, FILM COATED, EXTENDED RELEASE ORAL at 21:05

## 2021-09-20 RX ADMIN — ASPIRIN 81 MG: 81 TABLET, FILM COATED ORAL at 07:55

## 2021-09-20 RX ADMIN — CARVEDILOL 3.12 MG: 3.12 TABLET, FILM COATED ORAL at 07:54

## 2021-09-20 RX ADMIN — SODIUM CHLORIDE, POTASSIUM CHLORIDE, SODIUM LACTATE AND CALCIUM CHLORIDE 1000 ML: 600; 310; 30; 20 INJECTION, SOLUTION INTRAVENOUS at 06:01

## 2021-09-20 RX ADMIN — ALPRAZOLAM 0.25 MG: 0.25 TABLET ORAL at 12:55

## 2021-09-20 RX ADMIN — METHYLPREDNISOLONE SODIUM SUCCINATE 20 MG: 40 INJECTION, POWDER, FOR SOLUTION INTRAMUSCULAR; INTRAVENOUS at 17:40

## 2021-09-20 RX ADMIN — ALPRAZOLAM 0.25 MG: 0.25 TABLET ORAL at 07:32

## 2021-09-20 RX ADMIN — CARVEDILOL 3.12 MG: 3.12 TABLET, FILM COATED ORAL at 17:41

## 2021-09-20 RX ADMIN — ESCITALOPRAM OXALATE 10 MG: 10 TABLET ORAL at 21:05

## 2021-09-20 RX ADMIN — ATORVASTATIN CALCIUM 20 MG: 20 TABLET, FILM COATED ORAL at 21:05

## 2021-09-20 RX ADMIN — LISINOPRIL 20 MG: 20 TABLET ORAL at 07:54

## 2021-09-20 RX ADMIN — RANOLAZINE 500 MG: 500 TABLET, FILM COATED, EXTENDED RELEASE ORAL at 07:54

## 2021-09-20 RX ADMIN — ALPRAZOLAM 0.25 MG: 0.25 TABLET ORAL at 17:41

## 2021-09-20 RX ADMIN — AZITHROMYCIN MONOHYDRATE 500 MG: 500 INJECTION, POWDER, LYOPHILIZED, FOR SOLUTION INTRAVENOUS at 07:53
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
## 2021-09-20 NOTE — PROGRESS NOTES
Progress Note  Tonio Carver MD  Hospitalist    Date of visit: 9/20/2021     LOS: 0 days   Patient Care Team:  Lester Vargas APRN as PCP - General (Emergency Medicine)    Chief Complaint: Cough, shortness of breath    Subjective     Interval History:     Patient Complaints: Cough, shortness of breath, minimally better    History taken from: Patient and nursing    Medication Review:   Current Facility-Administered Medications   Medication Dose Route Frequency Provider Last Rate Last Admin   • ALPRAZolam (XANAX) tablet 0.25 mg  0.25 mg Oral 4x Daily Chema Smith MD   0.25 mg at 09/20/21 1255   • aspirin EC tablet 81 mg  81 mg Oral Daily Chema Smith MD   81 mg at 09/20/21 0755   • atorvastatin (LIPITOR) tablet 20 mg  20 mg Oral Nightly Chema Smith MD       • [START ON 9/21/2021] AZITHROMYCIN 500 MG/250 ML 0.9% NS IVPB (vial-mate)  500 mg Intravenous Q24H Chema Smith MD       • carvedilol (COREG) tablet 3.125 mg  3.125 mg Oral BID With Meals Chema Smith MD   3.125 mg at 09/20/21 0754   • [START ON 9/21/2021] cefTRIAXone (ROCEPHIN) 1 g/100 mL 0.9% NS (MBP)  1 g Intravenous Q24H Chema Smith MD       • escitalopram (LEXAPRO) tablet 10 mg  10 mg Oral Nightly Chema Smith MD       • ipratropium-albuterol (DUO-NEB) nebulizer solution 3 mL  3 mL Nebulization Q6H PRN Tonio Carver MD       • lisinopril (PRINIVIL,ZESTRIL) tablet 20 mg  20 mg Oral Daily Chema Smith MD   20 mg at 09/20/21 0754   • methylPREDNISolone sodium succinate (SOLU-Medrol) injection 20 mg  20 mg Intravenous Q8H Tonio Carver MD       • ranolazine (RANEXA) 12 hr tablet 500 mg  500 mg Oral Q12H Chema Smith MD   500 mg at 09/20/21 0754       Review of Systems:   Review of Systems   Constitutional: Positive for fatigue. Negative for fever.   Respiratory: Positive for cough, shortness of breath and wheezing.    Cardiovascular: Negative for chest pain, palpitations and leg swelling.    Gastrointestinal: Negative for abdominal distention, blood in stool, nausea and vomiting.   Genitourinary: Negative for dysuria, frequency, hematuria and urgency.   Musculoskeletal: Negative for arthralgias and back pain.   Skin: Negative for color change, pallor and rash.   Neurological: Positive for weakness. Negative for seizures, syncope, light-headedness and headaches.   Psychiatric/Behavioral: Negative for agitation, behavioral problems and confusion.   All other systems reviewed and are negative.      Objective     Vital Signs  Temp:  [98.1 °F (36.7 °C)-99.2 °F (37.3 °C)] 98.1 °F (36.7 °C)  Heart Rate:  [80-85] 85  Resp:  [18] 18  BP: (150-168)/(71-78) 164/78    Physical Exam:  Physical Exam  Vitals reviewed.   Constitutional:       General: She is not in acute distress.     Appearance: She is ill-appearing.   HENT:      Head: Normocephalic and atraumatic.   Eyes:      General: No scleral icterus.     Extraocular Movements: Extraocular movements intact.      Pupils: Pupils are equal, round, and reactive to light.   Cardiovascular:      Rate and Rhythm: Normal rate and regular rhythm.   Pulmonary:      Effort: No respiratory distress.      Breath sounds: No stridor. Wheezing and rhonchi present. No rales.   Abdominal:      General: Bowel sounds are normal. There is no distension.      Palpations: Abdomen is soft. There is no mass.      Tenderness: There is no abdominal tenderness. There is no right CVA tenderness, left CVA tenderness or guarding.      Hernia: No hernia is present.   Musculoskeletal:         General: No swelling or deformity.      Cervical back: Normal range of motion and neck supple. No rigidity or tenderness.      Right lower leg: No edema.      Left lower leg: No edema.   Skin:     General: Skin is warm and dry.      Coloration: Skin is pale. Skin is not jaundiced.      Findings: No bruising or lesion.   Neurological:      Mental Status: She is alert and oriented to person, place, and  time. Mental status is at baseline.      Cranial Nerves: No cranial nerve deficit.      Sensory: No sensory deficit.      Motor: No weakness.      Gait: Gait normal.   Psychiatric:         Mood and Affect: Mood normal.         Behavior: Behavior normal.          Results Review:    Lab Results (last 24 hours)     Procedure Component Value Units Date/Time    Urinalysis, Microscopic Only - Urine, Clean Catch [[id number]]  (Abnormal) Collected: 09/20/21 0650    Specimen: Urine, Clean Catch Updated: 09/20/21 0700     RBC, UA 3-5 /HPF      WBC, UA 31-50 /HPF      Bacteria, UA None Seen /HPF      Squamous Epithelial Cells, UA 3-5 /HPF      Hyaline Casts, UA 3-6 /LPF      Methodology Automated Microscopy    Urine Culture - Urine, Urine, Clean Catch [[id number]] Collected: 09/20/21 0650    Specimen: Urine, Clean Catch Updated: 09/20/21 0700    Urinalysis With Culture If Indicated - Urine, Clean Catch [[id number]]  (Abnormal) Collected: 09/20/21 0650    Specimen: Urine, Clean Catch Updated: 09/20/21 0659     Color, UA Yellow     Appearance, UA Cloudy     pH, UA 6.0     Specific Gravity, UA 1.023     Glucose, UA Negative     Ketones, UA Negative     Bilirubin, UA Negative     Blood, UA Negative     Protein, UA Negative     Leuk Esterase, UA Moderate (2+)     Nitrite, UA Negative     Urobilinogen, UA 1.0 E.U./dL    Extra Tubes [[id number]] Collected: 09/20/21 0449    Specimen: Blood, Venous Line Updated: 09/20/21 0600    Narrative:      The following orders were created for panel order Extra Tubes.  Procedure                               Abnormality         Status                     ---------                               -----------         ------                     Gold Top - SST[[id number]]                                   Final result               Light Blue Top[[id number]]                                   Final result                 Please view results for these tests on the individual orders.    Gold Top - SST  [[id number]] Collected: 09/20/21 0449    Specimen: Blood Updated: 09/20/21 0600     Extra Tube Hold for add-ons.     Comment: Auto resulted.       Light Blue Top [[id number]] Collected: 09/20/21 0449    Specimen: Blood Updated: 09/20/21 0600     Extra Tube hold for add-on     Comment: Auto resulted       COVID-19 and FLU A/B PCR - Swab, Nasopharynx [[id number]]  (Normal) Collected: 09/20/21 0435    Specimen: Swab from Nasopharynx Updated: 09/20/21 0519     COVID19 Not Detected     Influenza A PCR Not Detected     Influenza B PCR Not Detected    Narrative:      Fact sheet for providers: https://www.fda.gov/media/761722/download    Fact sheet for patients: https://www.fda.gov/media/057948/download    Test performed by PCR.    BNP [[id number]]  (Normal) Collected: 09/20/21 0436    Specimen: Blood Updated: 09/20/21 0515     proBNP 485.6 pg/mL     Narrative:      Among patients with dyspnea, NT-proBNP is highly sensitive for the detection of acute congestive heart failure. In addition NT-proBNP of <300 pg/ml effectively rules out acute congestive heart failure with 99% negative predictive value.    Results may be falsely decreased if patient taking Biotin.      Troponin [[id number]]  (Normal) Collected: 09/20/21 0436    Specimen: Blood Updated: 09/20/21 0515     Troponin T <0.010 ng/mL     Narrative:      Troponin T Reference Range:  <= 0.03 ng/mL-   Negative for AMI  >0.03 ng/mL-     Abnormal for myocardial necrosis.  Clinicians would have to utilize clinical acumen, EKG, Troponin and serial changes to determine if it is an Acute Myocardial Infarction or myocardial injury due to an underlying chronic condition.       Results may be falsely decreased if patient taking Biotin.      Basic Metabolic Panel [[id number]]  (Abnormal) Collected: 09/20/21 0436    Specimen: Blood Updated: 09/20/21 0511     Glucose 167 mg/dL      BUN 7 mg/dL      Creatinine 0.63 mg/dL      Sodium 136 mmol/L      Potassium 4.0 mmol/L      Comment: Slight  hemolysis detected by analyzer. Results may be affected.        Chloride 100 mmol/L      CO2 25.0 mmol/L      Calcium 8.6 mg/dL      eGFR Non African Amer 93 mL/min/1.73      BUN/Creatinine Ratio 11.1     Anion Gap 11.0 mmol/L     Narrative:      GFR Normal >60  Chronic Kidney Disease <60  Kidney Failure <15      Lipase [[id number]]  (Normal) Collected: 09/20/21 0436    Specimen: Blood Updated: 09/20/21 0511     Lipase 20 U/L     Magnesium [[id number]]  (Normal) Collected: 09/20/21 0436    Specimen: Blood Updated: 09/20/21 0511     Magnesium 1.9 mg/dL     Lactic Acid, Plasma [[id number]]  (Normal) Collected: 09/20/21 0436    Specimen: Blood Updated: 09/20/21 0511     Lactate 0.7 mmol/L     CBC & Differential [[id number]]  (Abnormal) Collected: 09/20/21 0436    Specimen: Blood Updated: 09/20/21 0453    Narrative:      The following orders were created for panel order CBC & Differential.  Procedure                               Abnormality         Status                     ---------                               -----------         ------                     CBC Auto Differential[[id number]]        Abnormal            Final result                 Please view results for these tests on the individual orders.    CBC Auto Differential [[id number]]  (Abnormal) Collected: 09/20/21 0436    Specimen: Blood Updated: 09/20/21 0453     WBC 5.81 10*3/mm3      RBC 3.81 10*6/mm3      Hemoglobin 12.3 g/dL      Hematocrit 36.5 %      MCV 95.8 fL      MCH 32.3 pg      MCHC 33.7 g/dL      RDW 13.1 %      RDW-SD 46.1 fl      MPV 8.7 fL      Platelets 206 10*3/mm3      Neutrophil % 73.8 %      Lymphocyte % 17.9 %      Monocyte % 6.0 %      Eosinophil % 1.5 %      Basophil % 0.5 %      Immature Grans % 0.3 %      Neutrophils, Absolute 4.28 10*3/mm3      Lymphocytes, Absolute 1.04 10*3/mm3      Monocytes, Absolute 0.35 10*3/mm3      Eosinophils, Absolute 0.09 10*3/mm3      Basophils, Absolute 0.03 10*3/mm3      Immature Grans, Absolute 0.02  10*3/mm3      nRBC 0.0 /100 WBC     Blood Culture - Blood, Arm, Right [[id number]] Collected: 09/20/21 0435    Specimen: Blood from Arm, Right Updated: 09/20/21 0449    Blood Culture - Blood, Arm, Left [[id number]] Collected: 09/20/21 0435    Specimen: Blood from Arm, Left Updated: 09/20/21 0448          Imaging Results (Last 24 Hours)     Procedure Component Value Units Date/Time    CT Head Without Contrast [[id number]] Collected: 09/20/21 0610     Updated: 09/20/21 0715    Narrative:      EXAM DESCRIPTION:   CT HEAD WO CONTRAST    CLINICAL HISTORY:   confusion, R09.02 Hypoxemia    TECHNIQUE:   The Sagittal and coronal reformatted images were also created.  This exam was performed according to our departmental  dose-optimization program which includes use of Automated  Exposure Control, adjustment of the mA and/or kV according to  patient size and/or use of iterative reconstruction technique.    COMPARISON:   None.    FINDINGS:   Brain:  No  Ventricles:Compensatory dilation to overlying atrophy.  Paranasal sinuses: No fluid or thickening as visualized.   Mastoid air cells: No mastoid air cell effusion.   Bones: No acute fracture is evident.  Soft tissues: Unremarkable.        Impression:      No acute intracranial findings.    Electronically signed by:  Alexandro Clark MD  9/20/2021 7:14  AM CDT Workstation: [id number]    XR Chest 1 View [[id number]] Collected: 09/20/21 0412     Updated: 09/20/21 0511    Narrative:      PORTABLE CHEST X-RAY    CLINICAL HISTORY: sob    COMPARISON: 10/6/2020.    FINDINGS: Portable AP view of the chest was obtained with  overlying monitor leads in place. Patient somewhat kyphotic and  rotated to the right. Lungs are under aerated. Vague airspace  disease in the right lung base may be related to atelectasis or  pneumonia. There are some linear densities in the left lung base  more suggestive of atelectasis. Stable calcified residua of  granulomatous disease. Heart size and vascularity  are normal. No  significant pleural fluid.      Impression:      Under aeration with right greater than left basilar  opacities      Electronically signed by:  Carlos A Norris MD  9/20/2021 5:10 AM  CDT Workstation: [id number]          Assessment/Plan       Acute respiratory failure with hypoxia (CMS/HCC)    COPD with acute exacerbation (CMS/HCC)    Diabetes mellitus (CMS/HCC)    Continue with respiratory support, supplemental oxygen, nebulized treatments, IV Solu-Medrol.    I confirmed that the patient's Advance Care Plan is present, code status is documented, or surrogate decision maker is listed in the patient's medical record.      Tonio Carver MD  09/20/21  15:30 CDT

## 2021-09-21 LAB
BACTERIA SPEC AEROBE CULT: NORMAL
TROPONIN T SERPL-MCNC: <0.01 NG/ML (ref 0–0.03)
WHOLE BLOOD HOLD SPECIMEN: NORMAL

## 2021-09-21 PROCEDURE — 25010000002 AZITHROMYCIN PER 500 MG: Performed by: FAMILY MEDICINE

## 2021-09-21 PROCEDURE — 93005 ELECTROCARDIOGRAM TRACING: CPT | Performed by: INTERNAL MEDICINE

## 2021-09-21 PROCEDURE — 97166 OT EVAL MOD COMPLEX 45 MIN: CPT

## 2021-09-21 PROCEDURE — 94799 UNLISTED PULMONARY SVC/PX: CPT

## 2021-09-21 PROCEDURE — 84484 ASSAY OF TROPONIN QUANT: CPT | Performed by: INTERNAL MEDICINE

## 2021-09-21 PROCEDURE — 93010 ELECTROCARDIOGRAM REPORT: CPT | Performed by: INTERNAL MEDICINE

## 2021-09-21 PROCEDURE — 25010000002 CEFTRIAXONE PER 250 MG: Performed by: FAMILY MEDICINE

## 2021-09-21 PROCEDURE — 97162 PT EVAL MOD COMPLEX 30 MIN: CPT

## 2021-09-21 PROCEDURE — 25010000002 METHYLPREDNISOLONE PER 40 MG: Performed by: INTERNAL MEDICINE

## 2021-09-21 RX ORDER — BUDESONIDE AND FORMOTEROL FUMARATE DIHYDRATE 160; 4.5 UG/1; UG/1
2 AEROSOL RESPIRATORY (INHALATION)
Status: DISCONTINUED | OUTPATIENT
Start: 2021-09-21 | End: 2021-09-22 | Stop reason: HOSPADM

## 2021-09-21 RX ORDER — HYDROCODONE BITARTRATE AND ACETAMINOPHEN 5; 325 MG/1; MG/1
1 TABLET ORAL EVERY 6 HOURS PRN
Status: DISCONTINUED | OUTPATIENT
Start: 2021-09-21 | End: 2021-09-22 | Stop reason: HOSPADM

## 2021-09-21 RX ORDER — GABAPENTIN 300 MG/1
600 CAPSULE ORAL EVERY 8 HOURS SCHEDULED
Status: DISCONTINUED | OUTPATIENT
Start: 2021-09-21 | End: 2021-09-22 | Stop reason: HOSPADM

## 2021-09-21 RX ORDER — NITROGLYCERIN 0.4 MG/1
0.4 TABLET SUBLINGUAL
Status: DISCONTINUED | OUTPATIENT
Start: 2021-09-21 | End: 2021-09-22 | Stop reason: HOSPADM

## 2021-09-21 RX ADMIN — GABAPENTIN 600 MG: 300 CAPSULE ORAL at 21:24

## 2021-09-21 RX ADMIN — CEFTRIAXONE SODIUM 1 G: 1 INJECTION, POWDER, FOR SOLUTION INTRAMUSCULAR; INTRAVENOUS at 05:36

## 2021-09-21 RX ADMIN — NITROGLYCERIN 0.4 MG: 0.4 TABLET SUBLINGUAL at 05:34

## 2021-09-21 RX ADMIN — ALPRAZOLAM 0.25 MG: 0.25 TABLET ORAL at 21:24

## 2021-09-21 RX ADMIN — ATORVASTATIN CALCIUM 20 MG: 20 TABLET, FILM COATED ORAL at 21:24

## 2021-09-21 RX ADMIN — ASPIRIN 81 MG: 81 TABLET, FILM COATED ORAL at 08:03

## 2021-09-21 RX ADMIN — RANOLAZINE 500 MG: 500 TABLET, FILM COATED, EXTENDED RELEASE ORAL at 08:03

## 2021-09-21 RX ADMIN — METHYLPREDNISOLONE SODIUM SUCCINATE 20 MG: 40 INJECTION, POWDER, FOR SOLUTION INTRAMUSCULAR; INTRAVENOUS at 17:48

## 2021-09-21 RX ADMIN — ALPRAZOLAM 0.25 MG: 0.25 TABLET ORAL at 13:00

## 2021-09-21 RX ADMIN — GABAPENTIN 600 MG: 300 CAPSULE ORAL at 13:07

## 2021-09-21 RX ADMIN — BUDESONIDE AND FORMOTEROL FUMARATE DIHYDRATE 2 PUFF: 160; 4.5 AEROSOL RESPIRATORY (INHALATION) at 19:41

## 2021-09-21 RX ADMIN — RANOLAZINE 500 MG: 500 TABLET, FILM COATED, EXTENDED RELEASE ORAL at 21:24

## 2021-09-21 RX ADMIN — METHYLPREDNISOLONE SODIUM SUCCINATE 20 MG: 40 INJECTION, POWDER, FOR SOLUTION INTRAMUSCULAR; INTRAVENOUS at 00:58

## 2021-09-21 RX ADMIN — ALPRAZOLAM 0.25 MG: 0.25 TABLET ORAL at 17:48

## 2021-09-21 RX ADMIN — LISINOPRIL 20 MG: 20 TABLET ORAL at 08:03

## 2021-09-21 RX ADMIN — METHYLPREDNISOLONE SODIUM SUCCINATE 20 MG: 40 INJECTION, POWDER, FOR SOLUTION INTRAMUSCULAR; INTRAVENOUS at 08:03

## 2021-09-21 RX ADMIN — ALPRAZOLAM 0.25 MG: 0.25 TABLET ORAL at 08:03

## 2021-09-21 RX ADMIN — ESCITALOPRAM OXALATE 10 MG: 10 TABLET ORAL at 21:24

## 2021-09-21 RX ADMIN — CARVEDILOL 3.12 MG: 3.12 TABLET, FILM COATED ORAL at 08:03

## 2021-09-21 RX ADMIN — AZITHROMYCIN 500 MG: 500 INJECTION, POWDER, LYOPHILIZED, FOR SOLUTION INTRAVENOUS at 08:01

## 2021-09-21 RX ADMIN — CARVEDILOL 3.12 MG: 3.12 TABLET, FILM COATED ORAL at 17:48

## 2021-09-21 RX ADMIN — HYDROCODONE BITARTRATE AND ACETAMINOPHEN 1 TABLET: 5; 325 TABLET ORAL at 10:50

## 2021-09-21 NOTE — NURSING NOTE
Pt complaining of chest pain. ekg ordered. Rhythm change noted. Paged hospitalist on call @ 4:10am. Waiting on call back

## 2021-09-21 NOTE — PLAN OF CARE
Problem: Adult Inpatient Plan of Care  Goal: Plan of Care Review  Recent Flowsheet Documentation  Taken 9/21/2021 1316 by Windy Hinton PT  Plan of Care Reviewed With: patient  Outcome Summary: PT evaluation completed as co-eval with OT. Pt transferred supine to sit to supine with CGA and sit to stand to sit with min assistance. Pt ambulated 20ft x2 with min assistance with SC. Pt also wanted to hold to furniture at times for stability. Function limited by decrease strength, balance, and tolerance for functional mobility and activities. Pt will benefit from PT to regain lost function as pt improves medically.Anticipate home with 24/7 care at discharge with .     Problem: Adult Inpatient Plan of Care  Goal: Plan of Care Review  Recent Flowsheet Documentation  Taken 9/21/2021 1316 by Windy Hinton PT  Plan of Care Reviewed With: patient  Outcome Summary: PT evaluation completed as co-eval with OT. Pt transferred supine to sit to supine with CGA and sit to stand to sit with min assistance. Pt ambulated 20ft x2 with min assistance with SC. Pt also wanted to hold to furniture at times for stability. Function limited by decrease strength, balance, and tolerance for functional mobility and activities. Pt will benefit from PT to regain lost function as pt improves medically.Anticipate home with 24/7 care at discharge with .   Goal Outcome Evaluation:  Plan of Care Reviewed With: patient           Outcome Summary: PT evaluation completed as co-eval with OT. Pt transferred supine to sit to supine with CGA and sit to stand to sit with min assistance. Pt ambulated 20ft x2 with min assistance with SC. Pt also wanted to hold to furniture at times for stability. Function limited by decrease strength, balance, and tolerance for functional mobility and activities. Pt will benefit from PT to regain lost function as pt improves medically.Anticipate home with 24/7 care at discharge with .

## 2021-09-21 NOTE — PLAN OF CARE
Goal Outcome Evaluation:  Plan of Care Reviewed With: patient, caregiver        Progress: no change  Outcome Summary: Pt admitted with COPD exacerbation along iwth pneumonia.  Hx of decreased oral intake and wt loss meeting criteria for acute malnutrition.  Will add supplements and modify diet order.  Will monitor.
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
## 2021-09-21 NOTE — CONSULTS
Adult Nutrition  Assessment    Patient Name:  Nikki Felder  YOB: 1950  MRN: [id number]  Admit Date:  9/20/2021    Assessment Date:  9/21/2021    Comments:  Pt admitted with acute resp failure related to COPD exacerbation along with pneumonia.  She reports a decreased appetite over the last several months with a wt loss of ~17#, which is 11.7% of her UBW.  Wt loss and decreased appetite meets criteria for severe Acute Malnutrition.  She has mild signs of fat loss and muscle wasting, please see MSA for details.  Pt is edentulous and she agrees to grd meats.  SLP eval pending.  Will modify diet order to grd meats and add ADA restrictions.  Will also add Boost GC and milk in an effort to optimize her intake.  Will monitor.     Reason for Assessment     Row Name 09/21/21 1436          Reason for Assessment    Reason For Assessment  identified at risk by screening criteria     Diagnosis  pulmonary disease     Identified At Risk by Screening Criteria  MST SCORE 2+;difficulty chewing/swallowing         Nutrition/Diet History     Row Name 09/21/21 1436          Nutrition/Diet History    Typical Food/Fluid Intake  Pt reports that she has no teeth so she is requesting grd meats.  Not eating very well pta for the last several months and has lost some wt.  UBW ~145#  She states that she just hasn't felt like eating.           Labs/Tests/Procedures/Meds     Row Name 09/21/21 1439          Labs/Procedures/Meds    Lab Results Reviewed  reviewed, pertinent     Lab Results Comments  Gluc 167; Bun 7        Diagnostic Tests/Procedures    Diagnostic Test/Procedure Reviewed  reviewed, pertinent     Diagnostic Test/Procedures Comments  abx; supplemental 02        Medications    Pertinent Medications Reviewed  reviewed, pertinent     Pertinent Medications Comments  Abx; Xanax; Coreg; Solumedrol         Physical Findings     Row Name 09/21/21 1440          Physical Findings    Overall Physical Appearance  on oxygen  therapy     Oral/Mouth Cavity  edentulous         Estimated/Assessed Needs     Row Name 09/21/21 1441          Calculation Measurements    Weight Used For Calculations  57.6 kg (127 lb)        Estimated/Assessed Needs    Additional Documentation  Additional Requirements (Group);Fluid Requirements (Group);Wilton-St. Jeor Equation (Group);Protein Requirements (Group);Calorie Requirements (Group);KCAL/KG (Group)        Calorie Requirements    Estimated Calorie Need Method  Wilton-St Jeor        Wilton-St. Jeor Equation    RMR (Wilton-St. Jeor Equation)  1023.445     Wilton-St. Jeor Activity Factors  1.4 - 1.5     Activity Factors (Wilton-St. Jeor)  1432.823 - 1531.1334        Protein Requirements    Weight Used For Protein Calculations  57.6 kg (127 lb)     Est Protein Requirement Amount (gms/kg)  1.2 gm protein     Estimated Protein Requirements (gms/day)  69.13        Fluid Requirements    Fluid Requirements (mL/day)  1500     Estimated Fluid Requirement Method  RDA Method     RDA Method (mL)  1500         Nutrition Prescription Ordered     Row Name 09/21/21 1441          Nutrition Prescription PO    Current PO Diet  Regular     Fluid Consistency  Thin         Evaluation of Received Nutrient/Fluid Intake     Row Name 09/21/21 1441          PO Evaluation    Number of Days PO Intake Evaluated  Insufficient Data           Malnutrition Severity Assessment     Row Name 09/21/21 1449          Malnutrition Severity Assessment    Malnutrition Type  Acute Disease or Injury - Related Malnutrition        Insufficient Energy Intake     Insufficient Energy Intake Findings  Severe     Insufficient Energy Intake   <75% of est. energy requirement for > or equal to 3 months        Unintentional Weight Loss     Unintentional Weight Loss Findings  Severe     Unintentional Weight Loss   Weight loss of 7.5% in three months 11.7% wt loss in the past several months.        Muscle Loss    Loss of Muscle Mass Findings  Mild        Fat  Loss    Subcutaneous Fat Loss Findings  Mild        Criteria Met (Must meet criteria for severity in at least 2 of these categories: M Wasting, Fat Loss, Fluid, Secondary Signs, Wt. Status, Intake)    Patient meets criteria for   Severe Malnutrition           Electronically signed by:  Mary Jane Bravo RD  09/21/21 14:51 CDT

## 2021-09-21 NOTE — PROGRESS NOTES
Progress Note  Tonio Carver MD  Hospitalist    Date of visit: 9/21/2021     LOS: 1 day   Patient Care Team:  Lester Vargas APRN as PCP - General (Emergency Medicine)    Chief Complaint: Cough, shortness of breath    Subjective     Interval History:     Patient Complaints: Cough, shortness of breath, minimally better    History taken from: Patient and nursing    Medication Review:   Current Facility-Administered Medications   Medication Dose Route Frequency Provider Last Rate Last Admin   • ALPRAZolam (XANAX) tablet 0.25 mg  0.25 mg Oral 4x Daily Chema Smith MD   0.25 mg at 09/21/21 0803   • aspirin EC tablet 81 mg  81 mg Oral Daily Chema Smith MD   81 mg at 09/21/21 0803   • atorvastatin (LIPITOR) tablet 20 mg  20 mg Oral Nightly Chema Smith MD   20 mg at 09/20/21 2105   • AZITHROMYCIN 500 MG/250 ML 0.9% NS IVPB (vial-mate)  500 mg Intravenous Q24H Chema Smith MD   500 mg at 09/21/21 0801   • budesonide-formoterol (SYMBICORT) 160-4.5 MCG/ACT inhaler 2 puff  2 puff Inhalation BID - RT Tonio Carver MD       • carvedilol (COREG) tablet 3.125 mg  3.125 mg Oral BID With Meals Chema Smith MD   3.125 mg at 09/21/21 0803   • cefTRIAXone (ROCEPHIN) 1 g/100 mL 0.9% NS (MBP)  1 g Intravenous Q24H Chema Smith MD   1 g at 09/21/21 0536   • escitalopram (LEXAPRO) tablet 10 mg  10 mg Oral Nightly Chema Smith MD   10 mg at 09/20/21 2105   • gabapentin (NEURONTIN) capsule 600 mg  600 mg Oral Q8H Tonio Carver MD       • HYDROcodone-acetaminophen (NORCO) 5-325 MG per tablet 1 tablet  1 tablet Oral Q6H PRN Tonio Carver MD   1 tablet at 09/21/21 1050   • ipratropium-albuterol (DUO-NEB) nebulizer solution 3 mL  3 mL Nebulization Q6H PRN Tonio Carver MD       • lisinopril (PRINIVIL,ZESTRIL) tablet 20 mg  20 mg Oral Daily Chema Smith MD   20 mg at 09/21/21 0803   • methylPREDNISolone sodium succinate (SOLU-Medrol) injection 20 mg  20 mg Intravenous Q8H Wen  MD Tonio   20 mg at 09/21/21 0803   • nitroglycerin (NITROSTAT) SL tablet 0.4 mg  0.4 mg Sublingual Q5 Min PRN Fabiola Pemberton MD   0.4 mg at 09/21/21 0534   • ranolazine (RANEXA) 12 hr tablet 500 mg  500 mg Oral Q12H Chema Smith MD   500 mg at 09/21/21 0803       Review of Systems:   Review of Systems   Constitutional: Positive for fatigue. Negative for fever.   Respiratory: Positive for cough, shortness of breath and wheezing.    Cardiovascular: Negative for chest pain, palpitations and leg swelling.   Gastrointestinal: Negative for abdominal distention, blood in stool, nausea and vomiting.   Genitourinary: Negative for dysuria, frequency, hematuria and urgency.   Musculoskeletal: Negative for arthralgias and back pain.   Skin: Negative for color change, pallor and rash.   Neurological: Positive for weakness. Negative for seizures, syncope, light-headedness and headaches.   Psychiatric/Behavioral: Negative for agitation, behavioral problems and confusion.   All other systems reviewed and are negative.      Objective     Vital Signs  Temp:  [97.3 °F (36.3 °C)-98.7 °F (37.1 °C)] 97.3 °F (36.3 °C)  Heart Rate:  [79-91] 84  Resp:  [16-18] 16  BP: (148-169)/(72-87) 165/76    Physical Exam:  Physical Exam  Vitals reviewed.   Constitutional:       General: She is not in acute distress.     Appearance: She is ill-appearing.   HENT:      Head: Normocephalic and atraumatic.   Eyes:      General: No scleral icterus.     Extraocular Movements: Extraocular movements intact.      Pupils: Pupils are equal, round, and reactive to light.   Cardiovascular:      Rate and Rhythm: Normal rate and regular rhythm.   Pulmonary:      Effort: No respiratory distress.      Breath sounds: No stridor. Wheezing and rhonchi present. No rales.   Abdominal:      General: Bowel sounds are normal. There is no distension.      Palpations: Abdomen is soft. There is no mass.      Tenderness: There is no abdominal tenderness. There is  no right CVA tenderness, left CVA tenderness or guarding.      Hernia: No hernia is present.   Musculoskeletal:         General: No swelling or deformity.      Cervical back: Normal range of motion and neck supple. No rigidity or tenderness.      Right lower leg: No edema.      Left lower leg: No edema.   Skin:     General: Skin is warm and dry.      Coloration: Skin is pale. Skin is not jaundiced.      Findings: No bruising or lesion.   Neurological:      Mental Status: She is alert and oriented to person, place, and time. Mental status is at baseline.      Cranial Nerves: No cranial nerve deficit.      Sensory: No sensory deficit.      Motor: No weakness.      Gait: Gait normal.   Psychiatric:         Mood and Affect: Mood normal.         Behavior: Behavior normal.          Results Review:    Lab Results (last 24 hours)     Procedure Component Value Units Date/Time    Urine Culture - Urine, Urine, Clean Catch [[id number]] Collected: 09/20/21 0650    Specimen: Urine, Clean Catch Updated: 09/21/21 1100     Urine Culture <25,000 CFU/mL Normal Urogenital Bernice    [place] Draw [[id number]] Collected: 09/21/21 0523    Specimen: Blood Updated: 09/21/21 0630    Narrative:      The following orders were created for panel order [place] Draw.  Procedure                               Abnormality         Status                     ---------                               -----------         ------                     Lavender Top[[id number]]                                     Final result                 Please view results for these tests on the individual orders.    Lavender Top [[id number]] Collected: 09/21/21 0523    Specimen: Blood Updated: 09/21/21 0630     Extra Tube hold for add-on     Comment: Auto resulted       Troponin [[id number]]  (Normal) Collected: 09/21/21 0522    Specimen: Blood Updated: 09/21/21 0553     Troponin T <0.010 ng/mL     Narrative:      Troponin T Reference Range:  <= 0.03 ng/mL-   Negative for  AMI  >0.03 ng/mL-     Abnormal for myocardial necrosis.  Clinicians would have to utilize clinical acumen, EKG, Troponin and serial changes to determine if it is an Acute Myocardial Infarction or myocardial injury due to an underlying chronic condition.       Results may be falsely decreased if patient taking Biotin.      Blood Culture - Blood, Arm, Right [[id number]] Collected: 09/20/21 0435    Specimen: Blood from Arm, Right Updated: 09/21/21 0501     Blood Culture No growth at 24 hours    Blood Culture - Blood, Arm, Left [[id number]] Collected: 09/20/21 0435    Specimen: Blood from Arm, Left Updated: 09/21/21 0501     Blood Culture No growth at 24 hours          Imaging Results (Last 24 Hours)     ** No results found for the last 24 hours. **          Assessment/Plan       Acute respiratory failure with hypoxia (CMS/HCC)    COPD with acute exacerbation (CMS/HCC)    Diabetes mellitus (CMS/HCC)    Continue with respiratory support, supplemental oxygen, nebulized treatments, IV Solu-Medrol. Resume Neurontin, PRN Xanax, try PT/OT.    I confirmed that the patient's Advance Care Plan is present, code status is documented, or surrogate decision maker is listed in the patient's medical record.      Tonio Carver MD  09/21/21  11:51 CDT

## 2021-09-21 NOTE — NURSING NOTE
Called lab x2 about stat troponin. States on the way. Paged hospitalist @ 4:50am. Pt requesting nitro for mid sternal cp of 4/10 . Waiting on call back

## 2021-09-21 NOTE — PLAN OF CARE
Goal Outcome Evaluation:  Plan of Care Reviewed With: patient           Outcome Summary: OT eval completed this date, co-eval with PT. Pt alert and cooperative, tearful at times. Pt was CGA for sup to sit to sup. Pt SBA for bridging in bed. Pt averaged min assist for toileting and mod assist to don and [facility] gown total assist to tie. Pt setup with self feeding. Pt CGA to min assist for sit to stand off bed and min assist for toilet t/f. Pt min assist with SC for moiblity around room, decreased stepping pattern and posture. Pt may benefit from further skilled OT to reach PLOF/max independence with ADL. Recommend 24/7 care and further therapy at d/c.

## 2021-09-21 NOTE — PLAN OF CARE
Goal Outcome Evaluation:  Plan of Care Reviewed With: patient        Progress: no change  Outcome Summary: pt new admit. oriented except to situation. very slow with responding. safety alarms in place. will cont to monitor
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
## 2021-09-21 NOTE — SIGNIFICANT NOTE
09/21/21 1258   OTHER   Discipline speech language pathologist   Rehab Time/Intention   Session Not Performed patient/family declined evaluation  (Pt adadmantly declined dysphagia evaluation d/t waiting on her pain meds.  Pt stated she would not be doing anything, including eating lunch, until she receives her meds.)

## 2021-09-22 VITALS
OXYGEN SATURATION: 98 % | SYSTOLIC BLOOD PRESSURE: 154 MMHG | HEART RATE: 74 BPM | TEMPERATURE: 97.7 F | BODY MASS INDEX: 24.17 KG/M2 | HEIGHT: 61 IN | WEIGHT: 128 LBS | RESPIRATION RATE: 16 BRPM | DIASTOLIC BLOOD PRESSURE: 78 MMHG

## 2021-09-22 PROBLEM — E43 SEVERE MALNUTRITION (HCC): Status: ACTIVE | Noted: 2021-09-22

## 2021-09-22 LAB
ANION GAP SERPL CALCULATED.3IONS-SCNC: 8 MMOL/L (ref 5–15)
BASOPHILS # BLD AUTO: 0.01 10*3/MM3 (ref 0–0.2)
BASOPHILS NFR BLD AUTO: 0.2 % (ref 0–1.5)
BUN SERPL-MCNC: 18 MG/DL (ref 8–23)
BUN/CREAT SERPL: 30 (ref 7–25)
CALCIUM SPEC-SCNC: 8.3 MG/DL (ref 8.6–10.5)
CHLORIDE SERPL-SCNC: 98 MMOL/L (ref 98–107)
CO2 SERPL-SCNC: 27 MMOL/L (ref 22–29)
CREAT SERPL-MCNC: 0.6 MG/DL (ref 0.57–1)
DEPRECATED RDW RBC AUTO: 45 FL (ref 37–54)
EOSINOPHIL # BLD AUTO: 0 10*3/MM3 (ref 0–0.4)
EOSINOPHIL NFR BLD AUTO: 0 % (ref 0.3–6.2)
ERYTHROCYTE [DISTWIDTH] IN BLOOD BY AUTOMATED COUNT: 13 % (ref 12.3–15.4)
GFR SERPL CREATININE-BSD FRML MDRD: 98 ML/MIN/1.73
GLUCOSE SERPL-MCNC: 222 MG/DL (ref 65–99)
HCT VFR BLD AUTO: 35.1 % (ref 34–46.6)
HGB BLD-MCNC: 11.9 G/DL (ref 12–15.9)
IMM GRANULOCYTES # BLD AUTO: 0.03 10*3/MM3 (ref 0–0.05)
IMM GRANULOCYTES NFR BLD AUTO: 0.5 % (ref 0–0.5)
LYMPHOCYTES # BLD AUTO: 0.75 10*3/MM3 (ref 0.7–3.1)
LYMPHOCYTES NFR BLD AUTO: 13.1 % (ref 19.6–45.3)
MCH RBC QN AUTO: 32.2 PG (ref 26.6–33)
MCHC RBC AUTO-ENTMCNC: 33.9 G/DL (ref 31.5–35.7)
MCV RBC AUTO: 94.9 FL (ref 79–97)
MONOCYTES # BLD AUTO: 0.2 10*3/MM3 (ref 0.1–0.9)
MONOCYTES NFR BLD AUTO: 3.5 % (ref 5–12)
NEUTROPHILS NFR BLD AUTO: 4.73 10*3/MM3 (ref 1.7–7)
NEUTROPHILS NFR BLD AUTO: 82.7 % (ref 42.7–76)
NRBC BLD AUTO-RTO: 0 /100 WBC (ref 0–0.2)
PLATELET # BLD AUTO: 227 10*3/MM3 (ref 140–450)
PMV BLD AUTO: 9.1 FL (ref 6–12)
POTASSIUM SERPL-SCNC: 4.3 MMOL/L (ref 3.5–5.2)
RBC # BLD AUTO: 3.7 10*6/MM3 (ref 3.77–5.28)
SODIUM SERPL-SCNC: 133 MMOL/L (ref 136–145)
WBC # BLD AUTO: 5.72 10*3/MM3 (ref 3.4–10.8)

## 2021-09-22 PROCEDURE — 25010000002 AZITHROMYCIN PER 500 MG: Performed by: FAMILY MEDICINE

## 2021-09-22 PROCEDURE — 94799 UNLISTED PULMONARY SVC/PX: CPT

## 2021-09-22 PROCEDURE — 25010000002 CEFTRIAXONE PER 250 MG: Performed by: FAMILY MEDICINE

## 2021-09-22 PROCEDURE — 94760 N-INVAS EAR/PLS OXIMETRY 1: CPT

## 2021-09-22 PROCEDURE — 97530 THERAPEUTIC ACTIVITIES: CPT

## 2021-09-22 PROCEDURE — 97535 SELF CARE MNGMENT TRAINING: CPT

## 2021-09-22 PROCEDURE — 97110 THERAPEUTIC EXERCISES: CPT

## 2021-09-22 PROCEDURE — 85025 COMPLETE CBC W/AUTO DIFF WBC: CPT | Performed by: INTERNAL MEDICINE

## 2021-09-22 PROCEDURE — 80048 BASIC METABOLIC PNL TOTAL CA: CPT | Performed by: INTERNAL MEDICINE

## 2021-09-22 PROCEDURE — 92610 EVALUATE SWALLOWING FUNCTION: CPT | Performed by: SPEECH-LANGUAGE PATHOLOGIST

## 2021-09-22 PROCEDURE — 25010000002 METHYLPREDNISOLONE PER 40 MG: Performed by: INTERNAL MEDICINE

## 2021-09-22 RX ORDER — ALBUTEROL SULFATE 90 UG/1
2 AEROSOL, METERED RESPIRATORY (INHALATION) EVERY 4 HOURS PRN
Qty: 18 G | Refills: 1 | Status: SHIPPED | OUTPATIENT
Start: 2021-09-22

## 2021-09-22 RX ORDER — BUDESONIDE AND FORMOTEROL FUMARATE DIHYDRATE 160; 4.5 UG/1; UG/1
2 AEROSOL RESPIRATORY (INHALATION)
Qty: 10.2 G | Refills: 11 | Status: SHIPPED | OUTPATIENT
Start: 2021-09-22

## 2021-09-22 RX ORDER — METHYLPREDNISOLONE 4 MG/1
TABLET ORAL
Qty: 21 TABLET | Refills: 0 | Status: SHIPPED | OUTPATIENT
Start: 2021-09-22

## 2021-09-22 RX ORDER — SODIUM CHLORIDE 9 MG/ML
INJECTION, SOLUTION INTRAVENOUS
Status: COMPLETED
Start: 2021-09-22 | End: 2021-09-22

## 2021-09-22 RX ADMIN — GABAPENTIN 600 MG: 300 CAPSULE ORAL at 13:08

## 2021-09-22 RX ADMIN — LISINOPRIL 20 MG: 20 TABLET ORAL at 09:21

## 2021-09-22 RX ADMIN — AZITHROMYCIN 500 MG: 500 INJECTION, POWDER, LYOPHILIZED, FOR SOLUTION INTRAVENOUS at 09:22

## 2021-09-22 RX ADMIN — CARVEDILOL 3.12 MG: 3.12 TABLET, FILM COATED ORAL at 09:21

## 2021-09-22 RX ADMIN — ALPRAZOLAM 0.25 MG: 0.25 TABLET ORAL at 13:00

## 2021-09-22 RX ADMIN — HYDROCODONE BITARTRATE AND ACETAMINOPHEN 1 TABLET: 5; 325 TABLET ORAL at 13:31

## 2021-09-22 RX ADMIN — HYDROCODONE BITARTRATE AND ACETAMINOPHEN 1 TABLET: 5; 325 TABLET ORAL at 05:57

## 2021-09-22 RX ADMIN — METHYLPREDNISOLONE SODIUM SUCCINATE 20 MG: 40 INJECTION, POWDER, FOR SOLUTION INTRAMUSCULAR; INTRAVENOUS at 09:22

## 2021-09-22 RX ADMIN — BUDESONIDE AND FORMOTEROL FUMARATE DIHYDRATE 2 PUFF: 160; 4.5 AEROSOL RESPIRATORY (INHALATION) at 07:08

## 2021-09-22 RX ADMIN — RANOLAZINE 500 MG: 500 TABLET, FILM COATED, EXTENDED RELEASE ORAL at 09:21

## 2021-09-22 RX ADMIN — METHYLPREDNISOLONE SODIUM SUCCINATE 20 MG: 40 INJECTION, POWDER, FOR SOLUTION INTRAMUSCULAR; INTRAVENOUS at 02:05

## 2021-09-22 RX ADMIN — GABAPENTIN 600 MG: 300 CAPSULE ORAL at 05:39

## 2021-09-22 RX ADMIN — ASPIRIN 81 MG: 81 TABLET, FILM COATED ORAL at 09:21

## 2021-09-22 RX ADMIN — SODIUM CHLORIDE 250 ML: 9 INJECTION, SOLUTION INTRAVENOUS at 05:42

## 2021-09-22 RX ADMIN — ALPRAZOLAM 0.25 MG: 0.25 TABLET ORAL at 09:21

## 2021-09-22 RX ADMIN — CEFTRIAXONE SODIUM 1 G: 1 INJECTION, POWDER, FOR SOLUTION INTRAMUSCULAR; INTRAVENOUS at 05:40
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
## 2021-09-22 NOTE — PLAN OF CARE
Goal Outcome Evaluation:  Plan of Care Reviewed With: patient           Outcome Summary: OT tx this date. Pt alert and cooperative. Pt SBA for sup to sit. Pt engaged in sponge bath on EOB, SBA for seated tasks except dependent for back and CGA to min ssist for standing for pericare. Pt don and doff socks with set up and supervision. Pt setup total assist to tie and untie gown and redirection to don an [facility] gown. Pt CGA to min assist for sit to stand with SC and t/f from EOB to bedside chair.

## 2021-09-22 NOTE — DISCHARGE SUMMARY
Discharge Summary  Tonio Carver MD  Hospitalist     Date of Discharge:  9/22/2021    Discharge Diagnosis:      Acute respiratory failure with hypoxia (CMS/HCC)    COPD with acute exacerbation (CMS/HCC)    Diabetes mellitus (CMS/HCC)    Severe malnutrition (HCC)      Presenting Problem/History of Present Illness  Cough, shortness of breath    Hospital Course  Patient is a 70 y.o. female admitted for cough, shortness of breath, slight confusion, hypoxia probably due to an acute exacerbation of the underlying COPD complicated by acute respiratory failure with hypoxia.    She improved significantly with the help of supplemental oxygen, nebulized treatments, IV steroids, IV antibiotics even though her admission chest x-ray showed only possible bilateral lower lung atelectasis. Her white count remained within normal limit and she has never been febrile.    She remains afebrile, vital signs are stable, her oxygen saturation remains at 98% on 3 L/minute nasal cannula and she will be discharged home with the help of Home Health on the medications as listed below. She will see her primary care provider within a week.    Consults:   Consults     No orders found from 8/22/2021 to 9/21/2021.        Condition on Discharge: Stable    Vital Signs  Temp:  [97.3 °F (36.3 °C)-97.7 °F (36.5 °C)] 97.7 °F (36.5 °C)  Heart Rate:  [63-80] 74  Resp:  [16-18] 16  BP: (142-154)/(66-78) 154/78    Physical Exam:  Physical Exam  Vitals reviewed.   Constitutional:       Appearance: She is ill-appearing.   HENT:      Head: Normocephalic and atraumatic.   Eyes:      General: No scleral icterus.     Extraocular Movements: Extraocular movements intact.      Pupils: Pupils are equal, round, and reactive to light.   Cardiovascular:      Rate and Rhythm: Normal rate and regular rhythm.   Pulmonary:      Effort: Pulmonary effort is normal. No respiratory distress.      Breath sounds: No stridor. Rhonchi present. No wheezing or rales.   Abdominal:       General: Bowel sounds are normal. There is no distension.      Palpations: Abdomen is soft. There is no mass.      Tenderness: There is no abdominal tenderness. There is no right CVA tenderness, left CVA tenderness or guarding.      Hernia: No hernia is present.   Musculoskeletal:         General: No swelling, tenderness or deformity. Normal range of motion.      Cervical back: Normal range of motion and neck supple. No rigidity or tenderness.      Right lower leg: No edema.      Left lower leg: No edema.   Skin:     General: Skin is warm and dry.      Coloration: Skin is not jaundiced or pale.      Findings: No bruising.   Neurological:      General: No focal deficit present.      Mental Status: She is alert and oriented to person, place, and time. Mental status is at baseline.      Cranial Nerves: No cranial nerve deficit.      Motor: No weakness.   Psychiatric:         Mood and Affect: Mood normal.         Behavior: Behavior normal.         Discharge Disposition  Home-Health Care [facility] [place]    Discharge Medications     Discharge Medications      New Medications      Instructions Start Date   albuterol sulfate  (90 Base) MCG/ACT inhaler  Commonly known as: PROVENTIL HFA;VENTOLIN HFA;PROAIR HFA  Notes to patient: As needed   2 puffs, Inhalation, Every 4 Hours PRN      budesonide-formoterol 160-4.5 MCG/ACT inhaler  Commonly known as: SYMBICORT  Notes to patient: 09/22/2021   2 puffs, Inhalation, 2 Times Daily - RT         Changes to Medications      Instructions Start Date   ALPRAZolam 0.25 MG tablet  Commonly known as: XANAX  What changed:   · how much to take  · when to take this  Notes to patient: 09/22/2021   0.25 mg, Oral, 4 Times Daily      escitalopram 10 MG tablet  Commonly known as: LEXAPRO  What changed: how much to take  Notes to patient: 09/22/2021   10 mg, Oral, Nightly      gabapentin 600 MG tablet  Commonly known as: NEURONTIN  What changed: how much to take  Notes to patient: 09/22/2021   600 mg,  Oral, 3 Times Daily, Already has prescription at home.      lisinopril 20 MG tablet  Commonly known as: PRINIVILZESTRIL  What changed:   · how much to take  · when to take this  Notes to patient: 09/23/2021   20 mg, Oral, Daily         Continue These Medications      Instructions Start Date   aspirin 81 MG tablet  Notes to patient: 09/23/2021   81 mg, Oral, Daily      atorvastatin 20 MG tablet  Commonly known as: LIPITOR  Notes to patient: 09/22/2021   20 mg, Oral, Nightly      Blood Glucose Monitoring Suppl device   1 each, Does not apply, 2 Times Daily Before Meals      carvedilol 3.125 MG tablet  Commonly known as: COREG  Notes to patient: 09/22/2021   3.125 mg, Oral, 2 Times Daily With Meals      Fingerstix Lancets misc   To check sugar tid dx code E11.65      isosorbide mononitrate 30 MG 24 hr tablet  Commonly known as: Imdur  Notes to patient: 09/23/2021   30 mg, Oral, Daily      loperamide 2 MG capsule  Commonly known as: IMODIUM  Notes to patient: As needed   2 mg, Oral, 4 Times Daily PRN      metFORMIN 1000 MG (OSM) 24 hr tablet  Commonly known as: FORTAMET  Notes to patient: 09/22/2021   1,000 mg, Oral, 2 Times Daily With Meals      methylPREDNISolone 4 MG dose pack  Commonly known as: MEDROL  Notes to patient: 09/23/2021   Take as directed on package instructions.      nitroglycerin 0.4 MG SL tablet  Commonly known as: NITROSTAT  Notes to patient: As needed   0.4 mg, Sublingual, Every 5 Minutes PRN, Take no more than 3 doses in 15 minutes.      ranolazine 500 MG 12 hr tablet  Commonly known as: RANEXA  Notes to patient: 09/22/2021   500 mg, Oral, Every 12 Hours Scheduled             Discharge Diet:   Diet Instructions     Diet: Consistent Carbohydrate      Discharge Diet: Consistent Carbohydrate          Activity at Discharge:   Activity Instructions     Activity as Tolerated            Follow-up Appointments  No future appointments.  Additional Instructions for the Follow-ups that You Need to Schedule      Ambulatory Referral to Home Health   As directed      Face to Face Visit Date: 9/22/2021    Follow-up provider for Plan of Care?: I treated the patient in an acute care facility and will not continue treatment after discharge.    Follow-up provider: ABEL VARGAS [[id number]]    Reason/Clinical Findings: copd    Describe mobility limitations that make leaving home difficult: copd    Nursing/Therapeutic Services Requested: Skilled Nursing Physical Therapy Occupational Therapy    Skilled nursing orders: Cardiopulmonary assessments COPD management O2 instruction    PT orders: Therapeutic exercise    Occupational orders: Strengthening    Frequency: 1 Week 1         Discharge Follow-up with PCP   As directed       Currently Documented PCP:    Abel Vargas, APRN    PCP Phone Number:    [phone]     Follow Up Details: in 1 week               Test Results Pending at Discharge  Pending Labs     Order Current Status    Blood Culture - Blood, Arm, Left Preliminary result    Blood Culture - Blood, Arm, Right Preliminary result           Tonio Carver MD  09/22/21  18:25 CDT

## 2021-09-22 NOTE — PLAN OF CARE
Goal Outcome Evaluation:  Plan of Care Reviewed With: patient        Progress: improving  Outcome Summary: Pt. with improved mobility & tolerance. Pt. transferred SBA sit-stand-sit, amb. 20'x3 with SC SBA, performed x20 reps seated LE therex with vss during treatment. Cont. to mobilize

## 2021-09-22 NOTE — PLAN OF CARE
Goal Outcome Evaluation:  Plan of Care Reviewed With: patient        Progress: improving  Outcome Summary: ST bedside swallow evaluation completed this date. pt is edentulous and prefers to remain on soft foods with ground meats.  pt independently used cyclic eating, single bolus and rest breaks d/t short of air at times during PO.  no s/ sof aspiration noted.  no f/u needed at this time

## 2021-09-22 NOTE — DISCHARGE PLACEMENT REQUEST
"Gino Felder (70 y.o. Female)     Date of Birth Social Security Number Address Home Phone MRN    1950  [address] [place] [id number] [phone] [id number]    [employer] Mar[employer] Status          [employer]        Admission Date Admission Type Admitting Provider Attending Provider Department, Room/Bed    21 Emergency Tonio Carver MD Popescu, Tudor, MD [address], [facility]/1    Discharge Date Discharge Disposition Discharge Destination         [place] [facility] [place]              Attending Provider: Tonio Carver MD    Allergies: No Known Allergies    Isolation: None   Infection: None   Code Status: CPR    Ht: 154.9 cm (61\")   Wt: 58.1 kg (128 lb)    Admission Cmt: None   Principal Problem: Acute respiratory failure with hypoxia (HCC) [J96.01]                 Active Insurance as of 2021     Primary Coverage     Payor Plan Insurance Group Employer/Plan Group    MEDICARE MEDICARE A & B      Payor Plan Address Payor Plan Phone Number Payor Plan Fax Number Effective Dates     [id number] [phone]  10/1/2013 - None Entered    [facility] [id number]       Subscriber Name Subscriber Birth Date Member ID       GINO FELDER 1950 [id number]                 Emergency Contacts      (Rel.) Home Phone Work Phone Mobile Phone    Madhavi Morrison (Power of ) [phone] -- [phone]           21 1100 21 1105 21 1109   Oxygen Therapy   SpO2 94 %  (resting) (!) 87 %  (walking) 98 %   Pulse Oximetry Type Continuous Continuous Continuous   Device (Oxygen Therapy) room air room air nasal cannula   Flow (L/min)  --   --  3     [address] [address]  Dept. Phone:  [phone]  Dept. Fax:  [phone] Date Ordered: Sep 22, 2021         Patient:  Gino Felder MRN:  [id number]   [address] [place] [id number] :  1950  SSN: " "   Phone: [phone] Sex:  F     Weight: 58.1 kg (128 lb)         Ht Readings from Last 1 Encounters:   09/20/21 154.9 cm (61\")         Home Oxygen Therapy          (Order ID: [id number])    Diagnosis:  Acute respiratory failure with hypoxia (HCC) (J96.01 [ICD-10-CM] 518.81 [ICD-9-CM])   Quantity:  1     Delivery Modality: Nasal Cannula  Liters Per Minute: 3  Duration: Continuous  Equipment:  Oxygen Concentrator &  &  Portable Gaseous Oxygen System & Portable Oxygen Contents Gaseous &  Conserving Regulator  Length of Need (99 Months = Lifetime): 99 Months = Lifetime        Authorizing Provider's Phone: [phone]   Authorizing Provider:Tonio Carver MD  Authorizing Provider's NPI: [id number]  Order Entered By: Tonio Carver MD 9/22/2021 10:58 AM     Electronically signed by: Tonio Carver MD 9/22/2021 10:58 AM            Insurance Information                MEDICARE/MEDICARE A & B Phone: [phone]    Subscriber: Nikki Felder Subscriber#: [id number]    Group#:  Precert#:           "

## 2021-09-22 NOTE — DISCHARGE PLACEMENT REQUEST
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 Muscle Testing (MMT) Assessment  BUE  grossly 4- o 4/5; BUE grossly 4- to 4/5  -[facility] Name 09/21/21 1309          Balance    Balance Assessment  sitting static balance;sitting dynamic  balance;standing static balance;standing dynamic balance  -     Static Sitting Balance  mild impairment  -     Dynamic Sitting Balance  mild impairment  -     Static Standing Balance  mild impairment  -     Dynamic Standing Balance  moderate impairment  -       User Key  (r) = Recorded By, (t) = Taken By, (c) = Cosigned By    Initials Name Provider Type     Micaela Engel, OTR/L Occupational Therapist        Goals/Plan     [facility] Name 09/21/21 1309          Transfer Goal 1 (OT)    Activity/Assistive Device (Transfer Goal 1, OT)  toilet  -     Time Frame (Transfer Goal 1, OT)  long term goal (LTG);by discharge  -     Progress/Outcome (Transfer Goal 1, OT)  goal not met  -[facility] Name 09/21/21 1309          Bathing Goal 1 (OT)    Activity/Device (Bathing Goal 1, OT)  bathing skills, all  -     [place] Level/Cues Needed (Bathing Goal 1, OT)  set-up required;standby assist;verbal cues required  -     Time Frame (Bathing Goal 1, OT)  long term goal (LTG);by discharge  -     Progress/Outcomes (Bathing Goal 1, OT)  goal not met  -[facility] Name 09/21/21 1309          Strength Goal 1 (OT)    Strength Goal 1 (OT)  Pt will be independent with BUE HEP.  -     Time Frame (Strength Goal 1, OT)  long term goal (LTG);by discharge  -     Progress/Outcome (Strength Goal 1, OT)  goal not met  -BH     Row Name 09/21/21 1309          Problem Specific Goal 1 (OT)    Problem Specific Goal 1 (OT)  Pt will communicate good home safety awareness.  -     Time Frame (Problem Specific Goal 1, OT)  long term goal (LTG);by discharge  -     Progress/Outcome (Problem Specific Goal 1, OT)  goal not met  -[facility] Name 09/21/21 1309          Therapy Assessment/Plan (OT)    Planned Therapy Interventions (OT)  activity tolerance training;adaptive equipment training;BADL retraining;cognitive/visual perception retraining;neuromuscular control/coordination retraining;patient/caregiver education/training;passive  ROM/stretching;transfer/mobility retraining;strengthening exercise;ROM/therapeutic exercise;occupation/activity based interventions;IADL retraining;functional balance retraining  -       User Key  (r) = Recorded By, (t) = Taken By, (c) = Cosigned By    Initials Name Provider Type     Micaela Engel, OTR/L Occupational Therapist        Clinical Impression     Row Name 09/21/21 130          Pain Assessment    Additional Documentation  Pain Scale: Numbers Pre/Post-Treatment (Group)  -     Row Name 09/21/21 1301          Pain Scale: Numbers Pre/Post-Treatment    Pretreatment Pain Rating  0/10 - no pain  -     Posttreatment Pain Rating  0/10 - no pain  -     Row Name 09/21/21 1301          Plan of Care Review    Plan of Care Reviewed With  patient  -     Outcome Summary  OT [place] completed this date, co-eval with PT. Pt alert and cooperative, tearful at times. Pt was CGA for sup to sit to sup. Pt SBA for bridging in bed. Pt averaged min assist for toileting and mod assist to don and [facility] gown total assist to tie. Pt setup with self feeding. Pt CGA to min assist for sit to stand off bed and min assist for toilet t/f. Pt min assist with SC for moiblity around room, decreased stepping pattern and posture. Pt may benefit from further skilled OT to reach PLOF/max independence with ADL. Recommend 24/7 care and further therapy at d/c.  -     Row Name 09/21/21 1306          Therapy Assessment/Plan (OT)    Patient/Family Therapy Goal Statement (OT)  to go home  -     Rehab Potential (OT)  fair, will monitor progress closely  -     Criteria for Skilled Therapeutic Interventions Met (OT)  yes;meets criteria;skilled treatment is necessary  [facility]     Therapy Frequency (OT)  other (see comments) 5-7 days a week  -     Predicted Duration of Therapy Intervention (OT)  until d/c  -     Row Name 09/21/21 2453          Therapy Plan Review/Discharge Plan (OT)    Anticipated Discharge Disposition (OT)  home with  24/7 care;home with home health  -     Row Name 09/21/21 1309          Vital Signs    Pre Systolic BP Rehab  161  -BH     Pre Treatment Diastolic BP  74  -BH     Post Systolic BP Rehab  155  -BH     Post Treatment Diastolic BP  72  -BH     Pretreatment Heart Rate (beats/min)  86  -BH     Posttreatment Heart Rate (beats/min)  88  -BH     Pre SpO2 (%)  97  -BH     O2 Delivery Pre Treatment  nasal cannula 3.5L  -BH     Post SpO2 (%)  92  -BH     O2 Delivery Post Treatment  supplemental O2  -BH     Pre Patient Position  Supine  -BH     Intra Patient Position  Standing  -BH     Post Patient Position  Supine  -     Row Name 09/21/21 1309          Positioning and Restraints    Pre-Treatment Position  in bed  -BH     Post Treatment Position  bed  -BH     In Bed  supine;call light within reach;encouraged to call for assist;exit alarm on;side rails up x2  -       User Key  (r) = Recorded By, (t) = Taken By, (c) = Cosigned By    Initials Name Provider Type    Micaela Hernandez, OTR/L Occupational Therapist        Outcome Measures     Row Name 09/21/21 1309          How much help from another is currently needed...    Putting on and taking off regular lower body clothing?  3  -BH     Bathing (including washing, rinsing, and drying)  3  -BH     Toileting (which includes using toilet bed pan or urinal)  3  -BH     Putting on and taking off regular upper body clothing  3  -BH     Taking care of personal grooming (such as brushing teeth)  3  -BH     Eating meals  4  -BH     AM-PAC 6 Clicks Score (OT)  19  -     Row Name 09/21/21 1309          Functional Assessment    Outcome Measure Options  AM-PAC 6 Clicks Daily Activity (OT)  -       User Key  (r) = Recorded By, (t) = Taken By, (c) = Cosigned By    Initials Name Provider Type    Micaela Hernandez OTR/L Occupational Therapist          Occupational Therapy Education                 Title: PT OT SLP Therapies (In Progress)     Topic: Occupational Therapy (In Progress)      Point: ADL training (Done)     Description:   Instruct learner(s) on proper safety adaptation and remediation techniques during self care or transfers.   Instruct in proper use of assistive devices.              Learning Progress Summary           Patient Acceptance, E, NR,VU by  at 9/21/2021 1410    Comment: Educated about OT and POC. Educated to call for assist. Educated on safety throughout.                   Point: Home exercise program (Not Started)     Description:   Instruct learner(s) on appropriate technique for monitoring, assisting and/or progressing therapeutic exercises/activities.              Learner Progress:  Not documented in this visit.          Point: Precautions (Done)     Description:   Instruct learner(s) on prescribed precautions during self-care and functional transfers.              Learning Progress Summary           Patient Acceptance, E, NR,VU by  at 9/21/2021 1410    Comment: Educated about OT and POC. Educated to call for assist. Educated on safety throughout.                   Point: Body mechanics (Done)     Description:   Instruct learner(s) on proper positioning and spine alignment during self-care, functional mobility activities and/or exercises.              Learning Progress Summary           Patient Acceptance, E, NR,VU by  at 9/21/2021 1410    Comment: Educated about OT and POC. Educated to call for assist. Educated on safety throughout.                               User Key     Initials Effective Dates Name Provider Type Discipline     06/16/21 -  Micaela Engel, OTR/L Occupational Therapist OT              OT Recommendation and Plan  Planned Therapy Interventions (OT): activity tolerance training, adaptive equipment training, BADL retraining, cognitive/visual perception retraining, neuromuscular control/coordination retraining, patient/caregiver education/training, passive ROM/stretching, transfer/mobility retraining, strengthening exercise, ROM/therapeutic  exercise, occupation/activity based interventions, IADL retraining, functional balance retraining  Therapy Frequency (OT): other (see comments) (5-7 days a week)  Plan of Care Review  Plan of Care Reviewed With: patient  Outcome Summary: OT eval completed this date, co-eval with PT. Pt alert and cooperative, tearful at times. Pt was CGA for sup to sit to sup. Pt SBA for bridging in bed. Pt averaged min assist for toileting and mod assist to don and [facility] gown total assist to tie. Pt setup with self feeding. Pt CGA to min assist for sit to stand off bed and min assist for toilet t/f. Pt min assist with SC for moiblity around room, decreased stepping pattern and posture. Pt may benefit from further skilled OT to reach PLOF/max independence with ADL. Recommend 24/7 care and further therapy at d/c.     Time Calculation:   Time Calculation- OT     Row Name 09/21/21 1411             Time Calculation- OT    OT Start Time  1309  -      OT Stop Time  1355  -      OT Time Calculation (min)  46 min  -      OT Received On  09/21/21  -      OT Goal Re-Cert Due Date  10/04/21  -         Untimed Charges    OT Eval/Re-eval Minutes  46  -BH         Total Minutes    Untimed Charges Total Minutes  46  -BH       Total Minutes  46  -BH        User Key  (r) = Recorded By, (t) = Taken By, (c) = Cosigned By    Initials Name Provider Type     Micaela Engel, OTR/L Occupational Therapist        Therapy Charges for Today     Code Description Service Date Service Provider Modifiers Qty    [id number]  OT EVAL MOD COMPLEXITY 3 9/21/2021 Micaela Engel, OTR/L GO 1               Micaela Engel OTR/L  9/21/2021    Electronically signed by Micaela Engel OTR/L at 09/21/21 1419     Micaela Engel OTR/L at 09/21/21 1309        Goal Outcome Evaluation:  Plan of Care Reviewed With: patient           Outcome Summary: OT eval completed this date, co-eval with PT. Pt alert and cooperative, tearful at times. Pt was CGA for sup  to sit to sup. Pt SBA for bridging in bed. Pt averaged min assist for toileting and mod assist to don and [facility] gown total assist to tie. Pt setup with self feeding. Pt CGA to min assist for sit to stand off bed and min assist for toilet t/f. Pt min assist with SC for moiblity around room, decreased stepping pattern and posture. Pt may benefit from further skilled OT to reach PLOF/max independence with ADL. Recommend 24/7 care and further therapy at d/c.    Electronically signed by Micaela Engel, OTR/L at 09/21/21 6387

## 2021-09-22 NOTE — DISCHARGE PLACEMENT REQUEST
"Gino Felder (70 y.o. Female)     Date of Birth Social Security Number Address Home Phone MRN    1950  [address] [place] [id number] [phone] [id number]    [place] Marital Status          [employer]        Admission Date Admission Type Admitting Provider Attending Provider Department, Room/Bed    9/20/21 Emergency Tonio Carver MD Popescu, Tudor, MD [address], [address]/1    Discharge Date Discharge Disposition Discharge Destination         Home-Health Care [facility]              Attending Provider: Tonio Carver MD    Allergies: No Known Allergies    Isolation: None   Infection: None   Code Status: CPR    Ht: 154.9 cm (61\")   Wt: 58.1 kg (128 lb)    Admission Cmt: None   Principal Problem: Acute respiratory failure with hypoxia (HCC) [J96.01]                 Active Insurance as of 9/20/2021     Primary Coverage     Payor Plan Insurance Group Employer/Plan Group    MEDICARE MEDICARE A & B      Payor Plan Address Payor Plan Phone Number Payor Plan Fax Number Effective Dates    [address] [phone]  10/1/2013 - None Entered    [facility] [id number]       Subscriber Name Subscriber Birth Date Member ID       GINO FELDER 1950 [id number]                 Emergency Contacts      (Rel.) Home Phone Work Phone Mobile Phone    Madhavi Morrison (Power of ) [phone] -- [phone]            Insurance Information                MEDICARE/MEDICARE A & B Phone: [phone]    Subscriber: Gino Felder Subscriber#: [id number]    Group#:  Precert#:           "
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
## 2021-09-23 ENCOUNTER — READMISSION MANAGEMENT (OUTPATIENT)
Dept: CALL CENTER | Facility: HOSPITAL | Age: 71
End: 2021-09-23

## 2021-09-23 NOTE — OUTREACH NOTE
Prep Survey      Responses   [facility] facility patient discharged from?  [place]   Is LACE score < 7 ?  No   Emergency Room discharge w/ pulse ox?  No   Eligibility  Readm Mgmt   Discharge diagnosis  COPD with acute exacerbation    Does the patient have one of the following disease processes/diagnoses(primary or secondary)?  COPD/Pneumonia   Does the patient have Home health ordered?  Yes   What is the Home health agency?   [facility]   Is there a DME ordered?  Yes   What DME was ordered?  [facility] home o2   Prep survey completed?  Yes          FLORA Espinoza RN

## 2021-09-25 LAB
BACTERIA SPEC AEROBE CULT: NORMAL
BACTERIA SPEC AEROBE CULT: NORMAL

## 2021-09-29 ENCOUNTER — READMISSION MANAGEMENT (OUTPATIENT)
Dept: CALL CENTER | Facility: HOSPITAL | Age: 71
End: 2021-09-29

## 2021-09-29 NOTE — OUTREACH NOTE
COPD/PN Week 1 Survey      Responses   [facility] patient discharged from?  [place]   Does the patient have one of the following disease processes/diagnoses(primary or secondary)?  COPD/Pneumonia   Was the primary reason for admission:  COPD exacerbation   Week 1 attempt successful?  No   Unsuccessful attempts  Attempt 1          Miley Corcoran LPN   I will START or STAY ON the medications listed below when I get home from the hospital:    Aspir 81 oral delayed release tablet  -- 1 tab(s) by mouth once a day  -- Indication: For CAD (coronary artery disease)    oxyCODONE 10 mg oral tablet  -- 1 tab(s) by mouth every 4 hours, As needed, Moderate Pain (4 - 6)  -- Indication: For Closed fracture of proximal end of right humerus, unspecified fracture morphology, initial encounter    Cozaar 100 mg oral tablet  -- 1 tab(s) by mouth once a day  -- Indication: For Essential hypertension    dilTIAZem 120 mg/24 hours oral capsule, extended release  -- 1 cap(s) by mouth once a day  -- Indication: For HTN (hypertension)    gabapentin 100 mg oral capsule  -- 1 cap(s) by mouth 3 times a day  -- Indication: For Closed fracture of proximal end of right humerus, unspecified fracture morphology, initial encounter    simvastatin 40 mg oral tablet  -- 1 tab(s) by mouth once a day (at bedtime)  -- Indication: For Hld    carbidopa-levodopa 25 mg-100 mg oral tablet, extended release  -- orally every 8 hours  -- Indication: For Gait abnormality    Plavix 75 mg oral tablet  -- 1 tab(s) by mouth once a day  -- Indication: For CAD (coronary artery disease)    cephalexin 500 mg oral capsule  -- 1 cap(s) by mouth every 12 hours  -- Indication: For uti    donepezil 10 mg oral tablet  -- 1 tab(s) by mouth once a day (at bedtime)  -- Indication: For dementia    docusate sodium 100 mg oral capsule  -- 1 cap(s) by mouth 2 times a day  -- Indication: For Constipation I will START or STAY ON the medications listed below when I get home from the hospital:    Aspir 81 oral delayed release tablet  -- 1 tab(s) by mouth once a day  -- Indication: For CAD (coronary artery disease)    oxyCODONE 10 mg oral tablet  -- 1 tab(s) by mouth every 4 hours, As needed, Moderate Pain (4 - 6)  -- Indication: For Closed fracture of proximal end of right humerus, unspecified fracture morphology, initial encounter    Cozaar 100 mg oral tablet  -- 1 tab(s) by mouth once a day  -- Indication: For Essential hypertension    tamsulosin 0.4 mg oral capsule  -- 1 cap(s) by mouth once a day  -- Indication: For Urinary retnetion    dilTIAZem 120 mg/24 hours oral capsule, extended release  -- 1 cap(s) by mouth once a day  -- Indication: For HTN (hypertension)    gabapentin 100 mg oral capsule  -- 1 cap(s) by mouth 3 times a day  -- Indication: For Closed fracture of proximal end of right humerus, unspecified fracture morphology, initial encounter    simvastatin 40 mg oral tablet  -- 1 tab(s) by mouth once a day (at bedtime)  -- Indication: For Hld    carbidopa-levodopa 25 mg-100 mg oral tablet, extended release  -- orally every 8 hours  -- Indication: For Gait abnormality    Plavix 75 mg oral tablet  -- 1 tab(s) by mouth once a day  -- Indication: For CAD (coronary artery disease)    cephalexin 500 mg oral capsule  -- 1 cap(s) by mouth every 12 hours  -- Indication: For uti    donepezil 10 mg oral tablet  -- 1 tab(s) by mouth once a day (at bedtime)  -- Indication: For dementia    docusate sodium 100 mg oral capsule  -- 1 cap(s) by mouth 2 times a day  -- Indication: For Constipation    bethanechol 25 mg oral tablet  -- 1 tab(s) by mouth every 12 hours  -- Indication: For Urinary rettntion

## 2021-09-30 ENCOUNTER — READMISSION MANAGEMENT (OUTPATIENT)
Dept: CALL CENTER | Facility: HOSPITAL | Age: 71
End: 2021-09-30

## 2021-09-30 NOTE — OUTREACH NOTE
COPD/PN Week 1 Survey      Responses   [facility] patient discharged from?  [place]   Does the patient have one of the following disease processes/diagnoses(primary or secondary)?  COPD/Pneumonia   Was the primary reason for admission:  COPD exacerbation   Week 1 attempt successful?  Yes   Call start time  1605   Call end time  1609   Discharge diagnosis  COPD with acute exacerbation    Person spoke with today (if not patient) and relationship  LUPIS Hendrickson reviewed with patient/caregiver?  Yes   Is the patient having any side effects they believe may be caused by any medication additions or changes?  No   Does the patient have all medications ordered at discharge?  Yes   Is the patient taking all medications as directed (includes completed medication regime)?  Yes   Does the patient have a primary care provider?   Yes   Does the patient have an appointment with their PCP or specialist within 7 days of discharge?  Yes   Has the patient kept scheduled appointments due by today?  Yes   What is the Home health agency?   [facility]   Has home health visited the patient within 72 hours of discharge?  Yes   What DME was ordered?  [facility] home o2   Has all DME been delivered?  Yes   Pulse Ox monitoring  Intermittent   Pulse Ox device source  Other   O2 Sat comments  98% 2LO2   Psychosocial issues?  No   Did the patient receive a copy of their discharge instructions?  Yes   Nursing interventions  Reviewed instructions with patient   What is the patient's perception of their health status since discharge?  Improving   Nursing Interventions  Nurse provided patient education   Are the patient's immunizations up to date?   Yes   Nursing interventions  Educated on importance of maintaining up to date immunizations as advised by provider   If the patient is a current smoker, are they able to teach back resources for cessation?  [id number]   Is the patient/caregiver able to teach  back the hierarchy of who to call/visit for symptoms/problems? PCP, Specialist, Home health nurse, Urgent Care, ED, 911  Yes   Is the patient able to teach back COPD zones?  Yes   Patient reports what zone on this call?  Green Zone   Green Zone  Reports doing well   Green Zone interventions:  Take daily medications, Use oxygen as prescribed, Do not smoke   Week 1 call completed?  Yes   Wrap up additional comments  LUPIS Hendrickson states pt is doing well. No questions/concerns.          Shira Jacobo RN

## 2021-10-08 ENCOUNTER — READMISSION MANAGEMENT (OUTPATIENT)
Dept: CALL CENTER | Facility: HOSPITAL | Age: 71
End: 2021-10-08

## 2021-10-08 NOTE — OUTREACH NOTE
COPD/PN Week 2 Survey      Responses   [facility] patient discharged from?  [place]   Does the patient have one of the following disease processes/diagnoses(primary or secondary)?  COPD/Pneumonia   Was the primary reason for admission:  COPD exacerbation   Week 2 attempt successful?  Yes   Call start time  1417   Call end time  1425   Discharge diagnosis  COPD with acute exacerbation    Is patient permission given to speak with other caregiver?  Yes   List who call center can speak with  Madhavi LUPIS Morrison, daughter   Person spoke with today (if not patient) and relationship  LUPIS Hendrickson   Meds reviewed with patient/caregiver?  Yes   Does the patient have all medications ordered at discharge?  Yes   Is the patient taking all medications as directed (includes completed medication regime)?  Yes   Does the patient have a primary care provider?   Yes   Does the patient have an appointment with their PCP or specialist within 7 days of discharge?  Yes   Comments regarding PCP  JUANA Rhodes PCP. Patient has had PCP f/u since discharge.    Has the patient kept scheduled appointments due by today?  Yes   What is the Home health agency?   [facility]   Has home health visited the patient within 72 hours of discharge?  Yes   What DME was ordered?  [facility] home o2   Has all DME been delivered?  Yes   DME comments  Wearing O22L.    Pulse Ox monitoring  Intermittent   Pulse Ox device source  Patient   O2 Sat comments  97-98% on 2L.    O2 Sat: education provided  Sat levels, Monitoring frequency, When to seek care   Psychosocial issues?  No   Did the patient receive a copy of their discharge instructions?  Yes   Nursing interventions  Reviewed instructions with patient   What is the patient's perception of their health status since discharge?  New symptoms unrelated to diagnosis [New sx of redness, irritation to right eye. Daughter states is going to do video appt today with  provider. ]   Nursing Interventions  Nurse provided patient education, Advised patient to call provider   Is the patient/caregiver able to teach back the hierarchy of who to call/visit for symptoms/problems? PCP, Specialist, Home health nurse, Urgent Care, ED, 911  Yes   Is the patient able to teach back COPD zones?  No   Nursing interventions  Education provided on various zones   Patient reports what zone on this call?  Green Zone   Green Zone  Reports doing well, Breathing without shortness of breath, Usual activity and exercise level   Green Zone interventions:  Take daily medications, Use oxygen as prescribed   Week 2 call completed?  Yes          Ida Hernandez RN

## 2021-10-17 LAB
QT INTERVAL: 390 MS
QTC INTERVAL: 469 MS

## 2021-10-19 ENCOUNTER — READMISSION MANAGEMENT (OUTPATIENT)
Dept: CALL CENTER | Facility: HOSPITAL | Age: 71
End: 2021-10-19

## 2021-10-19 NOTE — OUTREACH NOTE
COPD/PN Week 3 Survey      Responses   [facility] patient discharged from? [place]   Does the patient have one of the following disease processes/diagnoses(primary or secondary)? COPD/Pneumonia   Was the primary reason for admission: COPD exacerbation   Week 3 attempt successful? Yes   Call start time 1111   Discharge diagnosis COPD with acute exacerbation    Person spoke with today (if not patient) and relationship LUPIS Hendrickson reviewed with patient/caregiver? Yes   Is the patient having any side effects they believe may be caused by any medication additions or changes? No   Is the patient taking all medications as directed (includes completed medication regime)? Yes   Medication comments Pt has been started on q4h scheduled nebs since discharge   Does the patient have a primary care provider?  Yes   Has the patient kept scheduled appointments due by today? Yes   What is the Home health agency?  [facility]   Has home health visited the patient within 72 hours of discharge? Yes   DME comments Dtr reports oxygen at 2 lom   Pulse Ox monitoring Intermittent   O2 Sat comments 97-98% on 2L.    O2 Sat: education provided Sat levels   Psychosocial issues? No   What is the patient's perception of their health status since discharge? Improving  [Dtr reports patient has improved slowly--down to 2 lpm O2, nebs added. Dtr treating redness to right eye with medicated eye drops/warm compresses]   Is the patient/caregiver able to teach back the hierarchy of who to call/visit for symptoms/problems? PCP, Specialist, Home health nurse, Urgent Care, ED, 911 Yes          Silvia Collier RN

## 2022-11-08 ENCOUNTER — TRANSCRIBE ORDERS (OUTPATIENT)
Dept: CARDIOLOGY | Facility: CLINIC | Age: 72
End: 2022-11-08

## 2022-11-08 DIAGNOSIS — R06.09 DYSPNEA ON EXERTION: Primary | ICD-10-CM

## 2023-01-04 NOTE — PLAN OF CARE
Problem: Patient Care Overview (Adult)  Goal: Adult Individualization and Mutuality  Outcome: Ongoing (interventions implemented as appropriate)  Goal: Discharge Needs Assessment  Outcome: Ongoing (interventions implemented as appropriate)       Melolabial Interpolation Flap Division And Inset Text: Division and inset of the melolabial interpolation flap was performed to achieve optimal aesthetic result, restore normal anatomic appearance and avoid distortion of normal anatomy, expedite and facilitate wound healing, achieve optimal functional result and because linear closure either not possible or would produce suboptimal result. The patient was prepped and draped in the usual manner. The pedicle was infiltrated with local anesthesia. The pedicle was sectioned with a #15 blade. The pedicle was de-bulked and trimmed to match the shape of the defect. Hemostasis was achieved. The flap donor site and free margin of the flap were secured with deep buried sutures and the wound edges were re-approximated.

## 2025-06-14 NOTE — ED NOTES
PTA meds completed by pre-admitting nurse Iris Zuleta and reviewed by pharmacy      PTA Med List   Medication Sig Last Dose/Taking    calcium polycarbophil (FIBER-LAX) 625 MG tablet Take 1 tablet (625 mg) by mouth daily. Taking    DULoxetine (CYMBALTA) 60 MG capsule Take 120 mg by mouth daily  Taking    econazole nitrate 1 % external cream Apply topically as needed. Taking As Needed    ferrous sulfate (FEROSUL) 325 (65 Fe) MG tablet TAKE 1 TABLET BY MOUTH DAILY WITH BREAKFAST Taking    hydrocortisone 2.5 % cream Apply topically 2 times daily as needed. Taking As Needed    indomethacin (INDOCIN) 50 MG capsule Take 1 capsule (50 mg) by mouth 3 times daily (with meals). Taking    ketoconazole (NIZORAL) 2 % external cream Apply topically 2 times daily as needed. Taking As Needed    mirabegron (MYRBETRIQ) 50 MG 24 hr tablet Take 1 tablet by mouth daily Taking    nystatin-triamcinolone (MYCOLOG II) 515400-1.1 UNIT/GM-% external cream Apply topically 2 times daily Taking    OLANZapine (ZYPREXA) 15 MG tablet Take 15 mg by mouth at bedtime. Taking    oxyBUTYnin ER (DITROPAN XL) 15 MG 24 hr tablet Take 2 tablets (30 mg) by mouth daily Taking    pantoprazole (PROTONIX) 40 MG EC tablet TAKE 1 TABLET BY MOUTH ONCE DAILY 30-60 MINUTE(S) BEFORE FIRST MEAL OF THE DAY Taking    prazosin (MINIPRESS) 1 MG capsule Take 3 mg by mouth At Bedtime Taking    tirzepatide-Weight Management (ZEPBOUND) 12.5 MG/0.5ML prefilled pen Inject 0.5 mLs (12.5 mg) subcutaneously every 7 days. (Patient not taking: Reported on 6/13/2025) 5/30/2025      Per daughter, pt cannot swallow very well and gets choked easily, always has someone with her when she eats, needs softened diet.     Temitope Lam RN  09/20/21 [id number]

## (undated) DEVICE — GW CHOICE PT J 300CM

## (undated) DEVICE — PK CATH LAB 60

## (undated) DEVICE — MODEL BT2000 P/N 700287-012KIT CONTENTS: MANIFOLD WITH SALINE AND CONTRAST PORTS, SALINE TUBING WITH SPIKE AND HAND SYRINGE, TRANSDUCER: Brand: BT2000 AUTOMATED MANIFOLD KIT

## (undated) DEVICE — Device

## (undated) DEVICE — VSI MICRO-INTRODUCER KITS ARE INTENDED FOR USE IN PERCUTANEOUS INTRODUCTION OF UP TO A 0.018 INCH OR 0.038 INCH GUIDEWIRE OR CATHETER INTO THE VASCULAR SYSTEM FOLLOWING A SMALL GAUGE NEEDLE STICK.: Brand: VSI MICRO-INTRODUCER KIT

## (undated) DEVICE — CATH DIAG EXPO M/ PK 6FR FL4/FR4 PIG 3PK

## (undated) DEVICE — COPILOT BLEEDBACK CONTROL VALVE: Brand: COPILOT

## (undated) DEVICE — DEV INFL MONARCH 25W

## (undated) DEVICE — MODEL AT P65, P/N 701554-001KIT CONTENTS: HAND CONTROLLER, 3-WAY HIGH-PRESSURE STOPCOCK WITH ROTATING END AND PREMIUM HIGH-PRESSURE TUBING: Brand: ANGIOTOUCH® KIT

## (undated) DEVICE — INTRO SHEATH ART/FEM ENGAGE .038 6F12CM

## (undated) DEVICE — DEV INFL MONARCH 20ML

## (undated) DEVICE — NC TREK CORONARY DILATATION CATHETER 2.50 MM X 15 MM / OVER-THE-WIRE: Brand: NC TREK

## (undated) DEVICE — ANGIO-SEAL EVOLUTION VASCULAR CLOSURE DEVICE: Brand: ANGIO-SEAL

## (undated) DEVICE — HI-TORQUE WHISPER MS GUIDE WIRE .014 STRAIGHT TIP 3.0 CM X 300 CM: Brand: HI-TORQUE WHISPER

## (undated) DEVICE — ELECTRODE,RT,STRESS,FOAM,50PK: Brand: MEDLINE

## (undated) DEVICE — GW PERIPH GUIDERIGHT STD/J/TP PTFE/PCOAT SS 0.038IN 5X150CM

## (undated) DEVICE — COPILOT KIT INCLUDES BLEEDBACK CONTROL VALVE / GUIDE WIRE INTRODUCER / TORQUE DEVICE: Brand: ACCESSORIES

## (undated) DEVICE — 6F .070 XB LAD 3.5 100CM: Brand: VISTA BRITE TIP